# Patient Record
Sex: FEMALE | Race: WHITE | NOT HISPANIC OR LATINO | Employment: FULL TIME | URBAN - METROPOLITAN AREA
[De-identification: names, ages, dates, MRNs, and addresses within clinical notes are randomized per-mention and may not be internally consistent; named-entity substitution may affect disease eponyms.]

---

## 2017-01-18 ENCOUNTER — PATIENT MESSAGE (OUTPATIENT)
Dept: OBSTETRICS AND GYNECOLOGY | Facility: CLINIC | Age: 42
End: 2017-01-18

## 2017-01-25 ENCOUNTER — PATIENT MESSAGE (OUTPATIENT)
Dept: OBSTETRICS AND GYNECOLOGY | Facility: CLINIC | Age: 42
End: 2017-01-25

## 2017-01-27 ENCOUNTER — CLINICAL SUPPORT (OUTPATIENT)
Dept: OBSTETRICS AND GYNECOLOGY | Facility: CLINIC | Age: 42
End: 2017-01-27
Payer: COMMERCIAL

## 2017-01-27 DIAGNOSIS — Z78.0 MENOPAUSE: Primary | ICD-10-CM

## 2017-01-27 PROCEDURE — 96372 THER/PROPH/DIAG INJ SC/IM: CPT | Mod: S$GLB,,, | Performed by: OBSTETRICS & GYNECOLOGY

## 2017-01-27 PROCEDURE — 99999 PR PBB SHADOW E&M-EST. PATIENT-LVL II: CPT | Mod: PBBFAC,,,

## 2017-01-27 NOTE — PROGRESS NOTES
Here for  hormone therapy injection, no complaints at this time , Injection given as ordered, tolerated well, no report of pain prior to or after injection. Return to clinic as scheduled.     Depo Estradiol   10      mg    Clinic Supplied Medication

## 2017-02-06 ENCOUNTER — PATIENT MESSAGE (OUTPATIENT)
Dept: OBSTETRICS AND GYNECOLOGY | Facility: CLINIC | Age: 42
End: 2017-02-06

## 2017-02-24 ENCOUNTER — CLINICAL SUPPORT (OUTPATIENT)
Dept: OBSTETRICS AND GYNECOLOGY | Facility: CLINIC | Age: 42
End: 2017-02-24
Payer: COMMERCIAL

## 2017-02-24 ENCOUNTER — OFFICE VISIT (OUTPATIENT)
Dept: OBSTETRICS AND GYNECOLOGY | Facility: CLINIC | Age: 42
End: 2017-02-24
Attending: OBSTETRICS & GYNECOLOGY
Payer: COMMERCIAL

## 2017-02-24 VITALS
WEIGHT: 167.56 LBS | HEIGHT: 70 IN | BODY MASS INDEX: 23.99 KG/M2 | SYSTOLIC BLOOD PRESSURE: 108 MMHG | DIASTOLIC BLOOD PRESSURE: 72 MMHG

## 2017-02-24 DIAGNOSIS — N64.4 BREAST PAIN: Primary | ICD-10-CM

## 2017-02-24 DIAGNOSIS — Z78.0 MENOPAUSE: Primary | ICD-10-CM

## 2017-02-24 PROCEDURE — 99213 OFFICE O/P EST LOW 20 MIN: CPT | Mod: S$GLB,,, | Performed by: OBSTETRICS & GYNECOLOGY

## 2017-02-24 PROCEDURE — 1160F RVW MEDS BY RX/DR IN RCRD: CPT | Mod: S$GLB,,, | Performed by: OBSTETRICS & GYNECOLOGY

## 2017-02-24 PROCEDURE — 99999 PR PBB SHADOW E&M-EST. PATIENT-LVL II: CPT | Mod: PBBFAC,,, | Performed by: OBSTETRICS & GYNECOLOGY

## 2017-02-24 PROCEDURE — 96372 THER/PROPH/DIAG INJ SC/IM: CPT | Mod: S$GLB,,, | Performed by: OBSTETRICS & GYNECOLOGY

## 2017-02-24 PROCEDURE — 99999 PR PBB SHADOW E&M-EST. PATIENT-LVL II: CPT | Mod: PBBFAC,,,

## 2017-02-24 RX ORDER — PREDNISONE 10 MG/1
TABLET ORAL
Refills: 0 | COMMUNITY
Start: 2017-02-20 | End: 2017-04-03

## 2017-02-24 NOTE — MR AVS SNAPSHOT
[facility] [address]  [address] [address]  Phone: [phone]                  Mirian العلي   2017 9:30 AM   Appointment    Description:  Female : 1975   Provider:  GYN, INJECTION   Department:  [facility] [address]                To Do List           Future Appointments        Provider Department Dept Phone    2017 10:15 AM Genet Stein MD [facility] [address] [phone]      Goals (5 Years of Data)     None      [facility]     [facility] -  Assistance  Registered nurses in the [facility] provide clinical advisement, health education, appointment booking, and other advisory services.  Call for this free service at [phone].             Medications           Message regarding Medications     Verify the changes and/or additions to your medication regime listed below are the same as discussed with your clinician today.  If any of these changes or additions are incorrect, please notify your healthcare provider.             Verify that the below list of medications is an accurate representation of the medications you are currently taking.  If none reported, the list may be blank. If incorrect, please contact your healthcare provider. Carry this list with you in case of emergency.           Current Medications     acetaminophen (TYLENOL) 500 MG tablet Take 500 mg by mouth every 6 (six) hours as needed.      alprazolam (XANAX) 1 MG tablet TAKE 1 TABLET BY MOUTH FOUR TIMES DAILY AS NEEDED    aspirin (ECOTRIN) 81 MG EC tablet Take 81 mg by mouth once daily.    cetirizine (ZYRTEC) 10 MG tablet Take 1 tablet (10 mg total) by mouth once daily.    cyanocobalamin, vitamin B-12, 5,000 mcg Subl Place under the tongue once a week.    estradiol (VIVELLE-DOT) 0.1 mg/24 hr PTSW Apply 2 patches ( yes two) to skin twice weekly as directed.    fluticasone (FLONASE) 50 mcg/actuation nasal spray PLACE 2 SPRAYS  IN EACH NOSTRIL ONCE DAILY    gabapentin (NEURONTIN) 300 MG capsule Take 300 mg by mouth once as needed.     hydrocodone-acetaminophen 10-325mg (NORCO)  mg Tab TK 1 T PO Q 4 TO 6 H PRN P RELIEF.    ibuprofen (ADVIL,MOTRIN) 200 MG tablet Take 200 mg by mouth every 6 (six) hours as needed.             Clinical Reference Information           Your Vitals Were     Last Period                   02/01/2014           Allergies as of 2/24/2017     Niacin Preparations    Sulfa (Sulfonamide Antibiotics)    Iodine And Iodide Containing Products      Immunizations Administered on Date of Encounter - 2/24/2017     None      Language Assistance Services     ATTENTION: Language assistance services are available, free of charge. Please call [phone].      ATENCIÓN: Si juan daley, tiene a banks disposición servicios gratuitos de asistencia lingüística. Llame al [phone].     CHÚ Ý: N?u b?n nói Ti?ng Vi?t, có các d?ch v? h? tr? ngôn ng? mi?n phí dành cho b?n. G?i s? [phone].         [facility] [address] complies with applicable Federal civil rights laws and does not discriminate on the basis of race, color, national origin, age, disability, or sex.

## 2017-02-24 NOTE — PROGRESS NOTES
Here for  hormone therapy injection, no complaints at this time , Injection given as ordered, tolerated well, no report of pain prior to or after injection. Return to clinic as scheduled.     Site -LB    Depo Estradiol  10 mg    Clinic Supplied Medication

## 2017-02-24 NOTE — PROGRESS NOTES
Subjective:       Patient ID: Mirian العلي is a 41 y.o. female.    Chief Complaint:  Follow-up (breast tenderness)      History of Present Illness  HPI  This 41 yr old P2 female is here for her Estradiol injection and she had her first one last month and about week later had sharp shooting pain in her right breast that woke her up .  She had normal mamogram in 2016.  Now no tenderness or shooting pain and only had that night.  We discussed and her fear is breast ca.  We re discussed E2 and risks and benefits and she does feel better on the shots.    GYN & OB History  Patient's last menstrual period was 2014.   Date of Last Pap: 3/1/2014    OB History    Para Term  AB SAB TAB Ectopic Multiple Living   4 2 2  2 2          # Outcome Date GA Lbr Elan/2nd Weight Sex Delivery Anes PTL Lv   4 SAB            3 SAB            2 Term            1 Term               Obstetric Comments   Menarche age 14. LMP age 39 (postsurgical).   First child born age 30.   History of abnormal PAP smear: NO.   History of abnormal mammogram: NO.   History of sexually transmitted disease:  NO             Review of Systems  Review of Systems        Objective:    Physical Exam:        Pulmonary/Chest:                                   Assessment:        1. Breast pain               Plan:      Follow up prn

## 2017-03-02 ENCOUNTER — PATIENT MESSAGE (OUTPATIENT)
Dept: OBSTETRICS AND GYNECOLOGY | Facility: CLINIC | Age: 42
End: 2017-03-02

## 2017-03-24 ENCOUNTER — CLINICAL SUPPORT (OUTPATIENT)
Dept: OBSTETRICS AND GYNECOLOGY | Facility: CLINIC | Age: 42
End: 2017-03-24
Payer: COMMERCIAL

## 2017-03-24 DIAGNOSIS — Z78.0 MENOPAUSE: Primary | ICD-10-CM

## 2017-03-24 PROCEDURE — 96372 THER/PROPH/DIAG INJ SC/IM: CPT | Mod: S$GLB,,, | Performed by: OBSTETRICS & GYNECOLOGY

## 2017-03-24 PROCEDURE — 99999 PR PBB SHADOW E&M-EST. PATIENT-LVL II: CPT | Mod: PBBFAC,,,

## 2017-03-24 NOTE — PROGRESS NOTES
Here for  hormone therapy injection, no complaints at this time , Injection given as ordered, tolerated well, no report of pain prior to or after injection. Return to clinic as scheduled.     Site - RB      Depo Estradiol  10 mg    Clinic Supplied Medication

## 2017-04-03 ENCOUNTER — OFFICE VISIT (OUTPATIENT)
Dept: ALLERGY | Facility: CLINIC | Age: 42
End: 2017-04-03
Payer: COMMERCIAL

## 2017-04-03 VITALS
DIASTOLIC BLOOD PRESSURE: 70 MMHG | BODY MASS INDEX: 22.9 KG/M2 | HEIGHT: 71 IN | WEIGHT: 163.56 LBS | SYSTOLIC BLOOD PRESSURE: 102 MMHG

## 2017-04-03 DIAGNOSIS — R09.89 CHRONIC THROAT CLEARING: ICD-10-CM

## 2017-04-03 DIAGNOSIS — R51.9 FACIAL PAIN: ICD-10-CM

## 2017-04-03 DIAGNOSIS — R51.9 GENERALIZED HEADACHES: ICD-10-CM

## 2017-04-03 DIAGNOSIS — D68.51 FACTOR V LEIDEN CARRIER: ICD-10-CM

## 2017-04-03 DIAGNOSIS — J30.89 NON-SEASONAL ALLERGIC RHINITIS DUE TO OTHER ALLERGIC TRIGGER: Primary | ICD-10-CM

## 2017-04-03 PROCEDURE — 1160F RVW MEDS BY RX/DR IN RCRD: CPT | Mod: S$GLB,,, | Performed by: ALLERGY & IMMUNOLOGY

## 2017-04-03 PROCEDURE — 99999 PR PBB SHADOW E&M-EST. PATIENT-LVL III: CPT | Mod: PBBFAC,,, | Performed by: ALLERGY & IMMUNOLOGY

## 2017-04-03 PROCEDURE — 99204 OFFICE O/P NEW MOD 45 MIN: CPT | Mod: S$GLB,,, | Performed by: ALLERGY & IMMUNOLOGY

## 2017-04-03 RX ORDER — AZELASTINE 1 MG/ML
1-2 SPRAY, METERED NASAL 2 TIMES DAILY PRN
Qty: 30 ML | Refills: 5 | Status: SHIPPED | OUTPATIENT
Start: 2017-04-03 | End: 2018-10-17

## 2017-04-03 NOTE — PROGRESS NOTES
"Mirian Gill is a 41-year-old female who presents to clinic today for evaluation of allergic rhinitis. She was last seen by me on January 17, 2013.    Her ImmunoCAPs at that time were positive for dust mites.  She did house dust mite avoidance and her symptoms improved.    She has been taking Flonase daily since then.    She has been taking either Zyrtec or Benadryl on most days.    She does have itching without a rash as well. She takes Benadryl for this. Her last one was this morning. She prefers Benadryl over Zyrtec for this.    Over the last several months her symptoms have been increasing in severity. In early February she had a viral upper respiratory infection and saw her ENT, Dr. Danny Pepe.    She had increased right ear pain associated with decreased hearing. She had 2 tests done by Dr. Pepe. The one at the beginning of her symptoms showed decreased hearing. The second one after a course of prednisone showed improvement.    She did take a Medrol Dosepak. She did not take any antibiotics.    After this her ear pain did improve.    She has continued to have increased facial discomfort over the maxillary and frontal sinus areas. She has had this before her last visit in 2013. She has not had a sinus CT.    She also has had increased postnasal drip, frequent throat clearing, a sensation that something is in the back throat, mucus in throat, difficulty swallowing, and occasional hoarseness.     She denies any cough, wheezing, or shortness of breath. She was told in the past that she had "a little asthma" after seeing Dr. Dasia Madison, her PCP at the time, and having a PFT done.    She has had reflux in the past but has been mild. She is currently not having.    She has a factor V Leiden deficiency. She has never had a DVT.    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    Her brother is Rudi Gill. She has 2 daughters ages 8-1/2 and 11 that are in the [employer]. She is an  for the [employer].    [facility] PEQ ALLERGY QUESTIONNAIRE LONG 3/31/2017   Do you have symptoms in your head, eyes, ears, nose, or sinuses? Yes   Head or facial pain: Headaches, Sinus pressure   Please describe your head and/or facial pain, if applicable.  pressure across my sinus and forehead   Eyes: Itching, Tearing, Redness, Contacts, Eye discharge, Sensitivity to light   Do you have difficulty wearing contacts, if applicable?  No   Ears: Itching, Fullness, Pressure, Difficulty hearing, Infections   When did you have ear infections, if applicable? Viral infection in early February 2017   Nose: Itching, Post nasal drip, Sniffling, Sneezing, Runny nose, Snoring, Broken nose   When did you break your nose, if applicable? 1993   Throat: Itching, Hoarseness, Frequent clearing   Sinuses: Sinus infections   Do you have symptoms in your lungs?  Yes   Lungs: Cough, Shortness of breath   Is your cough productive of mucus and/or phlegm , if applicable? Yes   Have you ever has a tuberculosis skin test?  Yes   When did you have a tuberculosis skin test, if applicable? years ago   Was your tuberculosis skin test positive or negative, if applicable? Negative   Have you ever had a lung-function test? Yes   When was your lung-function test, if applicable? years ago   Have you had a flu shot this year? Yes   When was your flu shot, if applicable? August 2016   Have you had the pneumonia vaccine?  No   Do you have any known problems with your immune system? No   Do you suspect you may have problems with your immune system? No   Do you have frequent infections? No   Do you have skin symptoms? Yes   Skin: Itching   Have you associated the hives or swelling with any of the following? Not applicable   Have you had any other associated symptoms with the hives or swelling such as: Not applicable   When did these symptoms first occur? Early February 2017   Are they getting worse or  better? Better   How often do these symptoms occur? More often than not   When do these symptoms occur? All the time   Do they occur year round? Yes   If there is any seasonal variation in your symptoms, when are they worse? They have been worse since February, and also improved in some ways like the ear feeling less full.   Is there a particular time of the day or night when the symptoms are worse? No   Is there anything you have identified, which can cause symptoms or make them worse? (such as dust, grass, plant or animal products, mold, heat, cold, strong odors, exercise) No   Is there anything you have identified, which can make symptoms better?  Steam can help open my ears short term   What medications have you tried in the past to help control these symptoms?  Prednisone, Flonase, Zyrtec, Benadryl   Please list all the vitamins or herbal medications you are taking. B12 sublingual   Please list all the other medications you are taking, including over-the-counter medications. Estrogen injection monthly, baby aspirin, naproxen for pain as needed, xanax as needed for anxiety   Have you ever seen an allergist for these symptoms? Yes   When did you see the allergist, if applicable? 2013   Have you ever had skin tests? No   Have you ever had any other type of allergy testing? No   When did you have allergy tests, if applicable?  2013   Have you ever had allergy shots? No   Do you have food allergies? No   Do you have drug allergies? Yes   Please list the drug(s), type of reaction(s), last date of reaction(s), and if you have used the medication since discovering you are allergic.  Sulfa- swelling tongue- 2015, no use since, iodine contrast- hives- 2007? no use since, Niacin- flushing- 2003? no use since   Do you have insect allergies? No   Do you have latex allergies? No   Constitution: Activity change   Cardiovascular: No symptoms   Gastrointestinal: Nausea, vomiting   Genital/ urinary No symptoms   Musculoskeletal:  No symptoms   Endocrine: Headaches, Light-headedness   Hematologic: Adenopathy/ enlarged lymph nodes, Bruises/ bleeds easily   Please note which family members have allergies or asthma and specify which they have. Brother has asthma and allergies, mother had allergies   How long have you lived at your current address? 10 years   Has your residence ever had water or flood damage? No   Does your house have: Central air conditioning, Electrostatic air , HEPA filters   Does your bedroom have: Carpeting, Ceiling fan   What type of pillow do you have, for example feather, foam and fiberfill?  feather and fiberfill    Do you have pets? No   Does anyone in the house smoke? 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 Please specify which symptoms, if applicable.  no   Did you find this questionnaire helpful in addressing your symptoms?  Yes     Physical Examination:  General: Well-developed, well-nourished, no acute distress. Clearing throat throughout interview.  Head: No sinus tenderness.  Eyes: Conjunctivae:  No bulbar or palpebral conjunctival injection.  Ears: EAC's clear.  TM's clear.  No pre-auricular nodes.  Nose: Nasal Mucosa:  Pink.  Septum: No apparent deviation.  Turbinates:  No significant edema.  Polyps/Mass:  None visible.  Teeth/Gums:  No bleeding noted.  Oropharynx: No exudates.  Neck: Supple without thyromegaly. No cervical lymphadenopathy.    Respiratory/Chest: Effort: Good.  Auscultation:  Clear bilaterally.  Cardiovascular:  No murmur, rubs, or gallop heard.   GI:  Non-tender.  No masses.  No organomegaly.  Extremities:  No swelling.  No cyanosis, clubbing, or edema.  Skin: Good turgor.  No urticaria or angioedema.  Neuro/Psych: Oriented x 3.    Assessment:  1. Allergic rhinitis.  2. Facial discomfort, consider chronic sinusitis.  3. Consider LPR.  4. Factor V Leiden deficiency.    Recommendations:  1. Continue house dust mite avoidance.  2. Continue  Flonase daily.  3. OTC antihistamines as needed.  4. Add azelastine 1-2 sprays each nostril twice a day when necessary rhinitis.  5. Consider sinus CT if symptoms are not completely controlled.  6. Consider ENT evaluation of LPR. This was discussed.  7. Return to clinic in 2 weeks.    LPR and website were reviewed.

## 2017-04-19 ENCOUNTER — OFFICE VISIT (OUTPATIENT)
Dept: ALLERGY | Facility: CLINIC | Age: 42
End: 2017-04-19
Payer: COMMERCIAL

## 2017-04-19 VITALS
HEIGHT: 71 IN | OXYGEN SATURATION: 100 % | SYSTOLIC BLOOD PRESSURE: 100 MMHG | WEIGHT: 163.81 LBS | HEART RATE: 70 BPM | DIASTOLIC BLOOD PRESSURE: 60 MMHG | BODY MASS INDEX: 22.93 KG/M2

## 2017-04-19 DIAGNOSIS — R51.9 FACIAL PAIN: ICD-10-CM

## 2017-04-19 DIAGNOSIS — J30.89 NON-SEASONAL ALLERGIC RHINITIS DUE TO OTHER ALLERGIC TRIGGER: Primary | ICD-10-CM

## 2017-04-19 PROCEDURE — 1160F RVW MEDS BY RX/DR IN RCRD: CPT | Mod: S$GLB,,, | Performed by: ALLERGY & IMMUNOLOGY

## 2017-04-19 PROCEDURE — 99214 OFFICE O/P EST MOD 30 MIN: CPT | Mod: S$GLB,,, | Performed by: ALLERGY & IMMUNOLOGY

## 2017-04-19 PROCEDURE — 99999 PR PBB SHADOW E&M-EST. PATIENT-LVL III: CPT | Mod: PBBFAC,,, | Performed by: ALLERGY & IMMUNOLOGY

## 2017-04-19 NOTE — PROGRESS NOTES
Mirian Gill return to clinic today for continued evaluation of allergic rhinitis. She was last seen April 3, 2017.    After her last visit, she started taking azelastine at least once a day. She does think that this has helped. Her ear pain has improved.    She does continue to take Flonase and Zyrtec daily.    She has had increased drowsiness since starting azelastine.    She saw Dr. Danny Pepe in ENT on April 10, 2017. He did a swab of her nose and told her he grew staph. He gave her 3 days of azithromycin. She does not think that this helped.    She continues to have clear rhinorrhea. She thinks that after using Astelin this may increase.    She continues to have pressure over her maxillary and frontal areas. She continues to have a sensation of a lump in the throat, hoarseness, and throat clearing.    Dr. Pepe did do a laryngoscopy and did not see evidence of reflux.    They did discuss doing an MRI or sinus CT. She is to discuss with him after the visit today.    [facility] PEQ ALLERGY QUESTIONNAIRE SHORT 4/18/2017   Are you taking any new medications since your last visit? Yes   Constitution: Activity change, Fatigue   Head or facial pain: Headaches, Sinus pressure   Eyes: Contacts   Ears: Fullness, Pressure, Difficulty hearing   Nose: Post nasal drip, Sniffling   Throat: Frequent clearing   Sinuses: No symptoms   Lungs: No symptoms   Skin: Itching   Cardiovascular: No symptoms   Gastrointestinal: No symptoms   Genital/ urinary No symptoms   Musculoskeletal: No symptoms   Neurologic: Headaches   Endocrine: No symptoms   Hematologic: Bruises/ bleeds easily     Physical Examination:  General: Well-developed, well-nourished, no acute distress. Clearing throat throughout interview.  Head: No sinus tenderness.  Eyes: Conjunctivae:  No bulbar or palpebral conjunctival injection.  Ears: EAC's clear.  TM's clear.  No pre-auricular nodes.  Nose: Nasal Mucosa:  Pink.  Septum: No apparent deviation.  Turbinates:  No  significant edema.  Polyps/Mass:  None visible.  Teeth/Gums:  No bleeding noted.  Oropharynx: No exudates.  Neck: Supple without thyromegaly. No cervical lymphadenopathy.    Respiratory/Chest: Effort: Good.  Auscultation:  Clear bilaterally.  Skin: Good turgor.  No urticaria or angioedema.  Neuro/Psych: Oriented x 3.    Laboratory 1/10/2013:  IgE level: 109.  ImmunoCAP:  Class II: Dust mites.    Assessment:  1. Allergic rhinitis.  2. Facial discomfort, consider chronic sinusitis.  3. Consider LPR.  4. Factor V Leiden deficiency.    Recommendations:  1. Continue house dust mite avoidance.  2. Continue Flonase daily.  3. OTC antihistamines as needed.  4. Continue azelastine 1-2 sprays each nostril twice a day when necessary rhinitis unless drowsiness persists.  5. Consider sinus CT or MRI if symptoms persist. She will discuss with Dr. Pepe.  6. Consider laryngologist evaluation.  7. Return to clinic after above.

## 2017-04-24 ENCOUNTER — CLINICAL SUPPORT (OUTPATIENT)
Dept: OBSTETRICS AND GYNECOLOGY | Facility: CLINIC | Age: 42
End: 2017-04-24
Payer: COMMERCIAL

## 2017-04-24 DIAGNOSIS — R68.82 DECREASED LIBIDO: Primary | ICD-10-CM

## 2017-04-24 DIAGNOSIS — N95.1 MENOPAUSAL SYMPTOMS: ICD-10-CM

## 2017-04-24 PROCEDURE — 99999 PR PBB SHADOW E&M-EST. PATIENT-LVL II: CPT | Mod: PBBFAC,,,

## 2017-04-24 PROCEDURE — 96372 THER/PROPH/DIAG INJ SC/IM: CPT | Mod: S$GLB,,, | Performed by: OBSTETRICS & GYNECOLOGY

## 2017-04-24 NOTE — PROGRESS NOTES
Here for  hormone therapy injection, no complaints at this time , Injection given as ordered, tolerated well, no report of pain prior to or after injection. Return to clinic as scheduled.     Site - LB    Depo Estradiol  10 mg    Clinic Supplied Medication

## 2017-04-25 ENCOUNTER — TELEPHONE (OUTPATIENT)
Dept: ALLERGY | Facility: CLINIC | Age: 42
End: 2017-04-25

## 2017-04-25 ENCOUNTER — PATIENT MESSAGE (OUTPATIENT)
Dept: ALLERGY | Facility: CLINIC | Age: 42
End: 2017-04-25

## 2017-04-25 NOTE — TELEPHONE ENCOUNTER
----- Message from Jolie Garrison sent at 4/25/2017  9:01 AM CDT -----  Contact: Dr.Richard Doty/[phone]   said that he is calling in regards to needing to speak with  about pt. Please sarah and advise          Thank you

## 2017-05-08 ENCOUNTER — LAB VISIT (OUTPATIENT)
Dept: LAB | Facility: OTHER | Age: 42
End: 2017-05-08
Attending: OBSTETRICS & GYNECOLOGY
Payer: COMMERCIAL

## 2017-05-08 DIAGNOSIS — R68.82 DECREASED LIBIDO: ICD-10-CM

## 2017-05-08 DIAGNOSIS — N95.1 MENOPAUSAL SYMPTOMS: ICD-10-CM

## 2017-05-08 LAB — ESTRADIOL SERPL-MCNC: 65 PG/ML

## 2017-05-08 PROCEDURE — 84402 ASSAY OF FREE TESTOSTERONE: CPT

## 2017-05-08 PROCEDURE — 82670 ASSAY OF TOTAL ESTRADIOL: CPT

## 2017-05-10 ENCOUNTER — TELEPHONE (OUTPATIENT)
Dept: OBSTETRICS AND GYNECOLOGY | Facility: CLINIC | Age: 42
End: 2017-05-10

## 2017-05-10 ENCOUNTER — PATIENT MESSAGE (OUTPATIENT)
Dept: OBSTETRICS AND GYNECOLOGY | Facility: CLINIC | Age: 42
End: 2017-05-10

## 2017-05-10 ENCOUNTER — OFFICE VISIT (OUTPATIENT)
Dept: OBSTETRICS AND GYNECOLOGY | Facility: CLINIC | Age: 42
End: 2017-05-10
Attending: OBSTETRICS & GYNECOLOGY
Payer: COMMERCIAL

## 2017-05-10 DIAGNOSIS — Z78.0 MENOPAUSE: Primary | ICD-10-CM

## 2017-05-10 PROCEDURE — 96372 THER/PROPH/DIAG INJ SC/IM: CPT | Mod: S$GLB,,, | Performed by: OBSTETRICS & GYNECOLOGY

## 2017-05-10 PROCEDURE — 99214 OFFICE O/P EST MOD 30 MIN: CPT | Mod: 25,S$GLB,, | Performed by: OBSTETRICS & GYNECOLOGY

## 2017-05-10 RX ORDER — TESTOSTERONE CYPIONATE 200 MG/ML
50 INJECTION, SOLUTION INTRAMUSCULAR
Status: COMPLETED | OUTPATIENT
Start: 2017-05-11 | End: 2017-05-10

## 2017-05-10 RX ADMIN — TESTOSTERONE CYPIONATE 50 MG: 200 INJECTION, SOLUTION INTRAMUSCULAR at 03:05

## 2017-05-10 NOTE — PROGRESS NOTES
Subjective:       Patient ID: Mirian العلي is a 41 y.o. female.    Chief Complaint:  No chief complaint on file.      History of Present Illness  HPI  This 41 yr old P2 female is here for HRT discussion.  She has history of endometriosis and Hyst BSO and we are having a terrible time with her hormones.  She received a shot of estradiol last month and two weeks later her estrogen level was only 65.  She has gotten 4 injections of estradiol.  We had a very long discussion about this and discussed pellets and adding testosterone to the estradiol today.  She is ok trying this and then going to the pellets.  She is taking aspirin daily for her history of heterozygous factor V leiden    GYN & OB History  Patient's last menstrual period was 2014.   Date of Last Pap: 3/1/2014    OB History    Para Term  AB SAB TAB Ectopic Multiple Living   4 2 2  2 2          # Outcome Date GA Lbr Elan/2nd Weight Sex Delivery Anes PTL Lv   4 SAB            3 SAB            2 Term            1 Term               Obstetric Comments   Menarche age 14. LMP age 39 (postsurgical).   First child born age 30.   History of abnormal PAP smear: NO.   History of abnormal mammogram: NO.   History of sexually transmitted disease:  NO             Review of Systems  Review of Systems   Constitutional: Negative for chills and fever.   Respiratory: Negative for shortness of breath.    Cardiovascular: Negative for chest pain.   Gastrointestinal: Negative for abdominal pain, nausea and vomiting.   Endocrine: Positive for heat intolerance.   Genitourinary: Negative for difficulty urinating, dyspareunia, genital sores, menstrual problem, pelvic pain, vaginal bleeding, vaginal discharge and vaginal pain.   Skin: Negative for wound.   Hematological: Negative for adenopathy.   Psychiatric/Behavioral: Positive for decreased concentration.           Objective:    Physical Exam       Assessment:        1. Menopause               Plan:       Give her Im E/T 10/50  and may do pellets in month.  We are hoping that the synergist effect will help

## 2017-05-10 NOTE — TELEPHONE ENCOUNTER
----- Message from Amanda Dominguez sent at 5/10/2017 11:11 AM CDT -----  Contact: Patient  X _1st Request  _  2nd Request  _  3rd Request    Who:RENAE MONK [[id number]]    Why:Patient was returning a call back from the staff     What Number to Call Back:[phone]    When to Expect a call back: (Before the end of the day)   -- if call after 3:00 call back will be tomorrow.

## 2017-05-10 NOTE — TELEPHONE ENCOUNTER
----- Message from Genet Stein MD sent at 5/10/2017 10:59 AM CDT -----  Please call this pt and fit her in this afternoon or Friday or as soon as she can.  Thanks  (not emergency she is just frustrated)

## 2017-05-11 ENCOUNTER — PATIENT MESSAGE (OUTPATIENT)
Dept: OBSTETRICS AND GYNECOLOGY | Facility: CLINIC | Age: 42
End: 2017-05-11

## 2017-05-11 LAB — TESTOST FREE SERPL-MCNC: 0.6 PG/ML

## 2017-05-24 ENCOUNTER — LAB VISIT (OUTPATIENT)
Dept: LAB | Facility: OTHER | Age: 42
End: 2017-05-24
Attending: OBSTETRICS & GYNECOLOGY
Payer: COMMERCIAL

## 2017-05-24 DIAGNOSIS — Z78.0 MENOPAUSE: ICD-10-CM

## 2017-05-24 LAB — ESTRADIOL SERPL-MCNC: 112 PG/ML

## 2017-05-24 PROCEDURE — 82670 ASSAY OF TOTAL ESTRADIOL: CPT

## 2017-05-24 PROCEDURE — 84402 ASSAY OF FREE TESTOSTERONE: CPT

## 2017-05-24 PROCEDURE — 36415 COLL VENOUS BLD VENIPUNCTURE: CPT

## 2017-05-25 ENCOUNTER — PATIENT MESSAGE (OUTPATIENT)
Dept: ALLERGY | Facility: CLINIC | Age: 42
End: 2017-05-25

## 2017-05-25 ENCOUNTER — TELEPHONE (OUTPATIENT)
Dept: ALLERGY | Facility: CLINIC | Age: 42
End: 2017-05-25

## 2017-05-25 ENCOUNTER — PATIENT MESSAGE (OUTPATIENT)
Dept: OBSTETRICS AND GYNECOLOGY | Facility: CLINIC | Age: 42
End: 2017-05-25

## 2017-05-25 DIAGNOSIS — Z78.0 MENOPAUSE: Primary | ICD-10-CM

## 2017-05-25 NOTE — TELEPHONE ENCOUNTER
Hi called patient per Dr. Guthrie' request.  No answer, left voicemail msg to see how she's doing.  Advised that I would also try sending her a message through [facility].

## 2017-05-26 ENCOUNTER — TELEPHONE (OUTPATIENT)
Dept: OBSTETRICS AND GYNECOLOGY | Facility: CLINIC | Age: 42
End: 2017-05-26

## 2017-05-26 LAB — TESTOST FREE SERPL-MCNC: 1.1 PG/ML

## 2017-05-26 RX ORDER — PROGESTERONE 100 MG/1
100 CAPSULE ORAL NIGHTLY
Qty: 30 CAPSULE | Refills: 11 | Status: SHIPPED | OUTPATIENT
Start: 2017-05-26 | End: 2017-12-04 | Stop reason: DRUGHIGH

## 2017-05-26 NOTE — TELEPHONE ENCOUNTER
----- Message from Sabina Soni sent at 5/26/2017  9:33 AM CDT -----  Contact: RENAE MONK [[id number]]  _x  1st Request  _  2nd Request  _  3rd Request        Who: RENAE MONK [[id number]]    Why: Patient would like a call back as to how soon she can get the progesterone. Please advise, thanks.     What Number to Call Back: [phone]    When to Expect a call back: (Before the end of the day)   -- if call after 3:00 call back will be tomorrow.

## 2017-05-29 ENCOUNTER — PATIENT MESSAGE (OUTPATIENT)
Dept: OBSTETRICS AND GYNECOLOGY | Facility: CLINIC | Age: 42
End: 2017-05-29

## 2017-05-30 ENCOUNTER — TELEPHONE (OUTPATIENT)
Dept: OBSTETRICS AND GYNECOLOGY | Facility: CLINIC | Age: 42
End: 2017-05-30

## 2017-05-30 NOTE — TELEPHONE ENCOUNTER
----- Message from Genet Stein MD sent at 5/30/2017  1:00 PM CDT -----  Please call with injection appointment for 3 weeks after her last injection.

## 2017-05-31 ENCOUNTER — CLINICAL SUPPORT (OUTPATIENT)
Dept: OBSTETRICS AND GYNECOLOGY | Facility: CLINIC | Age: 42
End: 2017-05-31
Payer: COMMERCIAL

## 2017-05-31 DIAGNOSIS — R68.82 LIBIDO, DECREASED: Primary | ICD-10-CM

## 2017-05-31 PROCEDURE — 99999 PR PBB SHADOW E&M-EST. PATIENT-LVL II: CPT | Mod: PBBFAC,,,

## 2017-05-31 PROCEDURE — 96372 THER/PROPH/DIAG INJ SC/IM: CPT | Mod: S$GLB,,, | Performed by: OBSTETRICS & GYNECOLOGY

## 2017-05-31 RX ORDER — TESTOSTERONE CYPIONATE 200 MG/ML
50 INJECTION, SOLUTION INTRAMUSCULAR
Status: COMPLETED | OUTPATIENT
Start: 2017-05-31 | End: 2017-06-21

## 2017-05-31 RX ADMIN — TESTOSTERONE CYPIONATE 50 MG: 200 INJECTION, SOLUTION INTRAMUSCULAR at 08:05

## 2017-05-31 NOTE — PROGRESS NOTES
Here for  hormone therapy injection, no complaints at this time , Injection given as ordered, tolerated well, no report of pain prior to or after injection. Return to clinic as scheduled.     Site - RB    Testosterone  50 mg  Depo Estradiol  10 mg    Clinic Supplied Medication

## 2017-06-02 ENCOUNTER — PATIENT MESSAGE (OUTPATIENT)
Dept: OBSTETRICS AND GYNECOLOGY | Facility: CLINIC | Age: 42
End: 2017-06-02

## 2017-06-20 ENCOUNTER — PATIENT MESSAGE (OUTPATIENT)
Dept: OBSTETRICS AND GYNECOLOGY | Facility: CLINIC | Age: 42
End: 2017-06-20

## 2017-06-21 ENCOUNTER — CLINICAL SUPPORT (OUTPATIENT)
Dept: OBSTETRICS AND GYNECOLOGY | Facility: CLINIC | Age: 42
End: 2017-06-21
Payer: COMMERCIAL

## 2017-06-21 DIAGNOSIS — Z78.0 MENOPAUSE: Primary | ICD-10-CM

## 2017-06-21 PROCEDURE — 96372 THER/PROPH/DIAG INJ SC/IM: CPT | Mod: S$GLB,,, | Performed by: OBSTETRICS & GYNECOLOGY

## 2017-06-21 PROCEDURE — 99999 PR PBB SHADOW E&M-EST. PATIENT-LVL II: CPT | Mod: PBBFAC,,,

## 2017-06-21 RX ADMIN — TESTOSTERONE CYPIONATE 50 MG: 200 INJECTION, SOLUTION INTRAMUSCULAR at 08:06

## 2017-07-21 ENCOUNTER — CLINICAL SUPPORT (OUTPATIENT)
Dept: OBSTETRICS AND GYNECOLOGY | Facility: CLINIC | Age: 42
End: 2017-07-21
Payer: COMMERCIAL

## 2017-07-21 DIAGNOSIS — Z79.890 HORMONE REPLACEMENT THERAPY (HRT): Primary | ICD-10-CM

## 2017-07-21 PROCEDURE — 99999 PR PBB SHADOW E&M-EST. PATIENT-LVL II: CPT | Mod: PBBFAC,,,

## 2017-07-21 PROCEDURE — 96372 THER/PROPH/DIAG INJ SC/IM: CPT | Mod: S$GLB,,, | Performed by: OBSTETRICS & GYNECOLOGY

## 2017-07-21 RX ADMIN — TESTOSTERONE CYPIONATE 50 MG: 200 INJECTION, SOLUTION INTRAMUSCULAR at 09:07

## 2017-07-25 RX ORDER — TESTOSTERONE CYPIONATE 200 MG/ML
50 INJECTION, SOLUTION INTRAMUSCULAR ONCE
Status: COMPLETED | OUTPATIENT
Start: 2017-07-25 | End: 2017-07-21

## 2017-08-18 ENCOUNTER — CLINICAL SUPPORT (OUTPATIENT)
Dept: OBSTETRICS AND GYNECOLOGY | Facility: CLINIC | Age: 42
End: 2017-08-18
Payer: COMMERCIAL

## 2017-08-18 DIAGNOSIS — Z79.890 HORMONE REPLACEMENT THERAPY (HRT): Primary | ICD-10-CM

## 2017-08-18 PROCEDURE — 96372 THER/PROPH/DIAG INJ SC/IM: CPT | Mod: S$GLB,,, | Performed by: OBSTETRICS & GYNECOLOGY

## 2017-08-18 PROCEDURE — 99999 PR PBB SHADOW E&M-EST. PATIENT-LVL II: CPT | Mod: PBBFAC,,,

## 2017-08-18 RX ORDER — TESTOSTERONE CYPIONATE 200 MG/ML
50 INJECTION, SOLUTION INTRAMUSCULAR
Status: COMPLETED | OUTPATIENT
Start: 2017-08-18 | End: 2017-09-15

## 2017-08-18 RX ADMIN — TESTOSTERONE CYPIONATE 50 MG: 200 INJECTION, SOLUTION INTRAMUSCULAR at 09:08

## 2017-08-30 ENCOUNTER — PATIENT MESSAGE (OUTPATIENT)
Dept: OBSTETRICS AND GYNECOLOGY | Facility: CLINIC | Age: 42
End: 2017-08-30

## 2017-09-15 ENCOUNTER — CLINICAL SUPPORT (OUTPATIENT)
Dept: OBSTETRICS AND GYNECOLOGY | Facility: CLINIC | Age: 42
End: 2017-09-15
Payer: COMMERCIAL

## 2017-09-15 PROCEDURE — 96372 THER/PROPH/DIAG INJ SC/IM: CPT | Mod: S$GLB,,, | Performed by: OBSTETRICS & GYNECOLOGY

## 2017-09-15 PROCEDURE — 99999 PR PBB SHADOW E&M-EST. PATIENT-LVL II: CPT | Mod: PBBFAC,,,

## 2017-09-15 RX ADMIN — TESTOSTERONE CYPIONATE 26 MG: 200 INJECTION, SOLUTION INTRAMUSCULAR at 09:09

## 2017-09-15 NOTE — PROGRESS NOTES
Here for  hormone therapy injection, no complaints at this time , Injection given as ordered, tolerated well, no report of pain prior to or after injection. Return to clinic as scheduled.     Site -  RB     Testosterone  26  mg  Depo Estradiol  10  mg    Clinic Supplied Medication      Dr Stein suggested to reduce dose. Patient agreed to try 26 mg

## 2017-10-16 ENCOUNTER — CLINICAL SUPPORT (OUTPATIENT)
Dept: OBSTETRICS AND GYNECOLOGY | Facility: CLINIC | Age: 42
End: 2017-10-16
Payer: COMMERCIAL

## 2017-10-16 DIAGNOSIS — Z79.890 HORMONE REPLACEMENT THERAPY (HRT): Primary | ICD-10-CM

## 2017-10-16 PROCEDURE — 96372 THER/PROPH/DIAG INJ SC/IM: CPT | Mod: S$GLB,,, | Performed by: OBSTETRICS & GYNECOLOGY

## 2017-10-16 PROCEDURE — 99999 PR PBB SHADOW E&M-EST. PATIENT-LVL II: CPT | Mod: PBBFAC,,,

## 2017-10-16 RX ORDER — TESTOSTERONE CYPIONATE 200 MG/ML
26 INJECTION, SOLUTION INTRAMUSCULAR
Status: COMPLETED | OUTPATIENT
Start: 2017-10-16 | End: 2017-12-11

## 2017-10-16 RX ADMIN — TESTOSTERONE CYPIONATE 26 MG: 200 INJECTION, SOLUTION INTRAMUSCULAR at 09:10

## 2017-10-16 NOTE — PROGRESS NOTES
Here for  hormone therapy injection, no complaints at this time , Injection given as ordered, tolerated well, no report of pain prior to or after injection. Return to clinic as scheduled.     Site - LB    Testosterone  26 mg  Depo Estradiol  10 mg    Clinic Supplied Medication

## 2017-10-24 ENCOUNTER — PATIENT MESSAGE (OUTPATIENT)
Dept: OBSTETRICS AND GYNECOLOGY | Facility: CLINIC | Age: 42
End: 2017-10-24

## 2017-10-24 DIAGNOSIS — R53.83 FATIGUE, UNSPECIFIED TYPE: Primary | ICD-10-CM

## 2017-10-26 ENCOUNTER — TELEPHONE (OUTPATIENT)
Dept: OBSTETRICS AND GYNECOLOGY | Facility: CLINIC | Age: 42
End: 2017-10-26

## 2017-10-26 NOTE — TELEPHONE ENCOUNTER
----- Message from Genet Stein MD sent at 10/25/2017  4:53 PM CDT -----  Please call this pt and set up lab draw

## 2017-10-27 ENCOUNTER — CLINICAL SUPPORT (OUTPATIENT)
Dept: LAB | Facility: OTHER | Age: 42
End: 2017-10-27
Attending: OBSTETRICS & GYNECOLOGY
Payer: COMMERCIAL

## 2017-10-27 DIAGNOSIS — R53.83 FATIGUE, UNSPECIFIED TYPE: ICD-10-CM

## 2017-10-27 LAB
ALBUMIN SERPL BCP-MCNC: 4.2 G/DL
ALP SERPL-CCNC: 52 U/L
ALT SERPL W/O P-5'-P-CCNC: 10 U/L
ANION GAP SERPL CALC-SCNC: 6 MMOL/L
AST SERPL-CCNC: 13 U/L
BASOPHILS # BLD AUTO: 0.03 K/UL
BASOPHILS NFR BLD: 0.7 %
BILIRUB SERPL-MCNC: 0.7 MG/DL
BUN SERPL-MCNC: 16 MG/DL
CALCIUM SERPL-MCNC: 9.3 MG/DL
CHLORIDE SERPL-SCNC: 105 MMOL/L
CHOLEST SERPL-MCNC: 151 MG/DL
CHOLEST/HDLC SERPL: 2.2 {RATIO}
CO2 SERPL-SCNC: 27 MMOL/L
CREAT SERPL-MCNC: 0.8 MG/DL
DIFFERENTIAL METHOD: NORMAL
EOSINOPHIL # BLD AUTO: 0.1 K/UL
EOSINOPHIL NFR BLD: 2.6 %
ERYTHROCYTE [DISTWIDTH] IN BLOOD BY AUTOMATED COUNT: 13.1 %
EST. GFR  (AFRICAN AMERICAN): >60 ML/MIN/1.73 M^2
EST. GFR  (NON AFRICAN AMERICAN): >60 ML/MIN/1.73 M^2
ESTIMATED AVG GLUCOSE: 91 MG/DL
GLUCOSE SERPL-MCNC: 89 MG/DL
HBA1C MFR BLD HPLC: 4.8 %
HCT VFR BLD AUTO: 39.6 %
HDLC SERPL-MCNC: 69 MG/DL
HDLC SERPL: 45.7 %
HGB BLD-MCNC: 13.2 G/DL
LDLC SERPL CALC-MCNC: 71.6 MG/DL
LYMPHOCYTES # BLD AUTO: 1.2 K/UL
LYMPHOCYTES NFR BLD: 28 %
MCH RBC QN AUTO: 28.9 PG
MCHC RBC AUTO-ENTMCNC: 33.3 G/DL
MCV RBC AUTO: 87 FL
MONOCYTES # BLD AUTO: 0.4 K/UL
MONOCYTES NFR BLD: 10.4 %
NEUTROPHILS # BLD AUTO: 2.5 K/UL
NEUTROPHILS NFR BLD: 58.1 %
NONHDLC SERPL-MCNC: 82 MG/DL
PLATELET # BLD AUTO: 207 K/UL
PMV BLD AUTO: 10.8 FL
POTASSIUM SERPL-SCNC: 4.4 MMOL/L
PROT SERPL-MCNC: 7.4 G/DL
RBC # BLD AUTO: 4.57 M/UL
SODIUM SERPL-SCNC: 138 MMOL/L
T3FREE SERPL-MCNC: 2.5 PG/ML
T4 SERPL-MCNC: 6.4 UG/DL
TRIGL SERPL-MCNC: 52 MG/DL
TSH SERPL DL<=0.005 MIU/L-ACNC: 2.62 UIU/ML
WBC # BLD AUTO: 4.25 K/UL

## 2017-10-27 PROCEDURE — 80053 COMPREHEN METABOLIC PANEL: CPT

## 2017-10-27 PROCEDURE — 84436 ASSAY OF TOTAL THYROXINE: CPT

## 2017-10-27 PROCEDURE — 84481 FREE ASSAY (FT-3): CPT

## 2017-10-27 PROCEDURE — 85025 COMPLETE CBC W/AUTO DIFF WBC: CPT

## 2017-10-27 PROCEDURE — 80061 LIPID PANEL: CPT

## 2017-10-27 PROCEDURE — 82530 CORTISOL FREE: CPT

## 2017-10-27 PROCEDURE — 36415 COLL VENOUS BLD VENIPUNCTURE: CPT

## 2017-10-27 PROCEDURE — 83036 HEMOGLOBIN GLYCOSYLATED A1C: CPT

## 2017-10-27 PROCEDURE — 84443 ASSAY THYROID STIM HORMONE: CPT

## 2017-10-28 ENCOUNTER — PATIENT MESSAGE (OUTPATIENT)
Dept: OBSTETRICS AND GYNECOLOGY | Facility: CLINIC | Age: 42
End: 2017-10-28

## 2017-11-01 ENCOUNTER — PATIENT MESSAGE (OUTPATIENT)
Dept: OBSTETRICS AND GYNECOLOGY | Facility: CLINIC | Age: 42
End: 2017-11-01

## 2017-11-08 ENCOUNTER — TELEPHONE (OUTPATIENT)
Dept: ENDOCRINOLOGY | Facility: CLINIC | Age: 42
End: 2017-11-08

## 2017-11-08 LAB — CORTIS F SERPL-MCNC: 0.42 MCG/DL

## 2017-11-08 NOTE — TELEPHONE ENCOUNTER
----- Message from Breana Andino sent at 11/8/2017  8:36 AM CST -----  Contact: Mirian    tel:   [phone]  Pt.says she is expecting a return call from her msg. That she says she left on the pt. Portal.    Asking if she even needs to see Dr. Biswas in February or not.   Recently had testing done and results were sent to the pt. Portal.    Sent you a msg. On this and is awaiting your response.   As per pt..   Pls call.

## 2017-11-13 ENCOUNTER — CLINICAL SUPPORT (OUTPATIENT)
Dept: OBSTETRICS AND GYNECOLOGY | Facility: CLINIC | Age: 42
End: 2017-11-13
Payer: COMMERCIAL

## 2017-11-13 DIAGNOSIS — Z79.890 HORMONE REPLACEMENT THERAPY (HRT): Primary | ICD-10-CM

## 2017-11-13 PROCEDURE — 96372 THER/PROPH/DIAG INJ SC/IM: CPT | Mod: S$GLB,,, | Performed by: OBSTETRICS & GYNECOLOGY

## 2017-11-13 PROCEDURE — 99999 PR PBB SHADOW E&M-EST. PATIENT-LVL II: CPT | Mod: PBBFAC,,,

## 2017-11-13 RX ADMIN — TESTOSTERONE CYPIONATE 26 MG: 200 INJECTION, SOLUTION INTRAMUSCULAR at 09:11

## 2017-11-13 NOTE — PROGRESS NOTES
Here for  hormone therapy injection, no complaints at this time , Injection given as ordered, tolerated well, no report of pain prior to or after injection. Return to clinic as scheduled.     Site - RB    Testosterone  26  mg  Depo Estradiol  10 mg    Clinic Supplied Medication

## 2017-11-27 ENCOUNTER — LAB VISIT (OUTPATIENT)
Dept: LAB | Facility: OTHER | Age: 42
End: 2017-11-27
Attending: OBSTETRICS & GYNECOLOGY
Payer: COMMERCIAL

## 2017-11-27 ENCOUNTER — PATIENT MESSAGE (OUTPATIENT)
Dept: OBSTETRICS AND GYNECOLOGY | Facility: CLINIC | Age: 42
End: 2017-11-27

## 2017-11-27 DIAGNOSIS — Z79.890 HORMONE REPLACEMENT THERAPY (HRT): ICD-10-CM

## 2017-11-27 LAB — ESTRADIOL SERPL-MCNC: 109 PG/ML

## 2017-11-27 PROCEDURE — 36415 COLL VENOUS BLD VENIPUNCTURE: CPT

## 2017-11-27 PROCEDURE — 84402 ASSAY OF FREE TESTOSTERONE: CPT

## 2017-11-27 PROCEDURE — 82670 ASSAY OF TOTAL ESTRADIOL: CPT

## 2017-11-28 ENCOUNTER — TELEPHONE (OUTPATIENT)
Dept: OBSTETRICS AND GYNECOLOGY | Facility: CLINIC | Age: 42
End: 2017-11-28

## 2017-11-28 DIAGNOSIS — R53.83 FATIGUE, UNSPECIFIED TYPE: Primary | ICD-10-CM

## 2017-11-28 LAB — TESTOST FREE SERPL-MCNC: 1.5 PG/ML

## 2017-11-28 NOTE — TELEPHONE ENCOUNTER
----- Message from Genet Stein MD sent at 11/28/2017 11:53 AM CST -----  Please call in to [facility]  Armourthyroid  30 mg (1/2 grain) daily in am before eat and with water only  Disp 30 with 5 refils    Progesterone 300 mg capsule  Take at night nightly  Disp 30 with 5 refils.  thanks

## 2017-12-04 ENCOUNTER — TELEPHONE (OUTPATIENT)
Dept: OBSTETRICS AND GYNECOLOGY | Facility: CLINIC | Age: 42
End: 2017-12-04

## 2017-12-04 RX ORDER — THYROID 30 MG/1
30 TABLET ORAL
COMMUNITY
Start: 2017-11-28 | End: 2018-04-27 | Stop reason: SDUPTHER

## 2017-12-04 NOTE — TELEPHONE ENCOUNTER
Left message regarding lab work that has to be done 4 weeks from the first dose of the new medication armour thyroid that was called in last week.  Awaiting return call.

## 2017-12-04 NOTE — TELEPHONE ENCOUNTER
Patient reports she began taking the armour thyroid on 11/29 and lab work was scheduled for 12/27 at noon at the [facility] lab.  Reminder letter printed and sent via mail.

## 2017-12-05 ENCOUNTER — PATIENT MESSAGE (OUTPATIENT)
Dept: OBSTETRICS AND GYNECOLOGY | Facility: CLINIC | Age: 42
End: 2017-12-05

## 2017-12-05 DIAGNOSIS — Z12.31 VISIT FOR SCREENING MAMMOGRAM: Primary | ICD-10-CM

## 2017-12-11 ENCOUNTER — CLINICAL SUPPORT (OUTPATIENT)
Dept: OBSTETRICS AND GYNECOLOGY | Facility: CLINIC | Age: 42
End: 2017-12-11
Payer: COMMERCIAL

## 2017-12-11 PROCEDURE — 99999 PR PBB SHADOW E&M-EST. PATIENT-LVL II: CPT | Mod: PBBFAC,,,

## 2017-12-11 PROCEDURE — 96372 THER/PROPH/DIAG INJ SC/IM: CPT | Mod: S$GLB,,, | Performed by: OBSTETRICS & GYNECOLOGY

## 2017-12-11 RX ADMIN — TESTOSTERONE CYPIONATE 26 MG: 200 INJECTION, SOLUTION INTRAMUSCULAR at 08:12

## 2017-12-21 ENCOUNTER — HOSPITAL ENCOUNTER (OUTPATIENT)
Dept: RADIOLOGY | Facility: OTHER | Age: 42
Discharge: HOME | End: 2017-12-21
Attending: OBSTETRICS & GYNECOLOGY
Payer: COMMERCIAL

## 2017-12-21 DIAGNOSIS — Z12.31 VISIT FOR SCREENING MAMMOGRAM: ICD-10-CM

## 2017-12-21 PROCEDURE — 77067 SCR MAMMO BI INCL CAD: CPT | Mod: TC

## 2017-12-21 PROCEDURE — 77067 SCR MAMMO BI INCL CAD: CPT | Mod: 26,,, | Performed by: RADIOLOGY

## 2017-12-21 PROCEDURE — 77063 BREAST TOMOSYNTHESIS BI: CPT | Mod: 26,,, | Performed by: RADIOLOGY

## 2017-12-27 ENCOUNTER — LAB VISIT (OUTPATIENT)
Dept: LAB | Facility: OTHER | Age: 42
End: 2017-12-27
Attending: OBSTETRICS & GYNECOLOGY
Payer: COMMERCIAL

## 2017-12-27 DIAGNOSIS — R53.83 FATIGUE, UNSPECIFIED TYPE: ICD-10-CM

## 2017-12-27 LAB
T3FREE SERPL-MCNC: 2.5 PG/ML
T4 FREE SERPL-MCNC: 0.95 NG/DL
TSH SERPL DL<=0.005 MIU/L-ACNC: 1.36 UIU/ML

## 2017-12-27 PROCEDURE — 84481 FREE ASSAY (FT-3): CPT

## 2017-12-27 PROCEDURE — 84443 ASSAY THYROID STIM HORMONE: CPT

## 2017-12-27 PROCEDURE — 84439 ASSAY OF FREE THYROXINE: CPT

## 2017-12-27 PROCEDURE — 36415 COLL VENOUS BLD VENIPUNCTURE: CPT

## 2017-12-28 ENCOUNTER — PATIENT MESSAGE (OUTPATIENT)
Dept: OBSTETRICS AND GYNECOLOGY | Facility: CLINIC | Age: 42
End: 2017-12-28

## 2018-01-03 ENCOUNTER — PATIENT MESSAGE (OUTPATIENT)
Dept: OBSTETRICS AND GYNECOLOGY | Facility: CLINIC | Age: 43
End: 2018-01-03

## 2018-01-03 DIAGNOSIS — Z91.89 AT HIGH RISK FOR BREAST CANCER: Primary | ICD-10-CM

## 2018-01-08 ENCOUNTER — CLINICAL SUPPORT (OUTPATIENT)
Dept: OBSTETRICS AND GYNECOLOGY | Facility: CLINIC | Age: 43
End: 2018-01-08
Payer: COMMERCIAL

## 2018-01-08 ENCOUNTER — PATIENT MESSAGE (OUTPATIENT)
Dept: OBSTETRICS AND GYNECOLOGY | Facility: CLINIC | Age: 43
End: 2018-01-08

## 2018-01-08 DIAGNOSIS — Z79.890 HORMONE REPLACEMENT THERAPY (HRT): Primary | ICD-10-CM

## 2018-01-08 PROCEDURE — 96372 THER/PROPH/DIAG INJ SC/IM: CPT | Mod: S$GLB,,, | Performed by: OBSTETRICS & GYNECOLOGY

## 2018-01-08 PROCEDURE — 99999 PR PBB SHADOW E&M-EST. PATIENT-LVL II: CPT | Mod: PBBFAC,,,

## 2018-01-08 RX ORDER — TESTOSTERONE CYPIONATE 200 MG/ML
26 INJECTION, SOLUTION INTRAMUSCULAR
Status: COMPLETED | OUTPATIENT
Start: 2018-01-08 | End: 2018-02-05

## 2018-01-08 RX ADMIN — TESTOSTERONE CYPIONATE 26 MG: 200 INJECTION, SOLUTION INTRAMUSCULAR at 08:01

## 2018-01-26 ENCOUNTER — OFFICE VISIT (OUTPATIENT)
Dept: SURGERY | Facility: CLINIC | Age: 43
End: 2018-01-26
Payer: COMMERCIAL

## 2018-01-26 DIAGNOSIS — Z71.83 ENCOUNTER FOR NONPROCREATIVE GENETIC COUNSELING: Primary | ICD-10-CM

## 2018-01-26 DIAGNOSIS — Z80.3 FAMILY HISTORY OF BREAST CANCER: ICD-10-CM

## 2018-01-26 PROCEDURE — 99203 OFFICE O/P NEW LOW 30 MIN: CPT | Mod: S$GLB,,, | Performed by: SURGERY

## 2018-01-26 NOTE — PROGRESS NOTES
"Mrs العلي presents for genetic counseling, referred by Dr Stein. She is a 42 year old female with no personal history of cancer. See pedigree for full family history which will be scanned into Epic media.  She reports three paternal cousins being "positive for BRCA" and her sister was "BRCA negative". She does not have a copy of these results.      We reviewed her medical and family history and discussed the genetics of breast cancer, cancer risks associated with a hereditary predisposition to cancer, and the benefits, risks, and limitations of genetic testing according to current NCCN guidelines.  Discussed sporadic verses family clustering verses hereditary cancer. She reports a known "BRCA" mutation in her paternal cousins and it is uncertain whether or not this was inherited from maternal or paternal side of the family. Her sister's reported negative results do not indicate this patient herself is also negative.       Women who carry mutations in the BRCA genes have a 56%-87% risk to develop breast cancer and up to a 27%-44% risk to develop ovarian cancer during their lifetime. Women who carry a BRCA gene mutation also have a greater chance of developing a second primary breast cancer, up to 40%.  Men who carry a BRCA gene mutation have up to a 6% risk to develop breast cancer and an increased risk for early-onset prostate cancer (diagnosed under age 60).  Mutations in the BRCA2 gene have also been associated with an increased risk other types of cancer in both men and women in some families, most notably pancreatic cancer and melanoma.      Discussed increased surveillance, use of chemoprevention, or prophylactic surgery for pathogenic mutations in BRCA1 and BRCA2.      Discussed the cost of testing, Single Site verses Integrated BRACAnalysis, and potential insurance coverage.          RECOMMENDATIONS:   Obtain copy of her paternal cousin's genetic test results for review. Single Site BRACAnalysis will be " recommended for that specific mutation. If she is unable to obtain these results, Integrated BRACAnalysis would be recommended. She states she will attempt to obtain these results and forward to me for review.  Informed next appointment would be for DNA collection and start of testing.          Time in counseling 45 min, total time 45 min

## 2018-01-30 ENCOUNTER — PATIENT MESSAGE (OUTPATIENT)
Dept: OBSTETRICS AND GYNECOLOGY | Facility: CLINIC | Age: 43
End: 2018-01-30

## 2018-01-30 DIAGNOSIS — Z91.89 AT HIGH RISK FOR BREAST CANCER: ICD-10-CM

## 2018-02-05 ENCOUNTER — CLINICAL SUPPORT (OUTPATIENT)
Dept: OBSTETRICS AND GYNECOLOGY | Facility: CLINIC | Age: 43
End: 2018-02-05
Payer: COMMERCIAL

## 2018-02-05 PROCEDURE — 99999 PR PBB SHADOW E&M-EST. PATIENT-LVL II: CPT | Mod: PBBFAC,,,

## 2018-02-05 PROCEDURE — 96372 THER/PROPH/DIAG INJ SC/IM: CPT | Mod: S$GLB,,, | Performed by: OBSTETRICS & GYNECOLOGY

## 2018-02-05 RX ADMIN — TESTOSTERONE CYPIONATE 26 MG: 200 INJECTION, SOLUTION INTRAMUSCULAR at 08:02

## 2018-02-22 ENCOUNTER — OFFICE VISIT (OUTPATIENT)
Dept: SURGERY | Facility: CLINIC | Age: 43
End: 2018-02-22
Payer: COMMERCIAL

## 2018-02-22 DIAGNOSIS — Z71.83 ENCOUNTER FOR NONPROCREATIVE GENETIC COUNSELING: Primary | ICD-10-CM

## 2018-02-22 DIAGNOSIS — Z80.3 FAMILY HISTORY OF BREAST CANCER: ICD-10-CM

## 2018-02-22 PROCEDURE — 99212 OFFICE O/P EST SF 10 MIN: CPT | Mod: S$GLB,,, | Performed by: SURGERY

## 2018-02-22 NOTE — PROGRESS NOTES
Patient returns today for genetic testing, she was able to obtain copy of pedigree from her paternal cousin indicating presence of BRCA2 mutation [id number]. Her sister with inflammatory breast cancer tested negative with [employer] , Integrated BRACAnalysis with [facility]. Her father is . Recommended single site test for BRCA2 mutation identified in her paternal cousins. Discussed types of results, (positive verses negative), implications of results, and cost of testing. Buccal sample collected and sent to [facility]. Will phone patient once results are available     Time in counseling 20 min, total time 20 min

## 2018-03-05 ENCOUNTER — CLINICAL SUPPORT (OUTPATIENT)
Dept: OBSTETRICS AND GYNECOLOGY | Facility: CLINIC | Age: 43
End: 2018-03-05
Payer: COMMERCIAL

## 2018-03-05 DIAGNOSIS — Z79.890 HORMONE REPLACEMENT THERAPY (HRT): Primary | ICD-10-CM

## 2018-03-05 PROCEDURE — 99999 PR PBB SHADOW E&M-EST. PATIENT-LVL II: CPT | Mod: PBBFAC,,,

## 2018-03-05 PROCEDURE — 96372 THER/PROPH/DIAG INJ SC/IM: CPT | Mod: S$GLB,,, | Performed by: OBSTETRICS & GYNECOLOGY

## 2018-03-05 RX ORDER — TESTOSTERONE CYPIONATE 200 MG/ML
26 INJECTION, SOLUTION INTRAMUSCULAR
Status: COMPLETED | OUTPATIENT
Start: 2018-03-05 | End: 2018-04-03

## 2018-03-05 RX ADMIN — TESTOSTERONE CYPIONATE 26 MG: 200 INJECTION, SOLUTION INTRAMUSCULAR at 08:03

## 2018-03-15 ENCOUNTER — DOCUMENTATION ONLY (OUTPATIENT)
Dept: SURGERY | Facility: CLINIC | Age: 43
End: 2018-03-15

## 2018-03-15 ENCOUNTER — PATIENT MESSAGE (OUTPATIENT)
Dept: OBSTETRICS AND GYNECOLOGY | Facility: CLINIC | Age: 43
End: 2018-03-15

## 2018-03-15 NOTE — PROGRESS NOTES
Results received from [facility], negative single site BRCA2 [id number]. This mutation was reported in several paternal family members. Also important to note is that her sister with breast cancer diagnosed at 45 had negative BRACAnalysis with [facility]. This patient may consider increased breast cancer screenings with breast MRI based on family clustering on her maternal side (mother and sister)

## 2018-04-03 ENCOUNTER — CLINICAL SUPPORT (OUTPATIENT)
Dept: OBSTETRICS AND GYNECOLOGY | Facility: CLINIC | Age: 43
End: 2018-04-03
Payer: COMMERCIAL

## 2018-04-03 PROCEDURE — 99999 PR PBB SHADOW E&M-EST. PATIENT-LVL II: CPT | Mod: PBBFAC,,,

## 2018-04-03 PROCEDURE — 96372 THER/PROPH/DIAG INJ SC/IM: CPT | Mod: S$GLB,,, | Performed by: OBSTETRICS & GYNECOLOGY

## 2018-04-03 RX ADMIN — TESTOSTERONE CYPIONATE 26 MG: 200 INJECTION, SOLUTION INTRAMUSCULAR at 08:04

## 2018-04-18 ENCOUNTER — PATIENT MESSAGE (OUTPATIENT)
Dept: OBSTETRICS AND GYNECOLOGY | Facility: CLINIC | Age: 43
End: 2018-04-18

## 2018-04-18 DIAGNOSIS — Z79.890 PREMATURE MENOPAUSE ON HRT: Primary | ICD-10-CM

## 2018-04-18 DIAGNOSIS — E28.319 PREMATURE MENOPAUSE ON HRT: Primary | ICD-10-CM

## 2018-04-19 ENCOUNTER — TELEPHONE (OUTPATIENT)
Dept: OBSTETRICS AND GYNECOLOGY | Facility: CLINIC | Age: 43
End: 2018-04-19

## 2018-04-19 NOTE — TELEPHONE ENCOUNTER
----- Message from Genet Stein MD sent at 4/18/2018  4:27 PM CDT -----  Please call this pt for labs.  Orders in .  Should be two weeks after her last injection.  thanks

## 2018-04-30 ENCOUNTER — CLINICAL SUPPORT (OUTPATIENT)
Dept: OBSTETRICS AND GYNECOLOGY | Facility: CLINIC | Age: 43
End: 2018-04-30
Payer: COMMERCIAL

## 2018-04-30 DIAGNOSIS — Z79.890 HORMONE REPLACEMENT THERAPY (HRT): Primary | ICD-10-CM

## 2018-04-30 PROCEDURE — 99999 PR PBB SHADOW E&M-EST. PATIENT-LVL II: CPT | Mod: PBBFAC,,,

## 2018-04-30 PROCEDURE — 96372 THER/PROPH/DIAG INJ SC/IM: CPT | Mod: S$GLB,,, | Performed by: OBSTETRICS & GYNECOLOGY

## 2018-04-30 RX ORDER — TESTOSTERONE CYPIONATE 200 MG/ML
26 INJECTION, SOLUTION INTRAMUSCULAR
Status: SHIPPED | OUTPATIENT
Start: 2018-04-30 | End: 2018-08-20

## 2018-04-30 RX ADMIN — TESTOSTERONE CYPIONATE 26 MG: 200 INJECTION, SOLUTION INTRAMUSCULAR at 08:04

## 2018-05-01 RX ORDER — THYROID, PORCINE 30 MG/1
TABLET ORAL
Qty: 30 TABLET | Refills: 5 | Status: SHIPPED | OUTPATIENT
Start: 2018-05-01 | End: 2018-12-12

## 2018-05-02 ENCOUNTER — PATIENT MESSAGE (OUTPATIENT)
Dept: OBSTETRICS AND GYNECOLOGY | Facility: CLINIC | Age: 43
End: 2018-05-02

## 2018-05-14 ENCOUNTER — LAB VISIT (OUTPATIENT)
Dept: LAB | Facility: OTHER | Age: 43
End: 2018-05-14
Attending: OBSTETRICS & GYNECOLOGY
Payer: COMMERCIAL

## 2018-05-14 DIAGNOSIS — E28.319 PREMATURE MENOPAUSE ON HRT: ICD-10-CM

## 2018-05-14 DIAGNOSIS — Z79.890 PREMATURE MENOPAUSE ON HRT: ICD-10-CM

## 2018-05-14 LAB
DHEA-S SERPL-MCNC: 170.2 UG/DL
ESTRADIOL SERPL-MCNC: 122 PG/ML
T3FREE SERPL-MCNC: 2.4 PG/ML
T4 SERPL-MCNC: 5.2 UG/DL
TSH SERPL DL<=0.005 MIU/L-ACNC: 1.11 UIU/ML

## 2018-05-14 PROCEDURE — 82670 ASSAY OF TOTAL ESTRADIOL: CPT

## 2018-05-14 PROCEDURE — 84402 ASSAY OF FREE TESTOSTERONE: CPT

## 2018-05-14 PROCEDURE — 84481 FREE ASSAY (FT-3): CPT

## 2018-05-14 PROCEDURE — 82627 DEHYDROEPIANDROSTERONE: CPT

## 2018-05-14 PROCEDURE — 84436 ASSAY OF TOTAL THYROXINE: CPT

## 2018-05-14 PROCEDURE — 84443 ASSAY THYROID STIM HORMONE: CPT

## 2018-05-16 ENCOUNTER — PATIENT MESSAGE (OUTPATIENT)
Dept: OBSTETRICS AND GYNECOLOGY | Facility: CLINIC | Age: 43
End: 2018-05-16

## 2018-05-17 LAB — TESTOST FREE SERPL-MCNC: 0.9 PG/ML

## 2018-05-22 ENCOUNTER — OFFICE VISIT (OUTPATIENT)
Dept: OBSTETRICS AND GYNECOLOGY | Facility: CLINIC | Age: 43
End: 2018-05-22
Attending: OBSTETRICS & GYNECOLOGY
Payer: COMMERCIAL

## 2018-05-22 VITALS
SYSTOLIC BLOOD PRESSURE: 118 MMHG | BODY MASS INDEX: 23.06 KG/M2 | DIASTOLIC BLOOD PRESSURE: 78 MMHG | WEIGHT: 161.06 LBS | HEIGHT: 70 IN

## 2018-05-22 DIAGNOSIS — Z78.0 MENOPAUSE: Primary | ICD-10-CM

## 2018-05-22 DIAGNOSIS — F41.9 ANXIETY: ICD-10-CM

## 2018-05-22 PROCEDURE — 99214 OFFICE O/P EST MOD 30 MIN: CPT | Mod: 25,S$GLB,, | Performed by: OBSTETRICS & GYNECOLOGY

## 2018-05-22 PROCEDURE — 96372 THER/PROPH/DIAG INJ SC/IM: CPT | Mod: S$GLB,,, | Performed by: OBSTETRICS & GYNECOLOGY

## 2018-05-22 PROCEDURE — 3008F BODY MASS INDEX DOCD: CPT | Mod: CPTII,S$GLB,, | Performed by: OBSTETRICS & GYNECOLOGY

## 2018-05-22 RX ORDER — METHOCARBAMOL 500 MG/1
TABLET, FILM COATED ORAL
Refills: 0 | COMMUNITY
Start: 2018-05-14 | End: 2019-08-08

## 2018-05-22 RX ORDER — DICLOFENAC SODIUM 30 MG/G
GEL TOPICAL
COMMUNITY
Start: 2018-05-17 | End: 2019-08-08

## 2018-05-22 RX ORDER — PREGABALIN 50 MG/1
CAPSULE ORAL
Refills: 0 | COMMUNITY
Start: 2018-05-15 | End: 2018-10-17

## 2018-05-22 RX ORDER — TESTOSTERONE CYPIONATE 200 MG/ML
50 INJECTION, SOLUTION INTRAMUSCULAR
Status: COMPLETED | OUTPATIENT
Start: 2018-05-22 | End: 2018-10-08

## 2018-05-22 RX ORDER — VENLAFAXINE 75 MG/1
75 TABLET ORAL 2 TIMES DAILY
Qty: 60 TABLET | Refills: 2 | Status: SHIPPED | OUTPATIENT
Start: 2018-05-22 | End: 2018-08-12 | Stop reason: SDUPTHER

## 2018-05-22 RX ADMIN — TESTOSTERONE CYPIONATE 50 MG: 200 INJECTION, SOLUTION INTRAMUSCULAR at 04:05

## 2018-05-22 NOTE — PROGRESS NOTES
Subjective:       Patient ID: Mirian العلي is a 42 y.o. female.    Chief Complaint:  Follow-up      History of Present Illness  HPI  This 42 yr old P2 female is here for discussion of HRT.  She had Hyst /BSO for endometriosis several yrs ago.  She was using vivelle, then injections and just feeling very badly so recently I asked her to just stop all hrt and we can now start over.  She is now due for injection but here to discuss.  On her current IM injections of 26mg of testosterone and 10 mg of estradiol, she has severe fatigue and can sleep all night (8 hours) and still take 3 or 4 hour nap.  Her estradiol is over 100 but testosterone is only 0.8 on this regimen.  Will increase her testosterone to 50 mg monthly and start on effexor (wellbutrin not covered).  Also we discussed seeing her therapist regularly and asked her to see Dr Sneha Sow or Lisy Langford who practice [facility].  She agrees to all of this.  She has consulted with endocrine and all ok there.  She is seeing her chiropractor and beginning acupuncture soon.    GYN & OB History  Patient's last menstrual period was 2014.   Date of Last Pap: 3/1/2014    OB History    Para Term  AB Living   4 2 2   2     SAB TAB Ectopic Multiple Live Births   2              # Outcome Date GA Lbr Elan/2nd Weight Sex Delivery Anes PTL Lv   4 SAB            3 SAB            2 Term            1 Term               Obstetric Comments   Menarche age 14. LMP age 39 (postsurgical).   First child born age 30.   History of abnormal PAP smear: NO.   History of abnormal mammogram: NO.   History of sexually transmitted disease:  NO             Review of Systems  Review of Systems   Constitutional: Positive for fatigue. Negative for chills and fever.   Respiratory: Negative for shortness of breath.    Cardiovascular: Negative for chest pain.   Gastrointestinal: Negative for abdominal pain, nausea and vomiting.   Genitourinary: Negative for difficulty  urinating, dyspareunia, genital sores, menstrual problem, pelvic pain, vaginal bleeding, vaginal discharge and vaginal pain.   Musculoskeletal: Positive for myalgias.   Skin: Negative for wound.   Hematological: Negative for adenopathy.   Psychiatric/Behavioral: Positive for decreased concentration. The patient is nervous/anxious.            Objective:    Physical Exam       Assessment:        1. Menopause    2. Anxiety               Plan:      Will increase her testosterone to 50 IM monthly and continue estradiol.    Discussed pellets again  Refer to [facility] med  Continue therapist  We spent over 25 min and all in counseling

## 2018-05-22 NOTE — PROGRESS NOTES
Here for  hormone therapy injection, no complaints at this time , Injection given as ordered, tolerated well, no report of pain prior to or after injection. Return to clinic as scheduled.     Site -LB    Testosterone      50mg  Depo Estradiol       10  mg    Clinic Supplied Medication

## 2018-06-25 ENCOUNTER — PATIENT MESSAGE (OUTPATIENT)
Dept: OBSTETRICS AND GYNECOLOGY | Facility: CLINIC | Age: 43
End: 2018-06-25

## 2018-06-25 ENCOUNTER — CLINICAL SUPPORT (OUTPATIENT)
Dept: OBSTETRICS AND GYNECOLOGY | Facility: CLINIC | Age: 43
End: 2018-06-25
Payer: COMMERCIAL

## 2018-06-25 PROCEDURE — 99999 PR PBB SHADOW E&M-EST. PATIENT-LVL III: CPT | Mod: PBBFAC,,,

## 2018-06-25 PROCEDURE — 96372 THER/PROPH/DIAG INJ SC/IM: CPT | Mod: S$GLB,,, | Performed by: OBSTETRICS & GYNECOLOGY

## 2018-06-25 RX ADMIN — TESTOSTERONE CYPIONATE 50 MG: 200 INJECTION, SOLUTION INTRAMUSCULAR at 08:06

## 2018-06-29 ENCOUNTER — HOSPITAL ENCOUNTER (OUTPATIENT)
Dept: RADIOLOGY | Facility: HOSPITAL | Age: 43
Discharge: HOME | End: 2018-06-29
Attending: OBSTETRICS & GYNECOLOGY
Payer: COMMERCIAL

## 2018-06-29 DIAGNOSIS — Z91.89 AT HIGH RISK FOR BREAST CANCER: ICD-10-CM

## 2018-06-29 PROCEDURE — 0159T MRI BREAST BILATERAL: CPT | Mod: TC

## 2018-06-29 PROCEDURE — 77059 MRI BREAST BILATERAL: CPT | Mod: 26,,, | Performed by: RADIOLOGY

## 2018-06-29 PROCEDURE — 25500020 PHARM REV CODE 255: Performed by: OBSTETRICS & GYNECOLOGY

## 2018-06-29 PROCEDURE — 0159T MRI BREAST BILATERAL: CPT | Mod: 26,,, | Performed by: RADIOLOGY

## 2018-06-29 PROCEDURE — A9577 INJ MULTIHANCE: HCPCS | Performed by: OBSTETRICS & GYNECOLOGY

## 2018-06-29 RX ADMIN — GADOBENATE DIMEGLUMINE 16 ML: 529 INJECTION, SOLUTION INTRAVENOUS at 03:06

## 2018-07-12 ENCOUNTER — PATIENT MESSAGE (OUTPATIENT)
Dept: OBSTETRICS AND GYNECOLOGY | Facility: CLINIC | Age: 43
End: 2018-07-12

## 2018-07-16 ENCOUNTER — TELEPHONE (OUTPATIENT)
Dept: OBSTETRICS AND GYNECOLOGY | Facility: CLINIC | Age: 43
End: 2018-07-16

## 2018-07-16 NOTE — TELEPHONE ENCOUNTER
----- Message from Michaela Nair sent at 7/16/2018  9:55 AM CDT -----  Contact: Carmen/ Dr Napoleon Rey office [phone]  Carmen is needing a call back, regarding this pt, she can be reached at [phone].

## 2018-07-16 NOTE — TELEPHONE ENCOUNTER
Left detailed message for Carmen that Dr. Stein will return to the office on tomorrow. I will look for the release form for her to sign.

## 2018-07-20 ENCOUNTER — CLINICAL SUPPORT (OUTPATIENT)
Dept: OBSTETRICS AND GYNECOLOGY | Facility: CLINIC | Age: 43
End: 2018-07-20
Payer: COMMERCIAL

## 2018-07-20 PROCEDURE — 99999 PR PBB SHADOW E&M-EST. PATIENT-LVL III: CPT | Mod: PBBFAC,,,

## 2018-07-20 PROCEDURE — 96372 THER/PROPH/DIAG INJ SC/IM: CPT | Mod: S$GLB,,, | Performed by: OBSTETRICS & GYNECOLOGY

## 2018-07-20 RX ADMIN — TESTOSTERONE CYPIONATE 50 MG: 200 INJECTION, SOLUTION INTRAMUSCULAR at 09:07

## 2018-07-20 NOTE — PROGRESS NOTES
Here for  hormone therapy injection, no complaints at this time , Injection given as ordered, tolerated well, no report of pain prior to or after injection. Return to clinic as scheduled.     Site - LB    Testosterone   50 mg  Depo Estradiol  10  mg    Clinic Supplied Medication

## 2018-08-12 DIAGNOSIS — F41.9 ANXIETY: ICD-10-CM

## 2018-08-14 ENCOUNTER — CLINICAL SUPPORT (OUTPATIENT)
Dept: OBSTETRICS AND GYNECOLOGY | Facility: CLINIC | Age: 43
End: 2018-08-14
Payer: COMMERCIAL

## 2018-08-14 PROCEDURE — 99999 PR PBB SHADOW E&M-EST. PATIENT-LVL III: CPT | Mod: PBBFAC,,,

## 2018-08-14 PROCEDURE — 96372 THER/PROPH/DIAG INJ SC/IM: CPT | Mod: S$GLB,,, | Performed by: OBSTETRICS & GYNECOLOGY

## 2018-08-14 RX ORDER — VENLAFAXINE 75 MG/1
TABLET ORAL
Qty: 60 TABLET | Refills: 0 | Status: SHIPPED | OUTPATIENT
Start: 2018-08-14 | End: 2018-09-07 | Stop reason: SDUPTHER

## 2018-08-14 RX ADMIN — TESTOSTERONE CYPIONATE 50 MG: 200 INJECTION, SOLUTION INTRAMUSCULAR at 08:08

## 2018-09-07 DIAGNOSIS — F41.9 ANXIETY: ICD-10-CM

## 2018-09-10 RX ORDER — VENLAFAXINE 75 MG/1
TABLET ORAL
Qty: 60 TABLET | Refills: 0 | Status: SHIPPED | OUTPATIENT
Start: 2018-09-10 | End: 2018-10-05 | Stop reason: SDUPTHER

## 2018-09-11 ENCOUNTER — CLINICAL SUPPORT (OUTPATIENT)
Dept: OBSTETRICS AND GYNECOLOGY | Facility: CLINIC | Age: 43
End: 2018-09-11
Payer: COMMERCIAL

## 2018-09-11 PROCEDURE — 99999 PR PBB SHADOW E&M-EST. PATIENT-LVL III: CPT | Mod: PBBFAC,,,

## 2018-09-11 PROCEDURE — 96372 THER/PROPH/DIAG INJ SC/IM: CPT | Mod: S$GLB,,, | Performed by: OBSTETRICS & GYNECOLOGY

## 2018-09-11 RX ADMIN — TESTOSTERONE CYPIONATE 50 MG: 200 INJECTION, SOLUTION INTRAMUSCULAR at 09:09

## 2018-10-05 DIAGNOSIS — F41.9 ANXIETY: ICD-10-CM

## 2018-10-08 ENCOUNTER — CLINICAL SUPPORT (OUTPATIENT)
Dept: OBSTETRICS AND GYNECOLOGY | Facility: CLINIC | Age: 43
End: 2018-10-08
Payer: COMMERCIAL

## 2018-10-08 PROCEDURE — 99999 PR PBB SHADOW E&M-EST. PATIENT-LVL III: CPT | Mod: PBBFAC,,,

## 2018-10-08 PROCEDURE — 96372 THER/PROPH/DIAG INJ SC/IM: CPT | Mod: S$GLB,,, | Performed by: OBSTETRICS & GYNECOLOGY

## 2018-10-08 RX ADMIN — TESTOSTERONE CYPIONATE 50 MG: 200 INJECTION, SOLUTION INTRAMUSCULAR at 02:10

## 2018-10-09 RX ORDER — VENLAFAXINE 75 MG/1
TABLET ORAL
Qty: 60 TABLET | Refills: 0 | Status: SHIPPED | OUTPATIENT
Start: 2018-10-09 | End: 2018-11-07 | Stop reason: SDUPTHER

## 2018-10-17 ENCOUNTER — OFFICE VISIT (OUTPATIENT)
Dept: PRIMARY CARE CLINIC | Facility: CLINIC | Age: 43
End: 2018-10-17
Attending: FAMILY MEDICINE
Payer: COMMERCIAL

## 2018-10-17 VITALS
BODY MASS INDEX: 23.12 KG/M2 | HEIGHT: 71 IN | DIASTOLIC BLOOD PRESSURE: 78 MMHG | HEART RATE: 92 BPM | SYSTOLIC BLOOD PRESSURE: 108 MMHG | WEIGHT: 165.13 LBS

## 2018-10-17 DIAGNOSIS — Z00.00 ANNUAL PHYSICAL EXAM: Primary | ICD-10-CM

## 2018-10-17 DIAGNOSIS — J01.10 ACUTE NON-RECURRENT FRONTAL SINUSITIS: ICD-10-CM

## 2018-10-17 PROCEDURE — 96372 THER/PROPH/DIAG INJ SC/IM: CPT | Mod: S$GLB,,, | Performed by: FAMILY MEDICINE

## 2018-10-17 PROCEDURE — 99386 PREV VISIT NEW AGE 40-64: CPT | Mod: 25,S$GLB,, | Performed by: FAMILY MEDICINE

## 2018-10-17 PROCEDURE — 99999 PR PBB SHADOW E&M-EST. PATIENT-LVL III: CPT | Mod: PBBFAC,,, | Performed by: FAMILY MEDICINE

## 2018-10-17 RX ORDER — AZITHROMYCIN 250 MG/1
TABLET, FILM COATED ORAL
Qty: 6 TABLET | Refills: 0 | Status: SHIPPED | OUTPATIENT
Start: 2018-10-17 | End: 2018-12-12

## 2018-10-17 RX ORDER — TRIAMCINOLONE ACETONIDE 40 MG/ML
40 INJECTION, SUSPENSION INTRA-ARTICULAR; INTRAMUSCULAR
Status: COMPLETED | OUTPATIENT
Start: 2018-10-17 | End: 2018-10-17

## 2018-10-17 RX ADMIN — TRIAMCINOLONE ACETONIDE 40 MG: 40 INJECTION, SUSPENSION INTRA-ARTICULAR; INTRAMUSCULAR at 08:10

## 2018-10-17 NOTE — PROGRESS NOTES
"Per order, patient to receive 1mL of Kenalog (triamcinolone acetonide) 40mg/mL. The area of injection was palpated using the medial fold and the iliac crest as anatomical landmarks. Patient was advised to relax the muscle. The area was cleaned with alcohol and allowed to dry. Using a 25g 1.5" needle, 1mL of Kenalog (triamcinolone acetonide) 40mg/mL was placed intramuscularly into the Left upper outer gluteal quadrant. Patient experienced no complications and was discharged in stable condition. Kenalog Lot: [id number] Exp: 9/19  "

## 2018-10-17 NOTE — PROGRESS NOTES
Subjective:       Patient ID: Mirian العلي is a 42 y.o. female.    Chief Complaint: Establish Care; Sinus Problem (9/26 ); Nasal Congestion (rt side head); and Immunizations    Pt seen today to [facility] care. Pt states that she feels under the weather with cough, cold and congestion. Pt has tried sudafed, and other decongestants. Used her flonase. No relief. Denies fever, chills, n/v/CP/sob      Review of Systems   Constitutional: Positive for activity change. Negative for unexpected weight change.   HENT: Positive for rhinorrhea and trouble swallowing. Negative for hearing loss.    Eyes: Positive for discharge and visual disturbance.   Respiratory: Negative for chest tightness and wheezing.    Cardiovascular: Positive for palpitations. Negative for chest pain.   Gastrointestinal: Negative for blood in stool, constipation, diarrhea and vomiting.   Endocrine: Negative for polydipsia and polyuria.   Genitourinary: Negative for difficulty urinating, dysuria, hematuria and menstrual problem.   Musculoskeletal: Positive for arthralgias and neck pain. Negative for joint swelling.   Neurological: Positive for headaches. Negative for weakness.   Psychiatric/Behavioral: Negative for confusion and dysphoric mood.       Objective:      Physical Exam   Constitutional: She is oriented to person, place, and time. She appears well-developed and well-nourished.   HENT:   Head: Normocephalic and atraumatic.   Right Ear: There is swelling. Tympanic membrane is bulging.   Left Ear: There is swelling. Tympanic membrane is bulging. A middle ear effusion is present.   Nose: Mucosal edema and rhinorrhea present. Right sinus exhibits frontal sinus tenderness. Left sinus exhibits frontal sinus tenderness.   Mouth/Throat: Uvula is midline and mucous membranes are normal. Posterior oropharyngeal edema and posterior oropharyngeal erythema present. No oropharyngeal exudate or tonsillar abscesses.   Eyes: Conjunctivae and EOM are normal.  Pupils are equal, round, and reactive to light. Right eye exhibits no discharge. Left eye exhibits no discharge. No scleral icterus.   Neck: Normal range of motion. Neck supple. No thyromegaly present.   Cardiovascular: Normal rate, regular rhythm, normal heart sounds and intact distal pulses.   Pulmonary/Chest: Effort normal and breath sounds normal. No respiratory distress.   Lymphadenopathy:     She has no cervical adenopathy.   Neurological: She is alert and oriented to person, place, and time.   Skin: Skin is warm and dry. No rash noted.   Psychiatric: She has a normal mood and affect. Her behavior is normal. Judgment and thought content normal.       Assessment:       1. Annual physical exam    2. Acute non-recurrent frontal sinusitis        Plan:       Annual physical exam  -     CBC auto differential; Future; Expected date: 10/17/2018  -     Comprehensive metabolic panel; Future; Expected date: 10/17/2018  -     TSH; Future; Expected date: 10/17/2018  -     Lipid panel; Future; Expected date: 10/17/2018    Acute non-recurrent frontal sinusitis  -     triamcinolone acetonide injection 40 mg; Inject 1 mL (40 mg total) into the muscle one time.    Other orders  -     azithromycin (ZITHROMAX Z-AVILA) 250 MG tablet; Take as directed  Dispense: 6 tablet; Refill: 0    will treat sinus infection. SE;s of med d/w pt  Annual PE wnl  Labs pending  RTC prn symptoms or annually

## 2018-10-18 ENCOUNTER — LAB VISIT (OUTPATIENT)
Dept: LAB | Facility: HOSPITAL | Age: 43
End: 2018-10-18
Attending: FAMILY MEDICINE
Payer: COMMERCIAL

## 2018-10-18 DIAGNOSIS — Z00.00 ANNUAL PHYSICAL EXAM: ICD-10-CM

## 2018-10-18 LAB
ALBUMIN SERPL BCP-MCNC: 4.1 G/DL
ALP SERPL-CCNC: 62 U/L
ALT SERPL W/O P-5'-P-CCNC: 11 U/L
ANION GAP SERPL CALC-SCNC: 5 MMOL/L
AST SERPL-CCNC: 16 U/L
BASOPHILS # BLD AUTO: 0.04 K/UL
BASOPHILS NFR BLD: 0.6 %
BILIRUB SERPL-MCNC: 0.4 MG/DL
BUN SERPL-MCNC: 12 MG/DL
CALCIUM SERPL-MCNC: 9.4 MG/DL
CHLORIDE SERPL-SCNC: 104 MMOL/L
CHOLEST SERPL-MCNC: 154 MG/DL
CHOLEST/HDLC SERPL: 2.4 {RATIO}
CO2 SERPL-SCNC: 28 MMOL/L
CREAT SERPL-MCNC: 0.8 MG/DL
DIFFERENTIAL METHOD: NORMAL
EOSINOPHIL # BLD AUTO: 0 K/UL
EOSINOPHIL NFR BLD: 0 %
ERYTHROCYTE [DISTWIDTH] IN BLOOD BY AUTOMATED COUNT: 12.4 %
EST. GFR  (AFRICAN AMERICAN): >60 ML/MIN/1.73 M^2
EST. GFR  (NON AFRICAN AMERICAN): >60 ML/MIN/1.73 M^2
GLUCOSE SERPL-MCNC: 92 MG/DL
HCT VFR BLD AUTO: 39.7 %
HDLC SERPL-MCNC: 64 MG/DL
HDLC SERPL: 41.6 %
HGB BLD-MCNC: 12.9 G/DL
IMM GRANULOCYTES # BLD AUTO: 0.01 K/UL
IMM GRANULOCYTES NFR BLD AUTO: 0.2 %
LDLC SERPL CALC-MCNC: 75.2 MG/DL
LYMPHOCYTES # BLD AUTO: 1.5 K/UL
LYMPHOCYTES NFR BLD: 23.4 %
MCH RBC QN AUTO: 30.1 PG
MCHC RBC AUTO-ENTMCNC: 32.5 G/DL
MCV RBC AUTO: 93 FL
MONOCYTES # BLD AUTO: 0.5 K/UL
MONOCYTES NFR BLD: 8.5 %
NEUTROPHILS # BLD AUTO: 4.2 K/UL
NEUTROPHILS NFR BLD: 67.3 %
NONHDLC SERPL-MCNC: 90 MG/DL
NRBC BLD-RTO: 0 /100 WBC
PLATELET # BLD AUTO: 284 K/UL
PMV BLD AUTO: 9.9 FL
POTASSIUM SERPL-SCNC: 4.2 MMOL/L
PROT SERPL-MCNC: 7 G/DL
RBC # BLD AUTO: 4.29 M/UL
SODIUM SERPL-SCNC: 137 MMOL/L
TRIGL SERPL-MCNC: 74 MG/DL
TSH SERPL DL<=0.005 MIU/L-ACNC: 1.47 UIU/ML
WBC # BLD AUTO: 6.24 K/UL

## 2018-10-18 PROCEDURE — 85025 COMPLETE CBC W/AUTO DIFF WBC: CPT

## 2018-10-18 PROCEDURE — 84443 ASSAY THYROID STIM HORMONE: CPT

## 2018-10-18 PROCEDURE — 80061 LIPID PANEL: CPT

## 2018-10-18 PROCEDURE — 80053 COMPREHEN METABOLIC PANEL: CPT

## 2018-10-18 PROCEDURE — 36415 COLL VENOUS BLD VENIPUNCTURE: CPT | Mod: PN

## 2018-10-19 ENCOUNTER — PATIENT MESSAGE (OUTPATIENT)
Dept: PRIMARY CARE CLINIC | Facility: CLINIC | Age: 43
End: 2018-10-19

## 2018-10-25 ENCOUNTER — PATIENT MESSAGE (OUTPATIENT)
Dept: PRIMARY CARE CLINIC | Facility: CLINIC | Age: 43
End: 2018-10-25

## 2018-10-25 NOTE — TELEPHONE ENCOUNTER
Pt c/o bilateral ear fullness and yun, denies any fever. Pt states that she finished the antibiotics that was given. Pt was informed that Dr. Brooks is not in the office on Thursday or Friday and that this message will be sent to the covering. Pt verbalized understanding.

## 2018-11-05 ENCOUNTER — CLINICAL SUPPORT (OUTPATIENT)
Dept: OBSTETRICS AND GYNECOLOGY | Facility: CLINIC | Age: 43
End: 2018-11-05
Payer: COMMERCIAL

## 2018-11-05 DIAGNOSIS — Z79.890 HORMONE REPLACEMENT THERAPY (HRT): Primary | ICD-10-CM

## 2018-11-05 PROCEDURE — 96372 THER/PROPH/DIAG INJ SC/IM: CPT | Mod: S$GLB,,, | Performed by: OBSTETRICS & GYNECOLOGY

## 2018-11-05 PROCEDURE — 99999 PR PBB SHADOW E&M-EST. PATIENT-LVL II: CPT | Mod: PBBFAC,,,

## 2018-11-05 RX ORDER — TESTOSTERONE CYPIONATE 200 MG/ML
50 INJECTION, SOLUTION INTRAMUSCULAR
Status: COMPLETED | OUTPATIENT
Start: 2018-11-05 | End: 2019-01-28

## 2018-11-05 RX ADMIN — TESTOSTERONE CYPIONATE 50 MG: 200 INJECTION, SOLUTION INTRAMUSCULAR at 08:11

## 2018-11-07 DIAGNOSIS — F41.9 ANXIETY: ICD-10-CM

## 2018-11-07 RX ORDER — VENLAFAXINE 75 MG/1
TABLET ORAL
Qty: 60 TABLET | Refills: 0 | Status: SHIPPED | OUTPATIENT
Start: 2018-11-07 | End: 2018-12-12

## 2018-12-03 ENCOUNTER — CLINICAL SUPPORT (OUTPATIENT)
Dept: OBSTETRICS AND GYNECOLOGY | Facility: CLINIC | Age: 43
End: 2018-12-03
Payer: COMMERCIAL

## 2018-12-03 PROCEDURE — 96372 THER/PROPH/DIAG INJ SC/IM: CPT | Mod: S$GLB,,, | Performed by: OBSTETRICS & GYNECOLOGY

## 2018-12-03 PROCEDURE — 99999 PR PBB SHADOW E&M-EST. PATIENT-LVL I: CPT | Mod: PBBFAC,,,

## 2018-12-03 RX ADMIN — TESTOSTERONE CYPIONATE 50 MG: 200 INJECTION, SOLUTION INTRAMUSCULAR at 03:12

## 2018-12-12 ENCOUNTER — OFFICE VISIT (OUTPATIENT)
Dept: PRIMARY CARE CLINIC | Facility: CLINIC | Age: 43
End: 2018-12-12
Attending: FAMILY MEDICINE
Payer: COMMERCIAL

## 2018-12-12 VITALS — WEIGHT: 172.31 LBS | HEIGHT: 71 IN | BODY MASS INDEX: 24.12 KG/M2

## 2018-12-12 DIAGNOSIS — F41.9 ANXIETY: Primary | ICD-10-CM

## 2018-12-12 DIAGNOSIS — R20.0 BILATERAL LEG NUMBNESS: ICD-10-CM

## 2018-12-12 DIAGNOSIS — R07.1 CHEST PAIN ON BREATHING: ICD-10-CM

## 2018-12-12 PROCEDURE — 3008F BODY MASS INDEX DOCD: CPT | Mod: CPTII,S$GLB,, | Performed by: FAMILY MEDICINE

## 2018-12-12 PROCEDURE — 99999 PR PBB SHADOW E&M-EST. PATIENT-LVL III: CPT | Mod: PBBFAC,,, | Performed by: FAMILY MEDICINE

## 2018-12-12 PROCEDURE — 99214 OFFICE O/P EST MOD 30 MIN: CPT | Mod: S$GLB,,, | Performed by: FAMILY MEDICINE

## 2018-12-12 RX ORDER — TIZANIDINE HYDROCHLORIDE 4 MG/1
4 CAPSULE, GELATIN COATED ORAL NIGHTLY
Refills: 0 | COMMUNITY
Start: 2018-11-26 | End: 2019-08-08

## 2018-12-12 RX ORDER — ALPRAZOLAM 1 MG/1
1 TABLET ORAL 3 TIMES DAILY PRN
Qty: 90 TABLET | Refills: 0 | Status: SHIPPED | OUTPATIENT
Start: 2018-12-12 | End: 2019-05-28 | Stop reason: SDUPTHER

## 2018-12-12 RX ORDER — VENLAFAXINE HYDROCHLORIDE 75 MG/1
75 CAPSULE, EXTENDED RELEASE ORAL DAILY
Qty: 90 CAPSULE | Refills: 0 | Status: SHIPPED | OUTPATIENT
Start: 2018-12-12 | End: 2019-01-02

## 2018-12-18 ENCOUNTER — PATIENT MESSAGE (OUTPATIENT)
Dept: PRIMARY CARE CLINIC | Facility: CLINIC | Age: 43
End: 2018-12-18

## 2018-12-19 NOTE — PROGRESS NOTES
Subjective:       Patient ID: Mirian العلي is a 43 y.o. female.    Chief Complaint: Spasms (legs & feet cramping getting x mos); Night Sweats; and Anxiety (had ween off wants to get back on)    Pt presents today with concerns that she has been extra fatigued, has been having night sweats and feels numbness in her feet and legs. Pt did have hysterectomy in 2015 and has been over the menopause like symptoms but does wonder if this hormone related.  Pt also with some eye twitching and muscle spasms.     Upon further discussion it appears that pt is under immense duress with 's business, and being xmas time and family over  Pt does admit that it could be anxiety but is on Effexor. When talking more, it seems like the Effexor was started around the time line when her symptoms started.               Review of Systems   Constitutional: Positive for activity change and diaphoresis. Negative for fatigue, fever and unexpected weight change.   HENT: Positive for rhinorrhea. Negative for hearing loss and trouble swallowing.    Eyes: Negative for discharge and visual disturbance.   Respiratory: Negative for chest tightness and wheezing.    Cardiovascular: Positive for palpitations. Negative for chest pain.   Gastrointestinal: Positive for constipation. Negative for blood in stool, diarrhea and vomiting.   Endocrine: Negative for polydipsia and polyuria.   Genitourinary: Negative for difficulty urinating, dysuria, hematuria and menstrual problem.   Musculoskeletal: Negative for arthralgias, joint swelling and neck pain.   Neurological: Positive for headaches. Negative for weakness.   Psychiatric/Behavioral: Negative for confusion and dysphoric mood.   All other systems reviewed and are negative.      Objective:      Physical Exam   Constitutional: She is oriented to person, place, and time. She appears well-developed and well-nourished.   HENT:   Head: Normocephalic and atraumatic.   Eyes: Conjunctivae and EOM  are normal. Pupils are equal, round, and reactive to light.   Neck: Normal range of motion. Neck supple. No thyromegaly present.   Cardiovascular: Normal rate, regular rhythm, normal heart sounds and intact distal pulses.   No murmur heard.  Pulmonary/Chest: Effort normal and breath sounds normal. No respiratory distress. She has no wheezes. She has no rales. She exhibits no tenderness.   Musculoskeletal: Normal range of motion. She exhibits no edema.   Lymphadenopathy:     She has no cervical adenopathy.   Neurological: She is alert and oriented to person, place, and time. She displays normal reflexes. No cranial nerve deficit. She exhibits normal muscle tone. Coordination normal.   Skin: Skin is warm. No erythema.   Psychiatric: She has a normal mood and affect. Her behavior is normal. Judgment and thought content normal.       Assessment:       1. Chest pain on breathing    2. Anxiety        Plan:       Anxiety: suspect that this could be playing a role in her symptoms but pt advised to see therapist. Denies SI/HI   Chest pain:.symptoms seem as if assoc with effexor. Will cut back on dose and freq to XR. Pt amenable. If this med is cause my wish to try wellbutrin or another SSRI.  Pt amenable and will keep me posted via [employer] [facility] of her plan

## 2018-12-26 ENCOUNTER — HOSPITAL ENCOUNTER (EMERGENCY)
Facility: OTHER | Age: 43
Discharge: HOME | End: 2018-12-26
Attending: EMERGENCY MEDICINE
Payer: COMMERCIAL

## 2018-12-26 VITALS
HEART RATE: 74 BPM | SYSTOLIC BLOOD PRESSURE: 117 MMHG | DIASTOLIC BLOOD PRESSURE: 72 MMHG | BODY MASS INDEX: 23.1 KG/M2 | HEIGHT: 71 IN | WEIGHT: 165 LBS | OXYGEN SATURATION: 99 % | RESPIRATION RATE: 18 BRPM | TEMPERATURE: 98 F

## 2018-12-26 DIAGNOSIS — M79.89 SWELLING OF RIGHT HAND: Primary | ICD-10-CM

## 2018-12-26 PROCEDURE — 99284 EMERGENCY DEPT VISIT MOD MDM: CPT | Mod: 25

## 2018-12-26 RX ORDER — IBUPROFEN 600 MG/1
600 TABLET ORAL EVERY 6 HOURS PRN
Qty: 20 TABLET | Refills: 0 | Status: SHIPPED | OUTPATIENT
Start: 2018-12-26 | End: 2020-10-08

## 2018-12-26 NOTE — ED PROVIDER NOTES
Encounter Date: 12/26/2018       History     Chief Complaint   Patient presents with    Hand Pain     Pt reports she was relaxing at home when she noticed redness and warmth to the right hand. She is c/o pain, denies trauma.      Patient is 43 year old female history of factor V Leiden on daily ASA who presents with complaints of right hand discomfort and edema with some redness that was noticed suddenly about and hour PTA. She reports that she was in her usual state of health at home when she noticed a strange sensation with slight pain that radiated up to her anterior forearm. She reports that her hand appeared slightly swollen, red and warmer than her left. She is right hand dominant and has no report of recent trauma or injury. She has a history of trapezius spasms and took her usual dose of tizanidine last night but has not had any persistent or worsening spasms today. She denies associated neck pain. Denies recent change in activity level but does report two days ago playing two games of air hockey with her kids. She denies associated chest pain, SOB, nausea, vomiting, dizziness, AMS. She is currently accompanied by her  who is at bedside. Of note, despite history of factor V Leiden patient has no history of DVT or PE. Patient is right hand dominant.           Review of patient's allergies indicates:   Allergen Reactions    Niacin preparations      Patient states she has flushing    Sulfa (sulfonamide antibiotics)      Tongue swelling    Iodine and iodide containing products Rash     Past Medical History:   Diagnosis Date    AR (allergic rhinitis)     Asthma     Eczema     Endometriosis, mild 2002    Factor V Leiden mutation     Ovarian cyst, bilateral      Past Surgical History:   Procedure Laterality Date    BUNIONECTOMY Left 02/06/2018    CYSTOSCOPY N/A 1/20/2015    [facility]    HYSTERECTOMY      HYSTERECTOMY-TOTAL LAPAROSCOPIC (TLH) with right  salpingo-oopherectomy Right 1/20/2015    [facility]    LAPAROSCOPY W/ MINI-LAPAROTOMY  2003    OOPHORECTOMY Right 3/2012    TOTAL VAGINAL HYSTERECTOMY  1/2015    w/Right USO    WISDOM TOOTH EXTRACTION Bilateral      Family History   Problem Relation Age of Onset    Cancer Mother     Thyroid disease Mother     Breast cancer Mother         late 60's    Cancer Father     Diabetes Brother     Factor V Leiden deficiency Brother     Allergies Brother     Asthma Brother     Breast cancer Sister 46    Diabetes Maternal Aunt     Diabetes Paternal Uncle     Cancer Cousin      Social History     Tobacco Use    Smoking status: Never Smoker    Smokeless tobacco: Never Used   Substance Use Topics    Alcohol use: Yes     Alcohol/week: 3.0 - 3.6 oz     Types: 5 - 6 Glasses of wine per week     Comment: week    Drug use: No     Review of Systems   Constitutional: Negative for fever.   HENT: Negative for sore throat.    Respiratory: Negative for shortness of breath.    Cardiovascular: Negative for chest pain.   Gastrointestinal: Negative for nausea.   Genitourinary: Negative for dysuria.   Musculoskeletal: Negative for back pain.        Right hand swelling, warmth and redness   Skin: Negative for rash.   Neurological: Negative for weakness.   Hematological: Does not bruise/bleed easily.       Physical Exam     Initial Vitals [12/26/18 1753]   BP Pulse Resp Temp SpO2   138/89 80 18 98.1 °F (36.7 °C) 99 %      MAP       --         Physical Exam    Nursing note and vitals reviewed.  Constitutional: She appears well-developed and well-nourished. She is not diaphoretic. No distress.   Healthy appearing female in NAD or apparent pain. She makes good eye contact, speaks in clear full sentences and ambulates with ease.    HENT:   Head: Normocephalic and atraumatic.   Eyes: Conjunctivae and EOM are normal. Pupils are equal, round, and reactive to light. Right eye exhibits no discharge.  Left eye exhibits no discharge. No scleral icterus.   Neck: Normal range of motion.   Cardiovascular: Normal rate, regular rhythm, normal heart sounds and intact distal pulses. Exam reveals no gallop and no friction rub.    No murmur heard.  Pulmonary/Chest: Breath sounds normal. She has no wheezes. She has no rhonchi. She has no rales.   Abdominal: Soft. Bowel sounds are normal. There is no tenderness. There is no rebound and no guarding.   Musculoskeletal: Normal range of motion. She exhibits no edema or tenderness.   Right upper extremity has normal raidal pulse, ROM and sesnation to light touch. There is normal SAVANNAH. There is slight edema noted to the second and third fingers and the dorsum of the hand. There is some erythema noted ocer the MCP and there is some slight warmth    Lymphadenopathy:     She has no cervical adenopathy.   Neurological: She is alert and oriented to person, place, and time. She has normal strength. No cranial nerve deficit or sensory deficit. GCS score is 15. GCS eye subscore is 4. GCS verbal subscore is 5. GCS motor subscore is 6.   Skin: Skin is warm. Capillary refill takes less than 2 seconds. No rash and no abscess noted. No erythema.   Psychiatric: She has a normal mood and affect. Her behavior is normal. Thought content normal.         ED Course   Procedures  Labs Reviewed - No data to display        Imaging Results          US Upper Extremity Veins Right (Final result)  Result time 12/26/18 19:32:57    Final result by Hieu John III, MD (12/26/18 19:32:57)                 Impression:      See above    No right neck or upper extremity DVT seen.      Electronically signed by: Hieu John MD  Date:    12/26/2018  Time:    19:32             Narrative:    EXAMINATION:  US UPPER EXTREMITY VEINS RIGHT    CLINICAL HISTORY:  right hand edema;    FINDINGS:  Right neck and arm veins are patent and demonstrate normal flow compressibility and waveform.                                    Medical Decision Making:   ED Management:  Urgent evaluation a 43-year-old female who presents with complaints of right hand swelling redness and strange sensation.  She is afebrile, nontoxic appearing, hemodynamically stable. Physical exam outlined above and reveals no bony landmark abnormality or deformities. Normal distal pulse, sensation to light touch strength.  Normal elbow and shoulder range of motion.  No trapezius or cervical midline bony tenderness crepitus or step-offs.  Venous ultrasound of right upper extremity is pending.  Circumference of the upper and lower portion of the right arm is slightly larger in comparison to the left.  Will continue to follow.     Update: venous US reveals no evidence of DVT. Certainly superficial thrombophlebitis could account for patient's symptoms. Will treat with NSAIDs and encourage follow-up with PCP in the outpatient setting. Upon re-eval patient reports that while waiting she noticed similar symptoms develop on her left hand and admits that after about 10 mins all symptoms resolved. During my re-eval there is no clincal evidence of any pathology.  I have considered but do not suspect fracture, infection, acute neurovascular compromise.  There is no systemic sequelae.  No further diagnostics felt warranted at this time.  Patient educated on diagnostics and follow-up plan and verbalized understanding.  She is felt stable for discharge. Case discussed with attending physician who agrees with plan.    Left upper arm circumference 10.5 inches, right upper arm circumference 10.75 inches. Left lower arm circumference 8.75 inches, right lower arm circumference 9 inches.   Other:   I have discussed this case with another health care provider.       <> Summary of the Discussion: Sang                       Clinical Impression:   The encounter diagnosis was Swelling of right hand.                             Moni Sellers PA-C  12/26/18 2050

## 2018-12-27 NOTE — ED NOTES
Circumference of right lower arm measures 9 inches, left lower arm measures 8 and 3/4 inches. Right upper arm measures 10 and 3/4 inches, left upper arm measures 10 and 1/2 inches.

## 2018-12-28 DIAGNOSIS — Z12.31 ENCOUNTER FOR SCREENING MAMMOGRAM FOR MALIGNANT NEOPLASM OF BREAST: Primary | ICD-10-CM

## 2018-12-31 ENCOUNTER — PATIENT MESSAGE (OUTPATIENT)
Dept: OBSTETRICS AND GYNECOLOGY | Facility: CLINIC | Age: 43
End: 2018-12-31

## 2018-12-31 ENCOUNTER — CLINICAL SUPPORT (OUTPATIENT)
Dept: OBSTETRICS AND GYNECOLOGY | Facility: CLINIC | Age: 43
End: 2018-12-31
Payer: COMMERCIAL

## 2018-12-31 ENCOUNTER — PATIENT MESSAGE (OUTPATIENT)
Dept: PRIMARY CARE CLINIC | Facility: CLINIC | Age: 43
End: 2018-12-31

## 2018-12-31 PROCEDURE — 99999 PR PBB SHADOW E&M-EST. PATIENT-LVL II: CPT | Mod: PBBFAC,,,

## 2018-12-31 PROCEDURE — 96372 THER/PROPH/DIAG INJ SC/IM: CPT | Mod: S$GLB,,, | Performed by: OBSTETRICS & GYNECOLOGY

## 2018-12-31 RX ADMIN — TESTOSTERONE CYPIONATE 50 MG: 200 INJECTION, SOLUTION INTRAMUSCULAR at 10:12

## 2019-01-02 RX ORDER — BUPROPION HYDROCHLORIDE 150 MG/1
150 TABLET ORAL DAILY
Qty: 30 TABLET | Refills: 1 | Status: SHIPPED | OUTPATIENT
Start: 2019-01-02 | End: 2019-02-24 | Stop reason: SDUPTHER

## 2019-01-03 ENCOUNTER — HOSPITAL ENCOUNTER (OUTPATIENT)
Dept: RADIOLOGY | Facility: OTHER | Age: 44
Discharge: HOME | End: 2019-01-03
Attending: FAMILY MEDICINE
Payer: COMMERCIAL

## 2019-01-03 VITALS — WEIGHT: 165 LBS | HEIGHT: 71 IN | BODY MASS INDEX: 23.1 KG/M2

## 2019-01-03 DIAGNOSIS — Z12.31 ENCOUNTER FOR SCREENING MAMMOGRAM FOR MALIGNANT NEOPLASM OF BREAST: ICD-10-CM

## 2019-01-03 PROCEDURE — 77063 BREAST TOMOSYNTHESIS BI: CPT | Mod: 26,,, | Performed by: RADIOLOGY

## 2019-01-03 PROCEDURE — 77067 MAMMO DIGITAL SCREENING BILAT WITH TOMOSYNTHESIS_CAD: ICD-10-PCS | Mod: 26,,, | Performed by: RADIOLOGY

## 2019-01-03 PROCEDURE — 77067 SCR MAMMO BI INCL CAD: CPT | Mod: TC

## 2019-01-03 PROCEDURE — 77067 SCR MAMMO BI INCL CAD: CPT | Mod: 26,,, | Performed by: RADIOLOGY

## 2019-01-03 PROCEDURE — 77063 MAMMO DIGITAL SCREENING BILAT WITH TOMOSYNTHESIS_CAD: ICD-10-PCS | Mod: 26,,, | Performed by: RADIOLOGY

## 2019-01-23 ENCOUNTER — PATIENT MESSAGE (OUTPATIENT)
Dept: PRIMARY CARE CLINIC | Facility: CLINIC | Age: 44
End: 2019-01-23

## 2019-01-23 DIAGNOSIS — M79.89 ARM SWELLING: Primary | ICD-10-CM

## 2019-01-28 ENCOUNTER — CLINICAL SUPPORT (OUTPATIENT)
Dept: OBSTETRICS AND GYNECOLOGY | Facility: CLINIC | Age: 44
End: 2019-01-28
Payer: COMMERCIAL

## 2019-01-28 PROCEDURE — 99999 PR PBB SHADOW E&M-EST. PATIENT-LVL III: ICD-10-PCS | Mod: PBBFAC,,,

## 2019-01-28 PROCEDURE — 99999 PR PBB SHADOW E&M-EST. PATIENT-LVL III: CPT | Mod: PBBFAC,,,

## 2019-01-28 PROCEDURE — 96372 PR INJECTION,THERAP/PROPH/DIAG2ST, IM OR SUBCUT: ICD-10-PCS | Mod: S$GLB,,, | Performed by: OBSTETRICS & GYNECOLOGY

## 2019-01-28 PROCEDURE — 96372 THER/PROPH/DIAG INJ SC/IM: CPT | Mod: S$GLB,,, | Performed by: OBSTETRICS & GYNECOLOGY

## 2019-01-28 RX ADMIN — TESTOSTERONE CYPIONATE 50 MG: 200 INJECTION, SOLUTION INTRAMUSCULAR at 08:01

## 2019-02-15 ENCOUNTER — PATIENT MESSAGE (OUTPATIENT)
Dept: PRIMARY CARE CLINIC | Facility: CLINIC | Age: 44
End: 2019-02-15

## 2019-02-15 DIAGNOSIS — R76.8 POSITIVE ANA (ANTINUCLEAR ANTIBODY): Primary | ICD-10-CM

## 2019-02-25 ENCOUNTER — CLINICAL SUPPORT (OUTPATIENT)
Dept: OBSTETRICS AND GYNECOLOGY | Facility: CLINIC | Age: 44
End: 2019-02-25
Payer: COMMERCIAL

## 2019-02-25 DIAGNOSIS — Z79.890 HORMONE REPLACEMENT THERAPY (HRT): Primary | ICD-10-CM

## 2019-02-25 PROCEDURE — 96372 THER/PROPH/DIAG INJ SC/IM: CPT | Mod: S$GLB,,, | Performed by: OBSTETRICS & GYNECOLOGY

## 2019-02-25 PROCEDURE — 99999 PR PBB SHADOW E&M-EST. PATIENT-LVL III: CPT | Mod: PBBFAC,,,

## 2019-02-25 PROCEDURE — 96372 PR INJECTION,THERAP/PROPH/DIAG2ST, IM OR SUBCUT: ICD-10-PCS | Mod: S$GLB,,, | Performed by: OBSTETRICS & GYNECOLOGY

## 2019-02-25 PROCEDURE — 99999 PR PBB SHADOW E&M-EST. PATIENT-LVL III: ICD-10-PCS | Mod: PBBFAC,,,

## 2019-02-25 RX ORDER — TESTOSTERONE CYPIONATE 200 MG/ML
50 INJECTION, SOLUTION INTRAMUSCULAR
Status: COMPLETED | OUTPATIENT
Start: 2019-02-25 | End: 2019-07-16

## 2019-02-25 RX ORDER — BUPROPION HYDROCHLORIDE 150 MG/1
TABLET ORAL
Qty: 30 TABLET | Refills: 0 | Status: SHIPPED | OUTPATIENT
Start: 2019-02-25 | End: 2019-03-23 | Stop reason: SDUPTHER

## 2019-02-25 RX ADMIN — TESTOSTERONE CYPIONATE 50 MG: 200 INJECTION, SOLUTION INTRAMUSCULAR at 08:02

## 2019-03-12 RX ORDER — VENLAFAXINE HYDROCHLORIDE 75 MG/1
CAPSULE, EXTENDED RELEASE ORAL
Qty: 90 CAPSULE | Refills: 0 | OUTPATIENT
Start: 2019-03-12

## 2019-03-25 ENCOUNTER — CLINICAL SUPPORT (OUTPATIENT)
Dept: OBSTETRICS AND GYNECOLOGY | Facility: CLINIC | Age: 44
End: 2019-03-25
Payer: COMMERCIAL

## 2019-03-25 PROCEDURE — 99999 PR PBB SHADOW E&M-EST. PATIENT-LVL III: CPT | Mod: PBBFAC,,,

## 2019-03-25 PROCEDURE — 96372 THER/PROPH/DIAG INJ SC/IM: CPT | Mod: S$GLB,,, | Performed by: OBSTETRICS & GYNECOLOGY

## 2019-03-25 PROCEDURE — 99999 PR PBB SHADOW E&M-EST. PATIENT-LVL III: ICD-10-PCS | Mod: PBBFAC,,,

## 2019-03-25 PROCEDURE — 96372 PR INJECTION,THERAP/PROPH/DIAG2ST, IM OR SUBCUT: ICD-10-PCS | Mod: S$GLB,,, | Performed by: OBSTETRICS & GYNECOLOGY

## 2019-03-25 RX ADMIN — TESTOSTERONE CYPIONATE 50 MG: 200 INJECTION, SOLUTION INTRAMUSCULAR at 08:03

## 2019-03-26 RX ORDER — BUPROPION HYDROCHLORIDE 150 MG/1
TABLET ORAL
Qty: 30 TABLET | Refills: 0 | Status: SHIPPED | OUTPATIENT
Start: 2019-03-26 | End: 2019-04-25 | Stop reason: SDUPTHER

## 2019-04-23 ENCOUNTER — CLINICAL SUPPORT (OUTPATIENT)
Dept: OBSTETRICS AND GYNECOLOGY | Facility: CLINIC | Age: 44
End: 2019-04-23
Payer: COMMERCIAL

## 2019-04-23 PROCEDURE — 96372 PR INJECTION,THERAP/PROPH/DIAG2ST, IM OR SUBCUT: ICD-10-PCS | Mod: S$GLB,,, | Performed by: OBSTETRICS & GYNECOLOGY

## 2019-04-23 PROCEDURE — 99999 PR PBB SHADOW E&M-EST. PATIENT-LVL II: ICD-10-PCS | Mod: PBBFAC,,,

## 2019-04-23 PROCEDURE — 96372 THER/PROPH/DIAG INJ SC/IM: CPT | Mod: S$GLB,,, | Performed by: OBSTETRICS & GYNECOLOGY

## 2019-04-23 PROCEDURE — 99999 PR PBB SHADOW E&M-EST. PATIENT-LVL II: CPT | Mod: PBBFAC,,,

## 2019-04-23 RX ADMIN — TESTOSTERONE CYPIONATE 50 MG: 200 INJECTION, SOLUTION INTRAMUSCULAR at 09:04

## 2019-04-25 RX ORDER — BUPROPION HYDROCHLORIDE 150 MG/1
TABLET ORAL
Qty: 30 TABLET | Refills: 0 | Status: SHIPPED | OUTPATIENT
Start: 2019-04-25 | End: 2019-05-29 | Stop reason: SDUPTHER

## 2019-05-21 ENCOUNTER — CLINICAL SUPPORT (OUTPATIENT)
Dept: OBSTETRICS AND GYNECOLOGY | Facility: CLINIC | Age: 44
End: 2019-05-21
Payer: COMMERCIAL

## 2019-05-21 PROCEDURE — 96372 THER/PROPH/DIAG INJ SC/IM: CPT | Mod: S$GLB,,, | Performed by: OBSTETRICS & GYNECOLOGY

## 2019-05-21 PROCEDURE — 99999 PR PBB SHADOW E&M-EST. PATIENT-LVL I: CPT | Mod: PBBFAC,,,

## 2019-05-21 PROCEDURE — 99999 PR PBB SHADOW E&M-EST. PATIENT-LVL I: ICD-10-PCS | Mod: PBBFAC,,,

## 2019-05-21 PROCEDURE — 96372 PR INJECTION,THERAP/PROPH/DIAG2ST, IM OR SUBCUT: ICD-10-PCS | Mod: S$GLB,,, | Performed by: OBSTETRICS & GYNECOLOGY

## 2019-05-21 RX ADMIN — TESTOSTERONE CYPIONATE 50 MG: 200 INJECTION, SOLUTION INTRAMUSCULAR at 08:05

## 2019-05-21 NOTE — PROGRESS NOTES
Here for hormone therapy injection, no complaints at this time, Injection given as ordered, tolerated well, no report of pain prior to or after injection. Return to clinic as scheduled.     Site - LB    Testosterone 50 mg  Depo Estradiol 10 mg    Clinic Supplied Medication

## 2019-05-28 ENCOUNTER — TELEPHONE (OUTPATIENT)
Dept: PRIMARY CARE CLINIC | Facility: CLINIC | Age: 44
End: 2019-05-28

## 2019-05-28 DIAGNOSIS — F41.9 ANXIETY: ICD-10-CM

## 2019-05-28 DIAGNOSIS — R07.1 CHEST PAIN ON BREATHING: ICD-10-CM

## 2019-05-28 RX ORDER — ALPRAZOLAM 1 MG/1
1 TABLET ORAL 3 TIMES DAILY PRN
Qty: 21 TABLET | Refills: 0 | Status: SHIPPED | OUTPATIENT
Start: 2019-05-28 | End: 2019-05-29 | Stop reason: SDUPTHER

## 2019-05-29 ENCOUNTER — OFFICE VISIT (OUTPATIENT)
Dept: PRIMARY CARE CLINIC | Facility: CLINIC | Age: 44
End: 2019-05-29
Attending: FAMILY MEDICINE
Payer: COMMERCIAL

## 2019-05-29 VITALS
DIASTOLIC BLOOD PRESSURE: 68 MMHG | BODY MASS INDEX: 24.17 KG/M2 | HEART RATE: 81 BPM | HEIGHT: 71 IN | OXYGEN SATURATION: 100 % | WEIGHT: 172.63 LBS | SYSTOLIC BLOOD PRESSURE: 96 MMHG

## 2019-05-29 DIAGNOSIS — R07.1 CHEST PAIN ON BREATHING: ICD-10-CM

## 2019-05-29 DIAGNOSIS — F41.9 ANXIETY: Primary | ICD-10-CM

## 2019-05-29 DIAGNOSIS — D68.51 FACTOR V LEIDEN CARRIER: ICD-10-CM

## 2019-05-29 PROCEDURE — 99999 PR PBB SHADOW E&M-EST. PATIENT-LVL III: CPT | Mod: PBBFAC,,, | Performed by: FAMILY MEDICINE

## 2019-05-29 PROCEDURE — 3008F PR BODY MASS INDEX (BMI) DOCUMENTED: ICD-10-PCS | Mod: CPTII,S$GLB,, | Performed by: FAMILY MEDICINE

## 2019-05-29 PROCEDURE — 3008F BODY MASS INDEX DOCD: CPT | Mod: CPTII,S$GLB,, | Performed by: FAMILY MEDICINE

## 2019-05-29 PROCEDURE — 99214 PR OFFICE/OUTPT VISIT, EST, LEVL IV, 30-39 MIN: ICD-10-PCS | Mod: S$GLB,,, | Performed by: FAMILY MEDICINE

## 2019-05-29 PROCEDURE — 99999 PR PBB SHADOW E&M-EST. PATIENT-LVL III: ICD-10-PCS | Mod: PBBFAC,,, | Performed by: FAMILY MEDICINE

## 2019-05-29 PROCEDURE — 99214 OFFICE O/P EST MOD 30 MIN: CPT | Mod: S$GLB,,, | Performed by: FAMILY MEDICINE

## 2019-05-29 RX ORDER — ALPRAZOLAM 1 MG/1
1 TABLET ORAL 3 TIMES DAILY PRN
Qty: 90 TABLET | Refills: 0 | Status: SHIPPED | OUTPATIENT
Start: 2019-05-29 | End: 2019-09-09 | Stop reason: SDUPTHER

## 2019-05-29 RX ORDER — ERGOCALCIFEROL 1.25 MG/1
50000 CAPSULE ORAL
COMMUNITY
End: 2019-11-19

## 2019-05-29 RX ORDER — BUPROPION HYDROCHLORIDE 150 MG/1
150 TABLET ORAL DAILY
Qty: 90 TABLET | Refills: 2 | Status: SHIPPED | OUTPATIENT
Start: 2019-05-29 | End: 2020-01-29

## 2019-05-29 NOTE — PROGRESS NOTES
Subjective:       Patient ID: Mirian العلي is a 43 y.o. female.    Chief Complaint: Medication Refill    Pt presents today for RF of her anxiety med, which she does take sparingly. Per , last 90 days lasted her nearly 6 months. Per pt, she also has had positive SHRAVAN at 1:320 titer and is being worked up by rheum. Garcia shave thyroglobulin ab positive although TSH is normal.   Pt c/o achiness along UE and neck right side. Was doing steroid injections in the past. States that xanax does help morgan when it worsens due to anxiety. Pt also with neuro pain right foot and notes that it helps with this as well.  Has tried gabapentin in past. SE's were too much for pt as is SE's from muscle relaxants as well    Review of Systems   Constitutional: Positive for activity change. Negative for appetite change, chills, diaphoresis, fatigue, fever and unexpected weight change.   HENT: Negative for congestion, ear pain, sinus pressure, sore throat and trouble swallowing.    Eyes: Negative for photophobia, pain and visual disturbance.   Respiratory: Negative for apnea, cough, chest tightness, shortness of breath and wheezing.    Cardiovascular: Negative for chest pain, palpitations and leg swelling.   Gastrointestinal: Negative for abdominal distention, abdominal pain, constipation, diarrhea, nausea and vomiting.   Genitourinary: Negative.    Musculoskeletal: Positive for arthralgias, back pain, joint swelling, myalgias, neck pain and neck stiffness. Negative for gait problem.   Skin: Negative.    Neurological: Positive for numbness. Negative for dizziness, tremors, weakness and headaches.   Psychiatric/Behavioral: Negative for behavioral problems, decreased concentration and sleep disturbance. The patient is not nervous/anxious.    All other systems reviewed and are negative.      Objective:      Physical Exam   Constitutional: She is oriented to person, place, and time. She appears well-developed and well-nourished.   HENT:    Head: Normocephalic and atraumatic.   Right Ear: External ear normal.   Left Ear: External ear normal.   Nose: Nose normal.   Mouth/Throat: Oropharynx is clear and moist. No oropharyngeal exudate.   Eyes: Pupils are equal, round, and reactive to light. EOM are normal.   Neck: Normal range of motion. Neck supple. No thyromegaly present.   Cardiovascular: Normal rate, regular rhythm, normal heart sounds and intact distal pulses.   No murmur heard.  Pulmonary/Chest: Effort normal and breath sounds normal. No respiratory distress.   Abdominal: Soft. Bowel sounds are normal. She exhibits no distension and no mass. There is no tenderness. There is no rebound and no guarding.   Musculoskeletal: Normal range of motion. She exhibits edema (RUE shoulder and neck) and tenderness.   Lymphadenopathy:     She has no cervical adenopathy.   Neurological: She is alert and oriented to person, place, and time. She has normal reflexes. She displays normal reflexes. No cranial nerve deficit or sensory deficit. She exhibits normal muscle tone. Coordination normal.   Skin: Skin is warm and dry. No erythema.   Psychiatric: She has a normal mood and affect. Her behavior is normal. Judgment and thought content normal.       Assessment:       1. Factor V Leiden carrier    2. Chest pain on breathing    3. Anxiety        Plan:       Anxiety and neuro pain in feet and neck  Pt is carrier for factor 5 leiden  Factor V Leiden carrier    Chest pain on breathing  -     ALPRAZolam (XANAX) 1 MG tablet; Take 1 tablet (1 mg total) by mouth 3 (three) times daily as needed.  Dispense: 90 tablet; Refill: 0    Anxiety  -     ALPRAZolam (XANAX) 1 MG tablet; Take 1 tablet (1 mg total) by mouth 3 (three) times daily as needed.  Dispense: 90 tablet; Refill: 0    Other orders  -     buPROPion (WELLBUTRIN XL) 150 MG TB24 tablet; Take 1 tablet (150 mg total) by mouth once daily.  Dispense: 90 tablet; Refill: 2      Stable on wellbutrin. Continue with present  management. Awaiting work up from rheum  RTC 6 mos   General

## 2019-05-30 ENCOUNTER — PATIENT MESSAGE (OUTPATIENT)
Dept: PRIMARY CARE CLINIC | Facility: CLINIC | Age: 44
End: 2019-05-30

## 2019-06-04 ENCOUNTER — TELEPHONE (OUTPATIENT)
Dept: PRIMARY CARE CLINIC | Facility: CLINIC | Age: 44
End: 2019-06-04

## 2019-06-04 DIAGNOSIS — R89.9 ABNORMAL LABORATORY TEST: Primary | ICD-10-CM

## 2019-06-04 RX ORDER — DULOXETIN HYDROCHLORIDE 30 MG/1
30 CAPSULE, DELAYED RELEASE ORAL DAILY
Qty: 30 CAPSULE | Refills: 1 | Status: SHIPPED | OUTPATIENT
Start: 2019-06-04 | End: 2019-08-07 | Stop reason: SDUPTHER

## 2019-06-05 NOTE — PROGRESS NOTES
Subjective:      Patient ID: Mirian العلي is a 43 y.o. female.    Chief Complaint:  Other Misc (new patient )    History of Present Illness  Mirian العلي presents today for Evaluation & management of an elevated thyroglobulin antibody test. This is her first visit with me.      Ref. Range 12/27/2017 11:52 5/14/2018 14:55 10/18/2018 08:20   TSH Latest Ref Range: 0.400 - 4.000 uIU/mL 1.365 1.111 1.465   T3, Free Latest Ref Range: 2.3 - 4.2 pg/mL 2.5 2.4    T4 Total Latest Ref Range: 4.5 - 11.5 ug/dL  5.2    Free T4 Latest Ref Range: 0.71 - 1.51 ng/dL 0.95       After her C section she took Wellman for 9 months, she stopped due to no change in symptoms of fatigue.     current symptoms:   + weight gain   Denies weight loss   + Fatigue   + Constipation   - frequent BM   + Hair loss  + dry skin   + Brittle nails  + Mental fog   No heat or cold intolerance.   No tremor   Denies anxiety  Denies cp, palpitations, or sob     Monthly injection of estrogen & testosterone.  Hysterectomy 1/2015- due to ovarian cysts     With regards to the vitamin d deficiency:  Ergocalciferol 50 k weekly    No recent fractures     Review of Systems   Constitutional: Positive for fatigue and unexpected weight change (gain).   Eyes: Negative for visual disturbance.   Respiratory: Negative for cough and shortness of breath.    Cardiovascular: Negative for chest pain.   Gastrointestinal: Positive for constipation. Negative for abdominal pain.   Endocrine: Negative for cold intolerance, heat intolerance, polydipsia, polyphagia and polyuria.   Musculoskeletal: Negative for arthralgias.   Skin: Negative for wound.   Neurological: Negative for headaches.   Hematological: Does not bruise/bleed easily.   Psychiatric/Behavioral: Negative for sleep disturbance.     Objective:   Physical Exam   Constitutional: She appears well-developed.   HENT:   Right Ear: External ear normal.   Left Ear: External ear normal.   Nose: Nose normal.    Hearing Normal     Neck: No tracheal deviation present. No thyromegaly present.   No nodules.   Cardiovascular: Normal rate.   No murmur heard.  No edema present   Pulmonary/Chest: Effort normal and breath sounds normal.   Abdominal: Soft. She exhibits no mass. No hernia.   Neurological: She is alert. No cranial nerve deficit or sensory deficit.   Skin: No rash noted.   Psychiatric: She has a normal mood and affect. Judgment normal.   Vitals reviewed.    Body mass index is 23.94 kg/m².    Lab Review:   VitD 21 (L)  Thyroglobulin antibody 2 (H)    Lab Results   Component Value Date    HGBA1C 4.8 10/27/2017     Lab Results   Component Value Date    CHOL 154 10/18/2018    HDL 64 10/18/2018    LDLCALC 75.2 10/18/2018    TRIG 74 10/18/2018    CHOLHDL 41.6 10/18/2018     Lab Results   Component Value Date     10/18/2018    K 4.2 10/18/2018     10/18/2018    CO2 28 10/18/2018    GLU 92 10/18/2018    BUN 12 10/18/2018    CREATININE 0.8 10/18/2018    CALCIUM 9.4 10/18/2018    PROT 7.0 10/18/2018    ALBUMIN 4.1 10/18/2018    BILITOT 0.4 10/18/2018    ALKPHOS 62 10/18/2018    AST 16 10/18/2018    ALT 11 10/18/2018    ANIONGAP 5 (L) 10/18/2018    ESTGFRAFRICA >60.0 10/18/2018    EGFRNONAA >60.0 10/18/2018    TSH 1.465 10/18/2018     Assessment and Plan     1. Vitamin D deficiency  Vitamin D   2. Abnormal laboratory test  TSH    Thyroid stimulating immunoglobulin    Thyrotropin receptor antibody    Thyroid peroxidase antibody    Hemoglobin A1c    Comprehensive metabolic panel    Vitamin B12     Vit D Def  Continue Vit D supplement daily  Labs today    -- Abnormal thyroglobulin antibody lab. Explained significance of this lab.   -- Above labs today and discussed indication for treatment of hypothyroidism.  -- Avoid exogenous hyperthyroidism as this can accelerate bone loss and increase risk of CV complications.  -- Reviewed that symptoms of hypothyroidism may not correlate with tsh, and a normal TSH is the goal of  therapy.....  symptoms are not a justification for over treatment     -- Ok to take selenium 100 mcg bid to try and decrease thyroid antibodies if possible.     Will notify patient of lab results via the patient portal.    All questions were answered.

## 2019-06-06 ENCOUNTER — PATIENT MESSAGE (OUTPATIENT)
Dept: PAIN MEDICINE | Facility: CLINIC | Age: 44
End: 2019-06-06

## 2019-06-06 ENCOUNTER — OFFICE VISIT (OUTPATIENT)
Dept: ENDOCRINOLOGY | Facility: CLINIC | Age: 44
End: 2019-06-06
Payer: COMMERCIAL

## 2019-06-06 ENCOUNTER — OFFICE VISIT (OUTPATIENT)
Dept: PAIN MEDICINE | Facility: CLINIC | Age: 44
End: 2019-06-06
Attending: ANESTHESIOLOGY
Payer: COMMERCIAL

## 2019-06-06 ENCOUNTER — PATIENT MESSAGE (OUTPATIENT)
Dept: ENDOCRINOLOGY | Facility: CLINIC | Age: 44
End: 2019-06-06

## 2019-06-06 VITALS
WEIGHT: 171.63 LBS | HEIGHT: 71 IN | BODY MASS INDEX: 24.03 KG/M2 | SYSTOLIC BLOOD PRESSURE: 114 MMHG | DIASTOLIC BLOOD PRESSURE: 82 MMHG | HEART RATE: 66 BPM

## 2019-06-06 VITALS
DIASTOLIC BLOOD PRESSURE: 71 MMHG | BODY MASS INDEX: 24.08 KG/M2 | SYSTOLIC BLOOD PRESSURE: 102 MMHG | RESPIRATION RATE: 18 BRPM | TEMPERATURE: 98 F | HEART RATE: 82 BPM | WEIGHT: 172 LBS | HEIGHT: 71 IN

## 2019-06-06 DIAGNOSIS — M54.12 CERVICAL RADICULOPATHY: Primary | ICD-10-CM

## 2019-06-06 DIAGNOSIS — M25.519 SHOULDER PAIN, UNSPECIFIED CHRONICITY, UNSPECIFIED LATERALITY: ICD-10-CM

## 2019-06-06 DIAGNOSIS — G56.01 CARPAL TUNNEL SYNDROME OF RIGHT WRIST: ICD-10-CM

## 2019-06-06 DIAGNOSIS — R89.9 ABNORMAL LABORATORY TEST: ICD-10-CM

## 2019-06-06 DIAGNOSIS — E55.9 VITAMIN D DEFICIENCY: Primary | ICD-10-CM

## 2019-06-06 DIAGNOSIS — M50.30 DDD (DEGENERATIVE DISC DISEASE), CERVICAL: ICD-10-CM

## 2019-06-06 PROCEDURE — 99999 PR PBB SHADOW E&M-EST. PATIENT-LVL III: CPT | Mod: PBBFAC,,, | Performed by: NURSE PRACTITIONER

## 2019-06-06 PROCEDURE — 3008F BODY MASS INDEX DOCD: CPT | Mod: CPTII,S$GLB,, | Performed by: NURSE PRACTITIONER

## 2019-06-06 PROCEDURE — 99999 PR PBB SHADOW E&M-EST. PATIENT-LVL IV: ICD-10-PCS | Mod: PBBFAC,,, | Performed by: ANESTHESIOLOGY

## 2019-06-06 PROCEDURE — 99203 PR OFFICE/OUTPT VISIT, NEW, LEVL III, 30-44 MIN: ICD-10-PCS | Mod: S$GLB,,, | Performed by: NURSE PRACTITIONER

## 2019-06-06 PROCEDURE — 99204 OFFICE O/P NEW MOD 45 MIN: CPT | Mod: S$GLB,,, | Performed by: ANESTHESIOLOGY

## 2019-06-06 PROCEDURE — 99999 PR PBB SHADOW E&M-EST. PATIENT-LVL III: ICD-10-PCS | Mod: PBBFAC,,, | Performed by: NURSE PRACTITIONER

## 2019-06-06 PROCEDURE — 99203 OFFICE O/P NEW LOW 30 MIN: CPT | Mod: S$GLB,,, | Performed by: NURSE PRACTITIONER

## 2019-06-06 PROCEDURE — 3008F PR BODY MASS INDEX (BMI) DOCUMENTED: ICD-10-PCS | Mod: CPTII,S$GLB,, | Performed by: NURSE PRACTITIONER

## 2019-06-06 PROCEDURE — 99999 PR PBB SHADOW E&M-EST. PATIENT-LVL IV: CPT | Mod: PBBFAC,,, | Performed by: ANESTHESIOLOGY

## 2019-06-06 PROCEDURE — 99204 PR OFFICE/OUTPT VISIT, NEW, LEVL IV, 45-59 MIN: ICD-10-PCS | Mod: S$GLB,,, | Performed by: ANESTHESIOLOGY

## 2019-06-06 RX ORDER — CYANOCOBALAMIN (VITAMIN B-12) 500 MCG
TABLET ORAL
COMMUNITY
End: 2020-06-08

## 2019-06-06 RX ORDER — METHYLPREDNISOLONE 4 MG/1
TABLET ORAL
Qty: 21 TABLET | Refills: 0 | Status: SHIPPED | OUTPATIENT
Start: 2019-06-06 | End: 2019-08-08

## 2019-06-06 NOTE — PROGRESS NOTES
Chronic Pain - New Consult    Referring Physician: Mirian Gonzales    Chief Complaint:   Chief Complaint   Patient presents with    Shoulder Pain     upper right         SUBJECTIVE:    Mirian العلي presents to the clinic for the evaluation of shoulder pain. The pain started 6 years ago following non related injury (lifting suitcase in plane) and symptoms have been unchanged.The pain is located in the right upper trap area and radiates down the arm to the right hand and fingers, as well as occasionally into the right neck and jaw.  The pain is described as aching, numbing, sharp, shooting, throbbing, tight band, tingling and intermittent and is rated as 3/10. She experiences whole hand numbness, but reports symptoms are inconsistent. She denies weakness or dropping things. The pain is rated with a score of  0/10 on the BEST day and a score of 8/10 on the WORST day.  Symptoms interfere with daily activity, sleeping and work. The pain is exacerbated by Sitting.  The pain is mitigated by heat, ice, laying down, massage and medications. She reports spending 0 hours per day reclining. The patient reports 10 hours of uninterrupted sleep per night.  She has had an EMG of the right arm with Dr Martin, is unsure of results. She has a history of weakly +SHRAVAN and has seen an outside rheumatologist with negative testing otherwise, not thought to have a connective tissue disorder. Also + thyroglobulin antibody and seeing an endocrinologist this afternoon. She has previously seen Dr Tiwari who did not recommend neck surgery.   Received multiple ANTHONY fro outside providers with appropriate relief, states last pain physician did not want to repeat it to avoid side effects         Patient reports loss of sensations.    Physical Therapy/Home Exercise: yes      Pain Disability Index Review:  No flowsheet data found.    Pain Medications:    - Opioids: none  - Adjuvant Medications: Advil,Motrin ( Ibuprofen) and Tizanidine  -  "Anti-Coagulants: Aspirin  - Others: see med list      report:  Reviewed and consistent with medication use as prescribed.    Pain Procedures:  3 cervical ESIs with Dr Rikki Reyes  2 ESIs with Napoleon Rey (notes reviewed)     -Paramedian R C7/T1 ILESI 18-"most relief she has had so far" per Dr Dye notes     -Paramedian R C7/T1 ILESI 18-patient reports helpful for approximately 3-4 months    Imagin18 MRI C-spine:   -C2-3: prominent right disc ostephyte complex in the right paracentral and right foraminal region along the uncovertebral joint indenting the ventral thecal sace and contributing to mild right neural foraminal stenosis with contact of the right exiting nerve root  -C3-4: moderate NF stenosis from mild hypertrophy of uncovertebral and facet joints  -C4-5: broad based disc bulge without NF or central stenosis  -C5-6:disc osteophyte complex causing mild central canal narrowing, no neuroforaminal stenosis  -C6-C7-T1: normal    16 MRI R shoulder: mild changes of tendinosis involving supraspinatus, type II acromion with evidence of impingement, Mild osteoarthritis right glenohumeral joint    EMG 16: NCS with mild right carpal tunnel syndrome with normal EMG examination    Past Medical History:   Diagnosis Date    AR (allergic rhinitis)     Asthma     Eczema     Endometriosis, mild 2002    Factor V Leiden mutation     Ovarian cyst, bilateral      Past Surgical History:   Procedure Laterality Date    BUNIONECTOMY Left 2018    CYSTOSCOPY N/A 2015    [facility]    HYSTERECTOMY      total    HYSTERECTOMY-TOTAL LAPAROSCOPIC (TLH) with right salpingo-oopherectomy Right 2015    [facility]    LAPAROSCOPY W/ MINI-LAPAROTOMY  2003    OOPHORECTOMY Right 3/2012    OOPHORECTOMY Left 2015    TOTAL VAGINAL HYSTERECTOMY  2015    w/Right USO    WISDOM TOOTH EXTRACTION " Bilateral      Social History     Socioeconomic History    Marital status:      Spouse name: Not on file    Number of children: 2    Years of education: Not on file    Highest education level: Not on file   Occupational History    Not on file   Social Needs    Financial resource strain: Not on file    Food insecurity:     Worry: Not on file     Inability: Not on file    Transportation needs:     Medical: Not on file     Non-medical: Not on file   Tobacco Use    Smoking status: Never Smoker    Smokeless tobacco: Never Used   Substance and Sexual Activity    Alcohol use: Yes     Alcohol/week: 3.0 - 3.6 oz     Types: 5 - 6 Glasses of wine per week     Frequency: 2-3 times a week     Comment: week    Drug use: No    Sexual activity: Yes     Partners: Male     Birth control/protection: Other-see comments, Post-menopausal   Lifestyle    Physical activity:     Days per week: Not on file     Minutes per session: Not on file    Stress: Not on file   Relationships    Social connections:     Talks on phone: Not on file     Gets together: Not on file     Attends Yarsani service: Not on file     Active member of club or organization: Not on file     Attends meetings of clubs or organizations: Not on file     Relationship status: Not on file   Other Topics Concern    Not on file   Social History Narrative    Not on file     Family History   Problem Relation Age of Onset    Cancer Mother     Thyroid disease Mother     Breast cancer Mother         late 60's    Cancer Father     Diabetes Brother     Factor V Leiden deficiency Brother     Allergies Brother     Asthma Brother     Breast cancer Sister 46        inflammatory    Diabetes Maternal Aunt     Diabetes Paternal Uncle     Cancer Cousin     Breast cancer Paternal Aunt        Review of patient's allergies indicates:   Allergen Reactions    Niacin preparations      Patient states she has flushing    Sulfa (sulfonamide antibiotics)       Tongue swelling    Iodine and iodide containing products Rash       Current Outpatient Medications   Medication Sig    ALPRAZolam (XANAX) 1 MG tablet Take 1 tablet (1 mg total) by mouth 3 (three) times daily as needed.    aspirin (ECOTRIN) 81 MG EC tablet Take 81 mg by mouth once daily.    buPROPion (WELLBUTRIN XL) 150 MG TB24 tablet Take 1 tablet (150 mg total) by mouth once daily.    cetirizine (ZYRTEC) 10 MG tablet Take 1 tablet (10 mg total) by mouth once daily. (Patient taking differently: Take 10 mg by mouth once as needed. )    cyanocobalamin, vitamin B-12, 5,000 mcg Subl Place under the tongue once a week.    diclofenac sodium (SOLARAZE) 3 % gel     DULoxetine (CYMBALTA) 30 MG capsule Take 1 capsule (30 mg total) by mouth once daily.    ergocalciferol (VITAMIN D2) 50,000 unit Cap Take 50,000 Units by mouth every 7 days.    fluticasone (FLONASE) 50 mcg/actuation nasal spray PLACE 2 SPRAYS IN EACH NOSTRIL ONCE DAILY    FLUZONE QUAD [id number], PF, 60 mcg (15 mcg x 4)/0.5 mL Syrg ADM 0.5ML IM UTD    ibuprofen (ADVIL,MOTRIN) 600 MG tablet Take 1 tablet (600 mg total) by mouth every 6 (six) hours as needed.    PROGESTERONE, MICRONIZED (PROMETRIUM ORAL) Take 300 mg by mouth nightly.    tiZANidine 4 mg Cap Take 4 tablets by mouth every evening.    melatonin 1 mg Tab Take by mouth.    methocarbamol (ROBAXIN) 500 MG Tab TK 1 T PO BID PRF MSP    methylPREDNISolone (MEDROL DOSEPACK) 4 mg tablet use as directed     Current Facility-Administered Medications   Medication    estradiol cypionate 5 mg/mL injection 10 mg    estradiol valerate injection 40 mg    testosterone cypionate injection 50 mg       REVIEW OF SYSTEMS:    GENERAL:  No weight loss, malaise or fevers.  HEENT:  +ve for frequent or significant headaches.  NECK:  Negative for lumps, goiter, pain and significant neck swelling.  RESPIRATORY:  Negative for cough, wheezing or shortness of breath.  CARDIOVASCULAR:  Negative for chest pain, leg  "swelling or palpitations.  GI:  Negative for abdominal discomfort, blood in stools or black stools or change in bowel habits.  MUSCULOSKELETAL:  See HPI.  SKIN:  Negative for lesions, rash, and itching.  PSYCH:  +ve for sleep disturbance, mood disorder and recent psychosocial stressors.  HEMATOLOGY/LYMPHOLOGY:  Negative for prolonged bleeding, bruising easily or swollen nodes.  NEURO:   No history of syncope, paralysis, seizures or tremors.  All other reviewed and negative other than HPI.    OBJECTIVE:    /71   Pulse 82   Temp 98.1 °F (36.7 °C)   Resp 18   Ht 5' 11" (1.803 m)   Wt 78 kg (172 lb)   LMP 02/01/2014   BMI 23.99 kg/m²     PHYSICAL EXAMINATION:    General appearance: Well appearing, in no acute distress, alert and oriented x3.  Psych:  Mood and affect appropriate.  Skin: Skin color, texture, turgor normal, no rashes or lesions, in both upper and lower body.  Head/face:  Normocephalic, atraumatic. No palpable lymph nodes.  Neck: No pain to palpation over the cervical paraspinous muscles. Spurling Negative. No pain with neck flexion, extension, or lateral flexion.   Cor: RRR  Pulm: CTA  GI:  Soft and non-tender.  Neck: excellent painless cervical range of motion. Mild tenderness over neck and shoulder muscles, no tender or trigger points. Cervical facet loading negative. Spurling's maneuver produces pain radiating to the right trap and shoulder region.  Extremities: Peripheral joint ROM is full and pain free without obvious instability or laxity in all four extremities. No deformities, edema, or skin discoloration. Good capillary refill.  Musculoskeletal: Hip, sacroiliac and knee provocative maneuvers are negative. Bilateral upper and lower extremity strength is normal and symmetric.  No atrophy or tone abnormalities are noted. Right shoulder with excellent range of motion. No pain with overhead motion. No tenderness about the shoulder. Negative Neer's/Hawkin's. Negative testing of rotator cuff " muscles.   Neuro: Bilateral upper and lower extremity coordination and muscle stretch reflexes are physiologic and symmetric.  Plantar response are downgoing. No loss of sensation is noted. Absent Polanco's. Negative Tinel's and Durkan's compression test bilatearl carpal tunnel. + Phalen's for paresthesias right index.   Gait: normal.    ASSESSMENT: 43 y.o. year old female with neck and and R shoulder pain, consistent with         1. Cervical radiculopathy  EMG W/ ULTRASOUND AND NERVE CONDUCTION TEST 2 Extremities    MRI Shoulder W WO Contrast Right   2. Shoulder pain, unspecified chronicity, unspecified laterality  EMG W/ ULTRASOUND AND NERVE CONDUCTION TEST 2 Extremities    MRI Shoulder W WO Contrast Right   3. DDD (degenerative disc disease), cervical  EMG W/ ULTRASOUND AND NERVE CONDUCTION TEST 2 Extremities    MRI Shoulder W WO Contrast Right   4. Carpal tunnel syndrome of right wrist       PLAN:     - I have stressed the importance of physical activity and a home exercise plan to help with pain and improve health.  - Obtain old records from outside physicians and imaging-Candido Murray, and Amy. Also patient instructed to obtain old EMG results if available. Dr Dye notes and EMG results were reviewed on the same date of encounter and results added above.   - Order MRI of the R shoulder to help evaluate possible source of pain. Previous MRI with supraspinatus tendonitis.  - Obtain repeat EMG of the right arm. Previous EMG with mild R carpal tunnel syndrome.   - Trial medrol dosepack today  - Patient can continue with medications for now since they are providing benefits, using them appropriately, and without side effects.  - In the future I will consider repeat cervical epidural steroid injection.  - If suboptimal improvement with intervention will consider referral to Dr Stephenson for surgical evaluation.   - RTC 2 weeks with NP on same day as Dr Stroud clinic.    The above plan and management  options were discussed at length with patient. Patient is in agreement with the above and verbalized understanding. It will be communicated with the referring physician via electronic record, fax, or mail.    DANA Shields MD  \[facility] [place] [facility]\"" PM&R HO-4   I have personally reviewed the history and exam of this patient and agree with the resident/fellow/NPs note as stated above.    Joel Stroud MD  6/6/19

## 2019-06-06 NOTE — LETTER
June 6, 2019      Nora Brooks MD  [address]   [address]  [facility] [address]           [facility] - Endocrinology 6th [place]  [address]  [place] [address]  Phone: [phone]          Patient: Mirian العلي   MR Number: [id number]   YOB: 1975   Date of Visit: 6/6/2019       Dear Dr. Nora Brooks:    Thank you for referring Mirian العلي to me for evaluation. Attached you will find relevant portions of my assessment and plan of care.    If you have questions, please do not hesitate to call me. I look forward to following Mirian العلي along with you.    Sincerely,    Jennifer Cash, JESSIE    Enclosure  CC:  No Recipients    If you would like to receive this communication electronically, please contact [email or web address] or [phone] to request more information on [employer] Link access.    For providers and/or their staff who would like to refer a patient to [facility], please contact us through our one-stop-shop provider referral line, [facility], at [phone].    If you feel you have received this communication in error or would no longer like to receive these types of communications, please e-mail [email or web address]

## 2019-06-07 ENCOUNTER — PATIENT MESSAGE (OUTPATIENT)
Dept: ENDOCRINOLOGY | Facility: CLINIC | Age: 44
End: 2019-06-07

## 2019-06-10 ENCOUNTER — PATIENT MESSAGE (OUTPATIENT)
Dept: ENDOCRINOLOGY | Facility: CLINIC | Age: 44
End: 2019-06-10

## 2019-06-11 ENCOUNTER — TELEPHONE (OUTPATIENT)
Dept: PAIN MEDICINE | Facility: CLINIC | Age: 44
End: 2019-06-11

## 2019-06-11 NOTE — TELEPHONE ENCOUNTER
Staff returned the call and was sent to an auto [employer] system. Unable to leave voice message.

## 2019-06-12 ENCOUNTER — TELEPHONE (OUTPATIENT)
Dept: PAIN MEDICINE | Facility: CLINIC | Age: 44
End: 2019-06-12

## 2019-06-12 DIAGNOSIS — M25.519 SHOULDER PAIN, UNSPECIFIED CHRONICITY, UNSPECIFIED LATERALITY: Primary | ICD-10-CM

## 2019-06-12 DIAGNOSIS — M67.919 DISORDER OF ROTATOR CUFF, UNSPECIFIED LATERALITY: ICD-10-CM

## 2019-06-12 NOTE — TELEPHONE ENCOUNTER
----- Message from Enrico Mensah sent at 6/12/2019  2:35 PM CDT -----  Contact: RENAE MONK [[id number]]  Name of Who is Calling:RENAE MONK [[id number]]        What is the request in detail: Pt requesting to have her MRI orders faxed to [facility] [phone]. Thanks=        Can the clinic reply by [facility]: n    What [facility]: [phone]

## 2019-06-12 NOTE — TELEPHONE ENCOUNTER
Staff called and spoke with pt to inform her that once Dr. Stroud puts in an order for an outside facilitly, we will then fax order and necessary paper work to the preferred facility and follow up upon once it has been completed.     Pt gave verbal understanding

## 2019-06-12 NOTE — TELEPHONE ENCOUNTER
Ruiz, this is Jose Alejandro I've received your request I'm returning your call from the pain management clinic at [facility]. I just wanted to let you know that I'm working on getting an answer for you by you back and verify if you're requesting outside MRI orders. Pt is scheduled for MRI on 6/13/19 at [facility].

## 2019-06-12 NOTE — TELEPHONE ENCOUNTER
----- Message from Zac Dominique sent at 6/12/2019  4:12 PM CDT -----  Contact: RENAE MONK [[id number]]  Type:  Patient Returning Call    Who Called: RENAE MONK [[id number]]    Who Left Message for Patient: Jose Alejandro    Does the patient know what this is regarding?: yes    Would the patient rather a call back or a response via [facility]? call    Best Call Back Number: [phone]    Additional Information: Patient states her insurance cost less to go the outside facility and canceled MRI for tomorrow. Please send orders to [facility] [phone]

## 2019-06-17 ENCOUNTER — TELEPHONE (OUTPATIENT)
Dept: PAIN MEDICINE | Facility: CLINIC | Age: 44
End: 2019-06-17

## 2019-06-17 NOTE — TELEPHONE ENCOUNTER
Called pt to inform her that MRI order has been sent to [facility] as requested.    Pt did not answer. Left detailed message informing pt of above information and advised her to contact the office at [phone] should she have any concerns.

## 2019-06-17 NOTE — TELEPHONE ENCOUNTER
----- Message from Josselynhannah Phillips sent at 6/17/2019  1:16 PM CDT -----  Contact: Lesvia Gonzales   Name of Who is Calling: Lesvia Gonzales     What is the request in detail:Lesvia edward/Ildefonso is requesting a call back regarding the pt MRI cant be done she needs a PA...... Please contact to further discuss and advise      Can the clinic reply by [facility]: No     What [facility]:  [phone] call back [phone]

## 2019-06-17 NOTE — TELEPHONE ENCOUNTER
Spoke with [facility] to relay approval number for MRI obtained from [facility]# [id number] valid from 06/11/2019 - 07/10/2019

## 2019-06-18 ENCOUNTER — CLINICAL SUPPORT (OUTPATIENT)
Dept: OBSTETRICS AND GYNECOLOGY | Facility: CLINIC | Age: 44
End: 2019-06-18
Payer: COMMERCIAL

## 2019-06-18 PROCEDURE — 99999 PR PBB SHADOW E&M-EST. PATIENT-LVL I: CPT | Mod: PBBFAC,,,

## 2019-06-18 PROCEDURE — 99999 PR PBB SHADOW E&M-EST. PATIENT-LVL I: ICD-10-PCS | Mod: PBBFAC,,,

## 2019-06-18 PROCEDURE — 96372 THER/PROPH/DIAG INJ SC/IM: CPT | Mod: S$GLB,,, | Performed by: OBSTETRICS & GYNECOLOGY

## 2019-06-18 PROCEDURE — 96372 PR INJECTION,THERAP/PROPH/DIAG2ST, IM OR SUBCUT: ICD-10-PCS | Mod: S$GLB,,, | Performed by: OBSTETRICS & GYNECOLOGY

## 2019-06-18 RX ADMIN — TESTOSTERONE CYPIONATE 50 MG: 200 INJECTION, SOLUTION INTRAMUSCULAR at 08:06

## 2019-06-19 ENCOUNTER — TELEPHONE (OUTPATIENT)
Dept: PAIN MEDICINE | Facility: CLINIC | Age: 44
End: 2019-06-19

## 2019-06-19 NOTE — TELEPHONE ENCOUNTER
Staff contact pt to confirm appointment for 6/27/19 @ 8:00 with Dr. Stroud.    Pt did not answer, left voicemail advising pt to contact the office to confirm.

## 2019-06-24 ENCOUNTER — TELEPHONE (OUTPATIENT)
Dept: PAIN MEDICINE | Facility: CLINIC | Age: 44
End: 2019-06-24

## 2019-06-24 NOTE — TELEPHONE ENCOUNTER
Staff return call and spoke with pt to inform her that per Dr. Stroud, the procedure can be done without the MRI results being review but he would rather see the results before.     Staff informed pt that if the results are available to her at the appointment or before. She may show him at that time.    Pt gave verbal understanding

## 2019-06-24 NOTE — TELEPHONE ENCOUNTER
----- Message from Sabina Reich sent at 6/24/2019 10:43 AM CDT -----  Contact: RENAE MONK [[id number]]  Name of Who is Calling: RENAE MONK [[id number]]      What is the request in detail: patient is returning paris call    Can the clinic reply by [facility]: no    What [facility] Back [facility]: [phone]

## 2019-06-24 NOTE — TELEPHONE ENCOUNTER
----- Message from Sabina Reich sent at 6/24/2019  8:35 AM CDT -----  Contact: RENAE MONK [[id number]]  Type:  Patient Returning Call    Who Called: RENAE MONK [[id number]]    Who Left Message for Patient: Heather     Does the patient know what this is regarding?:yes    Would the patient rather a call back or a response via [facility]? Call back     Best Call Back Number: [phone]    Additional Information:

## 2019-06-24 NOTE — TELEPHONE ENCOUNTER
Staff return pt call    Pt stated she is having MRI done at [facility] tomorrow and she would like to know if the procedure will still take place if the results are not in before.    Staff informed pt that regards will be represented to Dr. Stroud and staff will follow up upon his response.     Staff informed pt that we will keep appointment scheduled for now    Pt gave verbal understanding.

## 2019-06-27 ENCOUNTER — PROCEDURE VISIT (OUTPATIENT)
Dept: PAIN MEDICINE | Facility: CLINIC | Age: 44
End: 2019-06-27
Attending: ANESTHESIOLOGY
Payer: COMMERCIAL

## 2019-06-27 VITALS
HEART RATE: 94 BPM | SYSTOLIC BLOOD PRESSURE: 111 MMHG | BODY MASS INDEX: 23.64 KG/M2 | WEIGHT: 168.88 LBS | DIASTOLIC BLOOD PRESSURE: 80 MMHG | HEIGHT: 71 IN | TEMPERATURE: 98 F | RESPIRATION RATE: 18 BRPM

## 2019-06-27 DIAGNOSIS — M25.519 SHOULDER PAIN, UNSPECIFIED CHRONICITY, UNSPECIFIED LATERALITY: ICD-10-CM

## 2019-06-27 DIAGNOSIS — M75.50 SUBACROMIAL BURSITIS: Primary | ICD-10-CM

## 2019-06-27 PROCEDURE — 20611 DRAIN/INJ JOINT/BURSA W/US: CPT | Mod: S$GLB,,, | Performed by: ANESTHESIOLOGY

## 2019-06-27 PROCEDURE — 20611 PR DRAIN/ASP/INJECT MAJOR JOINT/BURSA W/US GUIDANCE: ICD-10-PCS | Mod: S$GLB,,, | Performed by: ANESTHESIOLOGY

## 2019-06-27 NOTE — PROCEDURES
Patient Name: Mirian العلي  MRN: [id number]    INFORMED CONSENT: The procedure, risks, benefits and options were discussed with patient. There are no contraindications to the procedure. The patient expressed understanding and agreed to proceed. The personnel performing the procedure was discussed. I verify that I personally obtained Mirian's consent prior to the start of the procedure and the signed consent can be found on the patient's chart.    Procedure Date: 06/27/2019    Anesthesia: Topical    Pre Procedure diagnosis:   1. Subacromial bursitis    2. Shoulder pain, unspecified chronicity, unspecified laterality        Post-Procedure diagnosis: same    Assistants:Ariane Bower MD PGY-5  [facility]     Sedation: None    PROCEDURE: Right SUBACROMIAL BURSA INJECTION under U/S guidence  With a patient in a sitting position, the desired area was prepped and draped in the usual sterile fashion using ChloraPrep. Using a 27-gauge 1.25in needle introduced into the subacromial bursa under ultrasound guidance . Negative aspiration was confirmed.. A combination of 4 cc of bupivacaine 0.5% and 40 mg Depo-Medrol was easily injected. The needle was removed and bleeding was nill. A sterile dressing was applied. Patient was taken back to the recovery room for further observation.     Dr. Joel Stroud MD was present during the entire procedure.    4 weeks follow up    Blood Loss: Nill  Specimen: None    Right Shoulder MRI :supr and infraspinatus tendinitis without evidence of rotator cuff tears. Mechanical impingmeent from hypertrophic AC joint mild subacominal and subdeltoid bursitis, Thickenining of the IGL with the small axillary ouch correlate for adhesive acpusilitis.     Ariane Bower MD

## 2019-07-15 ENCOUNTER — HOSPITAL ENCOUNTER (OUTPATIENT)
Dept: RADIOLOGY | Facility: HOSPITAL | Age: 44
Discharge: HOME | End: 2019-07-15
Attending: ORTHOPAEDIC SURGERY
Payer: COMMERCIAL

## 2019-07-15 ENCOUNTER — OFFICE VISIT (OUTPATIENT)
Dept: SPORTS MEDICINE | Facility: CLINIC | Age: 44
End: 2019-07-15
Payer: COMMERCIAL

## 2019-07-15 VITALS
BODY MASS INDEX: 23.52 KG/M2 | DIASTOLIC BLOOD PRESSURE: 81 MMHG | WEIGHT: 168 LBS | SYSTOLIC BLOOD PRESSURE: 118 MMHG | HEIGHT: 71 IN | HEART RATE: 100 BPM

## 2019-07-15 DIAGNOSIS — M25.511 RIGHT SHOULDER PAIN, UNSPECIFIED CHRONICITY: Primary | ICD-10-CM

## 2019-07-15 DIAGNOSIS — M25.511 RIGHT SHOULDER PAIN, UNSPECIFIED CHRONICITY: ICD-10-CM

## 2019-07-15 PROCEDURE — 3008F PR BODY MASS INDEX (BMI) DOCUMENTED: ICD-10-PCS | Mod: CPTII,S$GLB,, | Performed by: ORTHOPAEDIC SURGERY

## 2019-07-15 PROCEDURE — 3008F BODY MASS INDEX DOCD: CPT | Mod: CPTII,S$GLB,, | Performed by: ORTHOPAEDIC SURGERY

## 2019-07-15 PROCEDURE — 73030 X-RAY EXAM OF SHOULDER: CPT | Mod: 26,RT,, | Performed by: RADIOLOGY

## 2019-07-15 PROCEDURE — 99203 OFFICE O/P NEW LOW 30 MIN: CPT | Mod: S$GLB,,, | Performed by: ORTHOPAEDIC SURGERY

## 2019-07-15 PROCEDURE — 73030 XR SHOULDER COMPLETE 2 OR MORE VIEWS RIGHT: ICD-10-PCS | Mod: 26,RT,, | Performed by: RADIOLOGY

## 2019-07-15 PROCEDURE — 99999 PR PBB SHADOW E&M-EST. PATIENT-LVL V: ICD-10-PCS | Mod: PBBFAC,,, | Performed by: ORTHOPAEDIC SURGERY

## 2019-07-15 PROCEDURE — 99203 PR OFFICE/OUTPT VISIT, NEW, LEVL III, 30-44 MIN: ICD-10-PCS | Mod: S$GLB,,, | Performed by: ORTHOPAEDIC SURGERY

## 2019-07-15 PROCEDURE — 99999 PR PBB SHADOW E&M-EST. PATIENT-LVL V: CPT | Mod: PBBFAC,,, | Performed by: ORTHOPAEDIC SURGERY

## 2019-07-15 PROCEDURE — 73030 X-RAY EXAM OF SHOULDER: CPT | Mod: TC,FY,PO,RT

## 2019-07-15 NOTE — PROGRESS NOTES
CC: right shoulder pain> She was referred to me by Dr. Purdy      43 y.o. Female RHD. She is an  at a non-profit. Reports that the pain is severe and not responding to any conservative care.    In 2013 she was lifting a suitcase overhead and felt a sharp pain in her neck   She came home and had an MRI that looked normal and she had steroid injections and therapy with mild relief   She got a second opinion from another Orthopaedist and gave her more injections and therapy with mild relief   She switched her PCP who then referred her to Dr. Hewitt.  Dr. Hewitt ordered her an MRI on June 25. He followed this with a bursa injection   Her pain went away for one day and then her pain came right back    She states that her pain is almost constant but does go away at times   She states that nothing specifically makes the pain worse it just happens at times   She states that she has numbness that radiates down her arm   She states that most of her pain is in her upper trap region   She states that she has weakness with prolonged activites     She reports that the pain is worse with overhead activity. It also bothers her at night.    Is affecting ADLs.     Review of Systems   Constitution: Negative. Negative for chills, fever and night sweats.   HENT: Negative for congestion and headaches.    Eyes: Negative for blurred vision, left vision loss and right vision loss.   Cardiovascular: Negative for chest pain and syncope.   Respiratory: Negative for cough and shortness of breath.    Endocrine: Negative for polydipsia, polyphagia and polyuria.   Hematologic/Lymphatic: Negative for bleeding problem. Does not bruise/bleed easily.   Skin: Negative for dry skin, itching and rash.   Musculoskeletal: Negative for falls and muscle weakness.   Gastrointestinal: Negative for abdominal pain and bowel incontinence.   Genitourinary: Negative for bladder incontinence and nocturia.   Neurological: Negative for disturbances in  coordination, loss of balance and seizures.   Psychiatric/Behavioral: Negative for depression. The patient does not have insomnia.    Allergic/Immunologic: Negative for hives and persistent infections.     PAST MEDICAL HISTORY:   Past Medical History:   Diagnosis Date    AR (allergic rhinitis)     Asthma     Eczema     Endometriosis, mild 2002    Factor V Leiden mutation     Ovarian cyst, bilateral      PAST SURGICAL HISTORY:   Past Surgical History:   Procedure Laterality Date    BUNIONECTOMY Left 02/06/2018    CYSTOSCOPY N/A 1/20/2015    [facility]    HYSTERECTOMY      total    HYSTERECTOMY-TOTAL LAPAROSCOPIC (TLH) with right salpingo-oopherectomy Right 1/20/2015    Per[facility]    LAPAROSCOPY W/ MINI-LAPAROTOMY  2003    OOPHORECTOMY Right 3/2012    OOPHORECTOMY Left 01/2015    TOTAL VAGINAL HYSTERECTOMY  1/2015    w/Right USO    WISDOM TOOTH EXTRACTION Bilateral      FAMILY HISTORY:   Family History   Problem Relation Age of Onset    Cancer Mother     Thyroid disease Mother     Breast cancer Mother         late 60's    Cancer Father     Diabetes Brother     Factor V Leiden deficiency Brother     Allergies Brother     Asthma Brother     Breast cancer Sister 46        inflammatory    Diabetes Maternal Aunt     Diabetes Paternal Uncle     Cancer Cousin     Breast cancer Paternal Aunt      SOCIAL HISTORY:   Social History     Socioeconomic History    Marital status:      Spouse name: Not on file    Number of children: 2    Years of education: Not on file    Highest education level: Not on file   Occupational History    Not on file   Social Needs    Financial resource strain: Not on file    Food insecurity:     Worry: Not on file     Inability: Not on file    Transportation needs:     Medical: Not on file     Non-medical: Not on file   Tobacco Use    Smoking status: Never Smoker    Smokeless tobacco:  Never Used   Substance and Sexual Activity    Alcohol use: Yes     Alcohol/week: 3.0 - 3.6 oz     Types: 5 - 6 Glasses of wine per week     Frequency: 2-3 times a week     Comment: week    Drug use: No    Sexual activity: Yes     Partners: Male     Birth control/protection: Other-see comments, Post-menopausal   Lifestyle    Physical activity:     Days per week: Not on file     Minutes per session: Not on file    Stress: Not on file   Relationships    Social connections:     Talks on phone: Not on file     Gets together: Not on file     Attends Roman [employer] service: Not on file     Active member of club or organization: Not on file     Attends meetings of clubs or organizations: Not on file     Relationship status: Not on file   Other Topics Concern    Not on file   Social History Narrative    Not on file       MEDICATIONS:   Current Outpatient Medications:     ALPRAZolam (XANAX) 1 MG tablet, Take 1 tablet (1 mg total) by mouth 3 (three) times daily as needed., Disp: 90 tablet, Rfl: 0    aspirin (ECOTRIN) 81 MG EC tablet, Take 81 mg by mouth once daily., Disp: , Rfl:     buPROPion (WELLBUTRIN XL) 150 MG TB24 tablet, Take 1 tablet (150 mg total) by mouth once daily., Disp: 90 tablet, Rfl: 2    cetirizine (ZYRTEC) 10 MG tablet, Take 1 tablet (10 mg total) by mouth once daily. (Patient taking differently: Take 10 mg by mouth once as needed. ), Disp: 30 tablet, Rfl: 2    cyanocobalamin, vitamin B-12, 5,000 mcg Subl, Place under the tongue once a week., Disp: , Rfl:     diclofenac sodium (SOLARAZE) 3 % gel, , Disp: , Rfl:     DULoxetine (CYMBALTA) 30 MG capsule, Take 1 capsule (30 mg total) by mouth once daily., Disp: 30 capsule, Rfl: 1    ergocalciferol (VITAMIN D2) 50,000 unit Cap, Take 50,000 Units by mouth every 7 days., Disp: , Rfl:     fluticasone (FLONASE) 50 mcg/actuation nasal spray, PLACE 2 SPRAYS IN EACH NOSTRIL ONCE DAILY, Disp: 1 Bottle, Rfl: 1    FLUZONE QUAD 1135-3293, PF, 60 mcg (15 mcg x  "4)/0.5 mL Syrg, ADM 0.5ML IM UTD, Disp: , Rfl: 0    ibuprofen (ADVIL,MOTRIN) 600 MG tablet, Take 1 tablet (600 mg total) by mouth every 6 (six) hours as needed., Disp: 20 tablet, Rfl: 0    melatonin 1 mg Tab, Take by mouth., Disp: , Rfl:     methocarbamol (ROBAXIN) 500 MG Tab, TK 1 T PO BID PRF MSP, Disp: , Rfl: 0    methylPREDNISolone (MEDROL DOSEPACK) 4 mg tablet, use as directed, Disp: 21 tablet, Rfl: 0    PROGESTERONE, MICRONIZED (PROMETRIUM ORAL), Take 300 mg by mouth nightly., Disp: , Rfl:     tiZANidine 4 mg Cap, Take 4 tablets by mouth every evening., Disp: , Rfl: 0    Current Facility-Administered Medications:     estradiol cypionate 5 mg/mL injection 10 mg, 10 mg, Intramuscular, Q28 Days, Genet Stein MD, 10 mg at 06/18/19 0835    estradiol valerate injection 40 mg, 40 mg, Intramuscular, Q30 Days, Genet Stein MD    testosterone cypionate injection 50 mg, 50 mg, Intramuscular, Q28 Days, Genet Stein MD, 50 mg at 06/18/19 0835  ALLERGIES:   Review of patient's allergies indicates:   Allergen Reactions    Niacin preparations      Patient states she has flushing    Sulfa (sulfonamide antibiotics)      Tongue swelling    Iodine and iodide containing products Rash       VITAL SIGNS: /81   Pulse 100   Ht 5' 11" (1.803 m)   Wt 76.2 kg (168 lb)   LMP 02/01/2014   BMI 23.43 kg/m²      PHYSICAL EXAMINATION:  General:  The patient is alert and oriented x 3.  Mood is pleasant.  Observation of ears, eyes and nose reveal no gross abnormalities.  No labored breathing observed.  Gait is coordinated. Patient can toe walk and heel walk without difficulty.      right Shoulder / Upper Extremity Exam    OBSERVATION:     Swelling  none  Deformity  none   Discoloration  none   Scapular winging none   Scars   none  Atrophy  none    TENDERNESS / CREPITUS (T/C):          T/C      T/C   Clavicle   -/-  SUPRAspinatus    -/-     AC Jt.    -/-  INFRAspinatus  -/-    SC Jt. "    -/-  Deltoid    -/-      G. Tuberosity  -/-  LH BICEP groove  +/-   Acromion:  -/-  Midline Neck   -/-     Scapular Spine -/-  Trapezium   -/-   SMA Scapula  -/-  GH jt. line - post  -/-     Scapulothoracic  -/-         ROM: (* = with pain)  Right shoulder   Left shoulder        AROM (PROM)   AROM (PROM)   FE    170° (175°)     170° (175°)     ER at 0°    60°  (65°)    60°  (65°)   ER at 90° ABD  90°  (90°)    90°  (90°)   IR at 90°  ABD   NA  (40°)     NA  (40°)      IR (spine level)   T10     T10    STRENGTH: (* = with pain) RIGHT SHOULDER  LEFT SHOULDER   SCAPTION at 0  5/5    5/5   SCAPTION at 30  5/5    5/5    IR    5/5    5/5   ER    5/5    5/5   BICEPS   5/5    5/5   Deltoid    5/5    5/5     SIGNS:  Painful side       NEER   -    OARLETHS  +    FAITH   -    SPEEDS  neg     DROP ARM   neg   BELLY PRESS neg   Superior escape none    LIFT-OFF  neg   X-Body ADD    neg    MOVING VALGUS neg        STABILITY TESTING    RIGHT SHOULDER   LEFT SHOULDER     Translation     Anterior  up face     up face    Posterior  up face    up face    Sulcus   < 10mm    < 10 mm     Signs   Apprehension   neg      neg       Relocation   no change     no change      Jerk test  neg     neg    EXTREMITY NEURO-VASCULAR EXAM    Sensation grossly intact to light touch all dermatomal regions.    DTR 2+ Biceps, Triceps, BR and Negative Elizs sign   Grossly intact motor function at Elbow, Wrist and Hand   Distal pulses radial and ulnar 2+, brisk cap refill, symmetric.      NECK:  Painless FROM and spinous processes non-tender. Negative Spurlings sign.      OTHER FINDINGS:  Mild Scapular dyskinesia  + Marino    XRAYS:  Shoulder trauma series right,  were obtained and reviewed  No convincing fracture or dislocation is noted. The osseous structures appear well mineralized and well aligned    MRI:  + SLAP  + mild cuff tendonitis        ASSESSMENT:   right:  1. Possible TOS   2. Mild Scapular Dyskinesia     PLAN:      EMG Right  Shoulder   Referral to Dr. Garza     Begin Physical Therapy     All questions were answered, pt will contact us for questions or concerns in the interim.

## 2019-07-16 ENCOUNTER — CLINICAL SUPPORT (OUTPATIENT)
Dept: OBSTETRICS AND GYNECOLOGY | Facility: CLINIC | Age: 44
End: 2019-07-16
Payer: COMMERCIAL

## 2019-07-16 ENCOUNTER — PATIENT MESSAGE (OUTPATIENT)
Dept: OBSTETRICS AND GYNECOLOGY | Facility: CLINIC | Age: 44
End: 2019-07-16

## 2019-07-16 ENCOUNTER — TELEPHONE (OUTPATIENT)
Dept: OBSTETRICS AND GYNECOLOGY | Facility: CLINIC | Age: 44
End: 2019-07-16

## 2019-07-16 DIAGNOSIS — Z79.890 HORMONE REPLACEMENT THERAPY (HRT): Primary | ICD-10-CM

## 2019-07-16 DIAGNOSIS — Z78.0 MENOPAUSE: Primary | ICD-10-CM

## 2019-07-16 PROCEDURE — 96372 THER/PROPH/DIAG INJ SC/IM: CPT | Mod: S$GLB,,, | Performed by: OBSTETRICS & GYNECOLOGY

## 2019-07-16 PROCEDURE — 99999 PR PBB SHADOW E&M-EST. PATIENT-LVL III: CPT | Mod: PBBFAC,,,

## 2019-07-16 PROCEDURE — 96372 PR INJECTION,THERAP/PROPH/DIAG2ST, IM OR SUBCUT: ICD-10-PCS | Mod: S$GLB,,, | Performed by: OBSTETRICS & GYNECOLOGY

## 2019-07-16 PROCEDURE — 99999 PR PBB SHADOW E&M-EST. PATIENT-LVL III: ICD-10-PCS | Mod: PBBFAC,,,

## 2019-07-16 RX ADMIN — TESTOSTERONE CYPIONATE 50 MG: 200 INJECTION, SOLUTION INTRAMUSCULAR at 08:07

## 2019-07-18 ENCOUNTER — TELEPHONE (OUTPATIENT)
Dept: NEUROLOGY | Facility: CLINIC | Age: 44
End: 2019-07-18

## 2019-07-18 NOTE — TELEPHONE ENCOUNTER
Left message on patients vm that her EMG has been scheduled for October 7th at 11 am.  I added patient to the waiting list and will mail an appointment letter.

## 2019-07-19 ENCOUNTER — CLINICAL SUPPORT (OUTPATIENT)
Dept: REHABILITATION | Facility: HOSPITAL | Age: 44
End: 2019-07-19
Attending: ORTHOPAEDIC SURGERY
Payer: COMMERCIAL

## 2019-07-19 DIAGNOSIS — M25.511 RIGHT SHOULDER PAIN, UNSPECIFIED CHRONICITY: ICD-10-CM

## 2019-07-19 PROCEDURE — 97161 PT EVAL LOW COMPLEX 20 MIN: CPT

## 2019-07-19 PROCEDURE — 97110 THERAPEUTIC EXERCISES: CPT

## 2019-07-19 NOTE — PLAN OF CARE
[facility]  Physical Therapy Initial Evaluation    Name: Mirian العلي  Clinic Number: [id number]    Therapy Diagnosis:   Encounter Diagnosis   Name Primary?    Right shoulder pain, unspecified chronicity      Physician: Christy Abdi MD    Physician Orders: PT Eval and Treat  Medical Diagnosis: M25.511 (ICD-10-CM) - Right shoulder pain, unspecified chronicity  Evaluation Date: 7/19/2019  Authorization Period Expiration: 12/31/2019  Plan of Care Certification Period: 12/6/2019  Visit # / Visits authorized: 1/20  Date of Surgery: NA  Precautions: Standard    Time In: 705a  Time Out: 805a  Total Billable Time: 60 minutes    Subjective     Date of onset: Insidious, since 2013  History of current condition - Mirian reports: that she has neck and shoulder pain with feelings of tingling and heaviness in her arm and hand intermittently.  She states putting a suitcase overhead in 2013 and felt immediate severe pain in her neck.  Since then, pain has moved from neck to shoulder and down hand at different times.  She states that it follows no specific pattern and is not reproduced with positions.  She states that her arm feels heavy, she has intermittent headaches daily.  She states that the last 2-3 weeks have been the worst.  She wakes at night from pain.     Past Medical History:   Diagnosis Date    AR (allergic rhinitis)     Asthma     Eczema     Endometriosis, mild 2002    Factor V Leiden mutation     Ovarian cyst, bilateral      Mirian العلي  has a past surgical history that includes Laparoscopy w/ mini-laparotomy (2003); Total vaginal hysterectomy (1/2015); Bunionectomy (Left, 02/06/2018); [place] tooth extraction (Bilateral); Oophorectomy (Right, 3/2012); Oophorectomy (Left, 01/2015); and Hysterectomy.    Mirian has a current medication list which includes the following prescription(s): alprazolam, aspirin, bupropion, cetirizine, cyanocobalamin (vitamin b-12),  diclofenac sodium, duloxetine, ergocalciferol, fluticasone propionate, fluzone quad 5947-8199 (pf), ibuprofen, melatonin, methocarbamol, methylprednisolone, progesterone, micronized, and tizanidine, and the following Facility-Administered Medications: estradiol cypionate and estradiol valerate.    Review of patient's allergies indicates:   Allergen Reactions    Niacin preparations      Patient states she has flushing    Sulfa (sulfonamide antibiotics)      Tongue swelling    Iodine and iodide containing products Rash        Prior Therapy: Yes  Social History: Lives at home with family  Occupation: Works at non profit  Prior Level of Function: No limitations of UE or neck  Current Level of Function: Limited in UE activity, sitting, prolonged carrying, lifting    Pain:  Current 3/10, worst 10/10, best 0/10   Location: right hands , neck  and shoulder   Description: Aching, Tingling, Deep, Numb and Sharp    Pts goals: To resume full activity without pain or symptoms    Objective     Scapular alignment:  Right scapula abducted    Range of Motion:   Shoulder Right (AROM/PROM) Left (AROM/PROM)   Flexion 178 degrees 180 degrees   Abduction 180 degrees 180 degrees   ER at 90 102 degrees 100 degrees   IR at 90 91 degrees 90 degrees     Strength:  Shoulder Right Left   Flexion 4+*/5 5/5   Abduction 4+*/5 5/5   ER 4/5 4+/5   IR 5/5 5/5       Special Tests:  AC Joint Right Left   AC Joint Compression Test - -   Empty Can Test - -   Drop Arm test - -   Subscaputlaris Lift Off - -   Clunk test - -   Hawkin's Kenndy - -   Neer's Test - -   Speed's test - -   Anterior Apprehension test - -     Palpation: Limited sideglide throughout upper cervical spine, increased tone and hypertrophy of right upper trapezius, distal symptoms reproduced with ulnar nerve gliding    TREATMENT     Treatment Time In: 730a  Treatment Time Out: 805  Total Treatment time separate from Evaluation time:35    Mirian received therapeutic exercises to  develop strength, endurance and posture for 35 minutes including:  Education in diagnosis and plan of care  Prone cervical retraction - 10x  Prone scap retraction - 10x  Serratus punch - 10x  Lay on 1/2 fr - 2'    Mirian received the following manual therapy techniques for 0 minutes including:    Home Exercises and Patient Education Provided    Education provided re: therapeutic diagnosis and plan of care    Written Home Exercises Provided: Yes  Exercises were reviewed and Mirian was able to demonstrate them prior to the end of the session.   Pt received a written copy of exercises to perform at home. Mirian demonstrated good  understanding of the education provided.     See EMR under patient instructions for exercises given.     Assessment     Mirian is a 43 y.o. female referred to outpatient Physical Therapy with a medical diagnosis of right shoulder pain that began in 2013. Pt presents with objective deficits in right upper trapezius function, right scapular position, upper cervical hypermobility, and ulnar nerve mobility that limits functional ability to sleep, sit, lift/carry comfortably.    Pt prognosis is Excellent.   Pt will benefit from skilled outpatient Physical Therapy to address the deficits stated above and in the chart below, provide pt/family education, and to maximize pt's level of independence.     Plan of care discussed with patient: Yes  Pt's spiritual, cultural and educational needs considered and patient is agreeable to the plan of care and goals as stated below:     Anticipated Barriers for therapy: None    Medical Necessity is demonstrated by the following  History  Co-morbidities and personal factors that may impact the plan of care Co-morbidities:   See chart    Personal Factors:   no deficits     low   Examination  Body Structures and Functions, activity limitations and participation restrictions that may impact the plan of care Body Regions:   neck  upper extremities    Body  Systems:    strength    Participation Restrictions:   None    Activity limitations:   Learning and applying knowledge  no deficits    General Tasks and Commands  no deficits    Communication  no deficits    Mobility  lifting and carrying objects    Self care  no deficits    Domestic Life  no deficits    Interactions/Relationships  no deficits    Life Areas  no deficits    Community and Social Life  no deficits         low   Clinical Presentation stable and uncomplicated low   Decision Making/ Complexity Score: low       Goals:  Short Term Goals:  4 weeks  1.Patient will demonstrate ability to sleep without waking.   2.Patient will demonstrate 4+/5 strength of affected shoulder flexion and abduction without pain to increase ease with lifting/carrying.    Long Term Goals: 16 weeks  1.Increase strength to >/= 4+/5 in all planes of cervical and shoulder ROM to increase tolerance for ADL and work activities.  3. Pt goal: To resume playing tennis    Plan   Certification Period/Plan of care expiration: 7/19/2019 to 11/8/2019.    Outpatient Physical Therapy 2 times weekly for 16 weeks to include the following interventions: manual therapy as needed, therapeutic exercises to address functional deficits, modalities prn, wound care prn, dry needling prn, treatment by a skilled PTA at times.    Dario Jain, PT

## 2019-07-25 ENCOUNTER — TELEPHONE (OUTPATIENT)
Dept: PAIN MEDICINE | Facility: CLINIC | Age: 44
End: 2019-07-25

## 2019-07-25 NOTE — TELEPHONE ENCOUNTER
Staff left a detail message for patient.    Staff was informing patient about her appointment she have on 07.29.19 with JACK. Due to JACK not being available that day, staff was trying to get pt reschedule to a newer date and time.      **Staff canceled original appt with JACK 07.29.19(Mon)**      PLEASE RESCHEDULE PATIENT

## 2019-07-26 ENCOUNTER — PATIENT MESSAGE (OUTPATIENT)
Dept: SPORTS MEDICINE | Facility: CLINIC | Age: 44
End: 2019-07-26

## 2019-07-26 ENCOUNTER — CLINICAL SUPPORT (OUTPATIENT)
Dept: REHABILITATION | Facility: HOSPITAL | Age: 44
End: 2019-07-26
Attending: ORTHOPAEDIC SURGERY
Payer: COMMERCIAL

## 2019-07-26 DIAGNOSIS — M25.511 RIGHT SHOULDER PAIN, UNSPECIFIED CHRONICITY: ICD-10-CM

## 2019-07-26 PROCEDURE — 97110 THERAPEUTIC EXERCISES: CPT

## 2019-07-29 ENCOUNTER — TELEPHONE (OUTPATIENT)
Dept: CARDIOTHORACIC SURGERY | Facility: CLINIC | Age: 44
End: 2019-07-29

## 2019-07-29 ENCOUNTER — PATIENT MESSAGE (OUTPATIENT)
Dept: SPORTS MEDICINE | Facility: CLINIC | Age: 44
End: 2019-07-29

## 2019-07-29 NOTE — PROGRESS NOTES
Physical Therapy Daily Treatment Note     Name: Mirian Perezsey  Clinic Number: [id number]    Therapy Diagnosis:   Encounter Diagnosis   Name Primary?    Right shoulder pain, unspecified chronicity      Physician: Christy Abdi MD    Visit Date: 7/26/2019  Physician Orders: PT Eval and Treat  Medical Diagnosis: M25.511 (ICD-10-CM) - Right shoulder pain, unspecified chronicity  Evaluation Date: 7/19/2019  Authorization Period Expiration: 12/31/2019  Plan of Care Certification Period: 12/6/2019  Visit # / Visits authorized: 2/20  Date of Surgery: NA  Precautions: Standard    Time In: 1000a  Time Out: 1100a  Total Billable Time: 50 minutes    Subjective      Pt reports: she was compliant with home exercise program given last session.   Response to previous treatment: Patient states that she is having increased pain in her affected arm and neck, feeling very cold in affected hand    Pain: 3/10  Location: right arms, hands  and shoulder      Objective     Mirian received therapeutic exercises to develop strength, posture and core stabilization for 50 minutes including:  Prone on fr - 4'  Shoulder ext prone - 2 x 8  Prone thoracic extension mobilization of thoracic spine - Gr II-III  Sitting posture education   Sitting with arms supported chin retraction - 2 x 8  Prone pendulums - 2 x 30    Dry needling to patient prone for upper trapezius left and levator scapula, timed in and out, consent obtained, no adverse effects, performed by Marcelo Castillo PT, DPT  Mirian received the following manual therapy techniques for 0 minutes, including:      Home Exercises Provided and Patient Education Provided     Education provided:   Continue current HEP    Written Home Exercises Provided: Continue with current HEP  Exercises were reviewed and Mirian was able to demonstrate them prior to the end of the session.      Pt received a written copy of exercises to perform at home.   See EMR under patient  instructions for exercises given.     Mirian demonstrated good  understanding of the education provided.     Assessment     Patient demonstrates cold right hand that was improved in prone position, needed education of shoulder posture and pec minor stretching  Mirian is progressing well towards her goals.   Pt prognosis is Excellent.     Pt will continue to benefit from skilled outpatient physical therapy to address the deficits listed in the problem list box on initial evaluation, provide pt/family education and to maximize pt's level of independence in the home and community environment.     Pt's spiritual, cultural and educational needs considered and pt agreeable to plan of care and goals.    Anticipated barriers to physical therapy: None    Goals:   Short Term Goals:  4 weeks  1.Patient will demonstrate ability to sleep without waking.   2.Patient will demonstrate 4+/5 strength of affected shoulder flexion and abduction without pain to increase ease with lifting/carrying.     Long Term Goals: 16 weeks  1.Increase strength to >/= 4+/5 in all planes of cervical and shoulder ROM to increase tolerance for ADL and work activities.  3. Pt goal: To resume playing tennis    Plan     Progress education and postural modification for decreased symptoms    Dario Jain, PT

## 2019-07-30 ENCOUNTER — CLINICAL SUPPORT (OUTPATIENT)
Dept: REHABILITATION | Facility: HOSPITAL | Age: 44
End: 2019-07-30
Attending: ORTHOPAEDIC SURGERY
Payer: COMMERCIAL

## 2019-07-30 DIAGNOSIS — M25.511 RIGHT SHOULDER PAIN, UNSPECIFIED CHRONICITY: ICD-10-CM

## 2019-07-30 PROCEDURE — 97110 THERAPEUTIC EXERCISES: CPT

## 2019-07-30 PROCEDURE — 97140 MANUAL THERAPY 1/> REGIONS: CPT

## 2019-07-31 ENCOUNTER — TELEPHONE (OUTPATIENT)
Dept: PAIN MEDICINE | Facility: CLINIC | Age: 44
End: 2019-07-31

## 2019-07-31 NOTE — PROGRESS NOTES
"                            Physical Therapy Daily Treatment Note     Name: Mirian Perezsey  Clinic Number: [id number]    Therapy Diagnosis:   Encounter Diagnosis   Name Primary?    Right shoulder pain, unspecified chronicity      Physician: Christy Abdi MD    Visit Date: 2019  Physician Orders: PT Eval and Treat  Medical Diagnosis: M25.511 (ICD-10-CM) - Right shoulder pain, unspecified chronicity  Evaluation Date: 2019  Authorization Period Expiration: 2019  Plan of Care Certification Period: 2019  Visit # / Visits authorized: 3/20  Date of Surgery: NA  Precautions: Standard    Time In: 1000a  Time Out: 1100a  Total Billable Time: 50 minutes    Subjective      Pt reports: she was compliant with home exercise program given last session.   Response to previous treatment: Patient states that she has had 2 bad days of neck and shoulder pain, increased headaches    Pain: 3/10  Location: right arms, hands  and shoulder      Objective     Mirian received therapeutic exercises to develop strength, posture and core stabilization for 30 minutes including:  Supine on fr - 4'  Shoulder ext prone - 2 x 8  Prone thoracic extension mobilization of thoracic spine - Gr II-III  Pec minor stretch - 3 x 20"    Dry needling to patient prone for upper trapezius left and levator scapula, timed in and out, consent obtained, no adverse effects, performed by Marcelo Castillo PT, DPT  Mirian received the following manual therapy techniques for 10 minutes, includinst rib depression mobilization GR IV  Pec minor release right    Home Exercises Provided and Patient Education Provided     Education provided:   Continue current HEP    Written Home Exercises Provided: Continue with current HEP  Exercises were reviewed and Mirian was able to demonstrate them prior to the end of the session.      Pt received a written copy of exercises to perform at home.   See EMR under patient instructions for exercises given. "     Mirian demonstrated good  understanding of the education provided.     Assessment     Patient demonstrates decreased symptoms in affected hand with first rib mobilization, decreased neck pain with left cervical rotation  Mirian is progressing well towards her goals.   Pt prognosis is Excellent.     Pt will continue to benefit from skilled outpatient physical therapy to address the deficits listed in the problem list box on initial evaluation, provide pt/family education and to maximize pt's level of independence in the home and community environment.     Pt's spiritual, cultural and educational needs considered and pt agreeable to plan of care and goals.    Anticipated barriers to physical therapy: None    Goals:   Short Term Goals:  4 weeks  1.Patient will demonstrate ability to sleep without waking.   2.Patient will demonstrate 4+/5 strength of affected shoulder flexion and abduction without pain to increase ease with lifting/carrying.     Long Term Goals: 16 weeks  1.Increase strength to >/= 4+/5 in all planes of cervical and shoulder ROM to increase tolerance for ADL and work activities.  3. Pt goal: To resume playing tennis    Plan     Progress education and postural modification for decreased symptoms    Dario Jain, PT

## 2019-07-31 NOTE — TELEPHONE ENCOUNTER
----- Message from Sabina Reich sent at 7/31/2019  8:34 AM CDT -----  Contact: RENAE MONK [[id number]]  Name of Who is Calling: RENAE MONK [[id number]]      What is the request in detail: patient would like to establish care with Dr. Mcclendon states orthopedic doctor advised her to switch doctors. Please call to schedule appt     Can the clinic reply by [facility]: no    What Number to Call Back if not in [facility]: [phone]

## 2019-07-31 NOTE — TELEPHONE ENCOUNTER
Spoke with patient and explained process of switching providers, appointment made with nurse practioner

## 2019-08-01 ENCOUNTER — CLINICAL SUPPORT (OUTPATIENT)
Dept: REHABILITATION | Facility: HOSPITAL | Age: 44
End: 2019-08-01
Attending: ORTHOPAEDIC SURGERY
Payer: COMMERCIAL

## 2019-08-01 DIAGNOSIS — M25.511 RIGHT SHOULDER PAIN, UNSPECIFIED CHRONICITY: ICD-10-CM

## 2019-08-01 PROCEDURE — 97110 THERAPEUTIC EXERCISES: CPT

## 2019-08-01 PROCEDURE — 97140 MANUAL THERAPY 1/> REGIONS: CPT

## 2019-08-01 NOTE — PROGRESS NOTES
"                            Physical Therapy Daily Treatment Note     Name: Mirian Gill العلي  Clinic Number: [id number]    Therapy Diagnosis:   Encounter Diagnosis   Name Primary?    Right shoulder pain, unspecified chronicity      Physician: Christy Abdi MD    Visit Date: 2019  Physician Orders: PT Eval and Treat  Medical Diagnosis: M25.511 (ICD-10-CM) - Right shoulder pain, unspecified chronicity  Evaluation Date: 2019  Authorization Period Expiration: 2019  Plan of Care Certification Period: 2019  Visit # / Visits authorized:   Date of Surgery: NA  Precautions: Standard    Time In: 200p  Time Out: 300p  Total Billable Time: 50 minutes    Subjective      Pt reports: she was compliant with home exercise program given last session.   Response to previous treatment: Patient states that she woke up with almost no pain this morning, first time she could remember    Pain: 3/10  Location: right arms, hands  and shoulder      Objective     Mirian received therapeutic exercises to develop strength, posture and core stabilization for 30 minutes including:  Supine on fr - 4'  Shoulder ext prone - 2 x 8  Prone thoracic extension mobilization of thoracic spine - Gr II-III  Pec minor stretch - 3 x 20"    Dry needling to patient prone for upper trapezius left and levator scapula, timed in and out, consent obtained, no adverse effects, performed by Yesenia Westbrook PT, DPT  Mirian received the following manual therapy techniques for 10 minutes, includinst rib depression mobilization GR IV  Pec minor release right    Home Exercises Provided and Patient Education Provided     Education provided:   Continue current HEP    Written Home Exercises Provided: Continue with current HEP  Exercises were reviewed and Mirian was able to demonstrate them prior to the end of the session.      Pt received a written copy of exercises to perform at home.   See EMR under patient instructions for exercises given. "     Mirian demonstrated good  understanding of the education provided.     Assessment     Patient demonstrates decreased duration of symptoms with cervical scalene mobilization and 1st rib mobilization  Mirian is progressing well towards her goals.   Pt prognosis is Excellent.     Pt will continue to benefit from skilled outpatient physical therapy to address the deficits listed in the problem list box on initial evaluation, provide pt/family education and to maximize pt's level of independence in the home and community environment.     Pt's spiritual, cultural and educational needs considered and pt agreeable to plan of care and goals.    Anticipated barriers to physical therapy: None    Goals:   Short Term Goals:  4 weeks  1.Patient will demonstrate ability to sleep without waking.   2.Patient will demonstrate 4+/5 strength of affected shoulder flexion and abduction without pain to increase ease with lifting/carrying.     Long Term Goals: 16 weeks  1.Increase strength to >/= 4+/5 in all planes of cervical and shoulder ROM to increase tolerance for ADL and work activities.  3. Pt goal: To resume playing tennis    Plan     Progress education and postural modification for decreased symptoms    Dario Jain, PT

## 2019-08-05 ENCOUNTER — LAB VISIT (OUTPATIENT)
Dept: LAB | Facility: OTHER | Age: 44
End: 2019-08-05
Attending: OBSTETRICS & GYNECOLOGY
Payer: COMMERCIAL

## 2019-08-05 DIAGNOSIS — Z79.890 HORMONE REPLACEMENT THERAPY (HRT): ICD-10-CM

## 2019-08-05 LAB — ESTRADIOL SERPL-MCNC: 55 PG/ML

## 2019-08-05 PROCEDURE — 82670 ASSAY OF TOTAL ESTRADIOL: CPT

## 2019-08-05 PROCEDURE — 36415 COLL VENOUS BLD VENIPUNCTURE: CPT

## 2019-08-05 PROCEDURE — 84402 ASSAY OF FREE TESTOSTERONE: CPT

## 2019-08-06 ENCOUNTER — CLINICAL SUPPORT (OUTPATIENT)
Dept: REHABILITATION | Facility: HOSPITAL | Age: 44
End: 2019-08-06
Attending: ORTHOPAEDIC SURGERY
Payer: COMMERCIAL

## 2019-08-06 ENCOUNTER — PATIENT OUTREACH (OUTPATIENT)
Dept: ADMINISTRATIVE | Facility: OTHER | Age: 44
End: 2019-08-06

## 2019-08-06 DIAGNOSIS — M25.511 RIGHT SHOULDER PAIN, UNSPECIFIED CHRONICITY: ICD-10-CM

## 2019-08-06 PROCEDURE — 97140 MANUAL THERAPY 1/> REGIONS: CPT

## 2019-08-06 PROCEDURE — 97110 THERAPEUTIC EXERCISES: CPT

## 2019-08-07 DIAGNOSIS — F41.9 ANXIETY: Primary | ICD-10-CM

## 2019-08-07 DIAGNOSIS — M25.511 RIGHT SHOULDER PAIN, UNSPECIFIED CHRONICITY: ICD-10-CM

## 2019-08-07 RX ORDER — DULOXETIN HYDROCHLORIDE 30 MG/1
30 CAPSULE, DELAYED RELEASE ORAL DAILY
Qty: 30 CAPSULE | Refills: 1 | Status: SHIPPED | OUTPATIENT
Start: 2019-08-07 | End: 2019-09-27

## 2019-08-07 NOTE — TELEPHONE ENCOUNTER
LOV: 5/29/19 RTC 6 mos  See 's note below:  Dear Mirian,   If you would like, you can just add the cymbalta to the wellbutrin. And, after a few months (8-12 weeks) we could try to taper off the wellbutrin , or you can keep both. Wellbutrin is a good med that works well with others, so I have quite a few patients that take it with various other antidepressants/antianxiety meds.   I would start at cymbalta 30 mg daily. I will send the script to your pharmacy. Good for pain and mood. Main side effects are similar to wellbutrin.    Endocrine: the wait is long across the system. I would recommend Dr Biswas, Dr Garcia and Dr Marinelli. You should be able to make your appt online and if you need help please message us back.     Thank you for keeping me posted.     Best,.   PV      Last read by Mirian العلي at 2:48 PM on 6/4/2019.

## 2019-08-07 NOTE — PROGRESS NOTES
"                            Physical Therapy Daily Treatment Note     Name: Mirian Perezsey  Clinic Number: [id number]    Therapy Diagnosis:   Encounter Diagnosis   Name Primary?    Right shoulder pain, unspecified chronicity      Physician: Christy Abdi MD    Visit Date: 2019  Physician Orders: PT Eval and Treat  Medical Diagnosis: M25.511 (ICD-10-CM) - Right shoulder pain, unspecified chronicity  Evaluation Date: 2019  Authorization Period Expiration: 2019  Plan of Care Certification Period: 2019  Visit # / Visits authorized:   Date of Surgery: NA  Precautions: Standard    Time In: 700a  Time Out: 810a  Total Billable Time: 50 minutes    Subjective      Pt reports: she was compliant with home exercise program given last session.   Response to previous treatment: Patient states that her right hand is much better and she is more active without symptoms    Pain: 3/10  Location: right arms, hands  and shoulder      Objective     Mirian received therapeutic exercises to develop strength, posture and core stabilization for 30 minutes including:  Supine on fr - 4'  Shoulder ext prone - 2 x 8  Prone thoracic extension mobilization of thoracic spine - Gr II-III  Pec minor stretch - 3 x 20"    Dry needling to patient prone for upper trapezius left and levator scapula, timed in and out, consent obtained, no adverse effects, performed by Marcelo Castillo PT, DPT  Mirian received the following manual therapy techniques for 10 minutes, includinst rib depression mobilization GR IV  Pec minor release right    Home Exercises Provided and Patient Education Provided     Education provided:   Continue current HEP    Written Home Exercises Provided: Continue with current HEP  Exercises were reviewed and Mirian was able to demonstrate them prior to the end of the session.      Pt received a written copy of exercises to perform at home.   See EMR under patient instructions for exercises given. "     Mirian demonstrated good  understanding of the education provided.     Assessment     Patient demonstrates decreased distal symptoms of temperature and sensation changes with more mobility of affected upper trapezius muscle belly  Mirian is progressing well towards her goals.   Pt prognosis is Excellent.     Pt will continue to benefit from skilled outpatient physical therapy to address the deficits listed in the problem list box on initial evaluation, provide pt/family education and to maximize pt's level of independence in the home and community environment.     Pt's spiritual, cultural and educational needs considered and pt agreeable to plan of care and goals.    Anticipated barriers to physical therapy: None    Goals:   Short Term Goals:  4 weeks  1.Patient will demonstrate ability to sleep without waking.   2.Patient will demonstrate 4+/5 strength of affected shoulder flexion and abduction without pain to increase ease with lifting/carrying.     Long Term Goals: 16 weeks  1.Increase strength to >/= 4+/5 in all planes of cervical and shoulder ROM to increase tolerance for ADL and work activities.  3. Pt goal: To resume playing tennis    Plan     Progress education and postural modification for decreased symptoms    Dario Jain, PT

## 2019-08-08 ENCOUNTER — OFFICE VISIT (OUTPATIENT)
Dept: PAIN MEDICINE | Facility: CLINIC | Age: 44
End: 2019-08-08
Payer: COMMERCIAL

## 2019-08-08 VITALS
HEART RATE: 105 BPM | RESPIRATION RATE: 18 BRPM | WEIGHT: 168 LBS | BODY MASS INDEX: 23.52 KG/M2 | TEMPERATURE: 99 F | HEIGHT: 71 IN | SYSTOLIC BLOOD PRESSURE: 122 MMHG | DIASTOLIC BLOOD PRESSURE: 86 MMHG

## 2019-08-08 DIAGNOSIS — M75.50 SUBACROMIAL BURSITIS: ICD-10-CM

## 2019-08-08 DIAGNOSIS — M67.911 DISORDER OF RIGHT ROTATOR CUFF: ICD-10-CM

## 2019-08-08 DIAGNOSIS — M79.18 MYOFASCIAL PAIN: ICD-10-CM

## 2019-08-08 DIAGNOSIS — G54.0 THORACIC OUTLET SYNDROME: Primary | ICD-10-CM

## 2019-08-08 LAB — TESTOST FREE SERPL-MCNC: 1.2 PG/ML

## 2019-08-08 PROCEDURE — 99213 OFFICE O/P EST LOW 20 MIN: CPT | Mod: S$GLB,,, | Performed by: NURSE PRACTITIONER

## 2019-08-08 PROCEDURE — 3008F BODY MASS INDEX DOCD: CPT | Mod: CPTII,S$GLB,, | Performed by: NURSE PRACTITIONER

## 2019-08-08 PROCEDURE — 99999 PR PBB SHADOW E&M-EST. PATIENT-LVL III: CPT | Mod: PBBFAC,,, | Performed by: NURSE PRACTITIONER

## 2019-08-08 PROCEDURE — 99213 PR OFFICE/OUTPT VISIT, EST, LEVL III, 20-29 MIN: ICD-10-PCS | Mod: S$GLB,,, | Performed by: NURSE PRACTITIONER

## 2019-08-08 PROCEDURE — 99999 PR PBB SHADOW E&M-EST. PATIENT-LVL III: ICD-10-PCS | Mod: PBBFAC,,, | Performed by: NURSE PRACTITIONER

## 2019-08-08 PROCEDURE — 3008F PR BODY MASS INDEX (BMI) DOCUMENTED: ICD-10-PCS | Mod: CPTII,S$GLB,, | Performed by: NURSE PRACTITIONER

## 2019-08-08 RX ORDER — TIZANIDINE 4 MG/1
4 TABLET ORAL 2 TIMES DAILY PRN
Qty: 60 TABLET | Refills: 2 | Status: SHIPPED | OUTPATIENT
Start: 2019-08-08 | End: 2019-11-05 | Stop reason: SDUPTHER

## 2019-08-08 NOTE — PROGRESS NOTES
"Chronic Pain - Established Visit    Referring Physician: No ref. provider found    Chief Complaint:   Chief Complaint   Patient presents with    Arm Pain     Right Side        SUBJECTIVE:    Mirian العلي returns to clinic today for follow up. She would like to switch providers to Dr. Mcclendon. She continues to report right shoulder pain that is sharp in nature. She does reports intermittent numbness and tingling pain down the right arm to her hand and fingers. She also experiences occasional pain into her right neck and jaw. She denies any left arm pain. She denies any weakness or dropping objects. She reports one day of relief with recent right subacromial bursa injection. She has previously been treated by Dr. Rey and received multiple MAYLIN without lasting relief. She was recently evaluated by Dr. Abdi in Sports Medicine who has recommended she see Dr. Garza. She is scheduled for consult with Dr. Garza next week. She is also scheduled for EMG in September. She is currently participating in physical therapy. She is currently taking Zanaflex with some benefit. She has tried Gabapentin and Lyrica in the past which caused side effects. She denies any other health changes. Her pain today is 4/10.        Physical Therapy/Home Exercise: yes      Pain Disability Index Review:  Last 3 PDI Scores 6/27/2019   Pain Disability Index (PDI) 34       Pain Medications:  Tizanidine     report:  Reviewed and consistent with medication use as prescribed.    Pain Procedures:  3 cervical ESIs with Dr Rikki Reyes  2 ESIs with Napoleon Rey (notes reviewed)     -Paramedian R C7/T1 ILESI 7/24/18-"most relief she has had so far" per Dr Dye notes     -Paramedian R C7/T1 ILESI 11/20/18-patient reports helpful for approximately 3-4 months    Imaging:  MRI Right Shoulder 6/25/2019 in media:   Mild supra and infraspinatus tendinitis without evidence of rotator cuff tear.   Mechanical impingement from hypertrophic AC " joint changes and down sloping acromion with mild subacromial and subdeltoid bursitis.   Mild fraying to the labrum which appears mildly insufficiency posteriorly due to small labrum.   Thickening of the IGL with small axillary pouch. Correlate for adhesive capsulitis.     5/19/18 MRI C-spine:   -C2-3: prominent right disc ostephyte complex in the right paracentral and right foraminal region along the uncovertebral joint indenting the ventral thecal sace and contributing to mild right neural foraminal stenosis with contact of the right exiting nerve root  -C3-4: moderate NF stenosis from mild hypertrophy of uncovertebral and facet joints  -C4-5: broad based disc bulge without NF or central stenosis  -C5-6:disc osteophyte complex causing mild central canal narrowing, no neuroforaminal stenosis  -C6-C7-T1: normal    8/16/16 MRI R shoulder: mild changes of tendinosis involving supraspinatus, type II acromion with evidence of impingement, Mild osteoarthritis right glenohumeral joint    EMG 6/29/16: NCS with mild right carpal tunnel syndrome with normal EMG examination    Past Medical History:   Diagnosis Date    AR (allergic rhinitis)     Asthma     Eczema     Endometriosis, mild 2002    Factor V Leiden mutation     Ovarian cyst, bilateral      Past Surgical History:   Procedure Laterality Date    BUNIONECTOMY Left 02/06/2018    CYSTOSCOPY N/A 1/20/2015    [facility]    HYSTERECTOMY      total    HYSTERECTOMY-TOTAL LAPAROSCOPIC (TLH) with right salpingo-oopherectomy Right 1/20/2015    Performed by [facility] at [facility]    LAPAROSCOPY W/ MINI-LAPAROTOMY  2003    OOPHORECTOMY Right 3/2012    OOPHORECTOMY Left 01/2015    TOTAL VAGINAL HYSTERECTOMY  1/2015    w/Right USO    WISDOM TOOTH EXTRACTION Bilateral      Social History     Socioeconomic History    Marital status:      Spouse name: Not on file    Number of children: 2    Years of education:  Not on file    Highest education level: Not on file   Occupational History    Not on file   Social Needs    Financial resource strain: Not on file    Food insecurity:     Worry: Not on file     Inability: Not on file    Transportation needs:     Medical: Not on file     Non-medical: Not on file   Tobacco Use    Smoking status: Never Smoker    Smokeless tobacco: Never Used   Substance and Sexual Activity    Alcohol use: Yes     Alcohol/week: 3.0 - 3.6 oz     Types: 5 - 6 Glasses of wine per week     Frequency: 2-3 times a week     Comment: week    Drug use: No    Sexual activity: Yes     Partners: Male     Birth control/protection: Other-see comments, Post-menopausal   Lifestyle    Physical activity:     Days per week: Not on file     Minutes per session: Not on file    Stress: Not on file   Relationships    Social connections:     Talks on phone: Not on file     Gets together: Not on file     Attends Dru[employer] service: Not on file     Active member of club or organization: Not on file     Attends meetings of clubs or organizations: Not on file     Relationship status: Not on file   Other Topics Concern    Not on file   Social History Narrative    Not on file     Family History   Problem Relation Age of Onset    Cancer Mother     Thyroid disease Mother     Breast cancer Mother         late 60's    Cancer Father     Diabetes Brother     Factor V Leiden deficiency Brother     Allergies Brother     Asthma Brother     Breast cancer Sister 46        inflammatory    Diabetes Maternal Aunt     Diabetes Paternal Uncle     Cancer Cousin     Breast cancer Paternal Aunt        Review of patient's allergies indicates:   Allergen Reactions    Niacin preparations      Patient states she has flushing    Sulfa (sulfonamide antibiotics)      Tongue swelling    Iodine and iodide containing products Rash       Current Outpatient Medications   Medication Sig    ALPRAZolam (XANAX) 1 MG tablet Take 1 tablet  (1 mg total) by mouth 3 (three) times daily as needed.    aspirin (ECOTRIN) 81 MG EC tablet Take 81 mg by mouth once daily.    buPROPion (WELLBUTRIN XL) 150 MG TB24 tablet Take 1 tablet (150 mg total) by mouth once daily.    cetirizine (ZYRTEC) 10 MG tablet Take 1 tablet (10 mg total) by mouth once daily. (Patient taking differently: Take 10 mg by mouth once as needed. )    cyanocobalamin, vitamin B-12, 5,000 mcg Subl Place under the tongue once a week.    DULoxetine (CYMBALTA) 30 MG capsule Take 1 capsule (30 mg total) by mouth once daily.    ergocalciferol (VITAMIN D2) 50,000 unit Cap Take 50,000 Units by mouth every 7 days.    fluticasone (FLONASE) 50 mcg/actuation nasal spray PLACE 2 SPRAYS IN EACH NOSTRIL ONCE DAILY    ibuprofen (ADVIL,MOTRIN) 600 MG tablet Take 1 tablet (600 mg total) by mouth every 6 (six) hours as needed.    melatonin 1 mg Tab Take by mouth.    PROGESTERONE, MICRONIZED (PROMETRIUM ORAL) Take 300 mg by mouth nightly.    FLUZONE QUAD 5110-4823, PF, 60 mcg (15 mcg x 4)/0.5 mL Syrg ADM 0.5ML IM UTD    tiZANidine (ZANAFLEX) 4 MG tablet Take 1 tablet (4 mg total) by mouth 2 (two) times daily as needed.     Current Facility-Administered Medications   Medication    estradiol cypionate 5 mg/mL injection 10 mg    estradiol valerate injection 40 mg       REVIEW OF SYSTEMS:    GENERAL:  No weight loss, malaise or fevers.  HEENT:  +ve for frequent or significant headaches.  NECK:  Negative for lumps, goiter, pain and significant neck swelling.  RESPIRATORY:  Negative for cough, wheezing or shortness of breath.  CARDIOVASCULAR:  Negative for chest pain, leg swelling or palpitations.  GI:  Negative for abdominal discomfort, blood in stools or black stools or change in bowel habits.  MUSCULOSKELETAL:  See HPI.  SKIN:  Negative for lesions, rash, and itching.  PSYCH:  Negative for mood disorder and recent psychosocial stressors. Positive for sleep disturbance.   HEMATOLOGY/LYMPHOLOGY:  Negative  "for prolonged bleeding, bruising easily or swollen nodes.  NEURO:   No history of syncope, paralysis, seizures or tremors.  All other reviewed and negative other than HPI.    OBJECTIVE:    /86   Pulse 105   Temp 98.6 °F (37 °C)   Resp 18   Ht 5' 11" (1.803 m)   Wt 76.2 kg (167 lb 15.9 oz)   LMP 02/01/2014   BMI 23.43 kg/m²     PHYSICAL EXAMINATION:    General appearance: Well appearing, in no acute distress, alert and oriented x3.  Psych:  Mood and affect appropriate.  Skin: Skin color, texture, turgor normal, no rashes or lesions, in both upper and lower body.  Head/face:  Normocephalic, atraumatic. No palpable lymph nodes.  Neck: There is pain to palpation over the cervical paraspinous and trapezius muscles on the right. Spurling Negative. No pain with neck flexion, extension, or lateral flexion.   Cor: RRR  Pulm: CTA  GI:  Soft and non-tender.  Extremities: Peripheral joint ROM is full and pain free without obvious instability or laxity in all four extremities. No deformities, edema, or skin discoloration. Good capillary refill.  Musculoskeletal: Hip, sacroiliac and knee provocative maneuvers are negative. Bilateral upper and lower extremity strength is normal and symmetric.  No atrophy or tone abnormalities are noted. Right shoulder with excellent range of motion. No pain with overhead motion. No tenderness about the shoulder. Negative Neer's/Hawkin's. Negative testing of rotator cuff muscles.   Neuro: Bilateral upper and lower extremity coordination and muscle stretch reflexes are physiologic and symmetric.  Plantar response are downgoing. No loss of sensation is noted. Absent Polanco's. Negative Tinel's and Durkan's compression test bilatearl carpal tunnel. + Phalen's for paresthesias right index.   Gait: normal.    ASSESSMENT: 43 y.o. year old female with neck and and R shoulder pain, consistent with     1. Thoracic outlet syndrome     2. Disorder of right rotator cuff     3. Subacromial bursitis "     4. Myofascial pain  tiZANidine (ZANAFLEX) 4 MG tablet     PLAN:     - The patient would like to switch provider to Dr. Mcclendon. I do not think this is unreasonable. I will have Dr. cMclendon assume care.     - Previous imaging was reviewed and discussed with the patient today.    - She is s/p subacromial bursa injections with limited relief.     - She is scheduled for Dr. Garza and have EMG. I encouraged her to keep these appointments.    - Continue Zanaflex 4 mg BID PRN. Refill provided today.     - Continue physical therapy.    - RTC in 2-3 weeks.     - Dr. Mcclendon was consulted on the patient and agrees with this plan.    The above plan and management options were discussed at length with patient. Patient is in agreement with the above and verbalized understanding.     Lizzie Cordero NP  08/08/2019

## 2019-08-09 ENCOUNTER — CLINICAL SUPPORT (OUTPATIENT)
Dept: REHABILITATION | Facility: HOSPITAL | Age: 44
End: 2019-08-09
Attending: ORTHOPAEDIC SURGERY
Payer: COMMERCIAL

## 2019-08-09 DIAGNOSIS — M25.511 RIGHT SHOULDER PAIN, UNSPECIFIED CHRONICITY: ICD-10-CM

## 2019-08-09 PROCEDURE — 97140 MANUAL THERAPY 1/> REGIONS: CPT

## 2019-08-09 PROCEDURE — 97110 THERAPEUTIC EXERCISES: CPT

## 2019-08-09 NOTE — PROGRESS NOTES
"                            Physical Therapy Daily Treatment Note     Name: Mirian Gill العلي  Clinic Number: [id number]    Therapy Diagnosis:   Encounter Diagnosis   Name Primary?    Right shoulder pain, unspecified chronicity      Physician: Christy Abdi MD    Visit Date: 2019  Physician Orders: PT Eval and Treat  Medical Diagnosis: M25.511 (ICD-10-CM) - Right shoulder pain, unspecified chronicity  Evaluation Date: 2019  Authorization Period Expiration: 2019  Plan of Care Certification Period: 2019  Visit # / Visits authorized:   Date of Surgery: NA  Precautions: Standard    Time In: 700a  Time Out: 810a  Total Billable Time: 50 minutes    Subjective      Pt reports: she was compliant with home exercise program given last session.   Response to previous treatment: Patient states that she had a lot of neck and shoulder pain for the day after last visit, but hand and forearm are better    Pain: 3/10  Location: right arms, hands  and shoulder      Objective     Mirian received therapeutic exercises to develop strength, posture and core stabilization for 30 minutes including:  Supine on fr - 4'  Shoulder ext prone - 2 x 8  Prone thoracic extension mobilization of thoracic spine - Gr II-III  Pec minor stretch - 3 x 20"    Dry needling to patient prone for upper trapezius left and levator scapula, timed in and out, consent obtained, no adverse effects, performed by Pietro Anderson PT, DPT  Mirian received the following manual therapy techniques for 20 minutes, includinst rib depression mobilization GR IV  Pec minor release right    Home Exercises Provided and Patient Education Provided     Education provided:   Continue current HEP    Written Home Exercises Provided: Continue with current HEP  Exercises were reviewed and Mirian was able to demonstrate them prior to the end of the session.      Pt received a written copy of exercises to perform at home.   See EMR under patient " instructions for exercises given.     Mirian demonstrated good  understanding of the education provided.     Assessment     The patient is receiving some relief from treatment but has not demonstrated any consistent positional patterns for reproduction of symptoms  Mirian is progressing well towards her goals.   Pt prognosis is Excellent.     Pt will continue to benefit from skilled outpatient physical therapy to address the deficits listed in the problem list box on initial evaluation, provide pt/family education and to maximize pt's level of independence in the home and community environment.     Pt's spiritual, cultural and educational needs considered and pt agreeable to plan of care and goals.    Anticipated barriers to physical therapy: None    Goals:   Short Term Goals:  4 weeks  1.Patient will demonstrate ability to sleep without waking.   2.Patient will demonstrate 4+/5 strength of affected shoulder flexion and abduction without pain to increase ease with lifting/carrying.     Long Term Goals: 16 weeks  1.Increase strength to >/= 4+/5 in all planes of cervical and shoulder ROM to increase tolerance for ADL and work activities.  3. Pt goal: To resume playing tennis    Plan     Progress education and postural modification for decreased symptoms    Dario Jain, PT

## 2019-08-12 ENCOUNTER — PATIENT MESSAGE (OUTPATIENT)
Dept: OBSTETRICS AND GYNECOLOGY | Facility: CLINIC | Age: 44
End: 2019-08-12

## 2019-08-13 ENCOUNTER — CLINICAL SUPPORT (OUTPATIENT)
Dept: REHABILITATION | Facility: HOSPITAL | Age: 44
End: 2019-08-13
Attending: ORTHOPAEDIC SURGERY
Payer: COMMERCIAL

## 2019-08-13 ENCOUNTER — TELEPHONE (OUTPATIENT)
Dept: PAIN MEDICINE | Facility: CLINIC | Age: 44
End: 2019-08-13

## 2019-08-13 ENCOUNTER — PATIENT OUTREACH (OUTPATIENT)
Dept: ADMINISTRATIVE | Facility: OTHER | Age: 44
End: 2019-08-13

## 2019-08-13 DIAGNOSIS — M25.511 RIGHT SHOULDER PAIN, UNSPECIFIED CHRONICITY: ICD-10-CM

## 2019-08-13 PROCEDURE — 97110 THERAPEUTIC EXERCISES: CPT

## 2019-08-13 PROCEDURE — 97140 MANUAL THERAPY 1/> REGIONS: CPT

## 2019-08-13 NOTE — PROGRESS NOTES
"                            Physical Therapy Daily Treatment Note     Name: Mirian العلي  Clinic Number: [id number]    Therapy Diagnosis:   Encounter Diagnosis   Name Primary?    Right shoulder pain, unspecified chronicity      Physician: Christy Abdi MD    Visit Date: 2019  Physician Orders: PT Eval and Treat  Medical Diagnosis: M25.511 (ICD-10-CM) - Right shoulder pain, unspecified chronicity  Evaluation Date: 2019  Authorization Period Expiration: 2019  Plan of Care Certification Period: 2019  Visit # / Visits authorized:   Date of Surgery: NA  Precautions: Standard    Time In: 700a  Time Out: 810a  Total Billable Time: 50 minutes    Subjective      Pt reports: she was compliant with home exercise program given last session.   Response to previous treatment: Patient states that she is noticing a lot less pain in neck and shoulder, more posterior forearm pain    Pain: 3/10  Location: right arms, hands  and shoulder      Objective     Mirian received therapeutic exercises to develop strength, posture and core stabilization for 30 minutes including:  Supine on fr - 4'  Shoulder ext prone - 2 x 8  Prone thoracic extension mobilization of thoracic spine - Gr II-III  Pec minor stretch - 3 x 20"    Dry needling to patient prone for upper trapezius left and levator scapula, timed in and out, consent obtained, no adverse effects, performed by Marcelo Castillo PT, DPT  Mirian received the following manual therapy techniques for 20 minutes, includinst rib depression mobilization GR IV  Pec minor release right    Home Exercises Provided and Patient Education Provided     Education provided:   Continue current HEP    Written Home Exercises Provided: Continue with current HEP  Exercises were reviewed and Mirian was able to demonstrate them prior to the end of the session.      Pt received a written copy of exercises to perform at home.   See EMR under patient instructions for exercises " given.     Mirian demonstrated good  understanding of the education provided.     Assessment     The patient is able to tolerate more cervical sideglide without increase in symptoms and increased fluidity of movement with transitions    Mirian is progressing well towards her goals.   Pt prognosis is Excellent.     Pt will continue to benefit from skilled outpatient physical therapy to address the deficits listed in the problem list box on initial evaluation, provide pt/family education and to maximize pt's level of independence in the home and community environment.     Pt's spiritual, cultural and educational needs considered and pt agreeable to plan of care and goals.    Anticipated barriers to physical therapy: None    Goals:   Short Term Goals:  4 weeks  1.Patient will demonstrate ability to sleep without waking.   2.Patient will demonstrate 4+/5 strength of affected shoulder flexion and abduction without pain to increase ease with lifting/carrying.     Long Term Goals: 16 weeks  1.Increase strength to >/= 4+/5 in all planes of cervical and shoulder ROM to increase tolerance for ADL and work activities.  3. Pt goal: To resume playing tennis    Plan     Progress education and postural modification for decreased symptoms    Dario Jain, PT

## 2019-08-14 ENCOUNTER — OFFICE VISIT (OUTPATIENT)
Dept: PAIN MEDICINE | Facility: CLINIC | Age: 44
End: 2019-08-14
Attending: ANESTHESIOLOGY
Payer: COMMERCIAL

## 2019-08-14 ENCOUNTER — PATIENT MESSAGE (OUTPATIENT)
Dept: PAIN MEDICINE | Facility: CLINIC | Age: 44
End: 2019-08-14

## 2019-08-14 ENCOUNTER — OFFICE VISIT (OUTPATIENT)
Dept: CARDIOTHORACIC SURGERY | Facility: CLINIC | Age: 44
End: 2019-08-14
Payer: COMMERCIAL

## 2019-08-14 ENCOUNTER — PATIENT MESSAGE (OUTPATIENT)
Dept: CARDIOTHORACIC SURGERY | Facility: CLINIC | Age: 44
End: 2019-08-14

## 2019-08-14 ENCOUNTER — CLINICAL SUPPORT (OUTPATIENT)
Dept: OBSTETRICS AND GYNECOLOGY | Facility: CLINIC | Age: 44
End: 2019-08-14
Payer: COMMERCIAL

## 2019-08-14 VITALS
HEIGHT: 71 IN | HEART RATE: 98 BPM | DIASTOLIC BLOOD PRESSURE: 83 MMHG | TEMPERATURE: 98 F | BODY MASS INDEX: 23.27 KG/M2 | SYSTOLIC BLOOD PRESSURE: 115 MMHG | OXYGEN SATURATION: 99 % | WEIGHT: 166.25 LBS

## 2019-08-14 VITALS
HEART RATE: 98 BPM | SYSTOLIC BLOOD PRESSURE: 115 MMHG | HEIGHT: 71 IN | BODY MASS INDEX: 23.27 KG/M2 | TEMPERATURE: 98 F | WEIGHT: 166.25 LBS | DIASTOLIC BLOOD PRESSURE: 83 MMHG

## 2019-08-14 DIAGNOSIS — G89.29 CHRONIC RIGHT SHOULDER PAIN: Primary | ICD-10-CM

## 2019-08-14 DIAGNOSIS — Z79.890 HORMONE REPLACEMENT THERAPY (HRT): Primary | ICD-10-CM

## 2019-08-14 DIAGNOSIS — G24.3 ISOLATED CERVICAL DYSTONIA: Primary | ICD-10-CM

## 2019-08-14 DIAGNOSIS — Z78.0 MENOPAUSE: Primary | ICD-10-CM

## 2019-08-14 DIAGNOSIS — M62.838 MUSCLE SPASM: ICD-10-CM

## 2019-08-14 DIAGNOSIS — G54.0 THORACIC OUTLET SYNDROME: ICD-10-CM

## 2019-08-14 DIAGNOSIS — M25.511 CHRONIC RIGHT SHOULDER PAIN: Primary | ICD-10-CM

## 2019-08-14 PROCEDURE — 99999 PR PBB SHADOW E&M-EST. PATIENT-LVL III: CPT | Mod: PBBFAC,,, | Performed by: ANESTHESIOLOGY

## 2019-08-14 PROCEDURE — 99245 PR OFFICE CONSULTATION,LEVEL V: ICD-10-PCS | Mod: S$GLB,,, | Performed by: THORACIC SURGERY (CARDIOTHORACIC VASCULAR SURGERY)

## 2019-08-14 PROCEDURE — 96372 THER/PROPH/DIAG INJ SC/IM: CPT | Mod: S$GLB,,, | Performed by: OBSTETRICS & GYNECOLOGY

## 2019-08-14 PROCEDURE — 99999 PR PBB SHADOW E&M-EST. PATIENT-LVL I: CPT | Mod: PBBFAC,,,

## 2019-08-14 PROCEDURE — 99215 OFFICE O/P EST HI 40 MIN: CPT | Mod: S$GLB,,, | Performed by: ANESTHESIOLOGY

## 2019-08-14 PROCEDURE — 96372 PR INJECTION,THERAP/PROPH/DIAG2ST, IM OR SUBCUT: ICD-10-PCS | Mod: S$GLB,,, | Performed by: OBSTETRICS & GYNECOLOGY

## 2019-08-14 PROCEDURE — 99999 PR PBB SHADOW E&M-EST. PATIENT-LVL IV: ICD-10-PCS | Mod: PBBFAC,,, | Performed by: THORACIC SURGERY (CARDIOTHORACIC VASCULAR SURGERY)

## 2019-08-14 PROCEDURE — 99999 PR PBB SHADOW E&M-EST. PATIENT-LVL III: ICD-10-PCS | Mod: PBBFAC,,, | Performed by: ANESTHESIOLOGY

## 2019-08-14 PROCEDURE — 3008F BODY MASS INDEX DOCD: CPT | Mod: CPTII,S$GLB,, | Performed by: ANESTHESIOLOGY

## 2019-08-14 PROCEDURE — 99999 PR PBB SHADOW E&M-EST. PATIENT-LVL I: ICD-10-PCS | Mod: PBBFAC,,,

## 2019-08-14 PROCEDURE — 3008F PR BODY MASS INDEX (BMI) DOCUMENTED: ICD-10-PCS | Mod: CPTII,S$GLB,, | Performed by: ANESTHESIOLOGY

## 2019-08-14 PROCEDURE — 99215 PR OFFICE/OUTPT VISIT, EST, LEVL V, 40-54 MIN: ICD-10-PCS | Mod: S$GLB,,, | Performed by: ANESTHESIOLOGY

## 2019-08-14 PROCEDURE — 99245 OFF/OP CONSLTJ NEW/EST HI 55: CPT | Mod: S$GLB,,, | Performed by: THORACIC SURGERY (CARDIOTHORACIC VASCULAR SURGERY)

## 2019-08-14 PROCEDURE — 99999 PR PBB SHADOW E&M-EST. PATIENT-LVL IV: CPT | Mod: PBBFAC,,, | Performed by: THORACIC SURGERY (CARDIOTHORACIC VASCULAR SURGERY)

## 2019-08-14 RX ORDER — TESTOSTERONE CYPIONATE 200 MG/ML
50 INJECTION, SOLUTION INTRAMUSCULAR
Status: COMPLETED | OUTPATIENT
Start: 2019-08-14 | End: 2020-01-02

## 2019-08-14 RX ORDER — DULOXETIN HYDROCHLORIDE 60 MG/1
CAPSULE, DELAYED RELEASE ORAL
Refills: 3 | COMMUNITY
Start: 2019-08-08 | End: 2020-10-08

## 2019-08-14 RX ADMIN — TESTOSTERONE CYPIONATE 50 MG: 200 INJECTION, SOLUTION INTRAMUSCULAR at 02:08

## 2019-08-14 NOTE — PROGRESS NOTES
Subjective:      Patient ID: Mirian العلي is a 43 y.o. female.    Chief Complaint: Evaluation for TOS      HPI:  Mirian العلي is a 43 y.o. female who presents  for evaluation of possible TOS/right shoulder pain. Referred by Dr. Abdi. Pt is an  at a non-profit. Reports that the pain is severe and not responding to any conservative care. In 2013 she was lifting a suitcase overhead and felt a sharp pain in her neck. She came home and had an MRI that looked normal and she had steroid injections and therapy with mild relief. She got a second opinion from another Orthopaedist who gave her more injections and therapy with mild relief. She switched her PCP who then referred her to Dr. Hewitt. Dr. Hewitt ordered her an MRI on June 25. He followed this with a bursa injection (July). Her pain went away for one day and then her pain came right back. She states that her pain is almost constant but does go away at times. Nothing specifically makes the pain worse. Has numbness that radiates down her arm. Most of her pain is in her upper trap region and she has weakness with prolonged activities. Pain is worse with overhead activity. It also bothers her at night. Has done 3 rounds of physical therapy and currently goes 2x weekly. Several times right hand has turned red and swollen and she was evaluated for DVT as she has a family history and a clotting disorder, but no DVT was noted on US. Occasionally gets pain in left forearm.   Temperature and color changes in the right hand    Family and social history reviewed    Review of patient's allergies indicates:   Allergen Reactions    Niacin preparations      Patient states she has flushing    Sulfa (sulfonamide antibiotics)      Tongue swelling    Iodine and iodide containing products Rash     Past Medical History:   Diagnosis Date    AR (allergic rhinitis)     Asthma     Eczema     Endometriosis, mild 2002    Factor V Leiden mutation      Ovarian cyst, bilateral      Past Surgical History:   Procedure Laterality Date    BUNIONECTOMY Left 02/06/2018    CYSTOSCOPY N/A 1/20/2015    [facility]    HYSTERECTOMY      total    HYSTERECTOMY-TOTAL LAPAROSCOPIC (TLH) with right salpingo-oopherectomy Right 1/20/2015    Performed by [facility] at [facility] OR    LAPAROSCOPY W/ MINI-LAPAROTOMY  2003    OOPHORECTOMY Right 3/2012    OOPHORECTOMY Left 01/2015    TOTAL VAGINAL HYSTERECTOMY  1/2015    w/Right USO    WISDOM TOOTH EXTRACTION Bilateral      Family History     Problem Relation (Age of Onset)    Allergies Brother    Asthma Brother    Breast cancer Mother, Sister (46), Paternal Aunt    Cancer Mother, Father, Cousin    Diabetes Brother, Maternal Aunt, Paternal Uncle    Factor V Leiden deficiency Brother    Thyroid disease Mother        Social History     Socioeconomic History    Marital status:      Spouse name: Not on file    Number of children: 2    Years of education: Not on file    Highest education level: Not on file   Occupational History    Not on file   Social Needs    Financial resource strain: Not on file    Food insecurity:     Worry: Not on file     Inability: Not on file    Transportation needs:     Medical: Not on file     Non-medical: Not on file   Tobacco Use    Smoking status: Never Smoker    Smokeless tobacco: Never Used   Substance and Sexual Activity    Alcohol use: Yes     Alcohol/week: 3.0 - 3.6 oz     Types: 5 - 6 Glasses of wine per week     Frequency: 2-3 times a week     Comment: week    Drug use: No    Sexual activity: Yes     Partners: Male     Birth control/protection: Other-see comments, Post-menopausal   Lifestyle    Physical activity:     Days per week: Not on file     Minutes per session: Not on file    Stress: Not on file   Relationships    Social connections:     Talks on phone: Not on file     Gets together: Not on file     Attends [employer]  service: Not on file     Active member of club or organization: Not on file     Attends meetings of clubs or organizations: Not on file     Relationship status: Not on file   Other Topics Concern    Not on file   Social History Narrative    Not on file       Current medications Reviewed    Review of Systems   Constitutional: Negative for chills, fatigue, fever and unexpected weight change.   HENT: Negative for hearing loss and nosebleeds.    Eyes: Negative for pain, redness and visual disturbance.   Respiratory: Negative for cough, chest tightness and shortness of breath.    Cardiovascular: Negative for chest pain and leg swelling.   Gastrointestinal: Negative for abdominal distention.   Genitourinary: Negative for difficulty urinating and hematuria.   Musculoskeletal: Positive for myalgias and neck pain. Negative for back pain and gait problem.   Skin: Negative for wound.   Neurological: Positive for numbness. Negative for dizziness, seizures, syncope and headaches.   Hematological: Negative for adenopathy. Does not bruise/bleed easily.   Psychiatric/Behavioral: Negative for behavioral problems.     Objective:   Physical Exam   Constitutional: She is oriented to person, place, and time. She appears well-developed and well-nourished.   HENT:   Head: Normocephalic and atraumatic.   Eyes: Pupils are equal, round, and reactive to light. Conjunctivae and EOM are normal.   Neck: Normal range of motion. Neck supple. No JVD present. No tracheal deviation present. No thyromegaly present.   Cardiovascular: Normal rate, regular rhythm, normal heart sounds and intact distal pulses.   Pulmonary/Chest: Effort normal and breath sounds normal.   Abdominal: Soft. Bowel sounds are normal.   Neurological: She is alert and oriented to person, place, and time. She has normal reflexes.   Skin: Skin is warm and dry.   Psychiatric: She has a normal mood and affect. Her behavior is normal. Judgment and thought content normal.   Nursing  note and vitals reviewed.    Right side, right hand dominant  Scalene Triangle: tender to palpation  Pectoralis Minor Tendon: tender to palpation    Elevated Arm Stress Test (EAST): positive at 90 seconds for pain and paresthesia   ADSON: positive  Radial Pulse Obliteration with Hyperabduction: yes    Hand: no weakness no atrophy  Deltoid: no weakness no pain no atrophy  Trapezius tight and tender    Physical Therapy: started at [place] for TOS    Pain Management: [facility] today for TOS    Diagnostic Results: reviewed   Shoulder Xray  Visualized osseous structures appear unremarkable, with no evidence of recent or healing fracture, lytic destructive process, appreciable glenohumeral arthritic change, or other significant abnormality noted.  No glenohumeral dislocation.  No abnormal soft tissue calcifications.    MRI:  - Mild supra and infraspinatus tendinitis without evidence of rotator cuff tear  - Mechanical impingement from hypertrophic AC joint changes and down slopping acromion with mild subacromial and subdeltoid bursitis   - Mild fraying of the labrum which appears mildly insufficiency posteriorly due to small labrum   - Thickening of the IGL with small axillary pouch. Correlate for adhesive capsulitis.   Assessment:   1. Right shoulder pain / TOS   Plan:   I reviewed all the available studies and the patient's history and symptoms. Physical exam included provocative testing and a direct examination of the scalene triangle and pectoralis minor tendon. I discussed Thoracic Outlet Syndrome in detail with the patient. We discussed the importance of physical therapy in diagnosis and treatment as well as the possibility of a consult with pain management to assist in diagnosis and treatment. Based on the visit today I feel there is a good chance that NTOS is a siginificant cause of this patient's symptoms.  Greater than 30 minutes spent on history, exam and counseling. She has the following appointments [facility]  [facility] for PT  I do not think she needs an EMG for TOS evaluation, she had an EMG in 2016 that ruled out carpal and cubital tunnel

## 2019-08-14 NOTE — PROGRESS NOTES
Here for hormone therapy injection, no complaints at this time, Injection given as ordered, tolerated well, no report of pain prior to or after injection. Return to clinic as scheduled.     Site - RB    Testosterone 50 mg  Depo Estradiol 15 mg    Clinic Supplied Medication

## 2019-08-14 NOTE — PROGRESS NOTES
"Subjective:      Patient ID: Mirian العلي is a 43 y.o. female.    Chief Complaint: No chief complaint on file.    Referred by: No ref. provider found     Interval History:  Ms. العلي presents to clinic today for follow-up. She continues to endorse right-sided neck, trapezius, and shoulder pain as well as a sensation of a "heavy right hand". She says her pain began in 2013 after lifting a suitcase and since this time, she states her symptoms of varied from mostly neck pain to shoulder pain and now a combination of the two. She states she has had cervical ESIs in the past which have never provided significant relief as well as dry needling without relief. She is currently in her 3 round of Physical Therapy which she says has helped. Finally, she recently had a right subacromial bursa injection on 6/27/19 with Dr. Stroud which she states provided significant short term relief. Finally, she was referred to Dr. Garza after Dr. Abdi with Sports Medicine was concerned for potential Thoracic outlet syndrome. She presents today for further options for her managing her Pain.      Interventional Pain History  3 cervical ESIs with Dr Rikki Reyes  2 ESIs with Napoleon Rey (notes reviewed)     -Paramedian R C7/T1 ILESI 7/24/18-"most relief she has had so far" per Dr Dye notes     -Paramedian R C7/T1 ILESI 11/20/18-patient reports helpful for approximately 3-4 months  Right Subacromial Bursa Injection 6/27/19    Past Medical History:   Diagnosis Date    AR (allergic rhinitis)     Asthma     Eczema     Endometriosis, mild 2002    Factor V Leiden mutation     Ovarian cyst, bilateral        Past Surgical History:   Procedure Laterality Date    BUNIONECTOMY Left 02/06/2018    CYSTOSCOPY N/A 1/20/2015    [facility]    HYSTERECTOMY      total    HYSTERECTOMY-TOTAL LAPAROSCOPIC (TLH) with right salpingo-oopherectomy Right 1/20/2015    Per[facility] Genet TUCKER" [facility] Emil [facility]    LAPAROSCOPY W/ MINI-LAPAROTOMY  2003    OOPHORECTOMY Right 3/2012    OOPHORECTOMY Left 01/2015    TOTAL VAGINAL HYSTERECTOMY  1/2015    w/Right USO    WISDOM TOOTH EXTRACTION Bilateral        Review of patient's allergies indicates:   Allergen Reactions    Niacin preparations      Patient states she has flushing    Sulfa (sulfonamide antibiotics)      Tongue swelling    Iodine and iodide containing products Rash       Current Outpatient Medications   Medication Sig Dispense Refill    ALPRAZolam (XANAX) 1 MG tablet Take 1 tablet (1 mg total) by mouth 3 (three) times daily as needed. 90 tablet 0    aspirin (ECOTRIN) 81 MG EC tablet Take 81 mg by mouth once daily.      buPROPion (WELLBUTRIN XL) 150 MG TB24 tablet Take 1 tablet (150 mg total) by mouth once daily. 90 tablet 2    cetirizine (ZYRTEC) 10 MG tablet Take 1 tablet (10 mg total) by mouth once daily. (Patient taking differently: Take 10 mg by mouth once as needed. ) 30 tablet 2    cyanocobalamin, vitamin B-12, 5,000 mcg Subl Place under the tongue once a week.      DULoxetine (CYMBALTA) 30 MG capsule Take 1 capsule (30 mg total) by mouth once daily. 30 capsule 1    DULoxetine (CYMBALTA) 60 MG capsule TK 1 C PO QD  3    ergocalciferol (VITAMIN D2) 50,000 unit Cap Take 50,000 Units by mouth every 7 days.      fluticasone (FLONASE) 50 mcg/actuation nasal spray PLACE 2 SPRAYS IN EACH NOSTRIL ONCE DAILY 1 Bottle 1    FLUZONE QUAD [id number], PF, 60 mcg (15 mcg x 4)/0.5 mL Syrg ADM 0.5ML IM UTD  0    ibuprofen (ADVIL,MOTRIN) 600 MG tablet Take 1 tablet (600 mg total) by mouth every 6 (six) hours as needed. 20 tablet 0    melatonin 1 mg Tab Take by mouth.      PROGESTERONE, MICRONIZED (PROMETRIUM ORAL) Take 300 mg by mouth nightly.      tiZANidine (ZANAFLEX) 4 MG tablet Take 1 tablet (4 mg total) by mouth 2 (two) times daily as needed. 60 tablet 2     Current Facility-Administered Medications   Medication Dose Route  Frequency Provider Last Rate Last Dose    estradiol cypionate 5 mg/mL injection 10 mg  10 mg Intramuscular Q28 Days Genet Stein MD   10 mg at 07/16/19 0850    estradiol cypionate 5 mg/mL injection 15 mg  15 mg Intramuscular Q28 Days Genet Stein MD        estradiol valerate injection 40 mg  40 mg Intramuscular Q30 Days Genet Stein MD           Family History   Problem Relation Age of Onset    Cancer Mother     Thyroid disease Mother     Breast cancer Mother         late 60's    Cancer Father     Diabetes Brother     Factor V Leiden deficiency Brother     Allergies Brother     Asthma Brother     Breast cancer Sister 46        inflammatory    Diabetes Maternal Aunt     Diabetes Paternal Uncle     Cancer Cousin     Breast cancer Paternal Aunt        Social History     Socioeconomic History    Marital status:      Spouse name: Not on file    Number of children: 2    Years of education: Not on file    Highest education level: Not on file   Occupational History    Not on file   Social Needs    Financial resource strain: Not on file    Food insecurity:     Worry: Not on file     Inability: Not on file    Transportation needs:     Medical: Not on file     Non-medical: Not on file   Tobacco Use    Smoking status: Never Smoker    Smokeless tobacco: Never Used   Substance and Sexual Activity    Alcohol use: Yes     Alcohol/week: 3.0 - 3.6 oz     Types: 5 - 6 Glasses of wine per week     Frequency: 2-3 times a week     Comment: week    Drug use: No    Sexual activity: Yes     Partners: Male     Birth control/protection: Other-see comments, Post-menopausal   Lifestyle    Physical activity:     Days per week: Not on file     Minutes per session: Not on file    Stress: Not on file   Relationships    Social connections:     Talks on phone: Not on file     Gets together: Not on file     Attends Confucianist service: Not on file     Active member of club or organization:  "Not on file     Attends meetings of clubs or organizations: Not on file     Relationship status: Not on file   Other Topics Concern    Not on file   Social History Narrative    Not on file           Review of Systems   Constitution: Positive for malaise/fatigue.   HENT: Negative.    Eyes: Negative.    Cardiovascular: Negative.    Respiratory: Negative.    Endocrine: Negative.    Skin: Negative.    Musculoskeletal: Positive for joint pain, myalgias, neck pain and stiffness.   Genitourinary: Negative.    Neurological: Positive for focal weakness (occassional right hand weakness) and paresthesias.   Psychiatric/Behavioral: Negative.            Objective:   /83   Pulse 98   Temp 98.3 °F (36.8 °C)   Ht 5' 11" (1.803 m)   Wt 75.4 kg (166 lb 3.6 oz)   LMP 02/01/2014   BMI 23.18 kg/m²   Pain Disability Index Review:  Last 3 PDI Scores 8/14/2019 8/8/2019 6/27/2019   Pain Disability Index (PDI) 41 39 34     Normocephalic.  Atraumatic.  Affect appropriate.  Breathing unlabored.  Extra ocular muscles intact.           General    Constitutional: She is oriented to person, place, and time. She appears well-developed and well-nourished. No distress.   HENT:   Head: Normocephalic and atraumatic.   Eyes: EOM are normal. Pupils are equal, round, and reactive to light.   Neck: Normal range of motion.   Cardiovascular: Normal rate and regular rhythm.    Pulmonary/Chest: Effort normal and breath sounds normal. No respiratory distress.   Abdominal: Soft. Bowel sounds are normal.   Neurological: She is alert and oriented to person, place, and time. No cranial nerve deficit.   Psychiatric: She has a normal mood and affect. Her behavior is normal. Thought content normal.         Back (L-Spine & T-Spine) / Neck (C-Spine) Exam     Comments:  NECK: No pain to palpation over the cervical paraspinous muscles. Spurling Negative. No pain with neck flexion, extension, or lateral flexion.       Left Shoulder Exam     Comments:  " EXTREMITIES: Peripheral joint ROM is full and pain free without obvious instability or laxity in all four extremities. No deformities, edema, or skin discoloration. Good capillary refill.  MUSCULOSKELETAL: Pain with deep palpation of subacromial bursa. Bilateral upper and lower extremity strength is normal and symmetric.  No atrophy or tone abnormalities are noted.  NEURO: Bilateral upper and lower extremity coordination and muscle stretch reflexes are physiologic and symmetric.  Plantar response are downgoing. No clonus.  No loss of sensation is noted.          Assessment:       Encounter Diagnoses   Name Primary?    Isolated cervical dystonia Yes    Muscle spasm     Thoracic outlet syndrome          Plan:   We discussed with the patient the assessment and recommendations. The following is the plan we agreed on:      1. Given patient recent diagnosis of neuropathic thoracic outlet syndrome, will schedule for Botox injection of scalene muscles for future.    2. Recommend continuing Physical Therapy to improve range of motion and core strengthening.    3. Continue Zanaflex 4 mg BID PRN for myofascial pain    4. RTC in 1 week.       Diagnoses and all orders for this visit:    Isolated cervical dystonia  -     Medication Pre-Authorization    Muscle spasm  -     Medication Pre-Authorization    Thoracic outlet syndrome  -     Medication Pre-Authorization         Angel Child MD  [facility], PGY V      I have personally taken the history and examined this patient and agree with the fellow's note as stated above.

## 2019-08-14 NOTE — LETTER
August 14, 2019      Christy Abdi MD  [address]  [place] [address]           [facility]  [address] [place] [address]  [facility] [address]  Phone: [phone]          Patient: Mirian العلي   MR Number: [id number]   YOB: 1975   Date of Visit: 8/14/2019       Dear Dr. Christy Abdi:    Thank you for referring Mirian العلي to me for evaluation. Attached you will find relevant portions of my assessment and plan of care.    If you have questions, please do not hesitate to call me. I look forward to following Mirian العلي along with you.    Sincerely,    Napoleon Garza MD    Enclosure  CC:  No Recipients    If you would like to receive this communication electronically, please contact [email or web address] or [phone] to request more information on [employer] access.    For providers and/or their staff who would like to refer a patient to [facility], please contact us through our one-stop-shop provider referral line, [facility] , at [phone].    If you feel you have received this communication in error or would no longer like to receive these types of communications, please e-mail [email or web address]

## 2019-08-15 ENCOUNTER — PATIENT MESSAGE (OUTPATIENT)
Dept: OBSTETRICS AND GYNECOLOGY | Facility: CLINIC | Age: 44
End: 2019-08-15

## 2019-08-16 ENCOUNTER — CLINICAL SUPPORT (OUTPATIENT)
Dept: REHABILITATION | Facility: HOSPITAL | Age: 44
End: 2019-08-16
Attending: ORTHOPAEDIC SURGERY
Payer: COMMERCIAL

## 2019-08-16 DIAGNOSIS — G89.29 CHRONIC RIGHT SHOULDER PAIN: ICD-10-CM

## 2019-08-16 DIAGNOSIS — M25.511 CHRONIC RIGHT SHOULDER PAIN: ICD-10-CM

## 2019-08-16 PROCEDURE — 97110 THERAPEUTIC EXERCISES: CPT

## 2019-08-16 PROCEDURE — 97140 MANUAL THERAPY 1/> REGIONS: CPT

## 2019-08-16 NOTE — PROGRESS NOTES
"                            Physical Therapy Daily Treatment Note     Name: Mirian العلي  Clinic Number: [id number]    Therapy Diagnosis:   Encounter Diagnosis   Name Primary?    Chronic right shoulder pain      Physician: Christy Abdi MD    Visit Date: 2019  Physician Orders: PT Eval and Treat  Medical Diagnosis: M25.511 (ICD-10-CM) - Right shoulder pain, unspecified chronicity  Evaluation Date: 2019  Authorization Period Expiration: 2019  Plan of Care Certification Period: 2019  Visit # / Visits authorized:   Date of Surgery: NA  Precautions: Standard    Time In: 700a  Time Out: 810a  Total Billable Time: 50 minutes    Subjective      Pt reports: she was compliant with home exercise program given last session.   Response to previous treatment: Patient states that she is having continued headaches but her right arm has been much better since last visit    Pain: 3/10  Location: right arms, hands  and shoulder      Objective     Mirian received therapeutic exercises to develop strength, posture and core stabilization for 30 minutes including:  Supine on fr - 4'  Shoulder ext prone - 2 x 8  Prone thoracic extension mobilization of thoracic spine - Gr II-III  Pec minor stretch - 3 x 20"    Dry needling to patient prone for upper trapezius left and levator scapula, timed in and out, consent obtained, no adverse effects, performed by Pietro Anderson PT, DPT  Mirian received the following manual therapy techniques for 20 minutes, includinst rib depression mobilization GR IV  Pec minor release right    Home Exercises Provided and Patient Education Provided     Education provided:   Continue current HEP    Written Home Exercises Provided: Continue with current HEP  Exercises were reviewed and Mirian was able to demonstrate them prior to the end of the session.      Pt received a written copy of exercises to perform at home.   See EMR under patient instructions for exercises given. "     Mirian demonstrated good  understanding of the education provided.     Assessment     The patient has minimal reaction to treatment in hand and forearm, able to maintain cervical posture with increased ease in standing and sitting    Mirian is progressing well towards her goals.   Pt prognosis is Excellent.     Pt will continue to benefit from skilled outpatient physical therapy to address the deficits listed in the problem list box on initial evaluation, provide pt/family education and to maximize pt's level of independence in the home and community environment.     Pt's spiritual, cultural and educational needs considered and pt agreeable to plan of care and goals.    Anticipated barriers to physical therapy: None    Goals:   Short Term Goals:  4 weeks  1.Patient will demonstrate ability to sleep without waking.   2.Patient will demonstrate 4+/5 strength of affected shoulder flexion and abduction without pain to increase ease with lifting/carrying.     Long Term Goals: 16 weeks  1.Increase strength to >/= 4+/5 in all planes of cervical and shoulder ROM to increase tolerance for ADL and work activities.  3. Pt goal: To resume playing tennis    Plan     Progress education and postural modification for decreased symptoms    Dario Jain, PT

## 2019-08-20 ENCOUNTER — TELEPHONE (OUTPATIENT)
Dept: PAIN MEDICINE | Facility: CLINIC | Age: 44
End: 2019-08-20

## 2019-08-20 ENCOUNTER — CLINICAL SUPPORT (OUTPATIENT)
Dept: REHABILITATION | Facility: HOSPITAL | Age: 44
End: 2019-08-20
Attending: ORTHOPAEDIC SURGERY
Payer: COMMERCIAL

## 2019-08-20 DIAGNOSIS — M25.511 CHRONIC RIGHT SHOULDER PAIN: ICD-10-CM

## 2019-08-20 DIAGNOSIS — G89.29 CHRONIC RIGHT SHOULDER PAIN: ICD-10-CM

## 2019-08-20 PROCEDURE — 97140 MANUAL THERAPY 1/> REGIONS: CPT

## 2019-08-20 PROCEDURE — 97110 THERAPEUTIC EXERCISES: CPT

## 2019-08-20 NOTE — PROGRESS NOTES
"                            Physical Therapy Daily Treatment Note     Name: Mirian Perezsey  Clinic Number: [id number]    Therapy Diagnosis:   Encounter Diagnosis   Name Primary?    Chronic right shoulder pain      Physician: Christy Abdi MD    Visit Date: 2019  Physician Orders: PT Eval and Treat  Medical Diagnosis: M25.511 (ICD-10-CM) - Right shoulder pain, unspecified chronicity  Evaluation Date: 2019  Authorization Period Expiration: 2019  Plan of Care Certification Period: 2019  Visit # / Visits authorized: 10/20  Date of Surgery: NA  Precautions: Standard    Time In: 700a  Time Out: 810a  Total Billable Time: 50 minutes    Subjective      Pt reports: she was compliant with home exercise program given last session.   Response to previous treatment: Patient states that she is feeling about the same.  Symptoms are decreased in neck though.    Pain: 3/10  Location: right arms, hands  and shoulder      Objective     Mirian received therapeutic exercises to develop strength, posture and core stabilization for 30 minutes including:  Supine on fr - 4'  Shoulder ext prone - 2 x 8  Prone thoracic extension mobilization of thoracic spine - Gr II-III  Pec minor stretch - 3 x 20"    Dry needling to patient prone for upper trapezius left and levator scapula, timed in and out, consent obtained, no adverse effects, performed by Yesenia Westbrook PT, DPT  Mirian received the following manual therapy techniques for 20 minutes, includinst rib depression mobilization GR IV  Pec minor release right    Home Exercises Provided and Patient Education Provided     Education provided:   Continue current HEP    Written Home Exercises Provided: Continue with current HEP  Exercises were reviewed and Mirian was able to demonstrate them prior to the end of the session.      Pt received a written copy of exercises to perform at home.   See EMR under patient instructions for exercises given.     Mirian " demonstrated good  understanding of the education provided.     Assessment     The patient has decreased pec minor stiffness and cervical suboccipitals are less reactive.    Mirian is progressing well towards her goals.   Pt prognosis is Excellent.     Pt will continue to benefit from skilled outpatient physical therapy to address the deficits listed in the problem list box on initial evaluation, provide pt/family education and to maximize pt's level of independence in the home and community environment.     Pt's spiritual, cultural and educational needs considered and pt agreeable to plan of care and goals.    Anticipated barriers to physical therapy: None    Goals:   Short Term Goals:  4 weeks  1.Patient will demonstrate ability to sleep without waking.   2.Patient will demonstrate 4+/5 strength of affected shoulder flexion and abduction without pain to increase ease with lifting/carrying.     Long Term Goals: 16 weeks  1.Increase strength to >/= 4+/5 in all planes of cervical and shoulder ROM to increase tolerance for ADL and work activities.  3. Pt goal: To resume playing tennis    Plan     Progress education and postural modification for decreased symptoms    Dario Jain, PT

## 2019-08-20 NOTE — TELEPHONE ENCOUNTER
My name is Staff, I am contacting you from [facility] regarding your appointment scheduled for 08.21.19, with Provider Dr. Mcclendon, just confirming you will be able to make it.    If you feel you need to reschedule or canceled please give our office a call so we can better assist you.      Staff requesting patient to arrive 15 mins ahead of schedule appointment time.    Staff left voicemail reminding patient of their appt

## 2019-08-21 ENCOUNTER — OFFICE VISIT (OUTPATIENT)
Dept: PAIN MEDICINE | Facility: CLINIC | Age: 44
End: 2019-08-21
Attending: ANESTHESIOLOGY
Payer: COMMERCIAL

## 2019-08-21 VITALS
HEIGHT: 71 IN | SYSTOLIC BLOOD PRESSURE: 108 MMHG | DIASTOLIC BLOOD PRESSURE: 79 MMHG | HEART RATE: 109 BPM | BODY MASS INDEX: 23.27 KG/M2 | WEIGHT: 166.25 LBS

## 2019-08-21 DIAGNOSIS — M62.838 MUSCLE SPASM: ICD-10-CM

## 2019-08-21 DIAGNOSIS — G24.3 ISOLATED CERVICAL DYSTONIA: Primary | ICD-10-CM

## 2019-08-21 PROCEDURE — 64616 CHEMODENERV MUSC NECK DYSTON: CPT | Mod: RT,S$GLB,, | Performed by: ANESTHESIOLOGY

## 2019-08-21 PROCEDURE — 99999 PR PBB SHADOW E&M-EST. PATIENT-LVL III: ICD-10-PCS | Mod: PBBFAC,,, | Performed by: ANESTHESIOLOGY

## 2019-08-21 PROCEDURE — 95874 PR NEEDLE EMG GUIDANCE FOR CHEMODENERVATION: ICD-10-PCS | Mod: S$GLB,,, | Performed by: ANESTHESIOLOGY

## 2019-08-21 PROCEDURE — 95874 GUIDE NERV DESTR NEEDLE EMG: CPT | Mod: S$GLB,,, | Performed by: ANESTHESIOLOGY

## 2019-08-21 PROCEDURE — 99999 PR PBB SHADOW E&M-EST. PATIENT-LVL III: CPT | Mod: PBBFAC,,, | Performed by: ANESTHESIOLOGY

## 2019-08-21 PROCEDURE — 99499 NO LOS: ICD-10-PCS | Mod: S$GLB,,, | Performed by: ANESTHESIOLOGY

## 2019-08-21 PROCEDURE — 64616 PR CHEMODENERVATION NECK MUSCLES EXC LARNYNX, UNI: ICD-10-PCS | Mod: RT,S$GLB,, | Performed by: ANESTHESIOLOGY

## 2019-08-21 PROCEDURE — 99499 UNLISTED E&M SERVICE: CPT | Mod: S$GLB,,, | Performed by: ANESTHESIOLOGY

## 2019-08-21 NOTE — PROGRESS NOTES
Subjective:      Patient ID: Mirian العلي is a 43 y.o. female.    Chief Complaint: No chief complaint on file.    Referred by: Fatemeh Mcclendon MD     HPI  She is here for follow-up and for Botox injections.  She continues to have the muscle spasm on the right side of the neck and chest. No new symptomatology otherwise.      Past Medical History:   Diagnosis Date    AR (allergic rhinitis)     Asthma     Eczema     Endometriosis, mild 2002    Factor V Leiden mutation     Ovarian cyst, bilateral        Past Surgical History:   Procedure Laterality Date    BUNIONECTOMY Left 02/06/2018    CYSTOSCOPY N/A 1/20/2015    Performed by [facility]    HYSTERECTOMY      total    HYSTERECTOMY-TOTAL LAPAROSCOPIC (TLH) with right salpingo-oopherectomy Right 1/20/2015    Performed by [facility] at [facility] OR    LAPAROSCOPY W/ MINI-LAPAROTOMY  2003    OOPHORECTOMY Right 3/2012    OOPHORECTOMY Left 01/2015    TOTAL VAGINAL HYSTERECTOMY  1/2015    w/Right USO    WISDOM TOOTH EXTRACTION Bilateral        Review of patient's allergies indicates:   Allergen Reactions    Niacin preparations      Patient states she has flushing    Sulfa (sulfonamide antibiotics)      Tongue swelling    Iodine and iodide containing products Rash       Current Outpatient Medications   Medication Sig Dispense Refill    ALPRAZolam (XANAX) 1 MG tablet Take 1 tablet (1 mg total) by mouth 3 (three) times daily as needed. 90 tablet 0    aspirin (ECOTRIN) 81 MG EC tablet Take 81 mg by mouth once daily.      buPROPion (WELLBUTRIN XL) 150 MG TB24 tablet Take 1 tablet (150 mg total) by mouth once daily. 90 tablet 2    cetirizine (ZYRTEC) 10 MG tablet Take 1 tablet (10 mg total) by mouth once daily. (Patient taking differently: Take 10 mg by mouth once as needed. ) 30 tablet 2    cyanocobalamin, vitamin B-12, 5,000 mcg Subl Place under the tongue once a week.      DULoxetine (CYMBALTA) 30 MG capsule Take 1  capsule (30 mg total) by mouth once daily. 30 capsule 1    DULoxetine (CYMBALTA) 60 MG capsule TK 1 C PO QD  3    ergocalciferol (VITAMIN D2) 50,000 unit Cap Take 50,000 Units by mouth every 7 days.      fluticasone (FLONASE) 50 mcg/actuation nasal spray PLACE 2 SPRAYS IN EACH NOSTRIL ONCE DAILY 1 Bottle 1    FLUZONE QUAD [id number], PF, 60 mcg (15 mcg x 4)/0.5 mL Syrg ADM 0.5ML IM UTD  0    ibuprofen (ADVIL,MOTRIN) 600 MG tablet Take 1 tablet (600 mg total) by mouth every 6 (six) hours as needed. 20 tablet 0    melatonin 1 mg Tab Take by mouth.      PROGESTERONE, MICRONIZED (PROMETRIUM ORAL) Take 300 mg by mouth nightly.      tiZANidine (ZANAFLEX) 4 MG tablet Take 1 tablet (4 mg total) by mouth 2 (two) times daily as needed. 60 tablet 2     Current Facility-Administered Medications   Medication Dose Route Frequency Provider Last Rate Last Dose    estradiol cypionate 5 mg/mL injection 10 mg  10 mg Intramuscular Q28 Days Genet Stein MD   10 mg at 07/16/19 0850    estradiol cypionate 5 mg/mL injection 15 mg  15 mg Intramuscular Q28 Days Genet Stein MD   15 mg at 08/14/19 1424    estradiol valerate injection 40 mg  40 mg Intramuscular Q30 Days Genet Stein MD        testosterone cypionate injection 50 mg  50 mg Intramuscular Q28 Days Genet Stein MD   50 mg at 08/14/19 1424       Family History   Problem Relation Age of Onset    Cancer Mother     Thyroid disease Mother     Breast cancer Mother         late 60's    Cancer Father     Diabetes Brother     Factor V Leiden deficiency Brother     Allergies Brother     Asthma Brother     Breast cancer Sister 46        inflammatory    Diabetes Maternal Aunt     Diabetes Paternal Uncle     Cancer Cousin     Breast cancer Paternal Aunt        Social History     Socioeconomic History    Marital status:      Spouse name: Not on file    Number of children: 2    Years of education: Not on file    Highest education  "level: Not on file   Occupational History    Not on file   Social Needs    Financial resource strain: Not on file    Food insecurity:     Worry: Not on file     Inability: Not on file    Transportation needs:     Medical: Not on file     Non-medical: Not on file   Tobacco Use    Smoking status: Never Smoker    Smokeless tobacco: Never Used   Substance and Sexual Activity    Alcohol use: Yes     Alcohol/week: 3.0 - 3.6 oz     Types: 5 - 6 Glasses of wine per week     Frequency: 2-3 times a week     Comment: week    Drug use: No    Sexual activity: Yes     Partners: Male     Birth control/protection: Other-see comments, Post-menopausal   Lifestyle    Physical activity:     Days per week: Not on file     Minutes per session: Not on file    Stress: Not on file   Relationships    Social connections:     Talks on phone: Not on file     Gets together: Not on file     Attends Rastafari service: Not on file     Active member of club or organization: Not on file     Attends meetings of clubs or organizations: Not on file     Relationship status: Not on file   Other Topics Concern    Not on file   Social History Narrative    Not on file           ROS        Objective:   /79   Pulse 109   Ht 5' 11" (1.803 m)   Wt 75.4 kg (166 lb 3.6 oz)   LMP 02/01/2014   BMI 23.18 kg/m²   Pain Disability Index Review:  Last 3 PDI Scores 8/21/2019 8/14/2019 8/8/2019   Pain Disability Index (PDI) 28 41 39     Normocephalic.  Atraumatic.  Affect appropriate.  Breathing unlabored.  Extra ocular muscles intact.           Ortho/SPM Exam    muscle spasms noted with prominent muscles on the right side more so than the left side.  Assessment:       Encounter Diagnoses   Name Primary?    Isolated cervical dystonia Yes    Muscle spasm          Plan:   We discussed with the patient the assessment and recommendations. The following is the plan we agreed on:  1.  Procedure:  Under clean technique, EMG guidance and after discussing " with the patient 200 units of Botox were injected.  We injected the right splenius capitis, longismus, upper trapezius, and levator scapula. We also injected the right upper pectoral.   She tolerated procedure well.   2.  Return as needed.  Otherwise follow-up in 1 month to see how well this worked.      Diagnoses and all orders for this visit:    Isolated cervical dystonia  -     onabotulinumtoxina injection 200 Units    Muscle spasm  -     onabotulinumtoxina injection 200 Units

## 2019-08-23 ENCOUNTER — CLINICAL SUPPORT (OUTPATIENT)
Dept: REHABILITATION | Facility: HOSPITAL | Age: 44
End: 2019-08-23
Attending: ORTHOPAEDIC SURGERY
Payer: COMMERCIAL

## 2019-08-23 DIAGNOSIS — M25.511 CHRONIC RIGHT SHOULDER PAIN: ICD-10-CM

## 2019-08-23 DIAGNOSIS — G89.29 CHRONIC RIGHT SHOULDER PAIN: ICD-10-CM

## 2019-08-23 PROCEDURE — 97110 THERAPEUTIC EXERCISES: CPT

## 2019-08-23 PROCEDURE — 97140 MANUAL THERAPY 1/> REGIONS: CPT

## 2019-08-23 NOTE — PROGRESS NOTES
"                            Physical Therapy Daily Treatment Note     Name: Mirian Perezsey  Clinic Number: [id number]    Therapy Diagnosis:   Encounter Diagnosis   Name Primary?    Chronic right shoulder pain      Physician: Christy Abdi MD    Visit Date: 2019  Physician Orders: PT Eval and Treat  Medical Diagnosis: M25.511 (ICD-10-CM) - Right shoulder pain, unspecified chronicity  Evaluation Date: 2019  Authorization Period Expiration: 2019  Plan of Care Certification Period: 2019  Visit # / Visits authorized:   Date of Surgery: NA  Precautions: Standard    Time In: 810a  Time Out: 910a  Total Billable Time: 50 minutes    Subjective      Pt reports: she was compliant with home exercise program given last session.   Response to previous treatment: Patient states that she has had a lot less pain in arm this week, headaches are worse    Pain: 310  Location: right arms, hands  and shoulder      Objective     Mirian received therapeutic exercises to develop strength, posture and core stabilization for 30 minutes including:  Supine on fr - 4'  Shoulder ext prone - 2 x 8  Prone thoracic extension mobilization of thoracic spine - Gr II-III  Pec minor stretch - 3 x 20"    Dry needling to patient prone for upper trapezius left and levator scapula, timed in and out, consent obtained, no adverse effects, performed by Pietro Anderson PT, DPT  Mirian received the following manual therapy techniques for 20 minutes, includinst rib depression mobilization GR IV  Pec minor release right    Home Exercises Provided and Patient Education Provided     Education provided:   Continue current HEP    Written Home Exercises Provided: Continue with current HEP  Exercises were reviewed and Mirian was able to demonstrate them prior to the end of the session.      Pt received a written copy of exercises to perform at home.   See EMR under patient instructions for exercises given.     Mirian demonstrated " good  understanding of the education provided.     Assessment     The patient has proving cervical mobility segmentally with sideglides    Mirian is progressing well towards her goals.   Pt prognosis is Excellent.     Pt will continue to benefit from skilled outpatient physical therapy to address the deficits listed in the problem list box on initial evaluation, provide pt/family education and to maximize pt's level of independence in the home and community environment.     Pt's spiritual, cultural and educational needs considered and pt agreeable to plan of care and goals.    Anticipated barriers to physical therapy: None    Goals:   Short Term Goals:  4 weeks  1.Patient will demonstrate ability to sleep without waking.   2.Patient will demonstrate 4+/5 strength of affected shoulder flexion and abduction without pain to increase ease with lifting/carrying.     Long Term Goals: 16 weeks  1.Increase strength to >/= 4+/5 in all planes of cervical and shoulder ROM to increase tolerance for ADL and work activities.  3. Pt goal: To resume playing tennis    Plan     Progress education and postural modification for decreased symptoms    Dario Jain, PT

## 2019-08-28 DIAGNOSIS — Z91.89 AT HIGH RISK FOR BREAST CANCER: ICD-10-CM

## 2019-08-29 ENCOUNTER — CLINICAL SUPPORT (OUTPATIENT)
Dept: REHABILITATION | Facility: HOSPITAL | Age: 44
End: 2019-08-29
Attending: ORTHOPAEDIC SURGERY
Payer: COMMERCIAL

## 2019-08-29 DIAGNOSIS — G89.29 CHRONIC RIGHT SHOULDER PAIN: ICD-10-CM

## 2019-08-29 DIAGNOSIS — M25.511 CHRONIC RIGHT SHOULDER PAIN: ICD-10-CM

## 2019-08-29 PROCEDURE — 97110 THERAPEUTIC EXERCISES: CPT

## 2019-08-29 PROCEDURE — 97140 MANUAL THERAPY 1/> REGIONS: CPT

## 2019-09-03 ENCOUNTER — CLINICAL SUPPORT (OUTPATIENT)
Dept: REHABILITATION | Facility: HOSPITAL | Age: 44
End: 2019-09-03
Attending: ORTHOPAEDIC SURGERY
Payer: COMMERCIAL

## 2019-09-03 DIAGNOSIS — G89.29 CHRONIC RIGHT SHOULDER PAIN: ICD-10-CM

## 2019-09-03 DIAGNOSIS — M25.511 CHRONIC RIGHT SHOULDER PAIN: ICD-10-CM

## 2019-09-03 PROCEDURE — 97140 MANUAL THERAPY 1/> REGIONS: CPT

## 2019-09-03 PROCEDURE — 97110 THERAPEUTIC EXERCISES: CPT

## 2019-09-03 NOTE — PROGRESS NOTES
"                            Physical Therapy Daily Treatment Note     Name: Mirian Perezsey  Clinic Number: [id number]    Therapy Diagnosis:   Encounter Diagnosis   Name Primary?    Chronic right shoulder pain      Physician: Christy Abdi MD    Visit Date: 2019  Physician Orders: PT Eval and Treat  Medical Diagnosis: M25.511 (ICD-10-CM) - Right shoulder pain, unspecified chronicity  Evaluation Date: 2019  Authorization Period Expiration: 2019  Plan of Care Certification Period: 2019  Visit # / Visits authorized:   Date of Surgery: NA  Precautions: Standard    Time In: 200p  Time Out: 300p  Total Billable Time: 50 minutes    Subjective      Pt reports: she was compliant with home exercise program given last session.   Response to previous treatment: Patient states that she is feeling better, but right hand has been numb again at times    Pain: 3/10  Location: right arms, hands  and shoulder      Objective     Mirian received therapeutic exercises to develop strength, posture and core stabilization for 15 minutes including:  Supine on fr - 4'  Bolster series - 15x  Pec minor stretch - 3 x 20"    Dry needling to patient prone for upper trapezius left and levator scapula, timed in and out, consent obtained, no adverse effects, performed by Pietro Anderson PT, DPT  Mirian received the following manual therapy techniques for 30 minutes, includinst rib depression mobilization GR IV  Pec minor release right  STM right scalenes and suboccipitals    Home Exercises Provided and Patient Education Provided     Education provided:   Continue current HEP    Written Home Exercises Provided: Continue with current HEP  Exercises were reviewed and Mirian was able to demonstrate them prior to the end of the session.      Pt received a written copy of exercises to perform at home.   See EMR under patient instructions for exercises given.     Mirian demonstrated good  understanding of the " education provided.     Assessment     The patient has decreased tingling in hand with cervical traction manually and 1st rib depression     Mirian is progressing well towards her goals.   Pt prognosis is Excellent.     Pt will continue to benefit from skilled outpatient physical therapy to address the deficits listed in the problem list box on initial evaluation, provide pt/family education and to maximize pt's level of independence in the home and community environment.     Pt's spiritual, cultural and educational needs considered and pt agreeable to plan of care and goals.    Anticipated barriers to physical therapy: None    Goals:   Short Term Goals:  4 weeks  1.Patient will demonstrate ability to sleep without waking.   2.Patient will demonstrate 4+/5 strength of affected shoulder flexion and abduction without pain to increase ease with lifting/carrying.     Long Term Goals: 16 weeks  1.Increase strength to >/= 4+/5 in all planes of cervical and shoulder ROM to increase tolerance for ADL and work activities.  3. Pt goal: To resume playing tennis    Plan     Progress education and postural modification for decreased symptoms    Dario Jain, PT

## 2019-09-04 ENCOUNTER — PROCEDURE VISIT (OUTPATIENT)
Dept: NEUROLOGY | Facility: CLINIC | Age: 44
End: 2019-09-04
Payer: COMMERCIAL

## 2019-09-04 DIAGNOSIS — M25.511 RIGHT SHOULDER PAIN, UNSPECIFIED CHRONICITY: ICD-10-CM

## 2019-09-04 PROCEDURE — 95886 PR EMG COMPLETE, W/ NERVE CONDUCTION STUDIES, 5+ MUSCLES: ICD-10-PCS | Mod: S$GLB,,, | Performed by: PSYCHIATRY & NEUROLOGY

## 2019-09-04 PROCEDURE — 95913 NRV CNDJ TEST 13/> STUDIES: CPT | Mod: S$GLB,,, | Performed by: PSYCHIATRY & NEUROLOGY

## 2019-09-04 PROCEDURE — 95886 MUSC TEST DONE W/N TEST COMP: CPT | Mod: S$GLB,,, | Performed by: PSYCHIATRY & NEUROLOGY

## 2019-09-04 PROCEDURE — 95913 PR NERVE CONDUCTION STUDY; 13 OR MORE STUDIES: ICD-10-PCS | Mod: S$GLB,,, | Performed by: PSYCHIATRY & NEUROLOGY

## 2019-09-04 NOTE — PROGRESS NOTES
"                            Physical Therapy Daily Treatment Note     Name: Mirian Perezsey  Clinic Number: [id number]    Therapy Diagnosis:   Encounter Diagnosis   Name Primary?    Chronic right shoulder pain      Physician: Christy Abdi MD    Visit Date: 9/3/2019  Physician Orders: PT Eval and Treat  Medical Diagnosis: M25.511 (ICD-10-CM) - Right shoulder pain, unspecified chronicity  Evaluation Date: 2019  Authorization Period Expiration: 2019  Plan of Care Certification Period: 2019  Visit # / Visits authorized:   Date of Surgery: NA  Precautions: Standard    Time In: 700a  Time Out: 800a  Total Billable Time: 50 minutes    Subjective      Pt reports: she was compliant with home exercise program given last session.   Response to previous treatment: Patient states that she is able to do more during the day before onset of pain    Pain: 3/10  Location: right arms, hands  and shoulder      Objective     Mirian received therapeutic exercises to develop strength, posture and core stabilization for 15 minutes including:  Supine on fr - 4'  Bolster series - 15x  Pec minor stretch - 3 x 20"    Dry needling to patient prone for upper trapezius left and levator scapula, timed in and out, consent obtained, no adverse effects, performed by Pietro Anderson PT, DPT  Mirian received the following manual therapy techniques for 30 minutes, includinst rib depression mobilization GR IV  Pec minor release right  STM right scalenes and suboccipitals    Home Exercises Provided and Patient Education Provided     Education provided:   Continue current HEP    Written Home Exercises Provided: Continue with current HEP  Exercises were reviewed and Mirian was able to demonstrate them prior to the end of the session.      Pt received a written copy of exercises to perform at home.   See EMR under patient instructions for exercises given.     Mirian demonstrated good  understanding of the education " provided.     Assessment     The patient has improvement in cervical sidebending to left without symptoms passively and actively     Mirian is progressing well towards her goals.   Pt prognosis is Excellent.     Pt will continue to benefit from skilled outpatient physical therapy to address the deficits listed in the problem list box on initial evaluation, provide pt/family education and to maximize pt's level of independence in the home and community environment.     Pt's spiritual, cultural and educational needs considered and pt agreeable to plan of care and goals.    Anticipated barriers to physical therapy: None    Goals:   Short Term Goals:  4 weeks  1.Patient will demonstrate ability to sleep without waking.   2.Patient will demonstrate 4+/5 strength of affected shoulder flexion and abduction without pain to increase ease with lifting/carrying.     Long Term Goals: 16 weeks  1.Increase strength to >/= 4+/5 in all planes of cervical and shoulder ROM to increase tolerance for ADL and work activities.  3. Pt goal: To resume playing tennis    Plan     Progress education and postural modification for decreased symptoms    Dario Jain, PT

## 2019-09-09 ENCOUNTER — PATIENT MESSAGE (OUTPATIENT)
Dept: PRIMARY CARE CLINIC | Facility: CLINIC | Age: 44
End: 2019-09-09

## 2019-09-09 ENCOUNTER — PATIENT MESSAGE (OUTPATIENT)
Dept: OBSTETRICS AND GYNECOLOGY | Facility: CLINIC | Age: 44
End: 2019-09-09

## 2019-09-09 DIAGNOSIS — F41.9 ANXIETY: ICD-10-CM

## 2019-09-09 DIAGNOSIS — R07.1 CHEST PAIN ON BREATHING: ICD-10-CM

## 2019-09-09 RX ORDER — ALPRAZOLAM 1 MG/1
1 TABLET ORAL 3 TIMES DAILY PRN
Qty: 90 TABLET | Refills: 0 | Status: SHIPPED | OUTPATIENT
Start: 2019-09-09 | End: 2019-11-29 | Stop reason: SDUPTHER

## 2019-09-10 ENCOUNTER — CLINICAL SUPPORT (OUTPATIENT)
Dept: REHABILITATION | Facility: HOSPITAL | Age: 44
End: 2019-09-10
Payer: COMMERCIAL

## 2019-09-10 DIAGNOSIS — M25.511 CHRONIC RIGHT SHOULDER PAIN: ICD-10-CM

## 2019-09-10 DIAGNOSIS — G89.29 CHRONIC RIGHT SHOULDER PAIN: ICD-10-CM

## 2019-09-10 PROCEDURE — 97110 THERAPEUTIC EXERCISES: CPT

## 2019-09-10 PROCEDURE — 97140 MANUAL THERAPY 1/> REGIONS: CPT

## 2019-09-10 NOTE — PROGRESS NOTES
"                            Physical Therapy Daily Treatment Note     Name: Mirian Gill Hastings  Clinic Number: [id number]    Therapy Diagnosis:   Encounter Diagnosis   Name Primary?    Chronic right shoulder pain      Physician: Christy Abdi MD    Visit Date: 9/10/2019  Physician Orders: PT Eval and Treat  Medical Diagnosis: M25.511 (ICD-10-CM) - Right shoulder pain, unspecified chronicity  Evaluation Date: 2019  Authorization Period Expiration: 2019  Plan of Care Certification Period: 2019  Visit # / Visits authorized:   Date of Surgery: NA  Precautions: Standard    Time In: 700a  Time Out: 800a  Total Billable Time: 50 minutes    Subjective      Pt reports: she was compliant with home exercise program given last session.   Response to previous treatment: Patient states that she is feeling big improvement in nerve pain, but her headaches have been worse the last week    Pain: 3/10  Location: right arms, hands  and shoulder      Objective     Mirian received therapeutic exercises to develop strength, posture and core stabilization for 15 minutes including:  Supine on fr - 4'  Bolster series - 15x  Pec minor stretch - 3 x 20"    Dry needling to patient prone for upper trapezius left and levator scapula, timed in and out, consent obtained, no adverse effects, performed by Pietro Anderson PT, DPT  Mirian received the following manual therapy techniques for 30 minutes, includinst rib depression mobilization GR IV  Pec minor release right  STM right scalenes and suboccipitals    Home Exercises Provided and Patient Education Provided     Education provided:   Continue current HEP    Written Home Exercises Provided: Continue with current HEP  Exercises were reviewed and Mirian was able to demonstrate them prior to the end of the session.      Pt received a written copy of exercises to perform at home.   See EMR under patient instructions for exercises given.     Mirian demonstrated good  " understanding of the education provided.     Assessment     The patient demonstrates improvement in ability to perform UE AROM without numbness and tingling in hand, less shoulder pain but initiated headache with soft tissue mobilization    Mirian is progressing well towards her goals.   Pt prognosis is Excellent.     Pt will continue to benefit from skilled outpatient physical therapy to address the deficits listed in the problem list box on initial evaluation, provide pt/family education and to maximize pt's level of independence in the home and community environment.     Pt's spiritual, cultural and educational needs considered and pt agreeable to plan of care and goals.    Anticipated barriers to physical therapy: None    Goals:   Short Term Goals:  4 weeks  1.Patient will demonstrate ability to sleep without waking.   2.Patient will demonstrate 4+/5 strength of affected shoulder flexion and abduction without pain to increase ease with lifting/carrying.     Long Term Goals: 16 weeks  1.Increase strength to >/= 4+/5 in all planes of cervical and shoulder ROM to increase tolerance for ADL and work activities.  3. Pt goal: To resume playing tennis    Plan     Progress education and postural modification for decreased symptoms    Dario Jain, PT

## 2019-09-11 ENCOUNTER — CLINICAL SUPPORT (OUTPATIENT)
Dept: OBSTETRICS AND GYNECOLOGY | Facility: CLINIC | Age: 44
End: 2019-09-11
Payer: COMMERCIAL

## 2019-09-11 PROCEDURE — 99999 PR PBB SHADOW E&M-EST. PATIENT-LVL I: ICD-10-PCS | Mod: PBBFAC,,,

## 2019-09-11 PROCEDURE — 96372 PR INJECTION,THERAP/PROPH/DIAG2ST, IM OR SUBCUT: ICD-10-PCS | Mod: S$GLB,,, | Performed by: OBSTETRICS & GYNECOLOGY

## 2019-09-11 PROCEDURE — 96372 THER/PROPH/DIAG INJ SC/IM: CPT | Mod: S$GLB,,, | Performed by: OBSTETRICS & GYNECOLOGY

## 2019-09-11 PROCEDURE — 99999 PR PBB SHADOW E&M-EST. PATIENT-LVL I: CPT | Mod: PBBFAC,,,

## 2019-09-11 RX ADMIN — TESTOSTERONE CYPIONATE 50 MG: 200 INJECTION, SOLUTION INTRAMUSCULAR at 01:09

## 2019-09-11 NOTE — PROGRESS NOTES
Here for hormone therapy injection, no complaints at this time, Injection given as ordered, tolerated well, no report of pain prior to or after injection. Return to clinic as scheduled.     Site - LB    Testosterone 50 mg  Depo Estradiol 15 mg    Clinic Supplied Medication

## 2019-09-13 ENCOUNTER — CLINICAL SUPPORT (OUTPATIENT)
Dept: REHABILITATION | Facility: HOSPITAL | Age: 44
End: 2019-09-13
Attending: ORTHOPAEDIC SURGERY
Payer: COMMERCIAL

## 2019-09-13 DIAGNOSIS — M25.511 CHRONIC RIGHT SHOULDER PAIN: ICD-10-CM

## 2019-09-13 DIAGNOSIS — G89.29 CHRONIC RIGHT SHOULDER PAIN: ICD-10-CM

## 2019-09-13 PROCEDURE — 97110 THERAPEUTIC EXERCISES: CPT

## 2019-09-13 PROCEDURE — 97140 MANUAL THERAPY 1/> REGIONS: CPT

## 2019-09-13 NOTE — PROGRESS NOTES
"                            Physical Therapy Daily Treatment Note     Name: Mirian Perezsey  Clinic Number: [id number]    Therapy Diagnosis:   Encounter Diagnosis   Name Primary?    Chronic right shoulder pain      Physician: Christy Abdi MD    Visit Date: 2019  Physician Orders: PT Eval and Treat  Medical Diagnosis: M25.511 (ICD-10-CM) - Right shoulder pain, unspecified chronicity  Evaluation Date: 2019  Authorization Period Expiration: 2019  Plan of Care Certification Period: 2019  Visit # / Visits authorized: 15/20  Date of Surgery: NA  Precautions: Standard    Time In: 700a  Time Out: 800a  Total Billable Time: 50 minutes    Subjective      Pt reports: she was compliant with home exercise program given last session.   Response to previous treatment: Patient states that she has had return to right hand numbness intermittently the last 24 hours    Pain: 3/10  Location: right arms, hands  and shoulder      Objective     Mirian received therapeutic exercises to develop strength, posture and core stabilization for 15 minutes including:  Supine on fr - 4'  Bolster series - 15x  Pec minor stretch - 3 x 20"    Dry needling to patient prone for upper trapezius left and levator scapula, timed in and out, consent obtained, no adverse effects, performed by Pietro Anderson PT, DPT  Mirian received the following manual therapy techniques for 30 minutes, includinst rib depression mobilization GR IV  Pec minor release right  STM right scalenes and suboccipitals    Home Exercises Provided and Patient Education Provided     Education provided:   Continue current HEP    Written Home Exercises Provided: Continue with current HEP  Exercises were reviewed and Mirian was able to demonstrate them prior to the end of the session.      Pt received a written copy of exercises to perform at home.   See EMR under patient instructions for exercises given.     Mirian demonstrated good  understanding of the " education provided.     Assessment     The patient demonstrates left upper trap stiffness but improvement in ability to tolerate 1st rib mobilization    Mirian is progressing well towards her goals.   Pt prognosis is Excellent.     Pt will continue to benefit from skilled outpatient physical therapy to address the deficits listed in the problem list box on initial evaluation, provide pt/family education and to maximize pt's level of independence in the home and community environment.     Pt's spiritual, cultural and educational needs considered and pt agreeable to plan of care and goals.    Anticipated barriers to physical therapy: None    Goals:   Short Term Goals:  4 weeks  1.Patient will demonstrate ability to sleep without waking.   2.Patient will demonstrate 4+/5 strength of affected shoulder flexion and abduction without pain to increase ease with lifting/carrying.     Long Term Goals: 16 weeks  1.Increase strength to >/= 4+/5 in all planes of cervical and shoulder ROM to increase tolerance for ADL and work activities.  3. Pt goal: To resume playing tennis    Plan     Progress education and postural modification for decreased symptoms    Dario Jain, PT

## 2019-09-17 ENCOUNTER — CLINICAL SUPPORT (OUTPATIENT)
Dept: REHABILITATION | Facility: HOSPITAL | Age: 44
End: 2019-09-17
Attending: ORTHOPAEDIC SURGERY
Payer: COMMERCIAL

## 2019-09-17 DIAGNOSIS — M25.511 CHRONIC RIGHT SHOULDER PAIN: ICD-10-CM

## 2019-09-17 DIAGNOSIS — G89.29 CHRONIC RIGHT SHOULDER PAIN: ICD-10-CM

## 2019-09-17 PROCEDURE — 97110 THERAPEUTIC EXERCISES: CPT

## 2019-09-17 PROCEDURE — 97140 MANUAL THERAPY 1/> REGIONS: CPT

## 2019-09-17 NOTE — PROGRESS NOTES
"                            Physical Therapy Daily Treatment Note     Name: Mirian Perezsey  Clinic Number: [id number]    Therapy Diagnosis:   Encounter Diagnosis   Name Primary?    Chronic right shoulder pain      Physician: Christy Abdi MD    Visit Date: 2019  Physician Orders: PT Eval and Treat  Medical Diagnosis: M25.511 (ICD-10-CM) - Right shoulder pain, unspecified chronicity  Evaluation Date: 2019  Authorization Period Expiration: 2019  Plan of Care Certification Period: 2019  Visit # / Visits authorized: 15/20  Date of Surgery: NA  Precautions: Standard    Time In: 700a  Time Out: 800a  Total Billable Time: 50 minutes    Subjective      Pt reports: she was compliant with home exercise program given last session.   Response to previous treatment: Patient states that she actually felt great Saturday morning and arms are feeling better overall    Pain: 3/10  Location: right arms, hands  and shoulder      Objective     Mirian received therapeutic exercises to develop strength, posture and core stabilization for 15 minutes including:  Supine on fr - 4'  Bolster series - 15x  Pec minor stretch - 3 x 20"    Dry needling to patient prone for upper trapezius left and levator scapula, timed in and out, consent obtained, no adverse effects, performed by Marcelo Castillo PT, DPT  Mirian received the following manual therapy techniques for 30 minutes, includinst rib depression mobilization GR IV  Pec minor release right  STM right scalenes and suboccipitals    Home Exercises Provided and Patient Education Provided     Education provided:   Continue current HEP    Written Home Exercises Provided: Continue with current HEP  Exercises were reviewed and Mirian was able to demonstrate them prior to the end of the session.      Pt received a written copy of exercises to perform at home.   See EMR under patient instructions for exercises given.     Mirian demonstrated good  understanding of " the education provided.     Assessment     The patient demonstrates improvement in left upper trap and levator tenderness    Mirian is progressing well towards her goals.   Pt prognosis is Excellent.     Pt will continue to benefit from skilled outpatient physical therapy to address the deficits listed in the problem list box on initial evaluation, provide pt/family education and to maximize pt's level of independence in the home and community environment.     Pt's spiritual, cultural and educational needs considered and pt agreeable to plan of care and goals.    Anticipated barriers to physical therapy: None    Goals:   Short Term Goals:  4 weeks  1.Patient will demonstrate ability to sleep without waking.   2.Patient will demonstrate 4+/5 strength of affected shoulder flexion and abduction without pain to increase ease with lifting/carrying.     Long Term Goals: 16 weeks  1.Increase strength to >/= 4+/5 in all planes of cervical and shoulder ROM to increase tolerance for ADL and work activities.  3. Pt goal: To resume playing tennis    Plan     Progress education and postural modification for decreased symptoms    Dario Jain, PT

## 2019-09-19 ENCOUNTER — PATIENT OUTREACH (OUTPATIENT)
Dept: ADMINISTRATIVE | Facility: OTHER | Age: 44
End: 2019-09-19

## 2019-09-23 PROBLEM — M79.89 SWELLING OF ARM: Status: ACTIVE | Noted: 2019-02-13

## 2019-09-26 ENCOUNTER — PATIENT OUTREACH (OUTPATIENT)
Dept: ADMINISTRATIVE | Facility: OTHER | Age: 44
End: 2019-09-26

## 2019-09-26 ENCOUNTER — TELEPHONE (OUTPATIENT)
Dept: PAIN MEDICINE | Facility: CLINIC | Age: 44
End: 2019-09-26

## 2019-09-26 NOTE — TELEPHONE ENCOUNTER
Good morning,afternoon Mr./Mrs.    My name is Lisa I am contacting you from [facility] regarding your appointment scheduled for, 09/30/2019 with, Lizzie Garcia just confirming you will be able to make it.    Staff left voicemail reminding pt of their appt.  MD

## 2019-09-27 ENCOUNTER — OFFICE VISIT (OUTPATIENT)
Dept: PAIN MEDICINE | Facility: CLINIC | Age: 44
End: 2019-09-27
Attending: ANESTHESIOLOGY
Payer: COMMERCIAL

## 2019-09-27 ENCOUNTER — CLINICAL SUPPORT (OUTPATIENT)
Dept: REHABILITATION | Facility: HOSPITAL | Age: 44
End: 2019-09-27
Attending: ORTHOPAEDIC SURGERY
Payer: COMMERCIAL

## 2019-09-27 VITALS
SYSTOLIC BLOOD PRESSURE: 117 MMHG | HEART RATE: 95 BPM | WEIGHT: 175 LBS | HEIGHT: 71 IN | TEMPERATURE: 98 F | DIASTOLIC BLOOD PRESSURE: 81 MMHG | RESPIRATION RATE: 18 BRPM | BODY MASS INDEX: 24.5 KG/M2 | OXYGEN SATURATION: 100 %

## 2019-09-27 DIAGNOSIS — G54.0 THORACIC OUTLET SYNDROME: ICD-10-CM

## 2019-09-27 DIAGNOSIS — M62.838 MUSCLE SPASM: ICD-10-CM

## 2019-09-27 DIAGNOSIS — G89.29 CHRONIC RIGHT SHOULDER PAIN: ICD-10-CM

## 2019-09-27 DIAGNOSIS — M25.511 CHRONIC RIGHT SHOULDER PAIN: ICD-10-CM

## 2019-09-27 DIAGNOSIS — G24.3 ISOLATED CERVICAL DYSTONIA: Primary | ICD-10-CM

## 2019-09-27 PROCEDURE — 3008F BODY MASS INDEX DOCD: CPT | Mod: CPTII,S$GLB,, | Performed by: NURSE PRACTITIONER

## 2019-09-27 PROCEDURE — 99999 PR PBB SHADOW E&M-EST. PATIENT-LVL III: ICD-10-PCS | Mod: PBBFAC,,, | Performed by: NURSE PRACTITIONER

## 2019-09-27 PROCEDURE — 97110 THERAPEUTIC EXERCISES: CPT

## 2019-09-27 PROCEDURE — 99213 PR OFFICE/OUTPT VISIT, EST, LEVL III, 20-29 MIN: ICD-10-PCS | Mod: S$GLB,,, | Performed by: NURSE PRACTITIONER

## 2019-09-27 PROCEDURE — 97140 MANUAL THERAPY 1/> REGIONS: CPT

## 2019-09-27 PROCEDURE — 99213 OFFICE O/P EST LOW 20 MIN: CPT | Mod: S$GLB,,, | Performed by: NURSE PRACTITIONER

## 2019-09-27 PROCEDURE — 99999 PR PBB SHADOW E&M-EST. PATIENT-LVL III: CPT | Mod: PBBFAC,,, | Performed by: NURSE PRACTITIONER

## 2019-09-27 PROCEDURE — 3008F PR BODY MASS INDEX (BMI) DOCUMENTED: ICD-10-PCS | Mod: CPTII,S$GLB,, | Performed by: NURSE PRACTITIONER

## 2019-09-27 RX ORDER — TIZANIDINE HYDROCHLORIDE 4 MG/1
CAPSULE, GELATIN COATED ORAL
COMMUNITY
Start: 2019-02-10 | End: 2019-11-19

## 2019-09-27 NOTE — PROGRESS NOTES
"Chronic Pain - Established Visit    Referring Physician: No ref. provider found    Chief Complaint:   Chief Complaint   Patient presents with    Neck Pain     1 month fopllow up after botox        SUBJECTIVE:    Mirian العلي returns to clinic today for follow up. She is s/p Botox 200 units on 8/21/2019. She reports that this has been significantly helpful for the past 3 weeks. She does report increased neck pain this last week. She does report decreased PT over the last week due to vacation. She did return to PT today. She denies any untoward side effects from Botox. She continues participate in physical therapy and a home exercise routine. She denies any other health changes. Her pain today is 0/10.      Physical Therapy/Home Exercise: yes      Pain Disability Index Review:  Last 3 PDI Scores 8/21/2019 8/14/2019 8/8/2019   Pain Disability Index (PDI) 28 41 39       Pain Medications:  Tizanidine     report:  Reviewed and consistent with medication use as prescribed.    Pain Procedures:  3 cervical ESIs with Dr Rikki Reyes  2 ESIs with Napoleon Rey (notes reviewed)     -Paramedian R C7/T1 ILESI 7/24/18-"most relief she has had so far" per Dr Dye notes     -Paramedian R C7/T1 ILESI 11/20/18-patient reports helpful for approximately 3-4 months      Imaging:  MRI Right Shoulder 6/25/2019 in media:   Mild supra and infraspinatus tendinitis without evidence of rotator cuff tear.   Mechanical impingement from hypertrophic AC joint changes and down sloping acromion with mild subacromial and subdeltoid bursitis.   Mild fraying to the labrum which appears mildly insufficiency posteriorly due to small labrum.   Thickening of the IGL with small axillary pouch. Correlate for adhesive capsulitis.     5/19/18 MRI C-spine:   -C2-3: prominent right disc ostephyte complex in the right paracentral and right foraminal region along the uncovertebral joint indenting the ventral thecal sace and contributing to " mild right neural foraminal stenosis with contact of the right exiting nerve root  -C3-4: moderate NF stenosis from mild hypertrophy of uncovertebral and facet joints  -C4-5: broad based disc bulge without NF or central stenosis  -C5-6:disc osteophyte complex causing mild central canal narrowing, no neuroforaminal stenosis  -C6-C7-T1: normal    8/16/16 MRI R shoulder: mild changes of tendinosis involving supraspinatus, type II acromion with evidence of impingement, Mild osteoarthritis right glenohumeral joint    EMG 6/29/16: NCS with mild right carpal tunnel syndrome with normal EMG examination    Past Medical History:   Diagnosis Date    AR (allergic rhinitis)     Asthma     Eczema     Endometriosis, mild 2002    Factor V Leiden mutation     Ovarian cyst, bilateral      Past Surgical History:   Procedure Laterality Date    BUNIONECTOMY Left 02/06/2018    HYSTERECTOMY      total    LAPAROSCOPY W/ MINI-LAPAROTOMY  2003    OOPHORECTOMY Right 3/2012    OOPHORECTOMY Left 01/2015    TOTAL VAGINAL HYSTERECTOMY  1/2015    w/Right USO    WISDOM TOOTH EXTRACTION Bilateral      Social History     Socioeconomic History    Marital status:      Spouse name: Not on file    Number of children: 2    Years of education: Not on file    Highest education level: Not on file   Occupational History    Not on file   Social Needs    Financial resource strain: Not on file    Food insecurity:     Worry: Not on file     Inability: Not on file    Transportation needs:     Medical: Not on file     Non-medical: Not on file   Tobacco Use    Smoking status: Never Smoker    Smokeless tobacco: Never Used   Substance and Sexual Activity    Alcohol use: Yes     Alcohol/week: 5.0 - 6.0 standard drinks     Types: 5 - 6 Glasses of wine per week     Frequency: 2-3 times a week     Comment: week    Drug use: No    Sexual activity: Yes     Partners: Male     Birth control/protection: Other-see comments, Post-menopausal    Lifestyle    Physical activity:     Days per week: Not on file     Minutes per session: Not on file    Stress: Not on file   Relationships    Social connections:     Talks on phone: Not on file     Gets together: Not on file     Attends Islam service: Not on file     Active member of club or organization: Not on file     Attends meetings of clubs or organizations: Not on file     Relationship status: Not on file   Other Topics Concern    Not on file   Social History Narrative    Not on file     Family History   Problem Relation Age of Onset    Cancer Mother     Thyroid disease Mother     Breast cancer Mother         late 60's    Cancer Father     Diabetes Brother     Factor V Leiden deficiency Brother     Allergies Brother     Asthma Brother     Breast cancer Sister 46        inflammatory    Diabetes Maternal Aunt     Diabetes Paternal Uncle     Cancer Cousin     Breast cancer Paternal Aunt        Review of patient's allergies indicates:   Allergen Reactions    Niacin preparations      Patient states she has flushing    Sulfa (sulfonamide antibiotics)      Tongue swelling    Iodine and iodide containing products Rash       Current Outpatient Medications   Medication Sig    ALPRAZolam (XANAX) 1 MG tablet Take 1 tablet (1 mg total) by mouth 3 (three) times daily as needed.    aspirin (ECOTRIN) 81 MG EC tablet Take 81 mg by mouth once daily.    buPROPion (WELLBUTRIN XL) 150 MG TB24 tablet Take 1 tablet (150 mg total) by mouth once daily.    cetirizine (ZYRTEC) 10 MG tablet Take 1 tablet (10 mg total) by mouth once daily. (Patient taking differently: Take 10 mg by mouth once as needed. )    DULoxetine (CYMBALTA) 60 MG capsule TK 1 C PO QD    ergocalciferol (VITAMIN D2) 50,000 unit Cap Take 50,000 Units by mouth every 7 days.    fluticasone (FLONASE) 50 mcg/actuation nasal spray PLACE 2 SPRAYS IN EACH NOSTRIL ONCE DAILY    FLUZONE QUAD 0276-1268, PF, 60 mcg (15 mcg x 4)/0.5 mL Syrg ADM  "0.5ML IM UTD    ibuprofen (ADVIL,MOTRIN) 600 MG tablet Take 1 tablet (600 mg total) by mouth every 6 (six) hours as needed.    PROGESTERONE, MICRONIZED (PROMETRIUM ORAL) Take 300 mg by mouth nightly.    tiZANidine 4 mg Cap     cyanocobalamin, vitamin B-12, 5,000 mcg Subl Place under the tongue once a week.    melatonin 1 mg Tab Take by mouth.     Current Facility-Administered Medications   Medication    estradiol cypionate 5 mg/mL injection 10 mg    estradiol cypionate 5 mg/mL injection 15 mg    estradiol valerate injection 40 mg    testosterone cypionate injection 50 mg       REVIEW OF SYSTEMS:    GENERAL:  No weight loss, malaise or fevers.  HEENT:  +ve for frequent or significant headaches.  NECK:  Negative for lumps, goiter, pain and significant neck swelling.  RESPIRATORY:  Negative for cough, wheezing or shortness of breath.  CARDIOVASCULAR:  Negative for chest pain, leg swelling or palpitations.  GI:  Negative for abdominal discomfort, blood in stools or black stools or change in bowel habits.  MUSCULOSKELETAL:  See HPI.  SKIN:  Negative for lesions, rash, and itching.  PSYCH:  Negative for mood disorder and recent psychosocial stressors. Positive for sleep disturbance.   HEMATOLOGY/LYMPHOLOGY:  Negative for prolonged bleeding, bruising easily or swollen nodes.  NEURO:   No history of syncope, paralysis, seizures or tremors.  All other reviewed and negative other than HPI.    OBJECTIVE:    /81   Pulse 95   Temp 98.1 °F (36.7 °C) (Oral)   Resp 18   Ht 5' 11" (1.803 m)   Wt 79.4 kg (175 lb)   LMP 02/01/2014   SpO2 100%   BMI 24.41 kg/m²     PHYSICAL EXAMINATION:    General appearance: Well appearing, in no acute distress, alert and oriented x3.  Psych:  Mood and affect appropriate.  Skin: Skin color, texture, turgor normal, no rashes or lesions, in both upper and lower body.  Head/face:  Normocephalic, atraumatic. No palpable lymph nodes.  Neck: There is mild pain to palpation over the " cervical paraspinous and trapezius muscles on the right. Spurling Negative. No pain with neck flexion, extension, or lateral flexion.   Cor: RRR  Pulm: CTA  GI:  Soft and non-tender.  Extremities: Peripheral joint ROM is full and pain free without obvious instability or laxity in all four extremities. No deformities, edema, or skin discoloration. Good capillary refill.  Musculoskeletal: Hip, sacroiliac and knee provocative maneuvers are negative. Bilateral upper and lower extremity strength is normal and symmetric.  No atrophy or tone abnormalities are noted. Right shoulder with normal range of motion. No pain with overhead motion. No tenderness about the shoulder. Negative Neer's/Hawkin's. Negative testing of rotator cuff muscles.   Neuro: Bilateral upper and lower extremity coordination and muscle stretch reflexes are physiologic and symmetric.  Plantar response are downgoing. No loss of sensation is noted. Absent Polanco's. Negative Tinel's and Durkan's compression test bilatearl carpal tunnel. + Phalen's for paresthesias right index.   Gait: normal.    ASSESSMENT: 43 y.o. year old female with neck and and R shoulder pain, consistent with     1. Isolated cervical dystonia  Medication Pre-Authorization   2. Muscle spasm  Medication Pre-Authorization   3. Thoracic outlet syndrome  Medication Pre-Authorization       PLAN:     - Previous imaging was reviewed and discussed with the patient today.    - She is s/p Botox 200 units. Consider increasing to 300 units.     - Continue Zanaflex 4 mg BID PRN.     - Continue physical therapy.    - RTC in 2 months for repeat Botox.     The above plan and management options were discussed at length with patient. Patient is in agreement with the above and verbalized understanding.     Lizzie Cordero NP  09/27/2019

## 2019-09-27 NOTE — PROGRESS NOTES
"                            Physical Therapy Daily Treatment Note     Name: Mirian [place]  Clinic Number: [id number]    Therapy Diagnosis:   Encounter Diagnosis   Name Primary?    Chronic right shoulder pain      Physician: Christy Abdi MD    Visit Date: 2019  Physician Orders: PT Eval and Treat  Medical Diagnosis: M25.511 (ICD-10-CM) - Right shoulder pain, unspecified chronicity  Evaluation Date: 2019  Authorization Period Expiration: 2019  Plan of Care Certification Period: 2019  Visit # / Visits authorized:   Date of Surgery: NA  Precautions: Standard    Time In: 700a  Time Out: 800a  Total Billable Time: 50 minutes    Subjective      Pt reports: she was compliant with home exercise program given last session.   Response to previous treatment: Patient states that she has had increased right hand numbness since she did not have PT for over a week    Pain: 3/10  Location: right arms, hands  and shoulder      Objective     Mirian received therapeutic exercises to develop strength, posture and core stabilization for 15 minutes including:  Supine on fr - 4'  Bolster series - 15x  Pec minor stretch - 3 x 20"    Dry needling to patient prone for upper trapezius left and levator scapula, timed in and out, consent obtained, no adverse effects, performed by Pietro Anderson PT, DPT  Mirian received the following manual therapy techniques for 30 minutes, includinst rib depression mobilization GR IV  Pec minor release right  STM right scalenes and suboccipitals    Home Exercises Provided and Patient Education Provided     Education provided:   Continue current HEP    Written Home Exercises Provided: Continue with current HEP  Exercises were reviewed and Mirian was able to demonstrate them prior to the end of the session.      Pt received a written copy of exercises to perform at home.   See EMR under patient instructions for exercises given.     Mirian demonstrated good  " understanding of the education provided.     Assessment     The patient demonstrates increased muscle stiffness of anterior scalenes and pec major on right side    Mirain is progressing well towards her goals.   Pt prognosis is Excellent.     Pt will continue to benefit from skilled outpatient physical therapy to address the deficits listed in the problem list box on initial evaluation, provide pt/family education and to maximize pt's level of independence in the home and community environment.     Pt's spiritual, cultural and educational needs considered and pt agreeable to plan of care and goals.    Anticipated barriers to physical therapy: None    Goals:   Short Term Goals:  4 weeks  1.Patient will demonstrate ability to sleep without waking.   2.Patient will demonstrate 4+/5 strength of affected shoulder flexion and abduction without pain to increase ease with lifting/carrying.     Long Term Goals: 16 weeks  1.Increase strength to >/= 4+/5 in all planes of cervical and shoulder ROM to increase tolerance for ADL and work activities.  3. Pt goal: To resume playing tennis    Plan     Progress education and postural modification for decreased symptoms    Dario Jain, PT

## 2019-10-01 ENCOUNTER — CLINICAL SUPPORT (OUTPATIENT)
Dept: REHABILITATION | Facility: HOSPITAL | Age: 44
End: 2019-10-01
Attending: ORTHOPAEDIC SURGERY
Payer: COMMERCIAL

## 2019-10-01 DIAGNOSIS — M25.511 CHRONIC RIGHT SHOULDER PAIN: ICD-10-CM

## 2019-10-01 DIAGNOSIS — G89.29 CHRONIC RIGHT SHOULDER PAIN: ICD-10-CM

## 2019-10-01 PROCEDURE — 97140 MANUAL THERAPY 1/> REGIONS: CPT

## 2019-10-01 PROCEDURE — 97110 THERAPEUTIC EXERCISES: CPT

## 2019-10-02 NOTE — PROGRESS NOTES
"                            Physical Therapy Daily Treatment Note     Name: Mirian Gill [place]  Clinic Number: [id number]    Therapy Diagnosis:   Encounter Diagnosis   Name Primary?    Chronic right shoulder pain      Physician: Christy Abdi MD    Visit Date: 10/1/2019  Physician Orders: PT Eval and Treat  Medical Diagnosis: M25.511 (ICD-10-CM) - Right shoulder pain, unspecified chronicity  Evaluation Date: 2019  Authorization Period Expiration: 2019  Plan of Care Certification Period: 2019  Visit # / Visits authorized:   Date of Surgery: NA  Precautions: Standard    Time In: 800a  Time Out: 900a  Total Billable Time: 50 minutes    Subjective      Pt reports: she was compliant with home exercise program given last session.   Response to previous treatment: Patient states that she has been feeling mostly better for the last 2 weeks    Pain: 3/10  Location: right arms, hands  and shoulder      Objective     Mirian received therapeutic exercises to develop strength, posture and core stabilization for 15 minutes including:  Supine on fr - 4'  Bolster series - 15x  Pec minor stretch - 3 x 20"    Dry needling to patient prone for upper trapezius left and levator scapula, timed in and out, consent obtained, no adverse effects, performed by Milton Carter PT, DPT  Mirian received the following manual therapy techniques for 30 minutes, includinst rib depression mobilization GR IV  Pec minor release right  STM right scalenes and suboccipitals    Home Exercises Provided and Patient Education Provided     Education provided:   Continue current HEP    Written Home Exercises Provided: Continue with current HEP  Exercises were reviewed and Mirian was able to demonstrate them prior to the end of the session.      Pt received a written copy of exercises to perform at home.   See EMR under patient instructions for exercises given.     Mirian demonstrated good  understanding of the education provided. "     Assessment     The patient demonstrates decreased resistance to 1st rib mobilization and decreased pain    Mirian is progressing well towards her goals.   Pt prognosis is Excellent.     Pt will continue to benefit from skilled outpatient physical therapy to address the deficits listed in the problem list box on initial evaluation, provide pt/family education and to maximize pt's level of independence in the home and community environment.     Pt's spiritual, cultural and educational needs considered and pt agreeable to plan of care and goals.    Anticipated barriers to physical therapy: None    Goals:   Short Term Goals:  4 weeks  1.Patient will demonstrate ability to sleep without waking.   2.Patient will demonstrate 4+/5 strength of affected shoulder flexion and abduction without pain to increase ease with lifting/carrying.     Long Term Goals: 16 weeks  1.Increase strength to >/= 4+/5 in all planes of cervical and shoulder ROM to increase tolerance for ADL and work activities.  3. Pt goal: To resume playing tennis    Plan     Progress education and postural modification for decreased symptoms    Dario Jain, PT

## 2019-10-04 ENCOUNTER — CLINICAL SUPPORT (OUTPATIENT)
Dept: REHABILITATION | Facility: HOSPITAL | Age: 44
End: 2019-10-04
Attending: ORTHOPAEDIC SURGERY
Payer: COMMERCIAL

## 2019-10-04 DIAGNOSIS — G89.29 CHRONIC RIGHT SHOULDER PAIN: ICD-10-CM

## 2019-10-04 DIAGNOSIS — M25.511 CHRONIC RIGHT SHOULDER PAIN: ICD-10-CM

## 2019-10-04 PROCEDURE — 97110 THERAPEUTIC EXERCISES: CPT

## 2019-10-04 PROCEDURE — 97140 MANUAL THERAPY 1/> REGIONS: CPT

## 2019-10-04 NOTE — PROGRESS NOTES
"                            Physical Therapy Daily Treatment Note     Name: Mirian Perezsey  Clinic Number: [id number]    Therapy Diagnosis:   Encounter Diagnosis   Name Primary?    Chronic right shoulder pain      Physician: Christy Abdi MD    Visit Date: 10/4/2019  Physician Orders: PT Eval and Treat  Medical Diagnosis: M25.511 (ICD-10-CM) - Right shoulder pain, unspecified chronicity  Evaluation Date: 2019  Authorization Period Expiration: 2019  Plan of Care Certification Period: 2019  Visit # / Visits authorized:   Date of Surgery: NA  Precautions: Standard    Time In: 700a  Time Out: 810a  Total Billable Time: 50 minutes    Subjective      Pt reports: she was compliant with home exercise program given last session.   Response to previous treatment: Patient states that she feels like her pain is mostly just shoulder now and in general not having much problem with sensation change in right hand    Pain: 3/10  Location: right arms, hands  and shoulder      Objective     Mirian received therapeutic exercises to develop strength, posture and core stabilization for 15 minutes including:  Supine on fr - 4'  Bolster series - 15x  Pec minor stretch - 3 x 20"  Scalene stretch - 10x  Seated right nerve glide brachial plexus - 10x  Scap retract with chin tuck - 4 x 20"    Dry needling to patient prone for upper trapezius left and levator scapula, timed in and out, consent obtained, no adverse effects, performed by Pietro Anderson PT, DPT  Mirian received the following manual therapy techniques for 30 minutes, includinst rib depression mobilization GR IV  Pec minor release right  STM right scalenes and suboccipitals    Home Exercises Provided and Patient Education Provided     Education provided:   Continue current HEP    Written Home Exercises Provided: Continue with current HEP  Exercises were reviewed and Mirian was able to demonstrate them prior to the end of the session.      Pt " received a written copy of exercises to perform at home.   See EMR under patient instructions for exercises given.     Mirian demonstrated good  understanding of the education provided.     Assessment     The patient demonstrates improvement in cervical sideglide right and cervical extension    Mirian is progressing well towards her goals.   Pt prognosis is Excellent.     Pt will continue to benefit from skilled outpatient physical therapy to address the deficits listed in the problem list box on initial evaluation, provide pt/family education and to maximize pt's level of independence in the home and community environment.     Pt's spiritual, cultural and educational needs considered and pt agreeable to plan of care and goals.    Anticipated barriers to physical therapy: None    Goals:   Short Term Goals:  4 weeks  1.Patient will demonstrate ability to sleep without waking.   2.Patient will demonstrate 4+/5 strength of affected shoulder flexion and abduction without pain to increase ease with lifting/carrying.     Long Term Goals: 16 weeks  1.Increase strength to >/= 4+/5 in all planes of cervical and shoulder ROM to increase tolerance for ADL and work activities.  3. Pt goal: To resume playing tennis    Plan     Progress education and postural modification for decreased symptoms    Dario Jain, PT

## 2019-10-07 ENCOUNTER — OFFICE VISIT (OUTPATIENT)
Dept: PRIMARY CARE CLINIC | Facility: CLINIC | Age: 44
End: 2019-10-07
Attending: FAMILY MEDICINE
Payer: COMMERCIAL

## 2019-10-07 VITALS
BODY MASS INDEX: 23.52 KG/M2 | HEART RATE: 92 BPM | HEIGHT: 71 IN | WEIGHT: 168 LBS | OXYGEN SATURATION: 99 % | DIASTOLIC BLOOD PRESSURE: 68 MMHG | SYSTOLIC BLOOD PRESSURE: 108 MMHG

## 2019-10-07 DIAGNOSIS — M79.89 LEFT ARM SWELLING: ICD-10-CM

## 2019-10-07 DIAGNOSIS — D68.51 FACTOR V LEIDEN CARRIER: ICD-10-CM

## 2019-10-07 DIAGNOSIS — M79.89 SWELLING OF ARM: ICD-10-CM

## 2019-10-07 DIAGNOSIS — Z00.00 ANNUAL PHYSICAL EXAM: Primary | ICD-10-CM

## 2019-10-07 PROCEDURE — 99396 PR PREVENTIVE VISIT,EST,40-64: ICD-10-PCS | Mod: 25,S$GLB,, | Performed by: FAMILY MEDICINE

## 2019-10-07 PROCEDURE — 99999 PR PBB SHADOW E&M-EST. PATIENT-LVL III: ICD-10-PCS | Mod: PBBFAC,,, | Performed by: FAMILY MEDICINE

## 2019-10-07 PROCEDURE — 99999 PR PBB SHADOW E&M-EST. PATIENT-LVL III: CPT | Mod: PBBFAC,,, | Performed by: FAMILY MEDICINE

## 2019-10-07 PROCEDURE — 90715 TDAP VACCINE 7 YRS/> IM: CPT | Mod: S$GLB,,, | Performed by: FAMILY MEDICINE

## 2019-10-07 PROCEDURE — 99396 PREV VISIT EST AGE 40-64: CPT | Mod: 25,S$GLB,, | Performed by: FAMILY MEDICINE

## 2019-10-07 PROCEDURE — 90471 TDAP VACCINE GREATER THAN OR EQUAL TO 7YO IM: ICD-10-PCS | Mod: S$GLB,,, | Performed by: FAMILY MEDICINE

## 2019-10-07 PROCEDURE — 90715 TDAP VACCINE GREATER THAN OR EQUAL TO 7YO IM: ICD-10-PCS | Mod: S$GLB,,, | Performed by: FAMILY MEDICINE

## 2019-10-07 PROCEDURE — 90471 IMMUNIZATION ADMIN: CPT | Mod: S$GLB,,, | Performed by: FAMILY MEDICINE

## 2019-10-07 NOTE — PROGRESS NOTES
"Patient was given vaccine information sheet for the Tdap immunization. The area of injection was palpated using the acromion process as a landmark. This area was cleaned with alcohol. Using a 25g 1" safety needle, 0.5mL of the vaccine was placed into the left muscle. The injection site was dressed with a bandage. Patient experienced no complications and was discharged in stable condition. Tdap Lot: [id number] Exp: 05/15/2021.  Edwin Dixon LPN      "

## 2019-10-07 NOTE — PROGRESS NOTES
Subjective:       Patient ID: Mirian العلي is a 43 y.o. female.    Chief Complaint: Annual Exam and Immunizations (T-dap)    Pt presents today to [facility] care. Pt with recent loss of sister from DCIS breast [place] age 49. Pt states that she left behind a 10 yo son. Pt with tears as she discusses her loss of sister.     Pt happy with recent botox injections for neck pain  Pt also notes that she when she had her BP taken last week at endo, her left hand pufed up and turned red. Pt is very worried about her intermittent symptoms that appear to be related to vascular issuess (was told that she might have thoracic outlet syndrome all work up neg with neuro and ortho per pt but has never seen vasc)    Is factor 5 leiden positive. Has seen heme. Rheum for pos SHRAVAN. Is being followed. Nothing conclusive has been found    Pt with toes and hands that are purple at tips, cold. Noted while in office today      Review of Systems   Constitutional: Negative for activity change and unexpected weight change.   HENT: Negative for hearing loss, rhinorrhea and trouble swallowing.    Eyes: Negative for discharge and visual disturbance.   Respiratory: Negative for chest tightness and wheezing.    Cardiovascular: Positive for palpitations. Negative for chest pain.   Gastrointestinal: Positive for constipation. Negative for blood in stool, diarrhea and vomiting.   Endocrine: Negative for polydipsia and polyuria.   Genitourinary: Negative for difficulty urinating, dysuria, hematuria and menstrual problem.   Musculoskeletal: Positive for arthralgias and neck pain. Negative for back pain, gait problem, joint swelling and myalgias.   Neurological: Positive for numbness and headaches. Negative for tremors, seizures, syncope, speech difficulty and weakness.   Psychiatric/Behavioral: Negative for confusion and dysphoric mood.   All other systems reviewed and are negative.      Objective:      Physical Exam   Constitutional: She is oriented to  person, place, and time. She appears well-developed and well-nourished.   HENT:   Head: Normocephalic and atraumatic.   Right Ear: External ear normal.   Left Ear: External ear normal.   Nose: Nose normal.   Mouth/Throat: Oropharynx is clear and moist. No oropharyngeal exudate.   Eyes: Pupils are equal, round, and reactive to light. Conjunctivae and EOM are normal. Right eye exhibits no discharge. Left eye exhibits no discharge.   Neck: Normal range of motion. Neck supple. No thyromegaly present.   Cardiovascular: Normal rate, regular rhythm, normal heart sounds and intact distal pulses. Exam reveals no gallop and no friction rub.   No murmur heard.  Pulmonary/Chest: Effort normal and breath sounds normal. No stridor. No respiratory distress. She has no wheezes. She has no rales. She exhibits no tenderness.   Abdominal: Soft. Bowel sounds are normal. She exhibits no distension and no mass. There is no tenderness. There is no rebound and no guarding.   Musculoskeletal: Normal range of motion. She exhibits no edema or tenderness.   Lymphadenopathy:     She has no cervical adenopathy.   Neurological: She is alert and oriented to person, place, and time. She has normal reflexes.   Skin: Skin is warm and dry. Capillary refill takes 2 to 3 seconds. No rash noted. There is erythema. There is pallor.   Pos fingertips and toes are bluish tinged and cold to touch  Pos DP pulses b/l feet and pos radial pulses as well b/l hands   Psychiatric: She has a normal mood and affect. Her behavior is normal. Judgment and thought content normal.       Assessment:       1. Annual physical exam    2. Left arm swelling    3. Swelling of arm        Plan:       Unclear of etiology of symptoms but do think pt will benefit to see [facility] for opinion on present symptoms. Pt amenable    Annual PE wnl  Raynauds possibility: pt declines meds and will f/u  With rheum pending symptoms    Grieving: normal after recent loss of her sister    RTC prn or  annually

## 2019-10-08 ENCOUNTER — CLINICAL SUPPORT (OUTPATIENT)
Dept: REHABILITATION | Facility: HOSPITAL | Age: 44
End: 2019-10-08
Attending: ORTHOPAEDIC SURGERY
Payer: COMMERCIAL

## 2019-10-08 ENCOUNTER — TELEPHONE (OUTPATIENT)
Dept: VASCULAR SURGERY | Facility: CLINIC | Age: 44
End: 2019-10-08

## 2019-10-08 ENCOUNTER — PATIENT MESSAGE (OUTPATIENT)
Dept: PRIMARY CARE CLINIC | Facility: CLINIC | Age: 44
End: 2019-10-08

## 2019-10-08 ENCOUNTER — HOSPITAL ENCOUNTER (OUTPATIENT)
Dept: RADIOLOGY | Facility: OTHER | Age: 44
Discharge: HOME | End: 2019-10-08
Attending: OBSTETRICS & GYNECOLOGY
Payer: COMMERCIAL

## 2019-10-08 DIAGNOSIS — G89.29 CHRONIC RIGHT SHOULDER PAIN: ICD-10-CM

## 2019-10-08 DIAGNOSIS — Z91.89 AT HIGH RISK FOR BREAST CANCER: ICD-10-CM

## 2019-10-08 DIAGNOSIS — M25.511 CHRONIC RIGHT SHOULDER PAIN: ICD-10-CM

## 2019-10-08 PROCEDURE — 77049 MRI BREAST W/WO CONTRAST, W/CAD, BILATERAL: ICD-10-PCS | Mod: 26,,, | Performed by: RADIOLOGY

## 2019-10-08 PROCEDURE — 77049 MRI BREAST C-+ W/CAD BI: CPT | Mod: 26,,, | Performed by: RADIOLOGY

## 2019-10-08 PROCEDURE — 77049 MRI BREAST C-+ W/CAD BI: CPT | Mod: TC

## 2019-10-08 PROCEDURE — 97110 THERAPEUTIC EXERCISES: CPT

## 2019-10-08 PROCEDURE — A9577 INJ MULTIHANCE: HCPCS | Performed by: OBSTETRICS & GYNECOLOGY

## 2019-10-08 PROCEDURE — 97140 MANUAL THERAPY 1/> REGIONS: CPT

## 2019-10-08 PROCEDURE — 25500020 PHARM REV CODE 255: Performed by: OBSTETRICS & GYNECOLOGY

## 2019-10-08 RX ADMIN — GADOBENATE DIMEGLUMINE 14 ML: 529 INJECTION, SOLUTION INTRAVENOUS at 03:10

## 2019-10-08 NOTE — TELEPHONE ENCOUNTER
----- Message from aSrah Sellers sent at 10/8/2019 10:06 AM CDT -----  Contact: PT  PT called to schedule her referral  Reason: M79.89 (ICD-10-CM) - Swelling of arm    Callback: [phone]

## 2019-10-08 NOTE — PROGRESS NOTES
"                            Physical Therapy Daily Treatment Note     Name: Mirian العلي  Clinic Number: [id number]    Therapy Diagnosis:   Encounter Diagnosis   Name Primary?    Chronic right shoulder pain      Physician: Christy Abdi MD    Visit Date: 10/8/2019  Physician Orders: PT Eval and Treat  Medical Diagnosis: M25.511 (ICD-10-CM) - Right shoulder pain, unspecified chronicity  Evaluation Date: 2019  Authorization Period Expiration: 2019  Plan of Care Certification Period: 2019  Visit # / Visits authorized:   Date of Surgery: NA  Precautions: Standard    Time In: 700a  Time Out: 810a  Total Billable Time: 50 minutes    Subjective      Pt reports: she was compliant with home exercise program given last session.   Response to previous treatment: Patient states that she is feeling a lot better in right hand tingling frequency    Pain: 310  Location: right arms, hands  and shoulder      Objective     Mirian received therapeutic exercises to develop strength, posture and core stabilization for 15 minutes including:  Supine on fr - 4'  Bolster series - 15x  Pec minor stretch - 3 x 20"  Scalene stretch - 10x  Seated right nerve glide brachial plexus - 10x  Scap retract with chin tuck - 4 x 20"    Dry needling to patient prone for upper trapezius left and levator scapula, timed in and out, consent obtained, no adverse effects, performed by Marcelo Castillo PT, DPT  Mirian received the following manual therapy techniques for 30 minutes, includinst rib depression mobilization GR IV  Pec minor release right  STM right scalenes and suboccipitals    Home Exercises Provided and Patient Education Provided     Education provided:   Continue current HEP    Written Home Exercises Provided: Continue with current HEP  Exercises were reviewed and Mirian was able to demonstrate them prior to the end of the session.      Pt received a written copy of exercises to perform at home.   See EMR " under patient instructions for exercises given.     Mirian demonstrated good  understanding of the education provided.     Assessment     The patient demonstrates improvement in performance of cervical nerve glides and elbow extension    Mirian is progressing well towards her goals.   Pt prognosis is Excellent.     Pt will continue to benefit from skilled outpatient physical therapy to address the deficits listed in the problem list box on initial evaluation, provide pt/family education and to maximize pt's level of independence in the home and community environment.     Pt's spiritual, cultural and educational needs considered and pt agreeable to plan of care and goals.    Anticipated barriers to physical therapy: None    Goals:   Short Term Goals:  4 weeks  1.Patient will demonstrate ability to sleep without waking.   2.Patient will demonstrate 4+/5 strength of affected shoulder flexion and abduction without pain to increase ease with lifting/carrying.     Long Term Goals: 16 weeks  1.Increase strength to >/= 4+/5 in all planes of cervical and shoulder ROM to increase tolerance for ADL and work activities.  3. Pt goal: To resume playing tennis    Plan     Progress education and postural modification for decreased symptoms    Dario Jain, PT

## 2019-10-09 ENCOUNTER — LAB VISIT (OUTPATIENT)
Dept: LAB | Facility: HOSPITAL | Age: 44
End: 2019-10-09
Payer: COMMERCIAL

## 2019-10-09 ENCOUNTER — CLINICAL SUPPORT (OUTPATIENT)
Dept: OBSTETRICS AND GYNECOLOGY | Facility: CLINIC | Age: 44
End: 2019-10-09
Payer: COMMERCIAL

## 2019-10-09 DIAGNOSIS — Z00.00 ANNUAL PHYSICAL EXAM: ICD-10-CM

## 2019-10-09 LAB
ALBUMIN SERPL BCP-MCNC: 4.5 G/DL (ref 3.5–5.2)
ALP SERPL-CCNC: 48 U/L (ref 55–135)
ALT SERPL W/O P-5'-P-CCNC: 11 U/L (ref 10–44)
ANION GAP SERPL CALC-SCNC: 9 MMOL/L (ref 8–16)
AST SERPL-CCNC: 13 U/L (ref 10–40)
BASOPHILS # BLD AUTO: 0.04 K/UL (ref 0–0.2)
BASOPHILS NFR BLD: 0.8 % (ref 0–1.9)
BILIRUB SERPL-MCNC: 0.5 MG/DL (ref 0.1–1)
BUN SERPL-MCNC: 13 MG/DL (ref 6–20)
CALCIUM SERPL-MCNC: 9.4 MG/DL (ref 8.7–10.5)
CHLORIDE SERPL-SCNC: 103 MMOL/L (ref 95–110)
CHOLEST SERPL-MCNC: 186 MG/DL (ref 120–199)
CHOLEST/HDLC SERPL: 2.9 {RATIO} (ref 2–5)
CO2 SERPL-SCNC: 27 MMOL/L (ref 23–29)
CREAT SERPL-MCNC: 0.8 MG/DL (ref 0.5–1.4)
DIFFERENTIAL METHOD: ABNORMAL
EOSINOPHIL # BLD AUTO: 0 K/UL (ref 0–0.5)
EOSINOPHIL NFR BLD: 0 % (ref 0–8)
ERYTHROCYTE [DISTWIDTH] IN BLOOD BY AUTOMATED COUNT: 12.1 % (ref 11.5–14.5)
EST. GFR  (AFRICAN AMERICAN): >60 ML/MIN/1.73 M^2
EST. GFR  (NON AFRICAN AMERICAN): >60 ML/MIN/1.73 M^2
GLUCOSE SERPL-MCNC: 83 MG/DL (ref 70–110)
HCT VFR BLD AUTO: 41.8 % (ref 37–48.5)
HDLC SERPL-MCNC: 64 MG/DL (ref 40–75)
HDLC SERPL: 34.4 % (ref 20–50)
HGB BLD-MCNC: 13.2 G/DL (ref 12–16)
IMM GRANULOCYTES # BLD AUTO: 0.01 K/UL (ref 0–0.04)
IMM GRANULOCYTES NFR BLD AUTO: 0.2 % (ref 0–0.5)
LDLC SERPL CALC-MCNC: 99.2 MG/DL (ref 63–159)
LYMPHOCYTES # BLD AUTO: 1.3 K/UL (ref 1–4.8)
LYMPHOCYTES NFR BLD: 26.2 % (ref 18–48)
MCH RBC QN AUTO: 29.9 PG (ref 27–31)
MCHC RBC AUTO-ENTMCNC: 31.6 G/DL (ref 32–36)
MCV RBC AUTO: 95 FL (ref 82–98)
MONOCYTES # BLD AUTO: 0.4 K/UL (ref 0.3–1)
MONOCYTES NFR BLD: 7.9 % (ref 4–15)
NEUTROPHILS # BLD AUTO: 3.2 K/UL (ref 1.8–7.7)
NEUTROPHILS NFR BLD: 64.9 % (ref 38–73)
NONHDLC SERPL-MCNC: 122 MG/DL
NRBC BLD-RTO: 0 /100 WBC
PLATELET # BLD AUTO: 219 K/UL (ref 150–350)
PMV BLD AUTO: 10.6 FL (ref 9.2–12.9)
POTASSIUM SERPL-SCNC: 4.3 MMOL/L (ref 3.5–5.1)
PROT SERPL-MCNC: 7.4 G/DL (ref 6–8.4)
RBC # BLD AUTO: 4.41 M/UL (ref 4–5.4)
SODIUM SERPL-SCNC: 139 MMOL/L (ref 136–145)
TRIGL SERPL-MCNC: 114 MG/DL (ref 30–150)
TSH SERPL DL<=0.005 MIU/L-ACNC: 2.3 UIU/ML (ref 0.4–4)
WBC # BLD AUTO: 4.96 K/UL (ref 3.9–12.7)

## 2019-10-09 PROCEDURE — 84443 ASSAY THYROID STIM HORMONE: CPT

## 2019-10-09 PROCEDURE — 80053 COMPREHEN METABOLIC PANEL: CPT

## 2019-10-09 PROCEDURE — 85025 COMPLETE CBC W/AUTO DIFF WBC: CPT

## 2019-10-09 PROCEDURE — 96372 THER/PROPH/DIAG INJ SC/IM: CPT | Mod: S$GLB,,, | Performed by: OBSTETRICS & GYNECOLOGY

## 2019-10-09 PROCEDURE — 36415 COLL VENOUS BLD VENIPUNCTURE: CPT | Mod: PN

## 2019-10-09 PROCEDURE — 80061 LIPID PANEL: CPT

## 2019-10-09 PROCEDURE — 96372 PR INJECTION,THERAP/PROPH/DIAG2ST, IM OR SUBCUT: ICD-10-PCS | Mod: S$GLB,,, | Performed by: OBSTETRICS & GYNECOLOGY

## 2019-10-09 PROCEDURE — 99999 PR PBB SHADOW E&M-EST. PATIENT-LVL I: ICD-10-PCS | Mod: PBBFAC,,,

## 2019-10-09 PROCEDURE — 99999 PR PBB SHADOW E&M-EST. PATIENT-LVL I: CPT | Mod: PBBFAC,,,

## 2019-10-09 RX ADMIN — TESTOSTERONE CYPIONATE 50 MG: 200 INJECTION, SOLUTION INTRAMUSCULAR at 01:10

## 2019-10-15 ENCOUNTER — CLINICAL SUPPORT (OUTPATIENT)
Dept: REHABILITATION | Facility: HOSPITAL | Age: 44
End: 2019-10-15
Attending: FAMILY MEDICINE
Payer: COMMERCIAL

## 2019-10-15 DIAGNOSIS — G89.29 CHRONIC RIGHT SHOULDER PAIN: ICD-10-CM

## 2019-10-15 DIAGNOSIS — M25.511 CHRONIC RIGHT SHOULDER PAIN: ICD-10-CM

## 2019-10-15 PROCEDURE — 97110 THERAPEUTIC EXERCISES: CPT

## 2019-10-15 PROCEDURE — 97140 MANUAL THERAPY 1/> REGIONS: CPT

## 2019-10-16 NOTE — PROGRESS NOTES
"                            Physical Therapy Daily Treatment Note     Name: Mirian العلي  Clinic Number: [id number]    Therapy Diagnosis:   Encounter Diagnosis   Name Primary?    Chronic right shoulder pain      Physician: Christy Abdi MD    Visit Date: 10/15/2019  Physician Orders: PT Eval and Treat  Medical Diagnosis: M25.511 (ICD-10-CM) - Right shoulder pain, unspecified chronicity  Evaluation Date: 2019  Authorization Period Expiration: 2019  Plan of Care Certification Period: 2019  Visit # / Visits authorized:   Date of Surgery: NA  Precautions: Standard    Time In: 700a  Time Out: 810a  Total Billable Time: 50 minutes    Subjective      Pt reports: she was compliant with home exercise program given last session.   Response to previous treatment: Patient states that she is feeling better, but had a lot of pain with turning her head to the right and her neck locked up.  She had to go to the chiropractor.    Pain: 3/10  Location: right arms, hands  and shoulder      Objective     Mirian received therapeutic exercises to develop strength, posture and core stabilization for 15 minutes including:  Supine on fr - 4'  Bolster series - 15x  Pec minor stretch - 3 x 20"  Scalene stretch - 10x  Seated right nerve glide brachial plexus - 10x  Scap retract with chin tuck - 4 x 20"    Dry needling to patient prone for upper trapezius left and levator scapula, timed in and out, consent obtained, no adverse effects, performed by Marcelo Castillo PT, DPT  Mirian received the following manual therapy techniques for 30 minutes, includinst rib depression mobilization GR IV  Pec minor release right  STM right scalenes and suboccipitals    Home Exercises Provided and Patient Education Provided     Education provided:   Continue current HEP    Written Home Exercises Provided: Continue with current HEP  Exercises were reviewed and Mirian was able to demonstrate them prior to the end of the session. "      Pt received a written copy of exercises to perform at home.   See EMR under patient instructions for exercises given.     Mirian demonstrated good  understanding of the education provided.     Assessment     The patient demonstrates increased numbness in right hand, but decreased tone in right upper trapezius    Mirian is progressing well towards her goals.   Pt prognosis is Excellent.     Pt will continue to benefit from skilled outpatient physical therapy to address the deficits listed in the problem list box on initial evaluation, provide pt/family education and to maximize pt's level of independence in the home and community environment.     Pt's spiritual, cultural and educational needs considered and pt agreeable to plan of care and goals.    Anticipated barriers to physical therapy: None    Goals:   Short Term Goals:  4 weeks  1.Patient will demonstrate ability to sleep without waking.   2.Patient will demonstrate 4+/5 strength of affected shoulder flexion and abduction without pain to increase ease with lifting/carrying.     Long Term Goals: 16 weeks  1.Increase strength to >/= 4+/5 in all planes of cervical and shoulder ROM to increase tolerance for ADL and work activities.  3. Pt goal: To resume playing tennis    Plan     Progress education and postural modification for decreased symptoms    Dario Jain, PT

## 2019-10-18 ENCOUNTER — CLINICAL SUPPORT (OUTPATIENT)
Dept: REHABILITATION | Facility: HOSPITAL | Age: 44
End: 2019-10-18
Attending: ORTHOPAEDIC SURGERY
Payer: COMMERCIAL

## 2019-10-18 DIAGNOSIS — G89.29 CHRONIC RIGHT SHOULDER PAIN: ICD-10-CM

## 2019-10-18 DIAGNOSIS — M25.511 CHRONIC RIGHT SHOULDER PAIN: ICD-10-CM

## 2019-10-18 PROCEDURE — 97110 THERAPEUTIC EXERCISES: CPT

## 2019-10-18 PROCEDURE — 97140 MANUAL THERAPY 1/> REGIONS: CPT

## 2019-10-18 NOTE — PROGRESS NOTES
"                            Physical Therapy Daily Treatment Note     Name: Mirian Gill Roxton  Clinic Number: [id number]    Therapy Diagnosis:   Encounter Diagnosis   Name Primary?    Chronic right shoulder pain      Physician: Christy Abdi MD    Visit Date: 10/18/2019  Physician Orders: PT Eval and Treat  Medical Diagnosis: M25.511 (ICD-10-CM) - Right shoulder pain, unspecified chronicity  Evaluation Date: 2019  Authorization Period Expiration: 2019  Plan of Care Certification Period: 2019  Visit # / Visits authorized:   Date of Surgery: NA  Precautions: Standard    Time In: 700a  Time Out: 810a  Total Billable Time: 50 minutes    Subjective      Pt reports: she was compliant with home exercise program given last session.   Response to previous treatment: Patient states that she is feeling looser, less pain with muscle relaxers regularly    Pain: 3/10  Location: right arms, hands  and shoulder      Objective     Mirian received therapeutic exercises to develop strength, posture and core stabilization for 15 minutes including:  Supine on fr - 4'  Bolster series - 15x  Pec minor stretch - 3 x 20"  Standing median nerve glide - 15x  Thorp - 15x  Arm resting in abduction - scap elevation and depression - 20x  Scap retract with chin tuck - 4 x 20"    Dry needling to patient prone for upper trapezius left and levator scapula, timed in and out, consent obtained, no adverse effects, performed by Marcelo Castillo PT, DPT  Mirian received the following manual therapy techniques for 30 minutes, includinst rib depression mobilization GR IV  Pec minor release right  STM right scalenes and suboccipitals    Home Exercises Provided and Patient Education Provided     Education provided:   Continue current HEP    Written Home Exercises Provided: Continue with current HEP  Exercises were reviewed and Mirian was able to demonstrate them prior to the end of the session.      Pt received a written copy " of exercises to perform at home.   See EMR under patient instructions for exercises given.     Mirian demonstrated good  understanding of the education provided.     Assessment     The patient demonstrates increasing mobility of left 1st rib and decreased pain with intervention for affected shoulder    Mirian is progressing well towards her goals.   Pt prognosis is Excellent.     Pt will continue to benefit from skilled outpatient physical therapy to address the deficits listed in the problem list box on initial evaluation, provide pt/family education and to maximize pt's level of independence in the home and community environment.     Pt's spiritual, cultural and educational needs considered and pt agreeable to plan of care and goals.    Anticipated barriers to physical therapy: None    Goals:   Short Term Goals:  4 weeks  1.Patient will demonstrate ability to sleep without waking.   2.Patient will demonstrate 4+/5 strength of affected shoulder flexion and abduction without pain to increase ease with lifting/carrying.     Long Term Goals: 16 weeks  1.Increase strength to >/= 4+/5 in all planes of cervical and shoulder ROM to increase tolerance for ADL and work activities.  3. Pt goal: To resume playing tennis    Plan     Progress education and postural modification for decreased symptoms    Dario Jain, PT

## 2019-11-05 DIAGNOSIS — M79.18 MYOFASCIAL PAIN: ICD-10-CM

## 2019-11-05 RX ORDER — TIZANIDINE 4 MG/1
TABLET ORAL
Qty: 60 TABLET | Refills: 1 | Status: SHIPPED | OUTPATIENT
Start: 2019-11-05 | End: 2019-12-30

## 2019-11-06 ENCOUNTER — CLINICAL SUPPORT (OUTPATIENT)
Dept: OBSTETRICS AND GYNECOLOGY | Facility: CLINIC | Age: 44
End: 2019-11-06
Payer: COMMERCIAL

## 2019-11-06 PROCEDURE — 99999 PR PBB SHADOW E&M-EST. PATIENT-LVL I: CPT | Mod: PBBFAC,,,

## 2019-11-06 PROCEDURE — 96372 PR INJECTION,THERAP/PROPH/DIAG2ST, IM OR SUBCUT: ICD-10-PCS | Mod: S$GLB,,, | Performed by: OBSTETRICS & GYNECOLOGY

## 2019-11-06 PROCEDURE — 96372 THER/PROPH/DIAG INJ SC/IM: CPT | Mod: S$GLB,,, | Performed by: OBSTETRICS & GYNECOLOGY

## 2019-11-06 PROCEDURE — 99999 PR PBB SHADOW E&M-EST. PATIENT-LVL I: ICD-10-PCS | Mod: PBBFAC,,,

## 2019-11-06 RX ADMIN — TESTOSTERONE CYPIONATE 50 MG: 200 INJECTION, SOLUTION INTRAMUSCULAR at 01:11

## 2019-11-06 NOTE — PROGRESS NOTES
Here for  hormone therapy injection, no complaints at this time , Injection given as ordered, tolerated well, no report of pain prior to or after injection. Return to clinic as scheduled.     Site - LB    Testosterone 50mg  Depo Estradiol 15 mg    Clinic Supplied Medication

## 2019-11-11 ENCOUNTER — TELEPHONE (OUTPATIENT)
Dept: CARDIOTHORACIC SURGERY | Facility: CLINIC | Age: 44
End: 2019-11-11

## 2019-11-11 NOTE — TELEPHONE ENCOUNTER
Contacted pt because pt cancelled f/u with Dr. Garza. Informed pt that Wed. 11/13 is Dr. Garza last week in clinic and if she wanted to reschedule she would have to do so with Dr. Butt. Pt states she was given recommendations of other providers and she would consult with them, so there is no need to reschedule.

## 2019-11-13 ENCOUNTER — TELEPHONE (OUTPATIENT)
Dept: PAIN MEDICINE | Facility: CLINIC | Age: 44
End: 2019-11-13

## 2019-11-19 ENCOUNTER — OFFICE VISIT (OUTPATIENT)
Dept: PAIN MEDICINE | Facility: CLINIC | Age: 44
End: 2019-11-19
Attending: ANESTHESIOLOGY
Payer: COMMERCIAL

## 2019-11-19 VITALS
WEIGHT: 178 LBS | HEART RATE: 112 BPM | TEMPERATURE: 99 F | SYSTOLIC BLOOD PRESSURE: 112 MMHG | BODY MASS INDEX: 24.92 KG/M2 | RESPIRATION RATE: 18 BRPM | HEIGHT: 71 IN | DIASTOLIC BLOOD PRESSURE: 78 MMHG

## 2019-11-19 DIAGNOSIS — M62.838 MUSCLE SPASM: ICD-10-CM

## 2019-11-19 DIAGNOSIS — G24.3 ISOLATED CERVICAL DYSTONIA: Primary | ICD-10-CM

## 2019-11-19 PROCEDURE — 99999 PR PBB SHADOW E&M-EST. PATIENT-LVL III: ICD-10-PCS | Mod: PBBFAC,,, | Performed by: ANESTHESIOLOGY

## 2019-11-19 PROCEDURE — 95874 GUIDE NERV DESTR NEEDLE EMG: CPT | Mod: S$GLB,,, | Performed by: ANESTHESIOLOGY

## 2019-11-19 PROCEDURE — 99499 UNLISTED E&M SERVICE: CPT | Mod: S$GLB,,, | Performed by: ANESTHESIOLOGY

## 2019-11-19 PROCEDURE — 95874 PR NEEDLE EMG GUIDANCE FOR CHEMODENERVATION: ICD-10-PCS | Mod: S$GLB,,, | Performed by: ANESTHESIOLOGY

## 2019-11-19 PROCEDURE — 99499 NO LOS: ICD-10-PCS | Mod: S$GLB,,, | Performed by: ANESTHESIOLOGY

## 2019-11-19 PROCEDURE — 64616 CHEMODENERV MUSC NECK DYSTON: CPT | Mod: RT,S$GLB,, | Performed by: ANESTHESIOLOGY

## 2019-11-19 PROCEDURE — 64616 PR CHEMODENERVATION NECK MUSCLES EXC LARNYNX, UNI: ICD-10-PCS | Mod: RT,S$GLB,, | Performed by: ANESTHESIOLOGY

## 2019-11-19 PROCEDURE — 99999 PR PBB SHADOW E&M-EST. PATIENT-LVL III: CPT | Mod: PBBFAC,,, | Performed by: ANESTHESIOLOGY

## 2019-11-19 NOTE — PROGRESS NOTES
Chronic Pain - Established Visit    Referring Physician: No ref. provider found    Chief Complaint:   No chief complaint on file.       SUBJECTIVE:    HPI:     Mirian العلي returns to clinic today for follow up. She is s/p Botox 200 units on 8/21/2019 and has been taking Zanaflex 4 mg BID PRN (and only takes at night) and continuing with physical therapy for cervical dystonia and muscle spasms. Patient has also had a number of cervical ESIs over the years with the most recent occurring 7/24/18 with Dr. Rey. She is here today for scheduled repeat botox injections. She has been doing HEP at this point for her neck pain and believes PT has run its course. Main sources of the pain are the R upper trap with radiation into her R jaw and upper R arm. She does report some swelling, redness and heat in hands. She does report a history of rheumatologic conditions in the family and is followed by a rheumatologist; she also plans to f/u with a vascular physician. She reports Raynaud's.     Regarding the botox, patient reports that she did feel a couple of days of generalized malaise, myalgias. She was able to go to work. After this brief period she received excellent relief. She says it is the most successful treatment she has had to this point. She no longer has to wear her mouth guard at night until a few weeks ago (around 3 months of relief total). She does continue to take Zanaflex at night and is reporting improved sleep.      Physical Therapy/Home Exercise: yes  (HEP)    Pain Disability Index Review:  Last 3 PDI Scores 11/19/2019 8/21/2019 8/14/2019   Pain Disability Index (PDI) 40 28 41       Pain Medications:  Tizanidine     report:  Reviewed and consistent with medication use as prescribed.    Pain/interventional Procedures:  9/29: 200 unit botox injections for cervical dystonia/muscle spasms: Excellent relief for 3 months  3 cervical ESIs with Dr Rikki Reyes  2 ESIs with Napoleon Rey (notes  "reviewed)     -Paramedian R C7/T1 ILESI 7/24/18-"most relief she has had so far" per Dr Dye notes     -Paramedian R C7/T1 ILESI 11/20/18-patient reports helpful for approximately 3-4 months      Imaging:  MRI Right Shoulder 6/25/2019 in media:   Mild supra and infraspinatus tendinitis without evidence of rotator cuff tear.   Mechanical impingement from hypertrophic AC joint changes and down sloping acromion with mild subacromial and subdeltoid bursitis.   Mild fraying to the labrum which appears mildly insufficiency posteriorly due to small labrum.   Thickening of the IGL with small axillary pouch. Correlate for adhesive capsulitis.     5/19/18 MRI C-spine:   -C2-3: prominent right disc ostephyte complex in the right paracentral and right foraminal region along the uncovertebral joint indenting the ventral thecal sace and contributing to mild right neural foraminal stenosis with contact of the right exiting nerve root  -C3-4: moderate NF stenosis from mild hypertrophy of uncovertebral and facet joints  -C4-5: broad based disc bulge without NF or central stenosis  -C5-6:disc osteophyte complex causing mild central canal narrowing, no neuroforaminal stenosis  -C6-C7-T1: normal    8/16/16 MRI R shoulder: mild changes of tendinosis involving supraspinatus, type II acromion with evidence of impingement, Mild osteoarthritis right glenohumeral joint    EMG 6/29/16: NCS with mild right carpal tunnel syndrome with normal EMG examination    Past Medical History:   Diagnosis Date    AR (allergic rhinitis)     Asthma     Eczema     Endometriosis, mild 2002    Factor V Leiden mutation     Ovarian cyst, bilateral      Past Surgical History:   Procedure Laterality Date    BUNIONECTOMY Left 02/06/2018    HYSTERECTOMY      total    LAPAROSCOPY W/ MINI-LAPAROTOMY  2003    OOPHORECTOMY Right 3/2012    OOPHORECTOMY Left 01/2015    TOTAL VAGINAL HYSTERECTOMY  1/2015    w/Right USO    WISDOM TOOTH EXTRACTION Bilateral  "     Social History     Socioeconomic History    Marital status:      Spouse name: Not on file    Number of children: 2    Years of education: Not on file    Highest education level: Not on file   Occupational History    Not on file   Social Needs    Financial resource strain: Not on file    Food insecurity:     Worry: Not on file     Inability: Not on file    Transportation needs:     Medical: Not on file     Non-medical: Not on file   Tobacco Use    Smoking status: Never Smoker    Smokeless tobacco: Never Used   Substance and Sexual Activity    Alcohol use: Yes     Alcohol/week: 5.0 - 6.0 standard drinks     Types: 5 - 6 Glasses of wine per week     Frequency: 2-3 times a week     Comment: week    Drug use: No    Sexual activity: Yes     Partners: Male     Birth control/protection: Other-see comments, Post-menopausal   Lifestyle    Physical activity:     Days per week: Not on file     Minutes per session: Not on file    Stress: Not at all   Relationships    Social connections:     Talks on phone: Not on file     Gets together: Not on file     Attends Judaism service: Not on file     Active member of club or organization: Not on file     Attends meetings of clubs or organizations: Not on file     Relationship status: Not on file   Other Topics Concern    Not on file   Social History Narrative    Not on file     Family History   Problem Relation Age of Onset    Cancer Mother     Thyroid disease Mother     Breast cancer Mother         late 60's    Cancer Father     Diabetes Brother     Factor V Leiden deficiency Brother     Allergies Brother     Asthma Brother     Breast cancer Sister 46        inflammatory    Diabetes Maternal Aunt     Diabetes Paternal Uncle     Cancer Cousin     Breast cancer Paternal Aunt        Review of patient's allergies indicates:   Allergen Reactions    Niacin preparations      Patient states she has flushing    Sulfa (sulfonamide antibiotics)       Tongue swelling    Iodine and iodide containing products Rash       Current Outpatient Medications   Medication Sig    ALPRAZolam (XANAX) 1 MG tablet Take 1 tablet (1 mg total) by mouth 3 (three) times daily as needed.    aspirin (ECOTRIN) 81 MG EC tablet Take 81 mg by mouth once daily.    buPROPion (WELLBUTRIN XL) 150 MG TB24 tablet Take 1 tablet (150 mg total) by mouth once daily.    cetirizine (ZYRTEC) 10 MG tablet Take 1 tablet (10 mg total) by mouth once daily. (Patient taking differently: Take 10 mg by mouth once as needed. )    cyanocobalamin, vitamin B-12, 5,000 mcg Subl Place under the tongue once a week.    DULoxetine (CYMBALTA) 60 MG capsule TK 1 C PO QD    fluticasone (FLONASE) 50 mcg/actuation nasal spray PLACE 2 SPRAYS IN EACH NOSTRIL ONCE DAILY    FLUZONE QUAD 9672-8349, PF, 60 mcg (15 mcg x 4)/0.5 mL Syrg ADM 0.5ML IM UTD    ibuprofen (ADVIL,MOTRIN) 600 MG tablet Take 1 tablet (600 mg total) by mouth every 6 (six) hours as needed.    PROGESTERONE, MICRONIZED (PROMETRIUM ORAL) Take 300 mg by mouth nightly.    tiZANidine (ZANAFLEX) 4 MG tablet TAKE 1 TABLET(4 MG) BY MOUTH TWICE DAILY AS NEEDED    melatonin 1 mg Tab Take by mouth.     Current Facility-Administered Medications   Medication    estradiol cypionate 5 mg/mL injection 10 mg    estradiol cypionate 5 mg/mL injection 15 mg    estradiol valerate injection 40 mg    testosterone cypionate injection 50 mg       REVIEW OF SYSTEMS:    GENERAL:  No weight loss, malaise or fevers.  HEENT:  +ve for frequent or significant headaches.  NECK:  Negative for lumps, goiter, pain and significant neck swelling.  RESPIRATORY:  Negative for cough, wheezing or shortness of breath.  CARDIOVASCULAR:  Negative for chest pain, leg swelling or palpitations.  GI:  Negative for abdominal discomfort, blood in stools or black stools or change in bowel habits.  MUSCULOSKELETAL:  See HPI.  SKIN:  Negative for lesions, rash, and itching.  PSYCH:  Negative for  "mood disorder and recent psychosocial stressors. Positive for sleep disturbance.   HEMATOLOGY/LYMPHOLOGY:  Negative for prolonged bleeding, bruising easily or swollen nodes.  NEURO:   No history of syncope, paralysis, seizures or tremors.  All other reviewed and negative other than HPI.    OBJECTIVE:    /78   Pulse (!) 112   Temp 99.2 °F (37.3 °C)   Resp 18   Ht 5' 11" (1.803 m)   Wt 80.7 kg (178 lb)   LMP 02/01/2014   BMI 24.83 kg/m²     PHYSICAL EXAMINATION:    General appearance: Well appearing, NAD.   MSK/Ortho: TTP over the cervical paraspinals and trapzius muscles on the right. No restrictions with neck ROM in any plane. Negative spurling's. Patient with full strength in BUE and passive ROM in bilateral shoulders. Weakly + Empty Can, Neers and Palafox on the right; negative Scarf test. No swelling appreciated in joints in fingers but some redness/discoloration in the phalanges with no signs of skin breakdown or ulceration. Negative Adson's bilaterally.     ASSESSMENT: 44 y.o. year old female with neck and and R shoulder pain, consistent with     1. Isolated cervical dystonia  Medication Pre-Authorization    onabotulinumtoxina injection 300 Units   2. Muscle spasm  Medication Pre-Authorization    onabotulinumtoxina injection 300 Units       PLAN:     - Consent obtained today for repeat botox injections after explaining the risks and benefits of this procedure.     - Procedure:  Under clean technique, EMG guidance and after discussing with the patient 300 units of Botox were injected.  We injected the right splenius capitis, longismus, upper trapezius, and levator scapula. We also injected the right upper pectoral. She tolerated procedure well.     - Continue Zanaflex 4 mg BID PRN at night to help with sleep    - May continue with HEP to maintain ROM and strength in neck    - Encourage f/u with rheumatologist and vascular surgeon regarding Raynaud's/swelling in fingers    - Patient may RTC in 3 " months for repeat botox    The above plan and management options were discussed at length with patient. Patient is in agreement with the above and verbalized understanding.     Estelita Chua MD  PGY-2 [employer] [facility]    I have personally taken the history and examined this patient and agree with the resident's note as stated above.

## 2019-11-29 ENCOUNTER — PATIENT MESSAGE (OUTPATIENT)
Dept: PRIMARY CARE CLINIC | Facility: CLINIC | Age: 44
End: 2019-11-29

## 2019-11-29 DIAGNOSIS — R07.1 CHEST PAIN ON BREATHING: ICD-10-CM

## 2019-11-29 DIAGNOSIS — F41.9 ANXIETY: ICD-10-CM

## 2019-11-29 NOTE — TELEPHONE ENCOUNTER
LOV 10/07/19  Requesting xanax due to loss of friend insomnia and nightmares,    msg on portal   Sent a reply that provider is out of office

## 2019-12-02 RX ORDER — ALPRAZOLAM 1 MG/1
1 TABLET ORAL 3 TIMES DAILY PRN
Qty: 90 TABLET | Refills: 0 | Status: SHIPPED | OUTPATIENT
Start: 2019-12-02 | End: 2020-01-29 | Stop reason: SDUPTHER

## 2019-12-03 ENCOUNTER — PATIENT MESSAGE (OUTPATIENT)
Dept: PRIMARY CARE CLINIC | Facility: CLINIC | Age: 44
End: 2019-12-03

## 2019-12-03 ENCOUNTER — TELEPHONE (OUTPATIENT)
Dept: PRIMARY CARE CLINIC | Facility: CLINIC | Age: 44
End: 2019-12-03

## 2019-12-04 ENCOUNTER — CLINICAL SUPPORT (OUTPATIENT)
Dept: OBSTETRICS AND GYNECOLOGY | Facility: CLINIC | Age: 44
End: 2019-12-04
Payer: COMMERCIAL

## 2019-12-04 PROCEDURE — 96372 PR INJECTION,THERAP/PROPH/DIAG2ST, IM OR SUBCUT: ICD-10-PCS | Mod: S$GLB,,, | Performed by: OBSTETRICS & GYNECOLOGY

## 2019-12-04 PROCEDURE — 99999 PR PBB SHADOW E&M-EST. PATIENT-LVL I: ICD-10-PCS | Mod: PBBFAC,,,

## 2019-12-04 PROCEDURE — 99999 PR PBB SHADOW E&M-EST. PATIENT-LVL I: CPT | Mod: PBBFAC,,,

## 2019-12-04 PROCEDURE — 96372 THER/PROPH/DIAG INJ SC/IM: CPT | Mod: S$GLB,,, | Performed by: OBSTETRICS & GYNECOLOGY

## 2019-12-04 RX ADMIN — TESTOSTERONE CYPIONATE 50 MG: 200 INJECTION, SOLUTION INTRAMUSCULAR at 03:12

## 2019-12-30 DIAGNOSIS — M79.18 MYOFASCIAL PAIN: ICD-10-CM

## 2019-12-30 RX ORDER — TIZANIDINE 4 MG/1
TABLET ORAL
Qty: 60 TABLET | Refills: 1 | Status: SHIPPED | OUTPATIENT
Start: 2019-12-30 | End: 2020-02-27

## 2020-01-02 ENCOUNTER — CLINICAL SUPPORT (OUTPATIENT)
Dept: OBSTETRICS AND GYNECOLOGY | Facility: CLINIC | Age: 45
End: 2020-01-02
Payer: COMMERCIAL

## 2020-01-02 PROCEDURE — 99999 PR PBB SHADOW E&M-EST. PATIENT-LVL I: CPT | Mod: PBBFAC,,,

## 2020-01-02 PROCEDURE — 99999 PR PBB SHADOW E&M-EST. PATIENT-LVL I: ICD-10-PCS | Mod: PBBFAC,,,

## 2020-01-02 PROCEDURE — 96372 PR INJECTION,THERAP/PROPH/DIAG2ST, IM OR SUBCUT: ICD-10-PCS | Mod: S$GLB,,, | Performed by: OBSTETRICS & GYNECOLOGY

## 2020-01-02 PROCEDURE — 96372 THER/PROPH/DIAG INJ SC/IM: CPT | Mod: S$GLB,,, | Performed by: OBSTETRICS & GYNECOLOGY

## 2020-01-02 RX ADMIN — TESTOSTERONE CYPIONATE 50 MG: 200 INJECTION, SOLUTION INTRAMUSCULAR at 09:01

## 2020-01-15 ENCOUNTER — TELEPHONE (OUTPATIENT)
Dept: PRIMARY CARE CLINIC | Facility: CLINIC | Age: 45
End: 2020-01-15

## 2020-01-15 DIAGNOSIS — Z12.39 ENCOUNTER FOR SCREENING BREAST EXAMINATION: ICD-10-CM

## 2020-01-15 DIAGNOSIS — Z12.39 SCREENING FOR BREAST CANCER: Primary | ICD-10-CM

## 2020-01-15 NOTE — TELEPHONE ENCOUNTER
Last mammogram 01/03/19  Called pt scheduled mammo for next wed at [facility]

## 2020-01-15 NOTE — TELEPHONE ENCOUNTER
----- Message from Rubin Echavarria sent at 1/15/2020  2:52 PM CST -----  Contact: Pt      The Pt would like for you to send her Mammo order over and call her after so that she will know to call us back please.    Phone # [phone]

## 2020-01-16 ENCOUNTER — PATIENT MESSAGE (OUTPATIENT)
Dept: PRIMARY CARE CLINIC | Facility: CLINIC | Age: 45
End: 2020-01-16

## 2020-01-16 RX ORDER — HYDROXYZINE HYDROCHLORIDE 10 MG/1
10-20 TABLET, FILM COATED ORAL 3 TIMES DAILY PRN
Qty: 30 TABLET | Refills: 0 | Status: SHIPPED | OUTPATIENT
Start: 2020-01-16 | End: 2020-01-29

## 2020-01-22 ENCOUNTER — PATIENT MESSAGE (OUTPATIENT)
Dept: PRIMARY CARE CLINIC | Facility: CLINIC | Age: 45
End: 2020-01-22

## 2020-01-22 ENCOUNTER — HOSPITAL ENCOUNTER (OUTPATIENT)
Dept: RADIOLOGY | Facility: OTHER | Age: 45
Discharge: HOME | End: 2020-01-22
Attending: FAMILY MEDICINE
Payer: COMMERCIAL

## 2020-01-22 VITALS — BODY MASS INDEX: 24.91 KG/M2 | HEIGHT: 71 IN | WEIGHT: 177.94 LBS

## 2020-01-22 DIAGNOSIS — Z12.39 BREAST CANCER SCREENING, HIGH RISK PATIENT: Primary | ICD-10-CM

## 2020-01-22 DIAGNOSIS — Z12.39 SCREENING FOR BREAST CANCER: ICD-10-CM

## 2020-01-22 DIAGNOSIS — Z12.39 ENCOUNTER FOR SCREENING BREAST EXAMINATION: ICD-10-CM

## 2020-01-22 PROCEDURE — 77063 BREAST TOMOSYNTHESIS BI: CPT | Mod: 26,,, | Performed by: INTERNAL MEDICINE

## 2020-01-22 PROCEDURE — 77067 SCR MAMMO BI INCL CAD: CPT | Mod: TC

## 2020-01-22 PROCEDURE — 77067 MAMMO DIGITAL SCREENING BILAT WITH TOMOSYNTHESIS_CAD: ICD-10-PCS | Mod: 26,,, | Performed by: INTERNAL MEDICINE

## 2020-01-22 PROCEDURE — 77063 MAMMO DIGITAL SCREENING BILAT WITH TOMOSYNTHESIS_CAD: ICD-10-PCS | Mod: 26,,, | Performed by: INTERNAL MEDICINE

## 2020-01-22 PROCEDURE — 77067 SCR MAMMO BI INCL CAD: CPT | Mod: 26,,, | Performed by: INTERNAL MEDICINE

## 2020-01-23 ENCOUNTER — PATIENT MESSAGE (OUTPATIENT)
Dept: SURGERY | Facility: CLINIC | Age: 45
End: 2020-01-23

## 2020-01-23 ENCOUNTER — TELEPHONE (OUTPATIENT)
Dept: SURGERY | Facility: CLINIC | Age: 45
End: 2020-01-23

## 2020-01-23 NOTE — TELEPHONE ENCOUNTER
Massage was left for  regarding a referral that was placed in the system by Dr.Priya Brooks for High Risk due to Family Hx of Breast Cancer. Patient with a TC score of 26.58% . Please call Margarita mendez @ ([phone] as I would be happy to schedule this apt.

## 2020-01-29 ENCOUNTER — OFFICE VISIT (OUTPATIENT)
Dept: PRIMARY CARE CLINIC | Facility: CLINIC | Age: 45
End: 2020-01-29
Attending: FAMILY MEDICINE
Payer: COMMERCIAL

## 2020-01-29 VITALS
HEIGHT: 71 IN | DIASTOLIC BLOOD PRESSURE: 73 MMHG | OXYGEN SATURATION: 99 % | WEIGHT: 178.13 LBS | SYSTOLIC BLOOD PRESSURE: 107 MMHG | BODY MASS INDEX: 24.94 KG/M2 | HEART RATE: 100 BPM

## 2020-01-29 DIAGNOSIS — R07.1 CHEST PAIN ON BREATHING: ICD-10-CM

## 2020-01-29 DIAGNOSIS — F41.9 ANXIETY: ICD-10-CM

## 2020-01-29 DIAGNOSIS — F51.01 PRIMARY INSOMNIA: Primary | ICD-10-CM

## 2020-01-29 PROCEDURE — 3008F BODY MASS INDEX DOCD: CPT | Mod: CPTII,S$GLB,, | Performed by: FAMILY MEDICINE

## 2020-01-29 PROCEDURE — 99214 OFFICE O/P EST MOD 30 MIN: CPT | Mod: S$GLB,,, | Performed by: FAMILY MEDICINE

## 2020-01-29 PROCEDURE — 99999 PR PBB SHADOW E&M-EST. PATIENT-LVL III: CPT | Mod: PBBFAC,,, | Performed by: FAMILY MEDICINE

## 2020-01-29 PROCEDURE — 99214 PR OFFICE/OUTPT VISIT, EST, LEVL IV, 30-39 MIN: ICD-10-PCS | Mod: S$GLB,,, | Performed by: FAMILY MEDICINE

## 2020-01-29 PROCEDURE — 3008F PR BODY MASS INDEX (BMI) DOCUMENTED: ICD-10-PCS | Mod: CPTII,S$GLB,, | Performed by: FAMILY MEDICINE

## 2020-01-29 PROCEDURE — 99999 PR PBB SHADOW E&M-EST. PATIENT-LVL III: ICD-10-PCS | Mod: PBBFAC,,, | Performed by: FAMILY MEDICINE

## 2020-01-29 RX ORDER — BUPROPION HYDROCHLORIDE 300 MG/1
300 TABLET ORAL DAILY
Qty: 90 TABLET | Refills: 1 | Status: SHIPPED | OUTPATIENT
Start: 2020-01-29 | End: 2020-02-24 | Stop reason: SDUPTHER

## 2020-01-29 RX ORDER — HYDROXYZINE HYDROCHLORIDE 50 MG/1
50 TABLET, FILM COATED ORAL NIGHTLY PRN
Qty: 90 TABLET | Refills: 0 | Status: SHIPPED | OUTPATIENT
Start: 2020-01-29 | End: 2020-10-08

## 2020-01-29 RX ORDER — ALPRAZOLAM 1 MG/1
1 TABLET ORAL 2 TIMES DAILY PRN
Qty: 60 TABLET | Refills: 1 | Status: SHIPPED | OUTPATIENT
Start: 2020-01-29 | End: 2020-08-17 | Stop reason: SDUPTHER

## 2020-01-29 NOTE — PROGRESS NOTES
Subjective:       Patient ID: Mirian العلي is a 44 y.o. female.    Chief Complaint: Medication Refill and Sore Throat    Pt presents today for RF of her anxiety med, which she was taking sparingly. But, since death of sister from DCIS as well as MIL  Does note that she has been taking xanax more and is receptive to other options. Is also on cymbalta and wellbutrin for pain and anxiety. Pt also wants med for sleep. States that atarax did not help her   Pt c/o achiness along UE and neck right side. Was doing steroid injections in the past. States that xanax does help morgan when it worsens due to anxiety. Pt also with neuro pain right foot and notes that it helps with this as well.    Pt also c/o sore throat. Neg n/v/d/HA/rash/f/c/sob        Medication Refill   Associated symptoms include arthralgias, joint swelling, myalgias, neck pain, numbness and a sore throat. Pertinent negatives include no abdominal pain, chest pain, chills, congestion, coughing, diaphoresis, fatigue, fever, headaches, nausea, vomiting or weakness.   Sore Throat    Associated symptoms include neck pain. Pertinent negatives include no abdominal pain, congestion, coughing, diarrhea, ear pain, headaches, shortness of breath, trouble swallowing or vomiting.     Review of Systems   Constitutional: Positive for activity change. Negative for appetite change, chills, diaphoresis, fatigue, fever and unexpected weight change.   HENT: Positive for sore throat. Negative for congestion, ear pain, hearing loss, rhinorrhea, sinus pressure and trouble swallowing.    Eyes: Negative for photophobia, pain, discharge and visual disturbance.   Respiratory: Negative for apnea, cough, chest tightness, shortness of breath and wheezing.    Cardiovascular: Negative for chest pain, palpitations and leg swelling.   Gastrointestinal: Negative for abdominal distention, abdominal pain, blood in stool, constipation, diarrhea, nausea and vomiting.   Endocrine: Negative  for polydipsia and polyuria.   Genitourinary: Negative.  Negative for difficulty urinating, dysuria, hematuria and menstrual problem.   Musculoskeletal: Positive for arthralgias, back pain, joint swelling, myalgias, neck pain and neck stiffness. Negative for gait problem.   Skin: Negative.    Neurological: Positive for numbness. Negative for dizziness, tremors, weakness and headaches.   Psychiatric/Behavioral: Positive for dysphoric mood and sleep disturbance. Negative for behavioral problems, confusion, decreased concentration, hallucinations, self-injury and suicidal ideas. The patient is nervous/anxious. The patient is not hyperactive.    All other systems reviewed and are negative.      Objective:      Physical Exam   Constitutional: She is oriented to person, place, and time. She appears well-developed and well-nourished.   HENT:   Head: Normocephalic and atraumatic.   Right Ear: External ear normal.   Left Ear: External ear normal.   Nose: Nose normal.   Mouth/Throat: Oropharynx is clear and moist. No oropharyngeal exudate.   Eyes: Pupils are equal, round, and reactive to light. EOM are normal.   Neck: Normal range of motion. Neck supple. No thyromegaly present.   Cardiovascular: Normal rate, regular rhythm, normal heart sounds and intact distal pulses.   No murmur heard.  Pulmonary/Chest: Effort normal and breath sounds normal. No respiratory distress.   Abdominal: Soft. Bowel sounds are normal. She exhibits no distension and no mass. There is no tenderness. There is no rebound and no guarding.   Musculoskeletal: Normal range of motion. She exhibits edema (RUE shoulder and neck) and tenderness.   Lymphadenopathy:     She has no cervical adenopathy.   Neurological: She is alert and oriented to person, place, and time. She has normal reflexes. She displays normal reflexes. No cranial nerve deficit or sensory deficit. She exhibits normal muscle tone. Coordination normal.   Skin: Skin is warm and dry. No  erythema.   Psychiatric: She has a normal mood and affect. Her behavior is normal. Judgment and thought content normal.       Assessment:       1. Chest pain on breathing    2. Anxiety        Plan:       Anxiety and neuro pain in feet and neck  Pt is carrier for factor 5 leiden  Chest pain on breathing  -     ALPRAZolam (XANAX) 1 MG tablet; Take 1 tablet (1 mg total) by mouth 2 (two) times daily as needed.  Dispense: 60 tablet; Refill: 1    Anxiety  -     ALPRAZolam (XANAX) 1 MG tablet; Take 1 tablet (1 mg total) by mouth 2 (two) times daily as needed.  Dispense: 60 tablet; Refill: 1    Other orders  -     hydrOXYzine (ATARAX) 50 MG tablet; Take 1 tablet (50 mg total) by mouth nightly as needed for Itching.  Dispense: 90 tablet; Refill: 0  -     buPROPion (WELLBUTRIN XL) 300 MG 24 hr tablet; Take 1 tablet (300 mg total) by mouth once daily.  Dispense: 90 tablet; Refill: 1      increase wellbutrin so that dependence on xanax is minimized.  ED prompts d/ w pt  Se's of med d/w pt  Atarax for sleep at night do not take xanax at same time      RTC 6 mos

## 2020-01-30 ENCOUNTER — CLINICAL SUPPORT (OUTPATIENT)
Dept: OBSTETRICS AND GYNECOLOGY | Facility: CLINIC | Age: 45
End: 2020-01-30
Payer: COMMERCIAL

## 2020-01-30 DIAGNOSIS — N95.1 MENOPAUSAL SYMPTOMS: Primary | ICD-10-CM

## 2020-01-30 DIAGNOSIS — Z79.890 HORMONE REPLACEMENT THERAPY (HRT): Primary | ICD-10-CM

## 2020-01-30 PROCEDURE — 99999 PR PBB SHADOW E&M-EST. PATIENT-LVL I: CPT | Mod: PBBFAC,,,

## 2020-01-30 PROCEDURE — 96372 PR INJECTION,THERAP/PROPH/DIAG2ST, IM OR SUBCUT: ICD-10-PCS | Mod: S$GLB,,, | Performed by: OBSTETRICS & GYNECOLOGY

## 2020-01-30 PROCEDURE — 99999 PR PBB SHADOW E&M-EST. PATIENT-LVL I: ICD-10-PCS | Mod: PBBFAC,,,

## 2020-01-30 PROCEDURE — 96372 THER/PROPH/DIAG INJ SC/IM: CPT | Mod: S$GLB,,, | Performed by: OBSTETRICS & GYNECOLOGY

## 2020-01-30 RX ORDER — TESTOSTERONE CYPIONATE 200 MG/ML
50 INJECTION, SOLUTION INTRAMUSCULAR
Status: SHIPPED | OUTPATIENT
Start: 2020-01-30 | End: 2020-07-16

## 2020-01-30 RX ADMIN — TESTOSTERONE CYPIONATE 50 MG: 200 INJECTION, SOLUTION INTRAMUSCULAR at 01:01

## 2020-02-17 ENCOUNTER — TELEPHONE (OUTPATIENT)
Dept: PAIN MEDICINE | Facility: CLINIC | Age: 45
End: 2020-02-17

## 2020-02-17 NOTE — TELEPHONE ENCOUNTER
My name is Staff, I am contacting you from [facility] regarding your appointment scheduled for 02.19.202, with Provider Dr. Mcclendon, just confirming you will be able to make it.    If you feel you need to reschedule or canceled please give our office a call so we can better assist you.      Staff requesting patient to arrive 15 mins ahead of schedule appointment time.    Pt verbalized understanding and has confirmed appt

## 2020-02-19 ENCOUNTER — OFFICE VISIT (OUTPATIENT)
Dept: PAIN MEDICINE | Facility: CLINIC | Age: 45
End: 2020-02-19
Attending: ANESTHESIOLOGY
Payer: COMMERCIAL

## 2020-02-19 VITALS
BODY MASS INDEX: 25.06 KG/M2 | TEMPERATURE: 99 F | OXYGEN SATURATION: 100 % | HEIGHT: 71 IN | RESPIRATION RATE: 18 BRPM | DIASTOLIC BLOOD PRESSURE: 78 MMHG | SYSTOLIC BLOOD PRESSURE: 116 MMHG | WEIGHT: 179 LBS | HEART RATE: 93 BPM

## 2020-02-19 DIAGNOSIS — M25.511 CHRONIC RIGHT SHOULDER PAIN: ICD-10-CM

## 2020-02-19 DIAGNOSIS — M75.41 IMPINGEMENT SYNDROME OF RIGHT SHOULDER: ICD-10-CM

## 2020-02-19 DIAGNOSIS — G89.29 CHRONIC RIGHT SHOULDER PAIN: ICD-10-CM

## 2020-02-19 DIAGNOSIS — M62.838 MUSCLE SPASM: ICD-10-CM

## 2020-02-19 DIAGNOSIS — M25.511 ARTHRALGIA OF RIGHT ACROMIOCLAVICULAR JOINT: ICD-10-CM

## 2020-02-19 DIAGNOSIS — G24.3 ISOLATED CERVICAL DYSTONIA: Primary | ICD-10-CM

## 2020-02-19 PROCEDURE — 99212 PR OFFICE/OUTPT VISIT, EST, LEVL II, 10-19 MIN: ICD-10-PCS | Mod: 25,S$GLB,, | Performed by: ANESTHESIOLOGY

## 2020-02-19 PROCEDURE — 99212 OFFICE O/P EST SF 10 MIN: CPT | Mod: 25,S$GLB,, | Performed by: ANESTHESIOLOGY

## 2020-02-19 PROCEDURE — 3008F PR BODY MASS INDEX (BMI) DOCUMENTED: ICD-10-PCS | Mod: CPTII,S$GLB,, | Performed by: ANESTHESIOLOGY

## 2020-02-19 PROCEDURE — 64616 PR CHEMODENERVATION NECK MUSCLES EXC LARNYNX, UNI: ICD-10-PCS | Mod: RT,S$GLB,, | Performed by: ANESTHESIOLOGY

## 2020-02-19 PROCEDURE — 20550 NJX 1 TENDON SHEATH/LIGAMENT: CPT | Mod: 59,RT,S$GLB, | Performed by: ANESTHESIOLOGY

## 2020-02-19 PROCEDURE — 99999 PR PBB SHADOW E&M-EST. PATIENT-LVL III: CPT | Mod: PBBFAC,,, | Performed by: ANESTHESIOLOGY

## 2020-02-19 PROCEDURE — 95874 PR NEEDLE EMG GUIDANCE FOR CHEMODENERVATION: ICD-10-PCS | Mod: S$GLB,,, | Performed by: ANESTHESIOLOGY

## 2020-02-19 PROCEDURE — 95874 GUIDE NERV DESTR NEEDLE EMG: CPT | Mod: S$GLB,,, | Performed by: ANESTHESIOLOGY

## 2020-02-19 PROCEDURE — 99999 PR PBB SHADOW E&M-EST. PATIENT-LVL III: ICD-10-PCS | Mod: PBBFAC,,, | Performed by: ANESTHESIOLOGY

## 2020-02-19 PROCEDURE — 3008F BODY MASS INDEX DOCD: CPT | Mod: CPTII,S$GLB,, | Performed by: ANESTHESIOLOGY

## 2020-02-19 PROCEDURE — 64616 CHEMODENERV MUSC NECK DYSTON: CPT | Mod: RT,S$GLB,, | Performed by: ANESTHESIOLOGY

## 2020-02-19 PROCEDURE — 20600 PR DRAIN/INJECT SMALL JOINT/BURSA: ICD-10-PCS | Mod: 59,RT,S$GLB, | Performed by: ANESTHESIOLOGY

## 2020-02-19 PROCEDURE — 20550 PR INJECT TENDON SHEATH/LIGAMENT: ICD-10-PCS | Mod: 59,RT,S$GLB, | Performed by: ANESTHESIOLOGY

## 2020-02-19 PROCEDURE — 20600 DRAIN/INJ JOINT/BURSA W/O US: CPT | Mod: 59,RT,S$GLB, | Performed by: ANESTHESIOLOGY

## 2020-02-19 RX ORDER — BETAMETHASONE SODIUM PHOSPHATE AND BETAMETHASONE ACETATE 3; 3 MG/ML; MG/ML
6 INJECTION, SUSPENSION INTRA-ARTICULAR; INTRALESIONAL; INTRAMUSCULAR; SOFT TISSUE
Status: COMPLETED | OUTPATIENT
Start: 2020-02-19 | End: 2020-02-19

## 2020-02-19 RX ADMIN — BETAMETHASONE SODIUM PHOSPHATE AND BETAMETHASONE ACETATE 6 MG: 3; 3 INJECTION, SUSPENSION INTRA-ARTICULAR; INTRALESIONAL; INTRAMUSCULAR; SOFT TISSUE at 10:02

## 2020-02-19 NOTE — PROGRESS NOTES
Subjective:      Patient ID: Mirian العلي is a 44 y.o. female.    Chief Complaint: No chief complaint on file.    Referred by: Fatemeh Mcclendon MD     HPI  She is here for follow-up in Botox injection.  She is also complaining of right shoulder pain. She points to the lateral aspect of her right shoulder along the supraspinatus tendon.  She said previous injection in that area helped her tremendously.  No untoward side effects.  No new symptomatology.      Past Medical History:   Diagnosis Date    AR (allergic rhinitis)     Asthma     Eczema     Endometriosis, mild 2002    Factor V Leiden mutation     Ovarian cyst, bilateral        Past Surgical History:   Procedure Laterality Date    BUNIONECTOMY Left 02/06/2018    HYSTERECTOMY      total    LAPAROSCOPY W/ MINI-LAPAROTOMY  2003    OOPHORECTOMY Right 3/2012    OOPHORECTOMY Left 01/2015    TOTAL VAGINAL HYSTERECTOMY  1/2015    w/Right USO    WISDOM TOOTH EXTRACTION Bilateral        Review of patient's allergies indicates:   Allergen Reactions    Niacin preparations      Patient states she has flushing    Sulfa (sulfonamide antibiotics)      Tongue swelling    Iodine and iodide containing products Rash       Current Outpatient Medications   Medication Sig Dispense Refill    ALPRAZolam (XANAX) 1 MG tablet Take 1 tablet (1 mg total) by mouth 2 (two) times daily as needed. 60 tablet 1    aspirin (ECOTRIN) 81 MG EC tablet Take 81 mg by mouth once daily.      buPROPion (WELLBUTRIN XL) 300 MG 24 hr tablet Take 1 tablet (300 mg total) by mouth once daily. 90 tablet 1    cetirizine (ZYRTEC) 10 MG tablet Take 1 tablet (10 mg total) by mouth once daily. (Patient taking differently: Take 10 mg by mouth once as needed. ) 30 tablet 2    cyanocobalamin, vitamin B-12, 5,000 mcg Subl Place under the tongue once a week.      DULoxetine (CYMBALTA) 60 MG capsule TK 1 C PO QD  3    fluticasone (FLONASE) 50 mcg/actuation nasal spray PLACE 2 SPRAYS IN EACH  NOSTRIL ONCE DAILY 1 Bottle 1    hydrOXYzine (ATARAX) 50 MG tablet Take 1 tablet (50 mg total) by mouth nightly as needed for Itching. 90 tablet 0    ibuprofen (ADVIL,MOTRIN) 600 MG tablet Take 1 tablet (600 mg total) by mouth every 6 (six) hours as needed. 20 tablet 0    melatonin 1 mg Tab Take by mouth.      PROGESTERONE, MICRONIZED (PROMETRIUM ORAL) Take 300 mg by mouth nightly.      tiZANidine (ZANAFLEX) 4 MG tablet TAKE 1 TABLET(4 MG) BY MOUTH TWICE DAILY AS NEEDED 60 tablet 1    FLUZONE QUAD 5056-4531, PF, 60 mcg (15 mcg x 4)/0.5 mL Syrg ADM 0.5ML IM UTD  0     Current Facility-Administered Medications   Medication Dose Route Frequency Provider Last Rate Last Dose    estradiol cypionate 5 mg/mL injection 10 mg  10 mg Intramuscular Q28 Days Genet Stein MD   10 mg at 07/16/19 0850    estradiol cypionate 5 mg/mL injection 15 mg  15 mg Intramuscular Q28 Days Genet Stein MD   15 mg at 01/30/20 1348    estradiol valerate injection 40 mg  40 mg Intramuscular Q30 Days Genet Stein MD        testosterone cypionate injection 50 mg  50 mg Intramuscular Q28 Days Genet Stein MD   50 mg at 01/30/20 1348       Family History   Problem Relation Age of Onset    Cancer Mother     Thyroid disease Mother     Breast cancer Mother         late 60's    Cancer Father     Diabetes Brother     Factor V Leiden deficiency Brother     Allergies Brother     Asthma Brother     Breast cancer Sister 46        inflammatory    Diabetes Maternal Aunt     Diabetes Paternal Uncle     Cancer Cousin     Breast cancer Paternal Aunt        Social History     Socioeconomic History    Marital status:      Spouse name: Not on file    Number of children: 2    Years of education: Not on file    Highest education level: Not on file   Occupational History    Not on file   Social Needs    Financial resource strain: Not on file    Food insecurity:     Worry: Not on file     Inability: Not  "on file    Transportation needs:     Medical: Not on file     Non-medical: Not on file   Tobacco Use    Smoking status: Never Smoker    Smokeless tobacco: Never Used   Substance and Sexual Activity    Alcohol use: Yes     Alcohol/week: 5.0 - 6.0 standard drinks     Types: 5 - 6 Glasses of wine per week     Frequency: 2-3 times a week     Comment: week    Drug use: No    Sexual activity: Yes     Partners: Male     Birth control/protection: Other-see comments, Post-menopausal   Lifestyle    Physical activity:     Days per week: Not on file     Minutes per session: Not on file    Stress: Not at all   Relationships    Social connections:     Talks on phone: Not on file     Gets together: Not on file     Attends Protestant service: Not on file     Active member of club or organization: Not on file     Attends meetings of clubs or organizations: Not on file     Relationship status: Not on file   Other Topics Concern    Not on file   Social History Narrative    Not on file           ROS        Objective:   /78   Pulse 93   Temp 98.7 °F (37.1 °C)   Resp 18   Ht 5' 11" (1.803 m)   Wt 81.2 kg (179 lb 0.2 oz)   LMP 02/01/2014   SpO2 100%   BMI 24.97 kg/m²   Pain Disability Index Review:  Last 3 PDI Scores 2/19/2020 11/19/2019 8/21/2019   Pain Disability Index (PDI) 22 40 28     Normocephalic.  Atraumatic.  Affect appropriate.  Breathing unlabored.  Extra ocular muscles intact.           Ortho/SPM Exam    positive Neer's and Palafox on the right side as well as cross arm table.  Muscle spasm noted on the right side.  Assessment:       Encounter Diagnoses   Name Primary?    Isolated cervical dystonia Yes    Muscle spasm     Chronic right shoulder pain     Arthralgia of right acromioclavicular joint     Impingement syndrome of right shoulder          Plan:   We discussed with the patient the assessment and recommendations. The following is the plan we agreed on:  1.  Procedure:  Under clean technique, " EMG guidance after discussing with the patient 300 units of Botox were mixed and used in the following muscles:  The right splenius capitis, longismus, scalene, upper trapezius, levator scapula.  She tolerated procedure well.  2.  Procedure under clean technique and after discussing with the patient 0.4 mL of betamethasone mixed with 10 mL of bupivacaine 0.25% were used.  Injected in the subacromial space with 3 mL. The patient continued to have pain with cross-arm table and raising her hand above her head.  We injected the acromioclavicular joint with 0.5 mL of the mixture and she had resolution of her symptomatology.  3.  Return as needed.  Otherwise follow-up in 3 months to repeat Botox      Diagnoses and all orders for this visit:    Isolated cervical dystonia  -     onabotulinumtoxina injection 300 Units    Muscle spasm  -     onabotulinumtoxina injection 300 Units    Chronic right shoulder pain  -     betamethasone acetate-betamethasone sodium phosphate injection 6 mg    Arthralgia of right acromioclavicular joint  -     betamethasone acetate-betamethasone sodium phosphate injection 6 mg    Impingement syndrome of right shoulder  -     betamethasone acetate-betamethasone sodium phosphate injection 6 mg

## 2020-02-24 RX ORDER — BUPROPION HYDROCHLORIDE 300 MG/1
300 TABLET ORAL DAILY
Qty: 90 TABLET | Refills: 1 | Status: SHIPPED | OUTPATIENT
Start: 2020-02-24 | End: 2020-03-10 | Stop reason: SDUPTHER

## 2020-02-27 ENCOUNTER — CLINICAL SUPPORT (OUTPATIENT)
Dept: OBSTETRICS AND GYNECOLOGY | Facility: CLINIC | Age: 45
End: 2020-02-27
Payer: COMMERCIAL

## 2020-02-27 DIAGNOSIS — N95.1 MENOPAUSAL SYMPTOMS: Primary | ICD-10-CM

## 2020-02-27 DIAGNOSIS — M79.18 MYOFASCIAL PAIN: ICD-10-CM

## 2020-02-27 PROCEDURE — 99999 PR PBB SHADOW E&M-EST. PATIENT-LVL I: CPT | Mod: PBBFAC,,,

## 2020-02-27 PROCEDURE — 96372 PR INJECTION,THERAP/PROPH/DIAG2ST, IM OR SUBCUT: ICD-10-PCS | Mod: S$GLB,,, | Performed by: OBSTETRICS & GYNECOLOGY

## 2020-02-27 PROCEDURE — 99999 PR PBB SHADOW E&M-EST. PATIENT-LVL I: ICD-10-PCS | Mod: PBBFAC,,,

## 2020-02-27 PROCEDURE — 96372 THER/PROPH/DIAG INJ SC/IM: CPT | Mod: S$GLB,,, | Performed by: OBSTETRICS & GYNECOLOGY

## 2020-02-27 RX ORDER — TIZANIDINE 4 MG/1
TABLET ORAL
Qty: 60 TABLET | Refills: 1 | Status: SHIPPED | OUTPATIENT
Start: 2020-02-27 | End: 2020-04-27

## 2020-02-27 RX ADMIN — TESTOSTERONE CYPIONATE 50 MG: 200 INJECTION, SOLUTION INTRAMUSCULAR at 08:02

## 2020-03-10 RX ORDER — BUPROPION HYDROCHLORIDE 300 MG/1
300 TABLET ORAL DAILY
Qty: 90 TABLET | Refills: 1 | Status: SHIPPED | OUTPATIENT
Start: 2020-03-10 | End: 2021-03-23 | Stop reason: SDUPTHER

## 2020-03-20 ENCOUNTER — TELEPHONE (OUTPATIENT)
Dept: OBSTETRICS AND GYNECOLOGY | Facility: CLINIC | Age: 45
End: 2020-03-20

## 2020-03-20 NOTE — TELEPHONE ENCOUNTER
----- Message from Sil Simmonsfield sent at 3/20/2020 10:15 AM CDT -----  Contact: RENAE MONK  Type:  Patient Returning Call    Who Called: RENAE MONK    Who Left Message for Patient: Bill CADETGermania    Does the patient know what this is regarding?: Yes    Best Call Back Number: [phone]    Additional Information:

## 2020-03-20 NOTE — TELEPHONE ENCOUNTER
----- Message from Earline Moncada LPN sent at 3/20/2020 12:46 PM CDT -----  Contact: RENAE MONK [[id number]]  PLEASE SEE BELOW. ROUTED INCORRECTLY   ----- Message -----  From: Yuri Wilkins  Sent: 3/20/2020  12:21 PM CDT  To: Emil [facility] Staff    Name of Who is Calling: RENAE MONK [[id number]]      What is the request in detail: Pt is requesting a call back from clinical team in regard to  Hormones   Please contact to further discuss and advise.          Can the clinic reply by [facility]:       What Number to Call Back if not in [facility]: [phone]

## 2020-03-20 NOTE — TELEPHONE ENCOUNTER
Spoke with patient and confirmed f/u - injection appointment. Patient has no further questions or complaints.

## 2020-03-23 ENCOUNTER — TELEPHONE (OUTPATIENT)
Dept: OBSTETRICS AND GYNECOLOGY | Facility: CLINIC | Age: 45
End: 2020-03-23

## 2020-03-23 NOTE — TELEPHONE ENCOUNTER
Patient phoned regarding her injection tomorrow. Patient prefers to transition to oral therapy amid COVID-19 precautions. Message sent to Dr. Stein/staff requesting Rx. KEATON Kovacs.

## 2020-03-24 RX ORDER — ESTERIFIED ESTROGEN AND METHYLTESTOSTERONE 1.25; 2.5 MG/1; MG/1
1 TABLET ORAL DAILY
Qty: 30 TABLET | Refills: 5 | Status: SHIPPED | OUTPATIENT
Start: 2020-03-24 | End: 2020-10-05 | Stop reason: SDUPTHER

## 2020-03-25 ENCOUNTER — PATIENT MESSAGE (OUTPATIENT)
Dept: PAIN MEDICINE | Facility: CLINIC | Age: 45
End: 2020-03-25

## 2020-03-25 ENCOUNTER — TELEPHONE (OUTPATIENT)
Dept: PAIN MEDICINE | Facility: CLINIC | Age: 45
End: 2020-03-25

## 2020-03-25 NOTE — TELEPHONE ENCOUNTER
Staff left a voicemail for patient to contact office back about her 5/18/20 botox appt.     Staff informed patient through message that by receiving this medication it can increase the risk of infection and can cause exposure to covid-19(if patient choose to still keep appt provider will move forward with treatment, or patient cancel she will be reschedule once clearance has lifted).

## 2020-04-02 ENCOUNTER — PATIENT MESSAGE (OUTPATIENT)
Dept: PAIN MEDICINE | Facility: CLINIC | Age: 45
End: 2020-04-02

## 2020-04-27 DIAGNOSIS — M79.18 MYOFASCIAL PAIN: ICD-10-CM

## 2020-04-27 RX ORDER — TIZANIDINE 4 MG/1
TABLET ORAL
Qty: 60 TABLET | Refills: 1 | Status: SHIPPED | OUTPATIENT
Start: 2020-04-27 | End: 2020-09-24

## 2020-05-16 ENCOUNTER — PATIENT MESSAGE (OUTPATIENT)
Dept: PAIN MEDICINE | Facility: CLINIC | Age: 45
End: 2020-05-16

## 2020-05-20 ENCOUNTER — OFFICE VISIT (OUTPATIENT)
Dept: PAIN MEDICINE | Facility: CLINIC | Age: 45
End: 2020-05-20
Attending: ANESTHESIOLOGY
Payer: COMMERCIAL

## 2020-05-20 ENCOUNTER — TELEPHONE (OUTPATIENT)
Dept: PAIN MEDICINE | Facility: CLINIC | Age: 45
End: 2020-05-20

## 2020-05-20 VITALS
BODY MASS INDEX: 26.75 KG/M2 | WEIGHT: 191.81 LBS | HEART RATE: 112 BPM | TEMPERATURE: 98 F | DIASTOLIC BLOOD PRESSURE: 82 MMHG | SYSTOLIC BLOOD PRESSURE: 120 MMHG

## 2020-05-20 DIAGNOSIS — G24.3 ISOLATED CERVICAL DYSTONIA: Primary | ICD-10-CM

## 2020-05-20 DIAGNOSIS — M62.838 MUSCLE SPASM: ICD-10-CM

## 2020-05-20 PROCEDURE — 99499 UNLISTED E&M SERVICE: CPT | Mod: S$GLB,,, | Performed by: ANESTHESIOLOGY

## 2020-05-20 PROCEDURE — 99499 NO LOS: ICD-10-PCS | Mod: S$GLB,,, | Performed by: ANESTHESIOLOGY

## 2020-05-20 PROCEDURE — 95874 PR NEEDLE EMG GUIDANCE FOR CHEMODENERVATION: ICD-10-PCS | Mod: S$GLB,,, | Performed by: ANESTHESIOLOGY

## 2020-05-20 PROCEDURE — 99999 PR PBB SHADOW E&M-EST. PATIENT-LVL III: CPT | Mod: PBBFAC,,, | Performed by: ANESTHESIOLOGY

## 2020-05-20 PROCEDURE — 64616 PR CHEMODENERVATION NECK MUSCLES EXC LARNYNX, UNI: ICD-10-PCS | Mod: RT,S$GLB,, | Performed by: ANESTHESIOLOGY

## 2020-05-20 PROCEDURE — 95874 GUIDE NERV DESTR NEEDLE EMG: CPT | Mod: S$GLB,,, | Performed by: ANESTHESIOLOGY

## 2020-05-20 PROCEDURE — 99999 PR PBB SHADOW E&M-EST. PATIENT-LVL III: ICD-10-PCS | Mod: PBBFAC,,, | Performed by: ANESTHESIOLOGY

## 2020-05-20 PROCEDURE — 64616 CHEMODENERV MUSC NECK DYSTON: CPT | Mod: RT,S$GLB,, | Performed by: ANESTHESIOLOGY

## 2020-05-20 NOTE — PROGRESS NOTES
Subjective:      Patient ID: Mirian العلي is a 44 y.o. female.    Chief Complaint: Arm Pain (right arm pit )    Referred by: Fatemeh Mcclendon MD     HPI  She is here for follow-up and for Botox injection.  No untoward side effects with the last injection.  She received 300 units that worked very well.  She is a little delayed in getting her injection today because of COVID.  She has more spasm than usual.  She is feeling some radiation down the upper extremity on the right side.  No new symptomatology otherwise.      Past Medical History:   Diagnosis Date    AR (allergic rhinitis)     Asthma     Eczema     Endometriosis, mild 2002    Factor V Leiden mutation     Ovarian cyst, bilateral        Past Surgical History:   Procedure Laterality Date    BUNIONECTOMY Left 02/06/2018    HYSTERECTOMY      total    LAPAROSCOPY W/ MINI-LAPAROTOMY  2003    OOPHORECTOMY Right 3/2012    OOPHORECTOMY Left 01/2015    TOTAL VAGINAL HYSTERECTOMY  1/2015    w/Right USO    WISDOM TOOTH EXTRACTION Bilateral        Review of patient's allergies indicates:   Allergen Reactions    Niacin preparations      Patient states she has flushing    Sulfa (sulfonamide antibiotics)      Tongue swelling    Iodine and iodide containing products Rash       Current Outpatient Medications   Medication Sig Dispense Refill    ALPRAZolam (XANAX) 1 MG tablet Take 1 tablet (1 mg total) by mouth 2 (two) times daily as needed. 60 tablet 1    aspirin (ECOTRIN) 81 MG EC tablet Take 81 mg by mouth once daily.      buPROPion (WELLBUTRIN XL) 300 MG 24 hr tablet Take 1 tablet (300 mg total) by mouth once daily. 90 tablet 1    cetirizine (ZYRTEC) 10 MG tablet Take 1 tablet (10 mg total) by mouth once daily. (Patient taking differently: Take 10 mg by mouth once as needed. ) 30 tablet 2    cyanocobalamin, vitamin B-12, 5,000 mcg Subl Place under the tongue once a week.      DULoxetine (CYMBALTA) 60 MG capsule TK 1 C PO QD  3     estrogens,conjugated,-methyltestosterone 1.25-2.5mg (ESTRATEST) 1.25-2.5 mg per tablet Take 1 tablet by mouth once daily. 30 tablet 5    fluticasone (FLONASE) 50 mcg/actuation nasal spray PLACE 2 SPRAYS IN EACH NOSTRIL ONCE DAILY 1 Bottle 1    FLUZONE QUAD 5659-3811, PF, 60 mcg (15 mcg x 4)/0.5 mL Syrg ADM 0.5ML IM UTD  0    hydrOXYzine (ATARAX) 50 MG tablet Take 1 tablet (50 mg total) by mouth nightly as needed for Itching. 90 tablet 0    ibuprofen (ADVIL,MOTRIN) 600 MG tablet Take 1 tablet (600 mg total) by mouth every 6 (six) hours as needed. 20 tablet 0    melatonin 1 mg Tab Take by mouth.      PROGESTERONE, MICRONIZED (PROMETRIUM ORAL) Take 300 mg by mouth nightly.      tiZANidine (ZANAFLEX) 4 MG tablet TAKE 1 TABLET(4 MG) BY MOUTH TWICE DAILY AS NEEDED 60 tablet 1     Current Facility-Administered Medications   Medication Dose Route Frequency Provider Last Rate Last Dose    estradiol cypionate 5 mg/mL injection 10 mg  10 mg Intramuscular Q28 Days Genet Stein MD   10 mg at 07/16/19 0850    estradiol cypionate 5 mg/mL injection 15 mg  15 mg Intramuscular Q28 Days Genet Stein MD   15 mg at 02/27/20 0840    estradiol valerate injection 40 mg  40 mg Intramuscular Q30 Days Genet Stein MD        testosterone cypionate injection 50 mg  50 mg Intramuscular Q28 Days Genet Stein MD   50 mg at 02/27/20 0840       Family History   Problem Relation Age of Onset    Cancer Mother     Thyroid disease Mother     Breast cancer Mother         late 60's    Cancer Father     Diabetes Brother     Factor V Leiden deficiency Brother     Allergies Brother     Asthma Brother     Breast cancer Sister 46        inflammatory    Diabetes Maternal Aunt     Diabetes Paternal Uncle     Cancer Cousin     Breast cancer Paternal Aunt        Social History     Socioeconomic History    Marital status:      Spouse name: Not on file    Number of children: 2    Years of education:  "Not on file    Highest education level: Not on file   Occupational History    Not on file   Social Needs    Financial resource strain: Not on file    Food insecurity:     Worry: Not on file     Inability: Not on file    Transportation needs:     Medical: Not on file     Non-medical: Not on file   Tobacco Use    Smoking status: Never Smoker    Smokeless tobacco: Never Used   Substance and Sexual Activity    Alcohol use: Yes     Alcohol/week: 5.0 - 6.0 standard drinks     Types: 5 - 6 Glasses of wine per week     Frequency: 2-3 times a week     Comment: week    Drug use: No    Sexual activity: Yes     Partners: Male     Birth control/protection: Other-see comments, Post-menopausal   Lifestyle    Physical activity:     Days per week: Not on file     Minutes per session: Not on file    Stress: Not at all   Relationships    Social connections:     Talks on phone: Not on file     Gets together: Not on file     Attends Muslim service: Not on file     Active member of club or organization: Not on file     Attends meetings of clubs or organizations: Not on file     Relationship status: Not on file   Other Topics Concern    Not on file   Social History Narrative    Not on file           ROS        Objective:   /82   Pulse (!) 112   Temp 98.3 °F (36.8 °C)   Resp (P) 18   Ht (P) 5' 11" (1.803 m)   Wt 87 kg (191 lb 12.8 oz)   LMP 02/01/2014   BMI (P) 26.75 kg/m²   Pain Disability Index Review:  Last 3 PDI Scores 5/20/2020 2/19/2020 11/19/2019   Pain Disability Index (PDI) 44 22 40     Normocephalic.  Atraumatic.  Affect appropriate.  Breathing unlabored.  Extra ocular muscles intact.           Ortho/SPM Exam    muscle spasm on the right side of the neck and right upper pectoral.  Shoulder examination with mildly positive Palafox sign.  Spurling sign negative.  Assessment:       Encounter Diagnoses   Name Primary?    Isolated cervical dystonia Yes    Muscle spasm          Plan:   We discussed with " the patient the assessment and recommendations. The following is the plan we agreed on:  1.  Procedure:  Under clean technique, EMG guidance after discussing the patient's 300 units of Botox were used.  We injected the splenius capitis, longismus, scalene, upper trapezius, levator scapula, and upper pectoral.  She tolerated procedure well.  2.  Return as needed.  Otherwise follow-up in 3 months for repeat injection      Mirian was seen today for arm pain.    Diagnoses and all orders for this visit:    Isolated cervical dystonia  -     onabotulinumtoxina injection 300 Units    Muscle spasm  -     onabotulinumtoxina injection 300 Units

## 2020-07-08 ENCOUNTER — LAB VISIT (OUTPATIENT)
Dept: PRIMARY CARE CLINIC | Facility: OTHER | Age: 45
End: 2020-07-08
Attending: INTERNAL MEDICINE
Payer: COMMERCIAL

## 2020-07-08 DIAGNOSIS — Z03.818 ENCOUNTER FOR OBSERVATION FOR SUSPECTED EXPOSURE TO OTHER BIOLOGICAL AGENTS RULED OUT: ICD-10-CM

## 2020-07-08 PROCEDURE — U0003 INFECTIOUS AGENT DETECTION BY NUCLEIC ACID (DNA OR RNA); SEVERE ACUTE RESPIRATORY SYNDROME CORONAVIRUS 2 (SARS-COV-2) (CORONAVIRUS DISEASE [COVID-19]), AMPLIFIED PROBE TECHNIQUE, MAKING USE OF HIGH THROUGHPUT TECHNOLOGIES AS DESCRIBED BY CMS-2020-01-R: HCPCS | Mod: ST72

## 2020-07-12 LAB — SARS-COV-2 RNA RESP QL NAA+PROBE: NEGATIVE

## 2020-08-06 ENCOUNTER — OFFICE VISIT (OUTPATIENT)
Dept: PAIN MEDICINE | Facility: CLINIC | Age: 45
End: 2020-08-06
Attending: ANESTHESIOLOGY
Payer: COMMERCIAL

## 2020-08-06 VITALS — WEIGHT: 185 LBS | HEIGHT: 71 IN | BODY MASS INDEX: 25.9 KG/M2

## 2020-08-06 DIAGNOSIS — M75.41 IMPINGEMENT SYNDROME OF SHOULDER REGION, RIGHT: ICD-10-CM

## 2020-08-06 DIAGNOSIS — M75.51 CHRONIC BURSITIS OF RIGHT SHOULDER: ICD-10-CM

## 2020-08-06 DIAGNOSIS — M25.511 CHRONIC RIGHT SHOULDER PAIN: Primary | ICD-10-CM

## 2020-08-06 DIAGNOSIS — G89.29 CHRONIC RIGHT SHOULDER PAIN: Primary | ICD-10-CM

## 2020-08-06 PROCEDURE — 64418 PR NERVE BLOCK INJ, ANES/STEROID, SUPRASCAPULAR: ICD-10-PCS | Mod: 59,RT,S$GLB, | Performed by: ANESTHESIOLOGY

## 2020-08-06 PROCEDURE — 20610 PR DRAIN/INJECT LARGE JOINT/BURSA: ICD-10-PCS | Mod: 59,RT,S$GLB, | Performed by: ANESTHESIOLOGY

## 2020-08-06 PROCEDURE — 99999 PR PBB SHADOW E&M-EST. PATIENT-LVL IV: CPT | Mod: PBBFAC,,, | Performed by: ANESTHESIOLOGY

## 2020-08-06 PROCEDURE — 20610 DRAIN/INJ JOINT/BURSA W/O US: CPT | Mod: 59,RT,S$GLB, | Performed by: ANESTHESIOLOGY

## 2020-08-06 PROCEDURE — 99213 OFFICE O/P EST LOW 20 MIN: CPT | Mod: 25,S$GLB,, | Performed by: ANESTHESIOLOGY

## 2020-08-06 PROCEDURE — 64418 NJX AA&/STRD SPRSCAP NRV: CPT | Mod: 59,RT,S$GLB, | Performed by: ANESTHESIOLOGY

## 2020-08-06 PROCEDURE — 20550 PR INJECT TENDON SHEATH/LIGAMENT: ICD-10-PCS | Mod: 59,RT,S$GLB, | Performed by: ANESTHESIOLOGY

## 2020-08-06 PROCEDURE — 3008F PR BODY MASS INDEX (BMI) DOCUMENTED: ICD-10-PCS | Mod: CPTII,S$GLB,, | Performed by: ANESTHESIOLOGY

## 2020-08-06 PROCEDURE — 99213 PR OFFICE/OUTPT VISIT, EST, LEVL III, 20-29 MIN: ICD-10-PCS | Mod: 25,S$GLB,, | Performed by: ANESTHESIOLOGY

## 2020-08-06 PROCEDURE — 20550 NJX 1 TENDON SHEATH/LIGAMENT: CPT | Mod: 59,RT,S$GLB, | Performed by: ANESTHESIOLOGY

## 2020-08-06 PROCEDURE — 3008F BODY MASS INDEX DOCD: CPT | Mod: CPTII,S$GLB,, | Performed by: ANESTHESIOLOGY

## 2020-08-06 PROCEDURE — 99999 PR PBB SHADOW E&M-EST. PATIENT-LVL IV: ICD-10-PCS | Mod: PBBFAC,,, | Performed by: ANESTHESIOLOGY

## 2020-08-06 RX ORDER — BETAMETHASONE SODIUM PHOSPHATE AND BETAMETHASONE ACETATE 3; 3 MG/ML; MG/ML
6 INJECTION, SUSPENSION INTRA-ARTICULAR; INTRALESIONAL; INTRAMUSCULAR; SOFT TISSUE
Status: COMPLETED | OUTPATIENT
Start: 2020-08-06 | End: 2020-08-06

## 2020-08-06 RX ADMIN — BETAMETHASONE SODIUM PHOSPHATE AND BETAMETHASONE ACETATE 6 MG: 3; 3 INJECTION, SUSPENSION INTRA-ARTICULAR; INTRALESIONAL; INTRAMUSCULAR; SOFT TISSUE at 05:08

## 2020-08-06 NOTE — PROGRESS NOTES
Subjective:      Patient ID: Mirian العلي is a 44 y.o. female.    Chief Complaint: Shoulder Pain (right)    Referred by: No ref. provider found     HPI  She is here for follow-up.  She is complaining of right shoulder pain and is requesting an injection.  About 2 years ago she had the same pain.  It is related to activities.  She was on vacation and feels like she sprained her shoulder.  No radicular component.  Previous injection helped her tremendously.  Symptoms are identical.  No fevers or chills.    Past Medical History:   Diagnosis Date    AR (allergic rhinitis)     Asthma     Eczema     Endometriosis, mild 2002    Factor V Leiden mutation     Ovarian cyst, bilateral        Past Surgical History:   Procedure Laterality Date    BUNIONECTOMY Left 02/06/2018    HYSTERECTOMY      total    LAPAROSCOPY W/ MINI-LAPAROTOMY  2003    OOPHORECTOMY Right 3/2012    OOPHORECTOMY Left 01/2015    TOTAL VAGINAL HYSTERECTOMY  1/2015    w/Right USO    WISDOM TOOTH EXTRACTION Bilateral        Review of patient's allergies indicates:   Allergen Reactions    Niacin preparations      Patient states she has flushing    Sulfa (sulfonamide antibiotics)      Tongue swelling    Iodine and iodide containing products Rash       Current Outpatient Medications   Medication Sig Dispense Refill    ALPRAZolam (XANAX) 1 MG tablet Take 1 tablet (1 mg total) by mouth 2 (two) times daily as needed. 60 tablet 1    aspirin (ECOTRIN) 81 MG EC tablet Take 81 mg by mouth once daily.      buPROPion (WELLBUTRIN XL) 300 MG 24 hr tablet Take 1 tablet (300 mg total) by mouth once daily. 90 tablet 1    cetirizine (ZYRTEC) 10 MG tablet Take 1 tablet (10 mg total) by mouth once daily. (Patient taking differently: Take 10 mg by mouth once as needed. ) 30 tablet 2    cyanocobalamin, vitamin B-12, 5,000 mcg Subl Place under the tongue once a week.      DULoxetine (CYMBALTA) 60 MG capsule TK 1 C PO QD  3     estrogens,conjugated,-methyltestosterone 1.25-2.5mg (ESTRATEST) 1.25-2.5 mg per tablet Take 1 tablet by mouth once daily. 30 tablet 5    fluticasone (FLONASE) 50 mcg/actuation nasal spray PLACE 2 SPRAYS IN EACH NOSTRIL ONCE DAILY 1 Bottle 1    FLUZONE QUAD 6495-2787, PF, 60 mcg (15 mcg x 4)/0.5 mL Syrg ADM 0.5ML IM UTD  0    hydrOXYzine (ATARAX) 50 MG tablet Take 1 tablet (50 mg total) by mouth nightly as needed for Itching. 90 tablet 0    ibuprofen (ADVIL,MOTRIN) 600 MG tablet Take 1 tablet (600 mg total) by mouth every 6 (six) hours as needed. 20 tablet 0    PROGESTERONE, MICRONIZED (PROMETRIUM ORAL) Take 300 mg by mouth nightly.      tiZANidine (ZANAFLEX) 4 MG tablet TAKE 1 TABLET(4 MG) BY MOUTH TWICE DAILY AS NEEDED 60 tablet 1     Current Facility-Administered Medications   Medication Dose Route Frequency Provider Last Rate Last Dose    estradiol cypionate 5 mg/mL injection 10 mg  10 mg Intramuscular Q28 Days Genet Stein MD   10 mg at 07/16/19 0850    estradiol cypionate 5 mg/mL injection 15 mg  15 mg Intramuscular Q28 Days Genet Stein MD   15 mg at 02/27/20 0840    estradiol valerate injection 40 mg  40 mg Intramuscular Q30 Days Genet Stein MD           Family History   Problem Relation Age of Onset    Cancer Mother     Thyroid disease Mother     Breast cancer Mother         late 60's    Cancer Father     Diabetes Brother     Factor V Leiden deficiency Brother     Allergies Brother     Asthma Brother     Breast cancer Sister 46        inflammatory    Diabetes Maternal Aunt     Diabetes Paternal Uncle     Cancer Cousin     Breast cancer Paternal Aunt        Social History     Socioeconomic History    Marital status:      Spouse name: Not on file    Number of children: 2    Years of education: Not on file    Highest education level: Not on file   Occupational History    Not on file   Social Needs    Financial resource strain: Not on file    Food  "insecurity     Worry: Not on file     Inability: Not on file    Transportation needs     Medical: Not on file     Non-medical: Not on file   Tobacco Use    Smoking status: Never Smoker    Smokeless tobacco: Never Used   Substance and Sexual Activity    Alcohol use: Yes     Alcohol/week: 5.0 - 6.0 standard drinks     Types: 5 - 6 Glasses of wine per week     Frequency: 2-3 times a week     Comment: week    Drug use: No    Sexual activity: Yes     Partners: Male     Birth control/protection: Other-see comments, Post-menopausal   Lifestyle    Physical activity     Days per week: Not on file     Minutes per session: Not on file    Stress: Not at all   Relationships    Social connections     Talks on phone: Not on file     Gets together: Not on file     Attends Bahai service: Not on file     Active member of club or organization: Not on file     Attends meetings of clubs or organizations: Not on file     Relationship status: Not on file   Other Topics Concern    Not on file   Social History Narrative    Not on file           ROS        Objective:   Ht 5' 11" (1.803 m)   Wt 83.9 kg (185 lb)   LMP 02/01/2014   BMI 25.80 kg/m²   Pain Disability Index Review:  Last 3 PDI Scores 5/20/2020 2/19/2020 11/19/2019   Pain Disability Index (PDI) 44 22 40     Normocephalic.  Atraumatic.  Affect appropriate.  Breathing unlabored.  Extra ocular muscles intact.           Ortho/SPM Exam    shoulder examination on the right side positive for Neer's, Palafox, and empty can sign.  Negative drop-arm test.  No erythema.  No effusion.  The joint is not hot.  Assessment:       Encounter Diagnoses   Name Primary?    Chronic right shoulder pain Yes    Impingement syndrome of shoulder region, right     Chronic bursitis of right shoulder          Plan:   We discussed with the patient the assessment and recommendations. The following is the plan we agreed on:  1.PROCEDURE:  Under clean technique & after discussing with the " patient, 0.4 mL of betamethasone X with 10 mL of bupivacaine 0.25% were used to inject on right suprascapular nerve, supraspinatus tendon sheath & subacromial bursa.   2.  Return in 1 week for Botox injections.      Mirian was seen today for shoulder pain.    Diagnoses and all orders for this visit:    Chronic right shoulder pain  -     betamethasone acetate-betamethasone sodium phosphate injection 6 mg    Impingement syndrome of shoulder region, right  -     betamethasone acetate-betamethasone sodium phosphate injection 6 mg    Chronic bursitis of right shoulder  -     betamethasone acetate-betamethasone sodium phosphate injection 6 mg

## 2020-08-12 ENCOUNTER — OFFICE VISIT (OUTPATIENT)
Dept: PAIN MEDICINE | Facility: CLINIC | Age: 45
End: 2020-08-12
Attending: ANESTHESIOLOGY
Payer: COMMERCIAL

## 2020-08-12 VITALS — BODY MASS INDEX: 25.93 KG/M2 | HEIGHT: 71 IN | OXYGEN SATURATION: 100 % | RESPIRATION RATE: 18 BRPM | WEIGHT: 185.19 LBS

## 2020-08-12 DIAGNOSIS — G24.3 ISOLATED CERVICAL DYSTONIA: Primary | ICD-10-CM

## 2020-08-12 DIAGNOSIS — M62.838 MUSCLE SPASM: ICD-10-CM

## 2020-08-12 PROCEDURE — 64616 CHEMODENERV MUSC NECK DYSTON: CPT | Mod: RT,S$GLB,, | Performed by: ANESTHESIOLOGY

## 2020-08-12 PROCEDURE — 99999 PR PBB SHADOW E&M-EST. PATIENT-LVL III: CPT | Mod: PBBFAC,,, | Performed by: ANESTHESIOLOGY

## 2020-08-12 PROCEDURE — 64616 PR CHEMODENERVATION NECK MUSCLES EXC LARNYNX, UNI: ICD-10-PCS | Mod: RT,S$GLB,, | Performed by: ANESTHESIOLOGY

## 2020-08-12 PROCEDURE — 99499 NO LOS: ICD-10-PCS | Mod: S$GLB,,, | Performed by: ANESTHESIOLOGY

## 2020-08-12 PROCEDURE — 99999 PR PBB SHADOW E&M-EST. PATIENT-LVL III: ICD-10-PCS | Mod: PBBFAC,,, | Performed by: ANESTHESIOLOGY

## 2020-08-12 PROCEDURE — 95874 PR NEEDLE EMG GUIDANCE FOR CHEMODENERVATION: ICD-10-PCS | Mod: S$GLB,,, | Performed by: ANESTHESIOLOGY

## 2020-08-12 PROCEDURE — 99499 UNLISTED E&M SERVICE: CPT | Mod: S$GLB,,, | Performed by: ANESTHESIOLOGY

## 2020-08-12 PROCEDURE — 95874 GUIDE NERV DESTR NEEDLE EMG: CPT | Mod: S$GLB,,, | Performed by: ANESTHESIOLOGY

## 2020-08-12 NOTE — PROGRESS NOTES
Chronic patient Established Note (Follow up visit)          SUBJECTIVE:    Mirian العلي presents to the clinic for a follow-up appointment for right shoulder pain. She had right suprascapular nerve, supraspinatus tendon sheath & subacromial bursa Steroid injection on 8/6/20. She reports that the pain has improved, . Since the last visit, Mirian العلي states the pain has been improving. She says that the pain is mostly in the upper arm especially when she reaches behind her back. Current pain intensity is 3/10.    Pain Disability Index Review:  Last 3 PDI Scores 8/12/2020 5/20/2020 2/19/2020   Pain Disability Index (PDI) 38 44 22       Pain Medications:        Opioid Contract: not applicable     report:  Not applicable    Pain Procedures:  right suprascapular nerve, supraspinatus tendon sheath & subacromial bursa Steroid injection   Botox injection       Physical Therapy/Home Exercise: no    Imaging:    Allergies:   Review of patient's allergies indicates:   Allergen Reactions    Niacin preparations      Patient states she has flushing    Sulfa (sulfonamide antibiotics)      Tongue swelling    Iodine and iodide containing products Rash       Current Medications:   Current Outpatient Medications   Medication Sig Dispense Refill    ALPRAZolam (XANAX) 1 MG tablet Take 1 tablet (1 mg total) by mouth 2 (two) times daily as needed. 60 tablet 1    aspirin (ECOTRIN) 81 MG EC tablet Take 81 mg by mouth once daily.      buPROPion (WELLBUTRIN XL) 300 MG 24 hr tablet Take 1 tablet (300 mg total) by mouth once daily. 90 tablet 1    cetirizine (ZYRTEC) 10 MG tablet Take 1 tablet (10 mg total) by mouth once daily. (Patient taking differently: Take 10 mg by mouth once as needed. ) 30 tablet 2    cyanocobalamin, vitamin B-12, 5,000 mcg Subl Place under the tongue once a week.      DULoxetine (CYMBALTA) 60 MG capsule TK 1 C PO QD  3    estrogens,conjugated,-methyltestosterone 1.25-2.5mg (ESTRATEST)  1.25-2.5 mg per tablet Take 1 tablet by mouth once daily. 30 tablet 5    fluticasone (FLONASE) 50 mcg/actuation nasal spray PLACE 2 SPRAYS IN EACH NOSTRIL ONCE DAILY 1 Bottle 1    FLUZONE QUAD 8611-9423, PF, 60 mcg (15 mcg x 4)/0.5 mL Syrg ADM 0.5ML IM UTD  0    hydrOXYzine (ATARAX) 50 MG tablet Take 1 tablet (50 mg total) by mouth nightly as needed for Itching. 90 tablet 0    ibuprofen (ADVIL,MOTRIN) 600 MG tablet Take 1 tablet (600 mg total) by mouth every 6 (six) hours as needed. 20 tablet 0    PROGESTERONE, MICRONIZED (PROMETRIUM ORAL) Take 300 mg by mouth nightly.      tiZANidine (ZANAFLEX) 4 MG tablet TAKE 1 TABLET(4 MG) BY MOUTH TWICE DAILY AS NEEDED 60 tablet 1     Current Facility-Administered Medications   Medication Dose Route Frequency Provider Last Rate Last Dose    estradiol cypionate 5 mg/mL injection 10 mg  10 mg Intramuscular Q28 Days Genet Stein MD   10 mg at 07/16/19 0850    estradiol cypionate 5 mg/mL injection 15 mg  15 mg Intramuscular Q28 Days Genet Stein MD   15 mg at 02/27/20 0840    estradiol valerate injection 40 mg  40 mg Intramuscular Q30 Days Genet Stein MD           REVIEW OF SYSTEMS:    GENERAL:  No weight loss, malaise or fevers.  HEENT:  Negative for frequent or significant headaches.  NECK:  Negative for lumps, goiter, pain and significant neck swelling.  RESPIRATORY:  Negative for cough, wheezing or shortness of breath.  CARDIOVASCULAR:  Negative for chest pain, leg swelling or palpitations.  GI:  Negative for abdominal discomfort, blood in stools or black stools or change in bowel habits.  MUSCULOSKELETAL:  See HPI.  SKIN:  Negative for lesions, rash, and itching.  PSYCH:  Negative for sleep disturbance, mood disorder and recent psychosocial stressors.  HEMATOLOGY/LYMPHOLOGY:  Negative for prolonged bleeding, bruising easily or swollen nodes.  NEURO:   No history of headaches, syncope, paralysis, seizures or tremors.  All other reviewed and  negative other than HPI.    Past Medical History:  Past Medical History:   Diagnosis Date    AR (allergic rhinitis)     Asthma     Eczema     Endometriosis, mild 2002    Factor V Leiden mutation     Ovarian cyst, bilateral        Past Surgical History:  Past Surgical History:   Procedure Laterality Date    BUNIONECTOMY Left 02/06/2018    HYSTERECTOMY      total    LAPAROSCOPY W/ MINI-LAPAROTOMY  2003    OOPHORECTOMY Right 3/2012    OOPHORECTOMY Left 01/2015    TOTAL VAGINAL HYSTERECTOMY  1/2015    w/Right USO    WISDOM TOOTH EXTRACTION Bilateral        Family History:  Family History   Problem Relation Age of Onset    Cancer Mother     Thyroid disease Mother     Breast cancer Mother         late 60's    Cancer Father     Diabetes Brother     Factor V Leiden deficiency Brother     Allergies Brother     Asthma Brother     Breast cancer Sister 46        inflammatory    Diabetes Maternal Aunt     Diabetes Paternal Uncle     Cancer Cousin     Breast cancer Paternal Aunt        Social History:  Social History     Socioeconomic History    Marital status:      Spouse name: Not on file    Number of children: 2    Years of education: Not on file    Highest education level: Not on file   Occupational History    Not on file   Social Needs    Financial resource strain: Not on file    Food insecurity     Worry: Not on file     Inability: Not on file    Transportation needs     Medical: Not on file     Non-medical: Not on file   Tobacco Use    Smoking status: Never Smoker    Smokeless tobacco: Never Used   Substance and Sexual Activity    Alcohol use: Yes     Alcohol/week: 5.0 - 6.0 standard drinks     Types: 5 - 6 Glasses of wine per week     Frequency: 2-3 times a week     Comment: week    Drug use: No    Sexual activity: Yes     Partners: Male     Birth control/protection: Other-see comments, Post-menopausal   Lifestyle    Physical activity     Days per week: Not on file      "Minutes per session: Not on file    Stress: Not at all   Relationships    Social connections     Talks on phone: Not on file     Gets together: Not on file     Attends Rastafari service: Not on file     Active member of club or organization: Not on file     Attends meetings of clubs or organizations: Not on file     Relationship status: Not on file   Other Topics Concern    Not on file   Social History Narrative    Not on file       OBJECTIVE:    Resp 18   Ht 5' 11" (1.803 m)   Wt 84 kg (185 lb 3 oz)   LMP 02/01/2014   SpO2 100%   BMI 25.83 kg/m²     PHYSICAL EXAMINATION:    General appearance: Well appearing, in no acute distress, alert and oriented x3.  Psych:  Mood and affect appropriate.  Skin: Skin color, texture, turgor normal, no rashes or lesions, in both upper and lower body.  Head/face:  Atraumatic, normocephalic. No palpable lymph nodes  Neck: No pain to palpation over the cervical paraspinous muscles. Spurling Negative. No pain with neck flexion, extension, or lateral flexion. .  Cor: RRR  Pulm: CTA  GI: Abdomen soft and non-tender.  Back: Straight leg raising in the sitting and supine positions is negative to radicular pain. No pain to palpation over the spine or costovertebral angles. Normal range of motion without pain reproduction.  Extremities: Peripheral joint ROM is full and pain free without obvious instability or laxity in all four extremities. No deformities, edema, or skin discoloration. Good capillary refill.  Musculoskeletal: Right shoulder Palafox, Neer's positive. Bilateral upper and lower extremity strength is normal and symmetric.  No atrophy or tone abnormalities are noted.  Neuro: Bilateral upper and lower extremity coordination and muscle stretch reflexes are physiologic and symmetric.  Plantar response are downgoing. No loss of sensation is noted.  Gait: Normal.    ASSESSMENT: 44 y.o. year old female with right shoulder and right neck pain, consistent with     1. Isolated " cervical dystonia  Prior authorization Order    onabotulinumtoxina injection 300 Units   2. Muscle spasm  Prior authorization Order    onabotulinumtoxina injection 300 Units         PLAN:   1. Procedure: Under clean technique, EMG guidance after discussing the patient's 300 units of Botox were used.  We injected the splenius capitis, longismus, upper trapezius, levator scapula, and sternocleidomastoid.  She tolerated procedure well.  2. RTC in 3 months.  3. Consider referral to orthopedics for right shoulder evaluation if no improvement in 1 month.  4. Counseled patient regarding the importance of activity modification.    Procedure      The above plan and management options were discussed at length with patient. Patient is in agreement with the above and verbalized understanding.    Don Tobin MD fellow  08/12/2020  I have personally taken the history and examined this patient and agree with the fellow's note as stated above.

## 2020-08-17 ENCOUNTER — OFFICE VISIT (OUTPATIENT)
Dept: PRIMARY CARE CLINIC | Facility: CLINIC | Age: 45
End: 2020-08-17
Attending: FAMILY MEDICINE
Payer: COMMERCIAL

## 2020-08-17 ENCOUNTER — TELEPHONE (OUTPATIENT)
Dept: PRIMARY CARE CLINIC | Facility: CLINIC | Age: 45
End: 2020-08-17

## 2020-08-17 DIAGNOSIS — F41.9 ANXIETY: ICD-10-CM

## 2020-08-17 DIAGNOSIS — F41.9 ANXIETY: Primary | ICD-10-CM

## 2020-08-17 DIAGNOSIS — R07.1 CHEST PAIN ON BREATHING: ICD-10-CM

## 2020-08-17 PROCEDURE — 99214 OFFICE O/P EST MOD 30 MIN: CPT | Mod: 95,,, | Performed by: FAMILY MEDICINE

## 2020-08-17 PROCEDURE — 99214 PR OFFICE/OUTPT VISIT, EST, LEVL IV, 30-39 MIN: ICD-10-PCS | Mod: 95,,, | Performed by: FAMILY MEDICINE

## 2020-08-17 RX ORDER — ALPRAZOLAM 1 MG/1
1 TABLET ORAL 2 TIMES DAILY PRN
Qty: 60 TABLET | Refills: 1 | Status: SHIPPED | OUTPATIENT
Start: 2020-08-17 | End: 2021-03-17 | Stop reason: SDUPTHER

## 2020-08-18 NOTE — PROGRESS NOTES
Subjective:       Patient ID: Mirian العلي is a 44 y.o. female.    Chief Complaint: No chief complaint on file.    Pt presents today for RF of her anxiety med, which she was taking sparingly. But, since death of sister from DCIS and an upcoming family wedding, pt is feeling very overwhelmed.  Does note that she has been taking xanax more and is receptive to other options. Is also on cymbalta and wellbutrin for pain and anxiety. Pt also wants med for sleep. States that atarax did not help her   Did have labs with external rheum. Will send to us once avail  Pt also c/o sore throat. Neg n/v/d/HA/rash/f/c/sob        Medication Refill  Associated symptoms include arthralgias, joint swelling, myalgias, neck pain, numbness and a sore throat. Pertinent negatives include no abdominal pain, chest pain, chills, congestion, coughing, diaphoresis, fatigue, fever, headaches, nausea, vomiting or weakness.   Sore Throat   Associated symptoms include neck pain. Pertinent negatives include no abdominal pain, congestion, coughing, diarrhea, ear pain, headaches, shortness of breath, trouble swallowing or vomiting.     Review of Systems   Constitutional: Positive for activity change. Negative for appetite change, chills, diaphoresis, fatigue, fever and unexpected weight change.   HENT: Positive for sore throat. Negative for congestion, ear pain, hearing loss, rhinorrhea, sinus pressure and trouble swallowing.    Eyes: Negative for photophobia, pain, discharge and visual disturbance.   Respiratory: Negative for apnea, cough, chest tightness, shortness of breath and wheezing.    Cardiovascular: Negative for chest pain, palpitations and leg swelling.   Gastrointestinal: Negative for abdominal distention, abdominal pain, blood in stool, constipation, diarrhea, nausea and vomiting.   Endocrine: Negative for polydipsia and polyuria.   Genitourinary: Negative.  Negative for difficulty urinating, dysuria, hematuria and menstrual  problem.   Musculoskeletal: Positive for arthralgias, back pain, joint swelling, myalgias, neck pain and neck stiffness. Negative for gait problem.   Skin: Negative.    Neurological: Positive for numbness. Negative for dizziness, tremors, weakness and headaches.   Psychiatric/Behavioral: Positive for dysphoric mood and sleep disturbance. Negative for behavioral problems, confusion, decreased concentration, hallucinations, self-injury and suicidal ideas. The patient is nervous/anxious. The patient is not hyperactive.    All other systems reviewed and are negative.      Objective:      Physical Exam  Constitutional:       Appearance: She is well-developed.   HENT:      Head: Normocephalic and atraumatic.   Eyes:      Pupils: Pupils are equal, round, and reactive to light.   Neck:      Musculoskeletal: Neck supple.   Pulmonary:      Effort: Pulmonary effort is normal.   Musculoskeletal: Normal range of motion.   Neurological:      Mental Status: She is oriented to person, place, and time.   Psychiatric:         Behavior: Behavior normal.         Thought Content: Thought content normal.         Judgment: Judgment normal.         Assessment:       anxiety  Plan:       Anxiety and neuro pain in feet and neck  Pt is carrier for factor 5 leiden  RF xanax.  ED prompts d/ w pt  Se's of med d/w pt  Atarax for sleep at night do not take xanax at same time      RTC 6 mos      The patient location is: HOME  The chief complaint leading to consultation is: anxiety    Visit type: audiovisual    Face to Face time with patient: 10 mins  15 minutes of total time spent on the encounter, which includes face to face time and non-face to face time preparing to see the patient (eg, review of tests), Obtaining and/or reviewing separately obtained history, Documenting clinical information in the electronic or other health record, Independently interpreting results (not separately reported) and communicating results to the  patient/family/caregiver, or Care coordination (not separately reported).         Each patient to whom he or she provides medical services by telemedicine is:  (1) informed of the relationship between the physician and patient and the respective role of any other health care provider with respect to management of the patient; and (2) notified that he or she may decline to receive medical services by telemedicine and may withdraw from such care at any time.    Notes:

## 2020-08-19 ENCOUNTER — TELEPHONE (OUTPATIENT)
Dept: OBSTETRICS AND GYNECOLOGY | Facility: CLINIC | Age: 45
End: 2020-08-19

## 2020-08-19 ENCOUNTER — PATIENT MESSAGE (OUTPATIENT)
Dept: PRIMARY CARE CLINIC | Facility: CLINIC | Age: 45
End: 2020-08-19

## 2020-08-19 NOTE — TELEPHONE ENCOUNTER
----- Message from Sabina Reich sent at 8/19/2020  9:42 AM CDT -----  Contact: RENAE MONK [[id number]]  Name of Who is Calling: RENAE MONK [[id number]]      What is the request in detail: Patient would like orders for bilateral breast MRI. Please call      Can the clinic reply by [facility]: no      What Number to Call Back if not in [facility]: [phone]

## 2020-09-15 ENCOUNTER — PATIENT OUTREACH (OUTPATIENT)
Dept: ADMINISTRATIVE | Facility: OTHER | Age: 45
End: 2020-09-15

## 2020-09-16 ENCOUNTER — OFFICE VISIT (OUTPATIENT)
Dept: SPORTS MEDICINE | Facility: CLINIC | Age: 45
End: 2020-09-16
Payer: COMMERCIAL

## 2020-09-16 ENCOUNTER — HOSPITAL ENCOUNTER (OUTPATIENT)
Dept: RADIOLOGY | Facility: HOSPITAL | Age: 45
Discharge: HOME | End: 2020-09-16
Attending: ORTHOPAEDIC SURGERY
Payer: COMMERCIAL

## 2020-09-16 VITALS
SYSTOLIC BLOOD PRESSURE: 118 MMHG | WEIGHT: 181.5 LBS | BODY MASS INDEX: 25.41 KG/M2 | HEIGHT: 71 IN | HEART RATE: 91 BPM | DIASTOLIC BLOOD PRESSURE: 88 MMHG

## 2020-09-16 DIAGNOSIS — M25.511 RIGHT SHOULDER PAIN, UNSPECIFIED CHRONICITY: ICD-10-CM

## 2020-09-16 DIAGNOSIS — M25.511 RIGHT SHOULDER PAIN, UNSPECIFIED CHRONICITY: Primary | ICD-10-CM

## 2020-09-16 PROCEDURE — 73030 X-RAY EXAM OF SHOULDER: CPT | Mod: 26,RT,, | Performed by: RADIOLOGY

## 2020-09-16 PROCEDURE — 99214 OFFICE O/P EST MOD 30 MIN: CPT | Mod: S$GLB,,, | Performed by: ORTHOPAEDIC SURGERY

## 2020-09-16 PROCEDURE — 99999 PR PBB SHADOW E&M-EST. PATIENT-LVL IV: ICD-10-PCS | Mod: PBBFAC,,, | Performed by: ORTHOPAEDIC SURGERY

## 2020-09-16 PROCEDURE — 3008F BODY MASS INDEX DOCD: CPT | Mod: CPTII,S$GLB,, | Performed by: ORTHOPAEDIC SURGERY

## 2020-09-16 PROCEDURE — 73030 X-RAY EXAM OF SHOULDER: CPT | Mod: TC,RT

## 2020-09-16 PROCEDURE — 3008F PR BODY MASS INDEX (BMI) DOCUMENTED: ICD-10-PCS | Mod: CPTII,S$GLB,, | Performed by: ORTHOPAEDIC SURGERY

## 2020-09-16 PROCEDURE — 73030 XR SHOULDER COMPLETE 2 OR MORE VIEWS RIGHT: ICD-10-PCS | Mod: 26,RT,, | Performed by: RADIOLOGY

## 2020-09-16 PROCEDURE — 99214 PR OFFICE/OUTPT VISIT, EST, LEVL IV, 30-39 MIN: ICD-10-PCS | Mod: S$GLB,,, | Performed by: ORTHOPAEDIC SURGERY

## 2020-09-16 PROCEDURE — 99999 PR PBB SHADOW E&M-EST. PATIENT-LVL IV: CPT | Mod: PBBFAC,,, | Performed by: ORTHOPAEDIC SURGERY

## 2020-09-16 NOTE — PROGRESS NOTES
CC: right shoulder pain   Referred to me by Dr. Purdy     44 y.o. Female RHD. She is an  at a non-profit. Reports that the pain is severe and not responding to any conservative care.    Here for 2 month follow-up. Has gotten EMG and seen .  Negative EMG no concern for TOS.  Had some therapy in the past that was helpful.  5 weeks prior had Subacromial CSI with minimal relief of symptoms.  Still notes pain with shoulder abduction and abduction and internal rotation.      Pt does note she sees a Rheumatologist.  Has raynauds and SHRAVAN + on lab exam. Her rheumatologist is monitoring her thyroid as well but not on any medication.   MR from 1 year prior reported tendinosis with decreased axillary pouch.     SANE:  2    Previously:     In 2013 she was lifting a suitcase overhead and felt a sharp pain in her neck   She came home and had an MRI that looked normal and she had steroid injections and therapy with mild relief   She got a second opinion from another Orthopaedist and gave her more injections and therapy with mild relief   She switched her PCP who then referred her to Dr. Hewitt.  Dr. Hewitt ordered her an MRI on June 25. He followed this with a bursa injection   Her pain went away for one day and then her pain came right back    She states that her pain is almost constant but does go away at times   She states that nothing specifically makes the pain worse it just happens at times   She states that she has numbness that radiates down her arm   She states that most of her pain is in her upper trap region   She states that she has weakness with prolonged activites     She reports that the pain is worse with overhead activity. It also bothers her at night.    Is affecting ADLs.     Review of Systems   Constitution: Negative. Negative for chills, fever and night sweats.   HENT: Negative for congestion and headaches.    Eyes: Negative for blurred vision, left vision loss and right vision loss.    Cardiovascular: Negative for chest pain and syncope.   Respiratory: Negative for cough and shortness of breath.    Endocrine: Negative for polydipsia, polyphagia and polyuria.   Hematologic/Lymphatic: Negative for bleeding problem. Does not bruise/bleed easily.   Skin: Negative for dry skin, itching and rash.   Musculoskeletal: Negative for falls and muscle weakness.   Gastrointestinal: Negative for abdominal pain and bowel incontinence.   Genitourinary: Negative for bladder incontinence and nocturia.   Neurological: Negative for disturbances in coordination, loss of balance and seizures.   Psychiatric/Behavioral: Negative for depression. The patient does not have insomnia.    Allergic/Immunologic: Negative for hives and persistent infections.     PAST MEDICAL HISTORY:   Past Medical History:   Diagnosis Date    AR (allergic rhinitis)     Asthma     Eczema     Endometriosis, mild 2002    Factor V Leiden mutation     Ovarian cyst, bilateral      PAST SURGICAL HISTORY:   Past Surgical History:   Procedure Laterality Date    BUNIONECTOMY Left 02/06/2018    HYSTERECTOMY      total    LAPAROSCOPY W/ MINI-LAPAROTOMY  2003    OOPHORECTOMY Right 3/2012    OOPHORECTOMY Left 01/2015    TOTAL VAGINAL HYSTERECTOMY  1/2015    w/Right USO    WISDOM TOOTH EXTRACTION Bilateral      FAMILY HISTORY:   Family History   Problem Relation Age of Onset    Cancer Mother     Thyroid disease Mother     Breast cancer Mother         late 60's    Cancer Father     Diabetes Brother     Factor V Leiden deficiency Brother     Allergies Brother     Asthma Brother     Breast cancer Sister 46        inflammatory    Diabetes Maternal Aunt     Diabetes Paternal Uncle     Cancer Cousin     Breast cancer Paternal Aunt      SOCIAL HISTORY:   Social History     Socioeconomic History    Marital status:      Spouse name: Not on file    Number of children: 2    Years of education: Not on file    Highest education level:  Not on file   Occupational History    Not on file   Social Needs    Financial resource strain: Not on file    Food insecurity     Worry: Not on file     Inability: Not on file    Transportation needs     Medical: Not on file     Non-medical: Not on file   Tobacco Use    Smoking status: Never Smoker    Smokeless tobacco: Never Used   Substance and Sexual Activity    Alcohol use: Yes     Alcohol/week: 5.0 - 6.0 standard drinks     Types: 5 - 6 Glasses of wine per week     Frequency: 2-3 times a week     Comment: week    Drug use: No    Sexual activity: Yes     Partners: Male     Birth control/protection: Other-see comments, Post-menopausal   Lifestyle    Physical activity     Days per week: Not on file     Minutes per session: Not on file    Stress: Not at all   Relationships    Social connections     Talks on phone: Not on file     Gets together: Not on file     Attends Spiritism service: Not on file     Active member of club or organization: Not on file     Attends meetings of clubs or organizations: Not on file     Relationship status: Not on file   Other Topics Concern    Not on file   Social History Narrative    Not on file       MEDICATIONS:   Current Outpatient Medications:     ALPRAZolam (XANAX) 1 MG tablet, Take 1 tablet (1 mg total) by mouth 2 (two) times daily as needed., Disp: 60 tablet, Rfl: 1    aspirin (ECOTRIN) 81 MG EC tablet, Take 81 mg by mouth once daily., Disp: , Rfl:     buPROPion (WELLBUTRIN XL) 300 MG 24 hr tablet, Take 1 tablet (300 mg total) by mouth once daily., Disp: 90 tablet, Rfl: 1    cetirizine (ZYRTEC) 10 MG tablet, Take 1 tablet (10 mg total) by mouth once daily. (Patient taking differently: Take 10 mg by mouth once as needed. ), Disp: 30 tablet, Rfl: 2    cyanocobalamin, vitamin B-12, 5,000 mcg Subl, Place under the tongue once a week., Disp: , Rfl:     DULoxetine (CYMBALTA) 60 MG capsule, TK 1 C PO QD, Disp: , Rfl: 3    estrogens,conjugated,-methyltestosterone  "1.25-2.5mg (ESTRATEST) 1.25-2.5 mg per tablet, Take 1 tablet by mouth once daily., Disp: 30 tablet, Rfl: 5    fluticasone (FLONASE) 50 mcg/actuation nasal spray, PLACE 2 SPRAYS IN EACH NOSTRIL ONCE DAILY, Disp: 1 Bottle, Rfl: 1    FLUZONE QUAD 0247-4131, PF, 60 mcg (15 mcg x 4)/0.5 mL Syrg, ADM 0.5ML IM UTD, Disp: , Rfl: 0    hydrOXYzine (ATARAX) 50 MG tablet, Take 1 tablet (50 mg total) by mouth nightly as needed for Itching., Disp: 90 tablet, Rfl: 0    ibuprofen (ADVIL,MOTRIN) 600 MG tablet, Take 1 tablet (600 mg total) by mouth every 6 (six) hours as needed., Disp: 20 tablet, Rfl: 0    PROGESTERONE, MICRONIZED (PROMETRIUM ORAL), Take 300 mg by mouth nightly., Disp: , Rfl:     tiZANidine (ZANAFLEX) 4 MG tablet, TAKE 1 TABLET(4 MG) BY MOUTH TWICE DAILY AS NEEDED, Disp: 60 tablet, Rfl: 1    Current Facility-Administered Medications:     estradiol cypionate 5 mg/mL injection 10 mg, 10 mg, Intramuscular, Q28 Days, Genet Stein MD, 10 mg at 07/16/19 0850    estradiol cypionate 5 mg/mL injection 15 mg, 15 mg, Intramuscular, Q28 Days, Genet Stein MD, 15 mg at 02/27/20 0840    estradiol valerate injection 40 mg, 40 mg, Intramuscular, Q30 Days, Genet Stein MD  ALLERGIES:   Review of patient's allergies indicates:   Allergen Reactions    Niacin preparations      Patient states she has flushing    Sulfa (sulfonamide antibiotics)      Tongue swelling    Iodine and iodide containing products Rash       VITAL SIGNS: /88   Pulse 91   Ht 5' 11" (1.803 m)   Wt 82.3 kg (181 lb 8 oz)   LMP 02/01/2014   BMI 25.31 kg/m²      PHYSICAL EXAMINATION:  General:  The patient is alert and oriented x 3.  Mood is pleasant.  Observation of ears, eyes and nose reveal no gross abnormalities.  No labored breathing observed.  Gait is coordinated. Patient can toe walk and heel walk without difficulty.      right Shoulder / Upper Extremity Exam    OBSERVATION:  "    Swelling  none  Deformity  none   Discoloration  none   Scapular winging none   Scars   none  Atrophy  none    TENDERNESS / CREPITUS (T/C):          T/C      T/C   Clavicle   -/-  SUPRAspinatus    -/-     AC Jt.    -/-  INFRAspinatus  -/-    SC Jt.    -/-  Deltoid    -/-      G. Tuberosity  -/-  LH BICEP groove  +/-   Acromion:  -/-  Midline Neck   -/-     Scapular Spine -/-  Trapezium   -/-   SMA Scapula  -/-  GH jt. line - post  -/-     Scapulothoracic  -/-         ROM: (* = with pain)  Right shoulder   Left shoulder        AROM (PROM)   AROM (PROM)   FE    170° (175°)     170° (175°)     ER at 0°    60°  (65°)    60°  (65°)   ER at 90° ABD  90°  (90°)    90°  (90°)   IR at 90°  ABD   NA  (40°)     NA  (40°)      IR (spine level)   T10     T10    STRENGTH: (* = with pain) RIGHT SHOULDER  LEFT SHOULDER   SCAPTION at 0  5/5*    5/5   SCAPTION at 30  5/5*    5/5    IR    5/5*    5/5   ER    5/5*    5/5   BICEPS   5/5    5/5   Deltoid    5/5    5/5     SIGNS:  Painful side       NEER   -    OARLETHS  -    FAITH   -    SPEEDS  neg     DROP ARM   neg   BELLY PRESS neg   Superior escape none    LIFT-OFF  neg   X-Body ADD    neg    MOVING VALGUS neg        STABILITY TESTING    RIGHT SHOULDER   LEFT SHOULDER     Translation     Anterior  up face     up face    Posterior  up face    up face    Sulcus   < 10mm    < 10 mm     Signs   Apprehension   neg      neg       Relocation   no change     no change      Jerk test  neg     neg    EXTREMITY NEURO-VASCULAR EXAM    Sensation grossly intact to light touch all dermatomal regions.    DTR 2+ Biceps, Triceps, BR and Negative Elizs sign   Grossly intact motor function at Elbow, Wrist and Hand   Distal pulses radial and ulnar 2+, brisk cap refill, symmetric.      NECK:  Painless FROM and spinous processes non-tender. Negative Spurlings sign.      OTHER FINDINGS:  Mild Scapular dyskinesia  pec minor NTTP.       XRAYS:  Shoulder trauma series right,  were obtained  and reviewed  No convincing fracture or dislocation is noted. The osseous structures appear well mineralized and well aligned      ASSESSMENT:   Right:    1. Adhesive capsulitis.     PLAN:      Restart physical therapy With [facility].   Intra-articular CSI in 3 months if not better  F/u in 3 months.       All questions were answered, pt will contact us for questions or concerns in the interim.

## 2020-10-01 ENCOUNTER — TELEPHONE (OUTPATIENT)
Dept: OBSTETRICS AND GYNECOLOGY | Facility: CLINIC | Age: 45
End: 2020-10-01

## 2020-10-01 ENCOUNTER — HOSPITAL ENCOUNTER (OUTPATIENT)
Dept: RADIOLOGY | Facility: HOSPITAL | Age: 45
Discharge: HOME | End: 2020-10-01
Attending: OBSTETRICS & GYNECOLOGY
Payer: COMMERCIAL

## 2020-10-01 DIAGNOSIS — Z91.89 AT HIGH RISK FOR BREAST CANCER: Primary | ICD-10-CM

## 2020-10-01 DIAGNOSIS — Z91.89 AT HIGH RISK FOR BREAST CANCER: ICD-10-CM

## 2020-10-01 PROCEDURE — 77049 MRI BREAST C-+ W/CAD BI: CPT | Mod: 26,,, | Performed by: RADIOLOGY

## 2020-10-01 PROCEDURE — 25500020 PHARM REV CODE 255: Performed by: OBSTETRICS & GYNECOLOGY

## 2020-10-01 PROCEDURE — 77049 MRI BREAST C-+ W/CAD BI: CPT | Mod: TC

## 2020-10-01 PROCEDURE — A9577 INJ MULTIHANCE: HCPCS | Performed by: OBSTETRICS & GYNECOLOGY

## 2020-10-01 PROCEDURE — 77049 MRI BREAST W/WO CONTRAST, W/CAD, BILATERAL: ICD-10-PCS | Mod: 26,,, | Performed by: RADIOLOGY

## 2020-10-01 RX ADMIN — GADOBENATE DIMEGLUMINE 18 ML: 529 INJECTION, SOLUTION INTRAVENOUS at 04:10

## 2020-10-01 NOTE — TELEPHONE ENCOUNTER
----- Message from Yaquelin Britton sent at 10/1/2020 12:11 PM CDT -----  Regarding: Patient Advice  Name of Who is Calling:  Mirain العلي    What is the request in detail:   Patient called requesting to schedule her annual Breast MRI.  Please put the orders in at your earliest convenience and further advise      Reply by [facility]: no      Call Back: [phone]

## 2020-10-04 ENCOUNTER — PATIENT MESSAGE (OUTPATIENT)
Dept: OBSTETRICS AND GYNECOLOGY | Facility: CLINIC | Age: 45
End: 2020-10-04

## 2020-10-05 DIAGNOSIS — Z78.0 MENOPAUSE: Primary | ICD-10-CM

## 2020-10-05 RX ORDER — ESTERIFIED ESTROGEN AND METHYLTESTOSTERONE 1.25; 2.5 MG/1; MG/1
1 TABLET ORAL DAILY
Qty: 30 TABLET | Refills: 1 | Status: SHIPPED | OUTPATIENT
Start: 2020-10-05 | End: 2020-11-02 | Stop reason: ALTCHOICE

## 2020-10-06 ENCOUNTER — OFFICE VISIT (OUTPATIENT)
Dept: URGENT CARE | Facility: CLINIC | Age: 45
End: 2020-10-06
Payer: COMMERCIAL

## 2020-10-06 VITALS
BODY MASS INDEX: 25.34 KG/M2 | DIASTOLIC BLOOD PRESSURE: 90 MMHG | OXYGEN SATURATION: 100 % | WEIGHT: 181 LBS | HEART RATE: 108 BPM | TEMPERATURE: 98 F | HEIGHT: 71 IN | SYSTOLIC BLOOD PRESSURE: 137 MMHG | RESPIRATION RATE: 18 BRPM

## 2020-10-06 DIAGNOSIS — J32.9 SINUSITIS, UNSPECIFIED CHRONICITY, UNSPECIFIED LOCATION: Primary | ICD-10-CM

## 2020-10-06 DIAGNOSIS — J02.9 SORE THROAT: ICD-10-CM

## 2020-10-06 DIAGNOSIS — J30.9 ALLERGIC RHINITIS, UNSPECIFIED SEASONALITY, UNSPECIFIED TRIGGER: ICD-10-CM

## 2020-10-06 LAB
CTP QC/QA: YES
SARS-COV-2 RDRP RESP QL NAA+PROBE: NEGATIVE

## 2020-10-06 PROCEDURE — U0002 COVID-19 LAB TEST NON-CDC: HCPCS | Mod: QW,S$GLB,, | Performed by: FAMILY MEDICINE

## 2020-10-06 PROCEDURE — 99213 OFFICE O/P EST LOW 20 MIN: CPT | Mod: S$GLB,,, | Performed by: FAMILY MEDICINE

## 2020-10-06 PROCEDURE — 99213 PR OFFICE/OUTPT VISIT, EST, LEVL III, 20-29 MIN: ICD-10-PCS | Mod: S$GLB,,, | Performed by: FAMILY MEDICINE

## 2020-10-06 PROCEDURE — U0002: ICD-10-PCS | Mod: QW,S$GLB,, | Performed by: FAMILY MEDICINE

## 2020-10-06 RX ORDER — PREDNISONE 20 MG/1
40 TABLET ORAL DAILY
Qty: 6 TABLET | Refills: 0 | Status: SHIPPED | OUTPATIENT
Start: 2020-10-06 | End: 2020-10-09

## 2020-10-06 RX ORDER — INFLUENZA A VIRUS A/VICTORIA/2454/2019 IVR-207 (H1N1) ANTIGEN (PROPIOLACTONE INACTIVATED), INFLUENZA A VIRUS A/HONG KONG/2671/2019 IVR-208 (H3N2) ANTIGEN (PROPIOLACTONE INACTIVATED), INFLUENZA B VIRUS B/VICTORIA/705/2018 BVR-11 ANTIGEN (PROPIOLACTONE INACTIVATED), INFLUENZA B VIRUS B/PHUKET/3073/2013 BVR-1B ANTIGEN (PROPIOLACTONE INACTIVATED) 15; 15; 15; 15 UG/.5ML; UG/.5ML; UG/.5ML; UG/.5ML
INJECTION, SUSPENSION INTRAMUSCULAR
COMMUNITY
Start: 2020-09-27 | End: 2021-03-23

## 2020-10-06 RX ORDER — HYDROXYCHLOROQUINE SULFATE 200 MG/1
TABLET, FILM COATED ORAL
COMMUNITY
Start: 2020-09-27

## 2020-10-07 DIAGNOSIS — Z91.89 INCREASED RISK OF BREAST CANCER: Primary | ICD-10-CM

## 2020-10-07 NOTE — PROGRESS NOTES
"Subjective:       Patient ID: Mirian العلي is a 44 y.o. female.    Vitals:  height is 5' 11" (1.803 m) and weight is 82.1 kg (181 lb). Her temperature is 98.4 °F (36.9 °C). Her blood pressure is 137/90 (abnormal) and her pulse is 108. Her respiration is 18 and oxygen saturation is 100%.     Chief Complaint: Sinus Problem    44-year-old female with history of allergic rhinitis.  Four day history irritated throat, headache, throat congestion, posterior nasal drainage, aches and T-max 99.3°.  Occasional colored drainage although mostly clear.  She has been using infrequent Benadryl and Flonase.  No known exposures to COVID.  Wishes COVID testing.        Sinus Problem  This is a new problem. The problem is unchanged. There has been no fever. The pain is mild. Associated symptoms include congestion, coughing, headaches and a sore throat. Pertinent negatives include no chills or shortness of breath. Past treatments include acetaminophen. The treatment provided no relief.       Constitution: Positive for appetite change and fatigue. Negative for chills and fever.   HENT: Positive for congestion and sore throat.    Neck: Negative for painful lymph nodes.   Cardiovascular: Negative for chest pain and leg swelling.   Eyes: Negative for double vision and blurred vision.   Respiratory: Positive for cough. Negative for shortness of breath.    Gastrointestinal: Negative for nausea, vomiting and diarrhea.   Genitourinary: Negative for dysuria, frequency, urgency and history of kidney stones.   Musculoskeletal: Negative for joint pain, joint swelling, muscle cramps and muscle ache.   Skin: Negative for color change, pale, rash and bruising.   Allergic/Immunologic: Negative for seasonal allergies.   Neurological: Positive for headaches. Negative for dizziness, history of vertigo, light-headedness and passing out.   Hematologic/Lymphatic: Negative for swollen lymph nodes.   Psychiatric/Behavioral: Negative for " nervous/anxious, sleep disturbance and depression. The patient is not nervous/anxious.        Objective:      Physical Exam   Constitutional: She is oriented to person, place, and time. She appears well-developed. She is cooperative.  Non-toxic appearance. She does not appear ill. No distress.   HENT:   Head: Normocephalic and atraumatic.      Comments: Sinuses nontender.  Ears:   Right Ear: Hearing, external ear and ear canal normal.   Left Ear: Hearing, external ear and ear canal normal.      Comments: Serous changes, bilateral TMs.  No injection.  Nose: Nose normal. No mucosal edema, rhinorrhea or nasal deformity. No epistaxis. Right sinus exhibits no maxillary sinus tenderness and no frontal sinus tenderness. Left sinus exhibits no maxillary sinus tenderness and no frontal sinus tenderness.   Mouth/Throat: Uvula is midline, oropharynx is clear and moist and mucous membranes are normal. No trismus in the jaw. Normal dentition. No uvula swelling. No oropharyngeal exudate, posterior oropharyngeal edema or posterior oropharyngeal erythema.      Comments: Pharynx with cobblestoning.  Eyes: Conjunctivae and lids are normal. No scleral icterus.   Neck: Full passive range of motion without pain and phonation normal. Neck supple. No neck rigidity. No edema and no erythema present.   Cardiovascular: Normal rate, regular rhythm, normal heart sounds and normal pulses.   Pulmonary/Chest: Effort normal and breath sounds normal. No stridor. No respiratory distress. She has no decreased breath sounds. She has no wheezes. She has no rhonchi. She has no rales.   Abdominal: Normal appearance.   Musculoskeletal: Normal range of motion.         General: No deformity.   Lymphadenopathy:     She has no cervical adenopathy.   Neurological: She is alert and oriented to person, place, and time. She exhibits normal muscle tone. Coordination normal.   Skin: Skin is warm, dry, intact, not diaphoretic and not pale. Psychiatric: Her speech is  normal and behavior is normal. Judgment and thought content normal.   Nursing note and vitals reviewed.        Results for orders placed or performed in visit on 10/06/20   POCT COVID-19 Rapid Screening   Result Value Ref Range    POC Rapid COVID Negative Negative     Acceptable Yes      Assessment:       1. Sinusitis, unspecified chronicity, unspecified location    2. Sore throat    3. Allergic rhinitis, unspecified seasonality, unspecified trigger        Plan:         Sinusitis, unspecified chronicity, unspecified location  -     predniSONE (DELTASONE) 20 MG tablet; Take 2 tablets (40 mg total) by mouth once daily. for 3 days  Dispense: 6 tablet; Refill: 0    Sore throat  -     POCT COVID-19 Rapid Screening    Allergic rhinitis, unspecified seasonality, unspecified trigger    TRY ZYRTEC OR CLARITIN OR ALLEGRA (OR GENERIC FOR THESE) DAILY AS NEEDED.    TRY USING THE FLONASE ON A REGULAR BASIS DURING DIFFICULT TIMES TO KEEP YOU FROM HAVING TO COME IN FOR A STEROID SHOT OR PILLS OR FROM DEVELOPING A SINUSITIS OR EAR INFECTION.      Make sure that you follow up with your primary care doctor in the next 2-5 days if needed .  Return to the Urgent Care if signs or symptoms change and certainly if you have worsening symptoms go to the nearest emergency department for further evaluation.

## 2020-10-07 NOTE — PATIENT INSTRUCTIONS
Understanding Nasal Allergies  Nasal allergies (also called allergic rhinitis) are a common health problem. They may be seasonal. This means they cause symptoms only at certain times of the year. Or they may be perennial. This means they cause symptoms all year long. Other health problems, such as asthma, often occur along with allergies as well.    What is an allergic reaction?  An allergy is a reaction to a substance called an allergen. Common allergens include:  · Wind-borne pollen  · Mold  · Dust mites  · Furry and feathered animals  · Cockroaches  Normally, allergens are harmless. But when a person has allergies, the body thinks they are harmful. The body then attacks allergens with antibodies. Antibodies are attached to special cells called mast cells. Allergens stick to the antibodies. This makes the mast cells release histamine and other chemicals. This is an allergic reaction. The chemicals irritate nearby nasal tissue. This causes nasal allergy symptoms.  Common nasal allergy symptoms  Allergies can cause nasal tissue to swell. This makes the air passages smaller. The nose may feel stuffed up. The nose may also make extra mucus, which can plug the nasal passages or drip out of the nose. Mucus can drip down the back of the throat (postnasal drip) as well. Sinus tissue can swell. This may cause pain and headache. Common allergy symptoms include:  · Runny nose with clear, watery discharge  · Stuffy nose (nasal congestion)  · Drainage down your throat (postnasal drip)  · Sneezing  · Red, watery eyes  · Itchy nose, eyes, ears, and throat  · Plugged-up ears (ear congestion)  · Sore throat  · Coughing  · Sinus pain and swelling  · Headache  It may not be allergies  Other health problems can cause symptoms like those of nasal allergies. These include:  · Nonallergic rhinitis and viruses such as colds  · Irritants and pollutants, such as strong odors or smoke  · Certain medicines  · Changes in the weather    Treatment  Your healthcare provider will evaluate you to find the cause of your symptoms then recommend treatment. If your symptoms are due to nasal allergies, your healthcare provider may prescribe nasal steroid sprays or oral antihistamines to help reduce symptoms. Avoidance of the allergen will also be suggested. You may also be referred to an allergist.   Date Last Reviewed: 10/1/2016  © 2575-8104 [employer]. [address] [place] [address], [place] [address]. All rights reserved. This information is not intended as a substitute for professional medical care. Always follow your healthcare professional's instructions.      TRY ZYRTEC OR CLARITIN OR ALLEGRA (OR GENERIC FOR THESE) DAILY AS NEEDED.    TRY USING THE FLONASE ON A REGULAR BASIS DURING DIFFICULT TIMES TO KEEP YOU FROM HAVING TO COME IN FOR A STEROID SHOT OR PILLS OR FROM DEVELOPING A SINUSITIS OR EAR INFECTION.      Make sure that you follow up with your primary care doctor in the next 2-5 days if needed .  Return to the Urgent Care if signs or symptoms change and certainly if you have worsening symptoms go to the nearest emergency department for further evaluation.

## 2020-10-08 ENCOUNTER — LAB VISIT (OUTPATIENT)
Dept: LAB | Facility: OTHER | Age: 45
End: 2020-10-08
Attending: OBSTETRICS & GYNECOLOGY
Payer: COMMERCIAL

## 2020-10-08 ENCOUNTER — TELEPHONE (OUTPATIENT)
Dept: OBSTETRICS AND GYNECOLOGY | Facility: CLINIC | Age: 45
End: 2020-10-08

## 2020-10-08 ENCOUNTER — OFFICE VISIT (OUTPATIENT)
Dept: OBSTETRICS AND GYNECOLOGY | Facility: CLINIC | Age: 45
End: 2020-10-08
Attending: OBSTETRICS & GYNECOLOGY
Payer: COMMERCIAL

## 2020-10-08 VITALS
HEIGHT: 71 IN | WEIGHT: 177.38 LBS | SYSTOLIC BLOOD PRESSURE: 132 MMHG | BODY MASS INDEX: 24.83 KG/M2 | DIASTOLIC BLOOD PRESSURE: 83 MMHG

## 2020-10-08 DIAGNOSIS — Z91.89 INCREASED RISK OF BREAST CANCER: ICD-10-CM

## 2020-10-08 DIAGNOSIS — Z79.890 POSTMENOPAUSAL HORMONE REPLACEMENT THERAPY: ICD-10-CM

## 2020-10-08 DIAGNOSIS — Z91.89 INCREASED RISK OF BREAST CANCER: Primary | ICD-10-CM

## 2020-10-08 DIAGNOSIS — Z01.419 ENCOUNTER FOR GYNECOLOGICAL EXAMINATION WITHOUT ABNORMAL FINDING: Primary | ICD-10-CM

## 2020-10-08 LAB — ESTRADIOL SERPL-MCNC: 35 PG/ML

## 2020-10-08 PROCEDURE — 99396 PR PREVENTIVE VISIT,EST,40-64: ICD-10-PCS | Mod: S$GLB,,, | Performed by: OBSTETRICS & GYNECOLOGY

## 2020-10-08 PROCEDURE — 36415 COLL VENOUS BLD VENIPUNCTURE: CPT

## 2020-10-08 PROCEDURE — 84402 ASSAY OF FREE TESTOSTERONE: CPT

## 2020-10-08 PROCEDURE — 3008F PR BODY MASS INDEX (BMI) DOCUMENTED: ICD-10-PCS | Mod: CPTII,S$GLB,, | Performed by: OBSTETRICS & GYNECOLOGY

## 2020-10-08 PROCEDURE — 99396 PREV VISIT EST AGE 40-64: CPT | Mod: S$GLB,,, | Performed by: OBSTETRICS & GYNECOLOGY

## 2020-10-08 PROCEDURE — 82670 ASSAY OF TOTAL ESTRADIOL: CPT

## 2020-10-08 PROCEDURE — 3008F BODY MASS INDEX DOCD: CPT | Mod: CPTII,S$GLB,, | Performed by: OBSTETRICS & GYNECOLOGY

## 2020-10-08 NOTE — PROGRESS NOTES
Subjective:       Patient ID: Mirian العلي is a 44 y.o. female.    Chief Complaint:  Well Woman and hormone replacement      History of Present Illness  HPI  Mirian العلي is a 44 y.o. female  (new to me , patient of Dr. Stein) here for her annual GYN exam.  She is also concerned about continuing HRT due to her Hysterectomy with oophorectomy at age 39 due to endometriosis. Her sister also had inflammatory Breast cancer at age 46. She has a family history of Factor V Mutation and has also tested positive for this, but has never had any thrombosis.  She was on oral medication following her hysterectomy with poor results, then switched to injections in May 2018 which she did well on . In 2020 she had to be changed to oral Estratest due to limited availability of injections from the Covid Pandemic. Her symptoms have been well controlled on this dose.      denies vaginal itching or irritation.  Denies vaginal discharge.  She is sexually active. She has had 1 partner for the past 21 years .   History of abnormal pap: No  Last Pap: approximate date  and was normal  Last MMG: normal-2020-routine follow-up in 12 months for Mammogram, but MRI recommended annually alternating 6 months from Mammogram  Last Colonoscopy:  None  denies domestic violence. She does feel safe at home.     Past Medical History:   Diagnosis Date    AR (allergic rhinitis)     Asthma     Eczema     Endometriosis, mild     Factor V Leiden mutation     Ovarian cyst, bilateral      Past Surgical History:   Procedure Laterality Date    BUNIONECTOMY Left 2018    HYSTERECTOMY  2015    TLHBSO    LAPAROSCOPY W/ MINI-LAPAROTOMY  2003    OOPHORECTOMY Right 3/2012    OOPHORECTOMY Left 2015    TOTAL VAGINAL HYSTERECTOMY  2015    w/Right USO    WISDOM TOOTH EXTRACTION Bilateral      Social History     Socioeconomic History    Marital status:      Spouse name: Not on file    Number of  children: 2    Years of education: Not on file    Highest education level: Not on file   Occupational History    Not on file   Social Needs    Financial resource strain: Not on file    Food insecurity     Worry: Not on file     Inability: Not on file    Transportation needs     Medical: Not on file     Non-medical: Not on file   Tobacco Use    Smoking status: Never Smoker    Smokeless tobacco: Never Used   Substance and Sexual Activity    Alcohol use: Yes     Alcohol/week: 5.0 - 6.0 standard drinks     Types: 5 - 6 Glasses of wine per week     Frequency: 2-3 times a week     Comment: week    Drug use: No    Sexual activity: Yes     Partners: Male     Birth control/protection: Post-menopausal, See Surgical Hx     Comment:  since ,     Lifestyle    Physical activity     Days per week: Not on file     Minutes per session: Not on file    Stress: Not at all   Relationships    Social connections     Talks on phone: Not on file     Gets together: Not on file     Attends Yazdanism service: Not on file     Active member of club or organization: Not on file     Attends meetings of clubs or organizations: Not on file     Relationship status: Not on file   Other Topics Concern    Not on file   Social History Narrative    Not on file     Family History   Problem Relation Age of Onset    Cancer Mother     Thyroid disease Mother     Breast cancer Mother         late 60's    Cancer Father     Diabetes Brother     Factor V Leiden deficiency Brother     Allergies Brother     Asthma Brother     Breast cancer Sister 46        inflammatory    Diabetes Maternal Aunt     Diabetes Paternal Uncle     Cancer Cousin     Breast cancer Paternal Aunt      OB History        4    Para   2    Term   2            AB   2    Living   2       SAB   2    TAB        Ectopic        Multiple        Live Births   2           Obstetric Comments   Menarche age 14. LMP age 39  "(postsurgical).  First child born age 30.  History of abnormal PAP smear: NO.  History of abnormal mammogram: NO.  History of sexually transmitted disease:  NO                   /83   Ht 5' 11" (1.803 m)   Wt 80.4 kg (177 lb 5.8 oz)   LMP 2015 (Approximate)   BMI 24.74 kg/m²         GYN & OB History    Date of Last Pap: 3/1/2014    OB History    Para Term  AB Living   4 2 2   2 2   SAB TAB Ectopic Multiple Live Births   2       2      # Outcome Date GA Lbr Elan/2nd Weight Sex Delivery Anes PTL Lv   4 SAB            3 SAB            2 Term      Vag-Spont   FERMÍN   1 Term      Vag-Spont   FERMÍN      Obstetric Comments   Menarche age 14. LMP age 39 (postsurgical).   First child born age 30.   History of abnormal PAP smear: NO.   History of abnormal mammogram: NO.   History of sexually transmitted disease:  NO             Review of Systems  Review of Systems   Constitutional: Negative for activity change, appetite change, fatigue and unexpected weight change.   HENT: Negative.    Eyes: Negative for visual disturbance.   Respiratory: Negative for shortness of breath and wheezing.    Cardiovascular: Negative for chest pain, palpitations and leg swelling.   Gastrointestinal: Negative for abdominal pain, bloating and blood in stool.   Endocrine: Negative for diabetes, hair loss and hot flashes.   Genitourinary: Negative for decreased libido, dyspareunia, hot flashes and vaginal dryness.   Musculoskeletal: Negative for back pain and joint swelling.   Integumentary:  Negative for acne, hair changes and nipple discharge.   Neurological: Negative for headaches.   Hematological: Does not bruise/bleed easily.   Psychiatric/Behavioral: Negative for depression and sleep disturbance. The patient is not nervous/anxious.    Breast: Negative for mastodynia and nipple discharge          Objective:      Physical Exam:   Constitutional: She is oriented to person, place, and time. She appears well-developed and " well-nourished.    HENT:   Head: Normocephalic and atraumatic.    Eyes: Pupils are equal, round, and reactive to light. EOM are normal.    Neck: Normal range of motion. Neck supple.    Cardiovascular: Normal rate and regular rhythm.     Pulmonary/Chest: Effort normal and breath sounds normal.   BREASTS:  no mass, no tenderness, no deformity and no retraction. Right breast exhibits no inverted nipple, no mass, no nipple discharge, no skin change, no tenderness, no bleeding and no swelling. Left breast exhibits no inverted nipple, no mass, no nipple discharge, no skin change, no tenderness, no bleeding and no swelling. Breasts are symmetrical.                Abdominal: Soft. Bowel sounds are normal.     Genitourinary:    Pelvic exam was performed with patient supine.      Genitourinary Comments: PELVIC: Normal external female genitalia without lesions. Normal hair distribution. Adequate perineal body, normal urethral meatus. Vagina Moderately well Rugated without lesions or discharge. No significant cystocele or rectocele. Bimanual exam shows uterus and cervix to be surgically absent. Adnexa without masses or tenderness.  RECTAL: Deferred               Musculoskeletal: Normal range of motion and moves all extremeties.       Neurological: She is alert and oriented to person, place, and time.    Skin: Skin is warm and dry.    Psychiatric: She has a normal mood and affect.              Assessment:        1. Encounter for gynecological examination without abnormal finding    2. Postmenopausal hormone replacement therapy    3. Increased risk of breast cancer               Plan:        1. Encounter for gynecological examination without abnormal finding  COUNSELING:  The patient was counseled today on regular weight bearing exercise. Patient was counseled today on the new ACS guidelines for cervical cytology screening as well as the current recommendations for breast cancer screening. Counseling session lasted approximately  10 minutes, and all her questions were answered. She was advised to see her primary care physician for all other health maintenance.   FOLLOW-UP with me for next routine visit.         2. Postmenopausal hormone replacement therapy  We spent a significant amt of time discussing estrogen replacement theropy.  We discussed the risks and benefits including breast cancer and embolic risk, osteoporosis and heart and colon health benefits.  Pt understands that there are many different ways to take estrogen and many different doses and that she does not have to take long term unless she chooses.  We discussed the increased risks of premature heart disease and osteoporosis in patients with premature surgical menopause before the age of 50 as well as the recent studies that indicate that estrogen alone does not seem to increase risk of breast cancer. We discussed that stopping Progesterone may be appropriate since she no longer has a uterus /no longer needs endometrial protection. We also discussed her Factor V Leiden , and that relative contraindication to ERT, which is somewhat felt to be less of an issue with transdermal administration which has less thrombotic risk  as well as keeping the dose of estrogen in a lower normal range since it does seem to be dose related risk.   She keeps a healthy lifestyle, does not smoke, exercises regularly and eats a healthy diet.   - Estradiol; Future  - Testosterone, Free; Future    Consider transition to estrogen patches/topical gel pending results of labs, and use of Topical Testosterone     3. Increased risk of breast cancer  Breast MRI ordered       Follow up in about 1 year (around 10/8/2021).

## 2020-10-12 LAB — TESTOST FREE SERPL-MCNC: 0.6 PG/ML

## 2020-10-13 ENCOUNTER — TELEPHONE (OUTPATIENT)
Dept: PAIN MEDICINE | Facility: CLINIC | Age: 45
End: 2020-10-13

## 2020-10-13 NOTE — TELEPHONE ENCOUNTER
Staff left a voicemail for patient to return call to clinical staff to reschedule 11/11/20 8:30 Botox appointment(provider will not be available).     Staff informed patient in message they will canceled original appointment until they hear back from patient to go over new dates & times with her.

## 2020-10-15 NOTE — PROGRESS NOTES
"Hereditary and High-Risk Clinic  Department of Hematology and Oncology  [facility]  [facility] [place], [place]    10/16/2020  Provider:  Alvin Daniels DNP    Patient ID: Mirian العلي is a 44 y.o. female.    Chief Complaint: Genetic Evaluation      Referring Provider:  Tiff Mir MD  [address]  [place] [address]    SUBJECTIVE:      History of Present Illness (HPI):  New patient presents today for an evaluation as it pertains to hereditary cancer syndromes.    Pedigree      Jimmy, version 8.0b (estimated lifetime risk of breast cancer):  - Height:  5'11"  - Weight:  176 lb  - Breast density per BI-RADS:  heterogeneously dense  - Age at menarche:  13 YO  - Age at first live birth:  31 YO  - Age at menopause, if applicable:  38 YO  - HRT usage:  current user, estrogen-only, used for >6 year(s)  - BRCA testing:  underway  - Personal history of ovarian cancer:  no  - Personal history of breast biopsy:  no  - Ashkenazi Jehovah's witness inheritance:  no  - Family history:  as detailed in pedigree     Review of Systems   - See HPI.    - Patient's Distress Score today was 0/10 (with 10 being the worst).    OBJECTIVE:     Physical Exam   Pleasant patient.  Presents alone.  /79 (BP Location: Left arm, Patient Position: Sitting, BP Method: Medium (Automatic))   Pulse 94   Temp 98.4 °F (36.9 °C) (Oral)   Ht 5' 11" (1.803 m)   Wt 80.9 kg (178 lb 5.6 oz)   LMP 01/01/2015 (Approximate)   BMI 24.88 kg/m²    Constitutional: She appears well developed and well nourished. No distress.   Pulmonary/Chest: Effort normal.   Neurological: She is alert.   Psychiatric: She has a normal mood and affect. Her speech is normal and behavior is normal. Her thought content is normal.     Jimmy  22.2% estimated lifetime risk of breast cancer as calculated today.    COUNSELING:     Based on the information provided to me by the patient, Mirian العلي, " "she meets criteria for genetic testing for Breast and Ovarian Cancer Susceptibility Genes based on current National Comprehensive Cancer Network (NCCN) Guidelines.  There are significant limitations of interpreting a negative genetic testing result in a patient unaffected with cancer prior to testing appropriate affected relative(s).     Germline genetic oncology panel testing consists of testing multiple genes known to be related to hereditary cancer syndromes.  These genes are known as "tumor suppressor genes."  A key role of tumor suppressor genes is to prevent cancer.  When a tumor suppressor gene has a clinically significant mutation, it affects the functioning of the gene, and the carrier may be more likely to develop cancers in certain organs.      Only some cancers are hereditary.  When a gene mutation is not identified, it does not completely rule out the possibility of hereditary cancers but does make them less likely.  Additionally, it is possible to see familial clustering of related cancer amongst family members, and these are sometimes caused by environmental factors and/or lifestyle factors that may be shared amongst family members.      Results of genetic testing include positive, negative, and variant of uncertain significance (VUS) (i.e. unclear) results.  If positive, the patient's specific cancer risks vary depending upon the tumor suppressor gene(s) in which there is/are a mutation(s).  In some cases, depending upon the result and the patient's clinical history, modified management may be recommended, including measures for risk reduction and/or surveillance, though modified management is not always an option.  Modified management may also be recommended, even with a negative result, based upon risk assessment that incorporates the family history.  A VUS indicates that there is not presently enough data for the laboratory to make a determination as to whether the variant is benign or pathogenic; " VUSs are not typically acted upon clinically.       If Mirian tests positive for a mutation inherited in an autosomal-dominant manner, her first-degree relatives would each have a 50% chance of having the same mutation, and other blood-relatives would also be at (a lesser) risk of having the same mutation.       The Genetic Information Nondiscrimination Act (ASA) prohibits most health insurance agencies and employers with 16 or more employees from discriminating against an individual based on the results of genetic testing; however, ASA does not protect individuals from discrimination by other types of policies/entities, including but not limited to life insurance, disability, long-term care insurance,  benefits or insurance, and  Health Services benefits).     An outside laboratory would perform the testing after a blood sample is collected at an [facility] laboratory or a saliva sample is collected by the patient at home.  With genetic testing, there is a potential for the patient to incur out-of-pocket costs.  Results can take several weeks.       Offered Mirian germline genetic oncology testing today, versus deferring testing at this time or declining testing altogether.  Mirian desires to proceed with germline genetic oncology testing today and has provided informed consent to proceed with the test(s) indicated in the plan as below, after a discussion regarding various genetic testing panels that could be performed as well as associated risks.     Post-test genetic counseling will be conducted once the genetic testing results are available.     Questions were encouraged and answered to the patient's satisfaction, and she verbalized understanding of information and agreement with the plan.       ASSESSMENT/PLAN:       Mirian was seen today for genetic evaluation.    Diagnoses and all orders for this visit:    Encounter for nonprocreative genetic counseling        - A blood sample for the  Invitae Multi-Cancer Panel and the Invitae Myelodysplastic Syndrome/Leukemia Panel was collected by [facility] today, with results expected in approximately 2-3 weeks, at which point post-test genetic counseling is to be conducted either in person or via audiovisual virtual visit.    Family history of breast cancer  -     Genetic Misc Sendout Test, Blood; Future  At high risk for breast cancer based on family history and other factors; also, on HRT        - Sent message to [facility], referring to VIANCA Moody, for evaluation and management of breast cancer risk as well as management of HRT      Follow up in about 1 month (around 11/16/2020) for post-test genetics visit.      During this encounter, I spent approximately 40 minutes face-to-face with this patient, more than half of which was spent counseling the patient and/or coordinating her care.    Alvin Jones, DNP, APRN, FNP-BC, AOCNP  Nurse Practitioner, Hereditary and High-Risk Clinic  Department of Hematology and Oncology  [facility] [place], 3rd floor  [facility] [address], [place]  [address]  office phone: [phone]    office fax: [phone]     10/16/2020

## 2020-10-16 ENCOUNTER — LAB VISIT (OUTPATIENT)
Dept: LAB | Facility: HOSPITAL | Age: 45
End: 2020-10-16
Payer: COMMERCIAL

## 2020-10-16 ENCOUNTER — OFFICE VISIT (OUTPATIENT)
Dept: HEMATOLOGY/ONCOLOGY | Facility: CLINIC | Age: 45
End: 2020-10-16
Payer: COMMERCIAL

## 2020-10-16 VITALS
HEART RATE: 94 BPM | HEIGHT: 71 IN | TEMPERATURE: 98 F | BODY MASS INDEX: 24.97 KG/M2 | DIASTOLIC BLOOD PRESSURE: 79 MMHG | SYSTOLIC BLOOD PRESSURE: 124 MMHG | WEIGHT: 178.38 LBS

## 2020-10-16 DIAGNOSIS — Z80.3 FAMILY HISTORY OF BREAST CANCER: ICD-10-CM

## 2020-10-16 DIAGNOSIS — Z71.83 ENCOUNTER FOR NONPROCREATIVE GENETIC COUNSELING: Primary | ICD-10-CM

## 2020-10-16 PROCEDURE — 99203 PR OFFICE/OUTPT VISIT, NEW, LEVL III, 30-44 MIN: ICD-10-PCS | Mod: S$GLB,,, | Performed by: NURSE PRACTITIONER

## 2020-10-16 PROCEDURE — 99999 PR PBB SHADOW E&M-EST. PATIENT-LVL IV: ICD-10-PCS | Mod: PBBFAC,,, | Performed by: NURSE PRACTITIONER

## 2020-10-16 PROCEDURE — 99203 OFFICE O/P NEW LOW 30 MIN: CPT | Mod: S$GLB,,, | Performed by: NURSE PRACTITIONER

## 2020-10-16 PROCEDURE — 3008F PR BODY MASS INDEX (BMI) DOCUMENTED: ICD-10-PCS | Mod: CPTII,S$GLB,, | Performed by: NURSE PRACTITIONER

## 2020-10-16 PROCEDURE — 99999 PR PBB SHADOW E&M-EST. PATIENT-LVL IV: CPT | Mod: PBBFAC,,, | Performed by: NURSE PRACTITIONER

## 2020-10-16 PROCEDURE — 3008F BODY MASS INDEX DOCD: CPT | Mod: CPTII,S$GLB,, | Performed by: NURSE PRACTITIONER

## 2020-10-16 PROCEDURE — 36415 COLL VENOUS BLD VENIPUNCTURE: CPT

## 2020-10-16 NOTE — Clinical Note
Jodi- This is the pt I discussed with you and Dr. Pedroza. TC score was 22.2% today, so needs breast high-risk visit with Florinda, please; additionally, she is on HRT currently. Her cancer-genetic testing is underway. (She previously had BRCA2 single-site testing for a familial BRCA2 mutation which the family has since determined originated in individuals not blood-related to the pt, just FYI.)

## 2020-10-16 NOTE — LETTER
October 16, 2020      Tiff Mir MD  [address]  [address]  [facility] [address]           [facility]  [address]  [facility] [address]  Phone: [phone]  Fax: [phone]          Patient: Mirian العلي   MR Number: [id number]   YOB: 1975   Date of Visit: 10/16/2020       Dear Dr. Tiff Mir:    Thank you for referring Mirian العلي to me for evaluation. Attached you will find relevant portions of my assessment and plan of care.    If you have questions, please do not hesitate to call me. I look forward to following Mirian العلي along with you.    Sincerely,    Alvin Daniels, DNP    Enclosure  CC:  No Recipients    If you would like to receive this communication electronically, please contact [email or web address] or [phone] to request more information on [employer] Link access.    For providers and/or their staff who would like to refer a patient to [facility], please contact us through our one-stop-shop provider referral line, [facility], at [phone].    If you feel you have received this communication in error or would no longer like to receive these types of communications, please e-mail [email or web address]

## 2020-10-22 NOTE — ED TRIAGE NOTES
Pt reports she felt discomfort to hand while at home and she noticed warmth to the area. She felt tightness to the hand. She is also c/o frequent muscle spasms that she is taking muscle relaxer for. Pt denies any other s/s. Denies recent fever.    No

## 2020-10-28 ENCOUNTER — PATIENT MESSAGE (OUTPATIENT)
Dept: HEMATOLOGY/ONCOLOGY | Facility: CLINIC | Age: 45
End: 2020-10-28

## 2020-10-28 LAB
GENETIC COUNSELING?: YES
GENSO SPECIMEN TYPE: NORMAL
MISCELLANEOUS GENETIC TEST NAME: NORMAL
PARTENTAL OR SIBLING TESTING?: NO
REFERENCE LAB: NORMAL
TEST RESULT: NORMAL

## 2020-10-30 ENCOUNTER — PATIENT MESSAGE (OUTPATIENT)
Dept: OBSTETRICS AND GYNECOLOGY | Facility: CLINIC | Age: 45
End: 2020-10-30

## 2020-10-30 DIAGNOSIS — N95.1 MENOPAUSAL SYNDROME: Primary | ICD-10-CM

## 2020-10-30 NOTE — TELEPHONE ENCOUNTER
Called patient to discuss labs and will need to switch to transdermal estradiol and testosterone from the oral. Consider Vivelle dot 0.05 or divigel 0.05 and Testosterone 1%.     Left message on voice mail.

## 2020-11-02 ENCOUNTER — PATIENT MESSAGE (OUTPATIENT)
Dept: PAIN MEDICINE | Facility: CLINIC | Age: 45
End: 2020-11-02

## 2020-11-02 DIAGNOSIS — N95.1 MENOPAUSAL SYNDROME: Primary | ICD-10-CM

## 2020-11-02 RX ORDER — ESTRADIOL 0.07 MG/D
1 FILM, EXTENDED RELEASE TRANSDERMAL
Qty: 8 PATCH | Refills: 11 | Status: SHIPPED | OUTPATIENT
Start: 2020-11-02 | End: 2021-09-28

## 2020-11-02 RX ORDER — TESTOSTERONE 50 MG/5G
0.5 GEL TRANSDERMAL DAILY
Qty: 30 G | Refills: 3 | Status: SHIPPED | OUTPATIENT
Start: 2020-11-02 | End: 2021-11-04 | Stop reason: SDUPTHER

## 2020-11-03 NOTE — TELEPHONE ENCOUNTER
Called patient and discussed changing to transdermal Estradiol which has lower/no risk of increasing thrombosis and this is especially important given hx Factor V Leiden. Also advised that should continue to take baby ASA.     Will stop Estratest, and begin Vivelle dot 0.075 twice weekly.     Will begin Testosterone gel 1% 2 clicks nightly to inner thigh (to be called in to [facility].

## 2020-11-04 ENCOUNTER — OFFICE VISIT (OUTPATIENT)
Dept: PAIN MEDICINE | Facility: CLINIC | Age: 45
End: 2020-11-04
Attending: ANESTHESIOLOGY
Payer: COMMERCIAL

## 2020-11-04 VITALS
BODY MASS INDEX: 24.88 KG/M2 | OXYGEN SATURATION: 100 % | SYSTOLIC BLOOD PRESSURE: 107 MMHG | HEIGHT: 71 IN | HEART RATE: 83 BPM | DIASTOLIC BLOOD PRESSURE: 72 MMHG | RESPIRATION RATE: 18 BRPM

## 2020-11-04 DIAGNOSIS — G24.3 ISOLATED CERVICAL DYSTONIA: Primary | ICD-10-CM

## 2020-11-04 DIAGNOSIS — M62.838 MUSCLE SPASM: ICD-10-CM

## 2020-11-04 PROCEDURE — 99499 UNLISTED E&M SERVICE: CPT | Mod: S$GLB,,, | Performed by: ANESTHESIOLOGY

## 2020-11-04 PROCEDURE — 99999 PR PBB SHADOW E&M-EST. PATIENT-LVL III: ICD-10-PCS | Mod: PBBFAC,,, | Performed by: ANESTHESIOLOGY

## 2020-11-04 PROCEDURE — 99499 NO LOS: ICD-10-PCS | Mod: S$GLB,,, | Performed by: ANESTHESIOLOGY

## 2020-11-04 PROCEDURE — 95874 GUIDE NERV DESTR NEEDLE EMG: CPT | Mod: S$GLB,,, | Performed by: ANESTHESIOLOGY

## 2020-11-04 PROCEDURE — 95874 PR NEEDLE EMG GUIDANCE FOR CHEMODENERVATION: ICD-10-PCS | Mod: S$GLB,,, | Performed by: ANESTHESIOLOGY

## 2020-11-04 PROCEDURE — 99999 PR PBB SHADOW E&M-EST. PATIENT-LVL III: CPT | Mod: PBBFAC,,, | Performed by: ANESTHESIOLOGY

## 2020-11-04 PROCEDURE — 64616 PR CHEMODENERVATION NECK MUSCLES EXC LARNYNX, UNI: ICD-10-PCS | Mod: 50,S$GLB,, | Performed by: ANESTHESIOLOGY

## 2020-11-04 PROCEDURE — 64616 CHEMODENERV MUSC NECK DYSTON: CPT | Mod: 50,S$GLB,, | Performed by: ANESTHESIOLOGY

## 2020-11-04 NOTE — PROGRESS NOTES
Subjective:      Patient ID: Mirian العلي is a 45 y.o. female.    Chief Complaint: No chief complaint on file.    Referred by: Fatemeh Mcclendon MD     Pain Scales  Best: 4/10  Worst: 8/10  Usually: 4/10  Today: 4/10      Ms. العلي is a 45 year old female here for follow-up and Botox injections for cervical dystonia. She notes that the upper trapezius started to become more contracted at the beginning of October but the other muscles are still doing well but slowly starting to come back. No side effects noted from previous injection. No contraindications today.    She is following up with Dr. Abdi for shoulder pain, diagnosed as adhesive capsulitis, considering shoulder injection if her symptoms do not improve.    V 8/12/2020  Mirian العلي presents to the clinic for a follow-up appointment for right shoulder pain. She had right suprascapular nerve, supraspinatus tendon sheath & subacromial bursa Steroid injection on 8/6/20. She reports that the pain has improved, . Since the last visit, Mirian العلي states the pain has been improving. She says that the pain is mostly in the upper arm especially when she reaches behind her back. Current pain intensity is 3/10.    Interventional Pain History  right suprascapular nerve, supraspinatus tendon sheath & subacromial bursa Steroid injection   Botox injection      Past Medical History:   Diagnosis Date    AR (allergic rhinitis)     Asthma     Eczema     Endometriosis, mild 2002    Factor V Leiden mutation     Ovarian cyst, bilateral        Past Surgical History:   Procedure Laterality Date    BUNIONECTOMY Left 02/06/2018    HYSTERECTOMY  01/2015    TLHBSO    LAPAROSCOPY W/ MINI-LAPAROTOMY  2003    OOPHORECTOMY Right 3/2012    OOPHORECTOMY Left 01/2015    TOTAL VAGINAL HYSTERECTOMY  1/2015    w/Right USO    WISDOM TOOTH EXTRACTION Bilateral        Review of patient's allergies indicates:   Allergen Reactions    Niacin preparations       Patient states she has flushing    Sulfa (sulfonamide antibiotics)      Tongue swelling    Iodine and iodide containing products Rash       Current Outpatient Medications   Medication Sig Dispense Refill    AFLURIA QD 2020-21,3YR UP,,PF, 60 mcg (15 mcg x 4)/0.5 mL Syrg ADM 0.5ML IM UTD      ALPRAZolam (XANAX) 1 MG tablet Take 1 tablet (1 mg total) by mouth 2 (two) times daily as needed. 60 tablet 1    aspirin (ECOTRIN) 81 MG EC tablet Take 81 mg by mouth once daily.      buPROPion (WELLBUTRIN XL) 300 MG 24 hr tablet Take 1 tablet (300 mg total) by mouth once daily. 90 tablet 1    cetirizine (ZYRTEC) 10 MG tablet Take 1 tablet (10 mg total) by mouth once daily. (Patient taking differently: Take 10 mg by mouth as needed. ) 30 tablet 2    cyanocobalamin, vitamin B-12, 5,000 mcg Subl Place under the tongue once a week.      estradioL (VIVELLE-DOT) 0.075 mg/24 hr Place 1 patch onto the skin twice a week. Apply twice weekly 8 patch 11    fluticasone (FLONASE) 50 mcg/actuation nasal spray PLACE 2 SPRAYS IN EACH NOSTRIL ONCE DAILY 1 Bottle 1    hydrOXYchloroQUINE (PLAQUENIL) 200 mg tablet TK 2 TS PO QD      testosterone (TESTIM) 50 mg/5 gram (1 %) Gel Apply 0.5 g topically once daily. 30 g 3    tiZANidine (ZANAFLEX) 4 MG tablet TAKE 1 TABLET(4 MG) BY MOUTH TWICE DAILY AS NEEDED 60 tablet 1     No current facility-administered medications for this visit.        Family History   Problem Relation Age of Onset    Cancer Mother     Thyroid disease Mother     Breast cancer Mother         late 60's    Cancer Father     Diabetes Brother     Factor V Leiden deficiency Brother     Allergies Brother     Asthma Brother     Breast cancer Sister 46        inflammatory    Other Sister         dermatothyocitis    Diabetes Maternal Aunt     Diabetes Paternal Uncle     Cancer Cousin     Breast cancer Paternal Aunt        Social History     Socioeconomic History    Marital status:      Spouse name: Not on  file    Number of children: 2    Years of education: Not on file    Highest education level: Not on file   Occupational History    Not on file   Social Needs    Financial resource strain: Not on file    Food insecurity     Worry: Not on file     Inability: Not on file    Transportation needs     Medical: Not on file     Non-medical: Not on file   Tobacco Use    Smoking status: Never Smoker    Smokeless tobacco: Never Used   Substance and Sexual Activity    Alcohol use: Yes     Alcohol/week: 5.0 - 6.0 standard drinks     Types: 5 - 6 Glasses of wine per week     Frequency: 2-3 times a week     Comment: week    Drug use: No    Sexual activity: Yes     Partners: Male     Birth control/protection: Post-menopausal, See Surgical Hx     Comment:  since 1999,  2002   Lifestyle    Physical activity     Days per week: Not on file     Minutes per session: Not on file    Stress: Not at all   Relationships    Social connections     Talks on phone: Not on file     Gets together: Not on file     Attends Caodaism service: Not on file     Active member of club or organization: Not on file     Attends meetings of clubs or organizations: Not on file     Relationship status: Not on file   Other Topics Concern    Not on file   Social History Narrative    Not on file           ROS  Constitutional: Denies fever/chills/night sweats/unexplained weight changes   HEENT: denies headache, vision changes, ear pain, sore throat/lump in throat   CV: Denies chest pain/palpitations   Pulm: Denies shortness of breath/wheezing/waking up in the night gasping for air   GI: normal appetite, regular bowel patterns, no nausea/vomitting/abd pain; denies bowel incontinence  : normal urine output/normal bladder function/denies bloody/burning urination/trouble controlling bladder/urinary leakage/urinary tract infection  Neuromusculoskeletal: denies weakness/recent falling/seizures/saddle anesthesia/paresthesia   Psych: mood  "stable, denies homicidal/suicidal ideation         Objective:   Resp 18   Ht 5' 11" (1.803 m)   LMP 01/01/2015 (Approximate)   SpO2 100%   BMI 24.88 kg/m²   Pain Disability Index Review:  Last 3 PDI Scores 8/12/2020 5/20/2020 2/19/2020   Pain Disability Index (PDI) 38 44 22     Normocephalic.  Atraumatic.  Affect appropriate.  Breathing unlabored.  Extra ocular muscles intact.           Ortho/SPM Exam    CERVICAL SPINE AND BILATERAL UPPER EXTREMITY EXAM:   INSPECTION: No gross deformities or abnormalities noted.   RANGE OF MOTION: Full but with more difficulty turning head to left  STABILITY: No instability noted/appreciated.   MYOFASCIAL EXAM: +ttp over right upper trapezius, taut  SPURLING: Negative bilaterally for radiating arm pain.   FACET LOADING: Unremarkable bilaterally.    MUSCLE STRENGTH: 5/5 and symmetrical, bilateral upper extremities.   SENSORY EXAM: Intact to light touch, bilateral upper extremities.   DTRs: 2+ and symmetrical.    Assessment:       Encounter Diagnoses   Name Primary?    Isolated cervical dystonia Yes    Muscle spasm          Plan:   We discussed with the patient the assessment and recommendations. The following is the plan we agreed on:        Diagnoses and all orders for this visit:    Isolated cervical dystonia    Muscle spasm       1. Procedure: Under clean technique, EMG guidance after discussing the patient's 300 units of Botox were used.  We injected the splenius capitis, longismus, upper trapezius, lower trapezius, levator scapula, and sternocleidomastoid. 50 U at each site.  She tolerated procedure well.  2. Continue follow-up with Dr. Abdi for shoulder.  3. RTC 3 months for Botox 300 U    Leonidas Mills MD      I have personally taken the history and examined this patient and agree with the fellow's note as stated above.   "

## 2020-11-05 ENCOUNTER — TELEPHONE (OUTPATIENT)
Dept: OBSTETRICS AND GYNECOLOGY | Facility: CLINIC | Age: 45
End: 2020-11-05

## 2020-11-06 NOTE — TELEPHONE ENCOUNTER
Appt confirmed with VIANCA Neely for 11/09/2020 at 1520 per patient request. Questions and concerns addressed to patient's satisfaction. Directions and contact information provided. KEATON Kovacs.

## 2020-11-09 ENCOUNTER — PATIENT MESSAGE (OUTPATIENT)
Dept: HEMATOLOGY/ONCOLOGY | Facility: CLINIC | Age: 45
End: 2020-11-09

## 2020-11-09 ENCOUNTER — OFFICE VISIT (OUTPATIENT)
Dept: HEMATOLOGY/ONCOLOGY | Facility: CLINIC | Age: 45
End: 2020-11-09
Payer: COMMERCIAL

## 2020-11-09 VITALS — HEIGHT: 71 IN | BODY MASS INDEX: 24.5 KG/M2 | WEIGHT: 175 LBS

## 2020-11-09 DIAGNOSIS — Z12.39 ENCOUNTER FOR SCREENING FOR MALIGNANT NEOPLASM OF BREAST, UNSPECIFIED SCREENING MODALITY: ICD-10-CM

## 2020-11-09 DIAGNOSIS — Z91.89 INCREASED RISK OF BREAST CANCER: Primary | ICD-10-CM

## 2020-11-09 PROCEDURE — 99999 PR PBB SHADOW E&M-EST. PATIENT-LVL III: CPT | Mod: PBBFAC,,, | Performed by: PHYSICIAN ASSISTANT

## 2020-11-09 PROCEDURE — 3008F PR BODY MASS INDEX (BMI) DOCUMENTED: ICD-10-PCS | Mod: CPTII,S$GLB,, | Performed by: PHYSICIAN ASSISTANT

## 2020-11-09 PROCEDURE — 99999 PR PBB SHADOW E&M-EST. PATIENT-LVL III: ICD-10-PCS | Mod: PBBFAC,,, | Performed by: PHYSICIAN ASSISTANT

## 2020-11-09 PROCEDURE — 99214 PR OFFICE/OUTPT VISIT, EST, LEVL IV, 30-39 MIN: ICD-10-PCS | Mod: S$GLB,,, | Performed by: PHYSICIAN ASSISTANT

## 2020-11-09 PROCEDURE — 99214 OFFICE O/P EST MOD 30 MIN: CPT | Mod: S$GLB,,, | Performed by: PHYSICIAN ASSISTANT

## 2020-11-09 PROCEDURE — 3008F BODY MASS INDEX DOCD: CPT | Mod: CPTII,S$GLB,, | Performed by: PHYSICIAN ASSISTANT

## 2020-11-09 NOTE — PROGRESS NOTES
History:     Reason For Consultation:   Increased lifetime risk of breast cancer    Referring Provider:   Alvin Daniels, DNP  [address] [place] [address]      HPI:   Mirian العلي presents for consultation of increased risk of breast cancer. She is postmenopausal. She presented for screening mammogram which was benign but revealed a Tyrer-Cuzick score of 26.58%. She had a total hysterectomy at age 40 and taking HRT. Was seeing Dr. Stein but now sees Dr. Mir with Ob/gyn. Also positive for Factor V Leiden but denies personal history of thrombosis. Dr. Mir just changed hormones to Vivelle dot and testosterone gel.     Personal history of cancer: no  Prior chest wall radiation at ages [age]: no  Genetic testing: negative; Recently met with Alvin Daniels and had genetic testing  Ashkenazi inheritance: no  OB/Gyn history:    Number of pregnancies & age at first gestation: 2 pregnancies, first at 30 years   Age of menarche: 14 years  Age of menopause: 40, surgical   Last menstrual period:    Body mass index is 24.41 kg/m².   HRT Use: Yes, current estrogen use only   Number of previous biopsies: None    Tyrer-Cuzick Score:   22.3% (re-calculated in clinic today).     Family History:  Mother- Breast Cancer at 68; Lung cancer ( at 74)  Father- Kidney and bladder cancers  Sister- Inflammatory Breast Cancer at 46 ( at 48); She had genetic testing that was negative.    Past Medical   Past Medical History:   Diagnosis Date    AR (allergic rhinitis)     Asthma     Eczema     Endometriosis, mild     Factor V Leiden mutation     Ovarian cyst, bilateral      Patient Active Problem List   Diagnosis    Fatigue    Ovarian cyst    Breast tenderness in female    Generalized headaches    Endometriosis of pelvis    Unmedicated IUD    Factor V Leiden carrier    Rhinitis, allergic    Pelvic pain in female    S/P hysterectomy with oophorectomy    Family history of breast cancer  in mother    Chest pain on breathing    Precordial pain    Hemangioma    Corneal abrasion, left    Pectus excavatum    Shingles    Abnormal laboratory test    Carpal tunnel syndrome of right wrist    Right shoulder pain    Swelling of arm    Primary insomnia       Social History   Social History     Tobacco Use    Smoking status: Never Smoker    Smokeless tobacco: Never Used   Substance Use Topics    Alcohol use: Yes     Alcohol/week: 5.0 - 6.0 standard drinks     Types: 5 - 6 Glasses of wine per week     Frequency: 2-3 times a week     Comment: week    Drug use: No       Family History  Family History   Problem Relation Age of Onset    Cancer Mother     Thyroid disease Mother     Breast cancer Mother         late 60's    Cancer Father     Diabetes Brother     Factor V Leiden deficiency Brother     Allergies Brother     Asthma Brother     Breast cancer Sister 46        inflammatory    Other Sister         dermatothyocitis    Diabetes Maternal Aunt     Diabetes Paternal Uncle     Cancer Cousin     Breast cancer Paternal Aunt        Medications    Current Outpatient Medications:     AFLURIA QD 2020-21,3YR UP,,PF, 60 mcg (15 mcg x 4)/0.5 mL Syrg, ADM 0.5ML IM UTD, Disp: , Rfl:     ALPRAZolam (XANAX) 1 MG tablet, Take 1 tablet (1 mg total) by mouth 2 (two) times daily as needed., Disp: 60 tablet, Rfl: 1    aspirin (ECOTRIN) 81 MG EC tablet, Take 81 mg by mouth once daily., Disp: , Rfl:     buPROPion (WELLBUTRIN XL) 300 MG 24 hr tablet, Take 1 tablet (300 mg total) by mouth once daily., Disp: 90 tablet, Rfl: 1    cetirizine (ZYRTEC) 10 MG tablet, Take 1 tablet (10 mg total) by mouth once daily. (Patient taking differently: Take 10 mg by mouth as needed. ), Disp: 30 tablet, Rfl: 2    cyanocobalamin, vitamin B-12, 5,000 mcg Subl, Place under the tongue once a week., Disp: , Rfl:     estradioL (VIVELLE-DOT) 0.075 mg/24 hr, Place 1 patch onto the skin twice a week. Apply twice weekly,  "Disp: 8 patch, Rfl: 11    fluticasone (FLONASE) 50 mcg/actuation nasal spray, PLACE 2 SPRAYS IN EACH NOSTRIL ONCE DAILY, Disp: 1 Bottle, Rfl: 1    hydrOXYchloroQUINE (PLAQUENIL) 200 mg tablet, TK 2 TS PO QD, Disp: , Rfl:     testosterone (TESTIM) 50 mg/5 gram (1 %) Gel, Apply 0.5 g topically once daily., Disp: 30 g, Rfl: 3    tiZANidine (ZANAFLEX) 4 MG tablet, TAKE 1 TABLET(4 MG) BY MOUTH TWICE DAILY AS NEEDED, Disp: 60 tablet, Rfl: 1    Allergies  Review of patient's allergies indicates:   Allergen Reactions    Niacin preparations      Patient states she has flushing    Sulfa (sulfonamide antibiotics)      Tongue swelling    Iodine and iodide containing products Rash       Review of Systems  General: No fever, chills, or weight loss.  Chest: No chest pain, shortness of breath, or palpitations.  Breast: No pain, masses, or nipple discharge.  Vulva: No pain, lesions, or itching.  Vagina: No relaxation, itching, discharge, or lesions.  Abdomen: No pain, nausea, vomiting, diarrhea, or constipation.  Urinary: No incontinence, nocturia, frequency, or dysuria.  Extremities:  No leg cramps, edema, or calf pain.  Neurologic: No headaches, dizziness, or visual changes.    Objective:     Vitals:    11/09/20 1544   Weight: 79.4 kg (175 lb)   Height: 5' 11" (1.803 m)     Body mass index is 24.41 kg/m².    GENERAL:  Well-developed, well-nourished female in no acute distress. Vital signs reviewed.   SKIN:  Warm, dry without rashes, purpura or petechiae.  EYES:  EOMs intact.  Conjunctivae normal.  HEAD:  Normocephalic.  EARS/NOSE/MOUTH/THROAT:  Hearing intact.   NECK:  Supple with good range of motion; no masses  PSYCHIATRIC:  Normal affect and mood.  Alert and Oriented x 3.   BREASTS:Symmetrical, no skin changes or visible lesions.  No palpable masses, nipple discharge bilaterally.    BREAST IMAGING  Mammogram: 1/22/20  Ultrasound:   MRI: 10/1/20 with Dr. Mir    Assessment:     Increased risk of breast cancer: This is " a 45 y.o. female with an increased risk of breast cancer based on Tyrer Cuzick score of 22.3%.       Plan:   We discussed that she would benefit from continuing annual MRI's in addition to mammograms, alternating imaging every 6 months until age 77.    I also recommended two physical exams per year, one can be with her OB/GYN, Dr. Mir  Since she just had her breast MRI, will plan to repeat her annual screening mammogram in 4/2020 get get them 6 months apart. Repeat MRI in 10/2021. This has already been ordered by Dr. Mir    Risk reduction options with chemoprevention such as Tamoxifen or Raloxifene were discussed.  These have been shown to lower the risk of breast cancer incidence, however there is no survival benefit in patients who don't have breast cancer.  I explained the most common side effects and risks including hot flashes, thromboembolism, stroke, endometrial cancer, weight changes, and pain. She is not a good candidate for chemoprevention given HRT and increased risk of thrombosis with Factor V Leiden.  Discussed that benefits do not out weight risk in her case.     Reviewed lifestyle modifications that have shown benefit or reducing breast cancer risk.   Including: Limit alcohol to less than one drink per day, Exercise at least 150 minutes per week of moderate intensity aerobic activity or at least 75 minutes of vigorous activity, Maintaining a healthy weight, Limit red meat to no more than 2-3x per week, Reduce processed foods and meat. Also encouraged wide variety of fruits and vegetables, specifically cruciferous vegetables.    Follow up in 1 year for CBE.     I spent 35 minutes with this patient today, >50% counseling and coordinating care.

## 2020-11-09 NOTE — LETTER
November 9, 2020      Alvin Daniels, DNP  [address]  [facility] [address]           [facility]  [address]  [facility] [address]  Phone: [phone]  Fax: [phone]          Patient: Mirian اعللي   MR Number: [id number]   YOB: 1975   Date of Visit: 11/9/2020       Dear Alvin Daniels:    Thank you for referring Mirina العلي to me for evaluation. Attached you will find relevant portions of my assessment and plan of care.    If you have questions, please do not hesitate to call me. I look forward to following Mirian العلي along with you.    Sincerely,    Florinda Prado PA-C    Enclosure  CC:  No Recipients    If you would like to receive this communication electronically, please contact [email or web address] or [phone] to request more information on imagel[employer] Link access.    For providers and/or their staff who would like to refer a patient to [facility], please contact us through our one-stop-shop provider referral line, [facility], at [phone].    If you feel you have received this communication in error or would no longer like to receive these types of communications, please e-mail [email or web address]

## 2020-11-17 ENCOUNTER — OFFICE VISIT (OUTPATIENT)
Dept: HEMATOLOGY/ONCOLOGY | Facility: CLINIC | Age: 45
End: 2020-11-17
Payer: COMMERCIAL

## 2020-11-17 ENCOUNTER — PATIENT MESSAGE (OUTPATIENT)
Dept: HEMATOLOGY/ONCOLOGY | Facility: CLINIC | Age: 45
End: 2020-11-17

## 2020-11-17 VITALS
SYSTOLIC BLOOD PRESSURE: 125 MMHG | HEIGHT: 71 IN | OXYGEN SATURATION: 99 % | HEART RATE: 82 BPM | BODY MASS INDEX: 24.41 KG/M2 | DIASTOLIC BLOOD PRESSURE: 86 MMHG

## 2020-11-17 DIAGNOSIS — Z71.83 ENCOUNTER FOR NONPROCREATIVE GENETIC COUNSELING: Primary | ICD-10-CM

## 2020-11-17 DIAGNOSIS — Z91.89 AT HIGH RISK FOR BREAST CANCER: ICD-10-CM

## 2020-11-17 PROCEDURE — 1125F AMNT PAIN NOTED PAIN PRSNT: CPT | Mod: S$GLB,,, | Performed by: NURSE PRACTITIONER

## 2020-11-17 PROCEDURE — 1125F PR PAIN SEVERITY QUANTIFIED, PAIN PRESENT: ICD-10-PCS | Mod: S$GLB,,, | Performed by: NURSE PRACTITIONER

## 2020-11-17 PROCEDURE — 3008F PR BODY MASS INDEX (BMI) DOCUMENTED: ICD-10-PCS | Mod: CPTII,S$GLB,, | Performed by: NURSE PRACTITIONER

## 2020-11-17 PROCEDURE — 99999 PR PBB SHADOW E&M-EST. PATIENT-LVL III: ICD-10-PCS | Mod: PBBFAC,,, | Performed by: NURSE PRACTITIONER

## 2020-11-17 PROCEDURE — 99999 PR PBB SHADOW E&M-EST. PATIENT-LVL III: CPT | Mod: PBBFAC,,, | Performed by: NURSE PRACTITIONER

## 2020-11-17 PROCEDURE — 3008F BODY MASS INDEX DOCD: CPT | Mod: CPTII,S$GLB,, | Performed by: NURSE PRACTITIONER

## 2020-11-17 PROCEDURE — 99213 OFFICE O/P EST LOW 20 MIN: CPT | Mod: S$GLB,,, | Performed by: NURSE PRACTITIONER

## 2020-11-17 PROCEDURE — 99213 PR OFFICE/OUTPT VISIT, EST, LEVL III, 20-29 MIN: ICD-10-PCS | Mod: S$GLB,,, | Performed by: NURSE PRACTITIONER

## 2020-11-17 NOTE — PROGRESS NOTES
"Hereditary and High-Risk Clinic  Department of Hematology and Oncology  [facility]  [facility] [place], [place]    11/17/2020    Provider:  Alvin Daniels DNP    Patient ID: Mirian العلي is a 45 y.o. female.    Chief Complaint: Follow-up (1 month) and Results (post test )      Referring Provider:  Tiff Mir MD  [address] [address]    SUBJECTIVE:      History of Present Illness (HPI):  Established patient presents today for post-test genetic counseling.    Pedigree      Jimmy, version 8.0b (estimated lifetime risk of breast cancer):  - Height:  5'11"  - Weight:  176 lb  - Breast density per BI-RADS:  heterogeneously dense  - Age at menarche:  13 YO  - Age at first live birth:  31 YO  - Age at menopause, if applicable:  40 YO  - HRT usage:  current user, estrogen-only, used for >6 year(s)  - BRCA testing:  performed, and negative  - Personal history of ovarian cancer:  no  - Personal history of breast biopsy:  no  - Ashkenazi Baptism inheritance:  no  - Family history:  as detailed in pedigree     Review of Systems   - See HPI.    - Right shoulder pain 8/10- Chronic frozen shoulder for which she receives Botox.    OBJECTIVE:      Physical Exam   Pleasant patient.  Presents alone today.  /86 (BP Location: Left arm, Patient Position: Sitting, BP Method: Medium (Automatic))   Pulse 82   Ht 5' 11" (1.803 m)   LMP 01/01/2015 (Approximate)   SpO2 99%   BMI 24.41 kg/m²    Constitutional: She appears well developed and well nourished. No distress.   Pulmonary/Chest: Effort normal.   Neurological: She is alert.   Psychiatric: She has a normal mood and affect. Her speech is normal and behavior is normal. Her thought content is normal.     I reviewed today's vital signs (above), distress score (0/10), pain rating (as above), and falls (none).    I also reviewed the patient's mother's pathology reports and somatic genetic testing " results report that the patient submitted previously.        - Her right breast cancer was ER+, IN+, HER2-.        - Her left breast cancer was ER+, IN+, HER2-.        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    Germline Cancer-Genetic Testing  · Test(s) performed:  Invitae Multi-Cancer Panel, Invitae Myelodysplastic Syndrome/Leukemia Panel  Number of genes analyzed:  84  Laboratory:  Echograph  Sample collection date:  10/16/2020  Results report date:  10/27/2020              Full report to be scanned into Epic under the Labs and/or Media tab(s).   FINDINGS:  NEGATIVE.    Breast Cancer Risk Stratification  Estimated lifetime risk of breast cancer as calculated today utilizing [employer] version 8.0b:  21.8%.    COUNSELING:     Mirian العلي's germline cancer-genetic panel testing came back negative, as detailed above.  I provided Mirian with a copy of her results report today.    These results do not completely rule out the possibility of a hereditary cancer, nor do they necessarily indicate that the patient is not at increased risk for certain cancers, as she may share other risk factors, such as environmental and lifestyle factors, with her family members affected by cancer.        I discussed with Mirian today that (per [facility] as of today), no germinal ROS1 gene mutations are established, and, therefore, germline testing of Mirian's ROS1 gene is not indicated at this time, but we can follow up on this at future visits, ensuring that this ROS1 information has not changed.     Mirian is to ensure that her healthcare providers are aware of her personal and family histories, especially of cancers, so that her medical management including cancer screenings can be based in part off of these histories.      Mirian is established with primary care provider (PCP) Dr. Nora Brooks and Gynecology provider Dr. Belgica Mir.  I recommend that the patient  undergo total-body skin examinations annually with a Dermatology provider; Mirian is established with Dermatology provider Dr. Sarah Dixon.       Based on Mirian's increased risk of breast cancer, I recommend continued follow-up in the High-Risk Breast Clinic.  Mirian and I also discussed breast awareness today, and I recommended that she perform monthly breast self-exams and report any changes/concerns to her breast specialist.      Mirian advised me that hormonal replacement therapy implications were discussed with her by Florinda Prado PA-C, whom she saw recently.    Mirian's relatives may be at increased risk for certain cancers for which they share other risk factors, such as environmental and lifestyle factors, with affected relatives.  Further regarding the patient's relatives, it is possible for them to have cancer-related genetic mutations that were not identified in Mirian.  The patient's relatives should consider speaking with a healthcare provider regarding undergoing genetic counseling/testing; I am available for this purpose, or they can visit [email or web address] to locate a local genetic counselor, and I have shared the contact information for both with the patient and encouraged her to share that information with her relatives.     Mirian should update me with any changes to her personal or family history and with any relative's genetic testing results and check in with me annually to determine if updated genetic testing is indicated for her.     Questions were encouraged and answered to the patient's satisfaction, and she verbalized understanding of information and agreement with the plan.    ASSESSMENT/PLAN:      Mirian was seen today for follow-up and results.    Diagnoses and all orders for this visit:    Encounter for nonprocreative genetic counseling  Mirianerlin العلي's germline cancer-genetic panel testing came back negative, as detailed above.          - Post-test genetic  counseling conducted, as above.        - I provided Mirian with a copy of her results report today.        - I discussed with Mirian today that (per [facility] as of today), no germinal ROS1 gene mutations are established, and, therefore, germline testing of Mirian's ROS1 gene is not indicated at this time, but we can follow up on this at future visits, ensuring that this ROS1 information has not changed.        - Mirian should update me with any changes to her personal or family history and with any relative's genetic testing results and check in with me annually to determine if updated genetic testing is indicated for her.    At high risk for breast cancer  JelaniyueRaphael lifetime risk 21.8% today        - Based on Mirian's increased risk of breast cancer, I recommend continued follow-up in the High-Risk Breast Clinic.  Mirian and I also discussed breast awareness today, and I recommended that she perform monthly breast self-exams and report any changes/concerns to her breast specialist.        No follow-ups on file.     During this encounter, I spent approximately 15 minutes face-to-face with this patient, more than half of which was spent counseling the patient and/or coordinating her care.    SignedAlvin, DOMONIQUE, APRN, FNP-BC, AOCNP  Nurse Practitioner, Hereditary and High-Risk Clinic  Department of Hematology and Oncology  [facility], 3rd floor  [facility] [address], [place]  [address]  office phone: [phone]    office fax: [phone]     11/17/2020

## 2020-12-16 ENCOUNTER — OFFICE VISIT (OUTPATIENT)
Dept: SPORTS MEDICINE | Facility: CLINIC | Age: 45
End: 2020-12-16
Payer: COMMERCIAL

## 2020-12-16 VITALS — BODY MASS INDEX: 23.8 KG/M2 | WEIGHT: 170 LBS | HEIGHT: 71 IN

## 2020-12-16 DIAGNOSIS — M75.00 ADHESIVE CAPSULITIS OF SHOULDER, UNSPECIFIED LATERALITY: Primary | ICD-10-CM

## 2020-12-16 PROCEDURE — 3008F PR BODY MASS INDEX (BMI) DOCUMENTED: ICD-10-PCS | Mod: CPTII,S$GLB,, | Performed by: ORTHOPAEDIC SURGERY

## 2020-12-16 PROCEDURE — 99999 PR PBB SHADOW E&M-EST. PATIENT-LVL III: ICD-10-PCS | Mod: PBBFAC,,, | Performed by: ORTHOPAEDIC SURGERY

## 2020-12-16 PROCEDURE — 99214 PR OFFICE/OUTPT VISIT, EST, LEVL IV, 30-39 MIN: ICD-10-PCS | Mod: 25,S$GLB,, | Performed by: ORTHOPAEDIC SURGERY

## 2020-12-16 PROCEDURE — 1125F AMNT PAIN NOTED PAIN PRSNT: CPT | Mod: S$GLB,,, | Performed by: ORTHOPAEDIC SURGERY

## 2020-12-16 PROCEDURE — 99214 OFFICE O/P EST MOD 30 MIN: CPT | Mod: 25,S$GLB,, | Performed by: ORTHOPAEDIC SURGERY

## 2020-12-16 PROCEDURE — 3008F BODY MASS INDEX DOCD: CPT | Mod: CPTII,S$GLB,, | Performed by: ORTHOPAEDIC SURGERY

## 2020-12-16 PROCEDURE — 1125F PR PAIN SEVERITY QUANTIFIED, PAIN PRESENT: ICD-10-PCS | Mod: S$GLB,,, | Performed by: ORTHOPAEDIC SURGERY

## 2020-12-16 PROCEDURE — 99999 PR PBB SHADOW E&M-EST. PATIENT-LVL III: CPT | Mod: PBBFAC,,, | Performed by: ORTHOPAEDIC SURGERY

## 2020-12-16 NOTE — PROGRESS NOTES
CC: right shoulder pain   Referred to me by Dr. Purdy     45 y.o. Female RHD. She is an  at a non-profit. Reports that the pain is severe and not responding to any conservative care.    Here for 2 month follow-up. Has gotten EMG and seen .  Negative EMG no concern for TOS.  Had some therapy in the past that was helpful.  5 weeks prior had Subacromial CSI with minimal relief of symptoms.  Still notes pain with shoulder abduction and abduction and internal rotation.      Pt does note she sees a Rheumatologist.  Has raynauds and SHRAVAN + on lab exam. Her rheumatologist is monitoring her thyroid as well but not on any medication.   MR from 1 year prior reported tendinosis with decreased axillary pouch.     SANE:  2    Previously:     In 2013 she was lifting a suitcase overhead and felt a sharp pain in her neck   She came home and had an MRI that looked normal and she had steroid injections and therapy with mild relief   She got a second opinion from another Orthopaedist and gave her more injections and therapy with mild relief   She switched her PCP who then referred her to Dr. Hewitt.  Dr. Hewitt ordered her an MRI on June 25. He followed this with a bursa injection   Her pain went away for one day and then her pain came right back    She states that her pain is almost constant but does go away at times   She states that nothing specifically makes the pain worse it just happens at times   She states that she has numbness that radiates down her arm   She states that most of her pain is in her upper trap region   She states that she has weakness with prolonged activites     She reports that the pain is worse with overhead activity. It also bothers her at night.    Is affecting ADLs.     Review of Systems   Constitution: Negative. Negative for chills, fever and night sweats.   HENT: Negative for congestion and headaches.    Eyes: Negative for blurred vision, left vision loss and right vision loss.    Cardiovascular: Negative for chest pain and syncope.   Respiratory: Negative for cough and shortness of breath.    Endocrine: Negative for polydipsia, polyphagia and polyuria.   Hematologic/Lymphatic: Negative for bleeding problem. Does not bruise/bleed easily.   Skin: Negative for dry skin, itching and rash.   Musculoskeletal: Negative for falls and muscle weakness.   Gastrointestinal: Negative for abdominal pain and bowel incontinence.   Genitourinary: Negative for bladder incontinence and nocturia.   Neurological: Negative for disturbances in coordination, loss of balance and seizures.   Psychiatric/Behavioral: Negative for depression. The patient does not have insomnia.    Allergic/Immunologic: Negative for hives and persistent infections.     PAST MEDICAL HISTORY:   Past Medical History:   Diagnosis Date    AR (allergic rhinitis)     Asthma     Eczema     Endometriosis, mild 2002    Factor V Leiden mutation     Genetic testing 10/2020    Invitae Multi-Cancer Panel & Invitae Myelodysplastic Syndrome/Leukemia Panel (84 genes):  NEGATIVE for mutations.    Ovarian cyst, bilateral     Thyroglobulin antibody positive     per patient, who stated results were given by Dr. Coleman Velez in Rheumatology on 04/22/2019     PAST SURGICAL HISTORY:   Past Surgical History:   Procedure Laterality Date    BUNIONECTOMY Left 02/06/2018    HYSTERECTOMY  01/2015    TLHBSO    LAPAROSCOPY W/ MINI-LAPAROTOMY  2003    OOPHORECTOMY Right 3/2012    OOPHORECTOMY Left 01/2015    TOTAL VAGINAL HYSTERECTOMY  1/2015    w/Right USO    WISDOM TOOTH EXTRACTION Bilateral      FAMILY HISTORY:   Family History   Problem Relation Age of Onset    Thyroid disease Mother     Breast cancer Mother         diagnosed in her 60s; bilateral    Lung cancer Mother 71        former smoker    Skin cancer Father         type?    Bladder Cancer Father         diagnosed in his 60s    Kidney cancer Father 60    Diabetes Brother     Factor V  Leiden deficiency Brother     Allergies Brother     Asthma Brother     Breast cancer Sister 46        inflammatory breast cancer, unilateral; myRisk genetic test negative    Other Sister         dermatothyocitis    Diabetes Maternal Aunt     Diabetes Paternal Uncle     Cancer Maternal Grandmother         bone marrow cancer in her 60s- multiple myeloma?    Throat cancer Paternal Grandfather         diagnosed in his 70s    Other Sister         pre-cancer? cervical?    Cancer Paternal Cousin         unknown origin    BRCA 1/2 Paternal Cousin         +BRCA2 mutation [id number] (patient Mirian tested negative for this twice)     SOCIAL HISTORY:   Social History     Socioeconomic History    Marital status:      Spouse name: Not on file    Number of children: 2    Years of education: Not on file    Highest education level: Not on file   Occupational History    Not on file   Social Needs    Financial resource strain: Not on file    Food insecurity     Worry: Not on file     Inability: Not on file    Transportation needs     Medical: Not on file     Non-medical: Not on file   Tobacco Use    Smoking status: Never Smoker    Smokeless tobacco: Never Used   Substance and Sexual Activity    Alcohol use: Yes     Alcohol/week: 5.0 - 6.0 standard drinks     Types: 5 - 6 Glasses of wine per week     Frequency: 2-3 times a week     Comment: week    Drug use: No    Sexual activity: Yes     Partners: Male     Birth control/protection: Post-menopausal, See Surgical Hx     Comment:  since 1999,  2002   Lifestyle    Physical activity     Days per week: Not on file     Minutes per session: Not on file    Stress: Not at all   Relationships    Social connections     Talks on phone: Not on file     Gets together: Not on file     Attends Restorationist service: Not on file     Active member of club or organization: Not on file     Attends meetings of clubs or organizations: Not on file     Relationship  "status: Not on file   Other Topics Concern    Not on file   Social History Narrative    Not on file       MEDICATIONS:   Current Outpatient Medications:     AFLURIA QD 2020-21,3YR UP,,PF, 60 mcg (15 mcg x 4)/0.5 mL Syrg, ADM 0.5ML IM UTD, Disp: , Rfl:     ALPRAZolam (XANAX) 1 MG tablet, Take 1 tablet (1 mg total) by mouth 2 (two) times daily as needed., Disp: 60 tablet, Rfl: 1    aspirin (ECOTRIN) 81 MG EC tablet, Take 81 mg by mouth once daily., Disp: , Rfl:     buPROPion (WELLBUTRIN XL) 300 MG 24 hr tablet, Take 1 tablet (300 mg total) by mouth once daily., Disp: 90 tablet, Rfl: 1    cetirizine (ZYRTEC) 10 MG tablet, Take 1 tablet (10 mg total) by mouth once daily. (Patient taking differently: Take 10 mg by mouth as needed. ), Disp: 30 tablet, Rfl: 2    cyanocobalamin, vitamin B-12, 5,000 mcg Subl, Place under the tongue once a week., Disp: , Rfl:     estradioL (VIVELLE-DOT) 0.075 mg/24 hr, Place 1 patch onto the skin twice a week. Apply twice weekly, Disp: 8 patch, Rfl: 11    fluticasone (FLONASE) 50 mcg/actuation nasal spray, PLACE 2 SPRAYS IN EACH NOSTRIL ONCE DAILY, Disp: 1 Bottle, Rfl: 1    hydrOXYchloroQUINE (PLAQUENIL) 200 mg tablet, TK 2 TS PO QD, Disp: , Rfl:     testosterone (TESTIM) 50 mg/5 gram (1 %) Gel, Apply 0.5 g topically once daily., Disp: 30 g, Rfl: 3    tiZANidine (ZANAFLEX) 4 MG tablet, TAKE 1 TABLET(4 MG) BY MOUTH TWICE DAILY AS NEEDED, Disp: 60 tablet, Rfl: 1  ALLERGIES:   Review of patient's allergies indicates:   Allergen Reactions    Niacin preparations      Patient states she has flushing    Sulfa (sulfonamide antibiotics)      Tongue swelling    Iodine and iodide containing products Rash       VITAL SIGNS: Ht 5' 11" (1.803 m)   Wt 77.1 kg (170 lb)   LMP 01/01/2015 (Approximate)   BMI 23.71 kg/m²      PHYSICAL EXAMINATION:  General:  The patient is alert and oriented x 3.  Mood is pleasant.  Observation of ears, eyes and nose reveal no gross abnormalities.  No labored " breathing observed.  Gait is coordinated. Patient can toe walk and heel walk without difficulty.      right Shoulder / Upper Extremity Exam    OBSERVATION:     Swelling  none  Deformity  none   Discoloration  none   Scapular winging none   Scars   none  Atrophy  none    TENDERNESS / CREPITUS (T/C):          T/C      T/C   Clavicle   -/-  SUPRAspinatus    -/-     AC Jt.    -/-  INFRAspinatus  -/-    SC Jt.    -/-  Deltoid    -/-      G. Tuberosity  -/-  LH BICEP groove  +/-   Acromion:  -/-  Midline Neck   -/-     Scapular Spine -/-  Trapezium   -/-   SMA Scapula  -/-  GH jt. line - post  -/-     Scapulothoracic  -/-         ROM: (* = with pain)  Right shoulder   Left shoulder        AROM (PROM)   AROM (PROM)   FE    110° (115°)     170° (175°)     ER at 0°    45°  (50°)    45°  (50°)   ER at 90° ABD  NT     90°  (90°)   IR at 90°  ABD   NT       NA  (40°)      IR (spine level)   body     T10    STRENGTH: (* = with pain) RIGHT SHOULDER  LEFT SHOULDER   SCAPTION at 0  5/5*    5/5   SCAPTION at 30  5/5*    5/5    IR    5/5*    5/5   ER    5/5*    5/5   BICEPS   5/5    5/5   Deltoid    5/5    5/5     SIGNS:  Painful side       NEER   -    OARLETHS  -    FAITH   -    SPEEDS  neg     DROP ARM   neg   BELLY PRESS neg   Superior escape none    LIFT-OFF  neg   X-Body ADD    neg    MOVING VALGUS neg        STABILITY TESTING    RIGHT SHOULDER   LEFT SHOULDER     Translation     Anterior  up face     up face    Posterior  up face    up face    Sulcus   < 10mm    < 10 mm     Signs   Apprehension   neg      neg       Relocation   no change     no change      Jerk test  neg     neg    EXTREMITY NEURO-VASCULAR EXAM    Sensation grossly intact to light touch all dermatomal regions.    DTR 2+ Biceps, Triceps, BR and Negative Elizs sign   Grossly intact motor function at Elbow, Wrist and Hand   Distal pulses radial and ulnar 2+, brisk cap refill, symmetric.      NECK:  Painless FROM and spinous processes non-tender.  Negative Spurlings sign.      OTHER FINDINGS:  Mild Scapular dyskinesia  pec minor NTTP.       XRAYS:  Shoulder trauma series right,  were obtained and reviewed  No convincing fracture or dislocation is noted. The osseous structures appear well mineralized and well aligned      ASSESSMENT:   Right:    1. Adhesive capsulitis.     PLAN:      PROCEDURE NOTE:   After cortisone consent and post-injection information was given, the shoulder was prepped with betadyne and alcohol. The right subacromial space of the shoulder was injected with 2 cc 40 mg kenalog and 4 cc lidocaine and 4 cc .5% marcaine.   Bandaid was applied. Patient was reminded to rest with RICE for 48 hours post injection and to call clinic immediately for any adverse side effects as explained in clinic today.    PT for scapular stabilization: Scapular dyskinesia   Scapular stabilization - [employer] protocol, 1-3x/week x 6-8 weeks with HEP      All questions were answered, pt will contact us for questions or concerns in the interim.    F/u in 3 months.       All questions were answered, pt will contact us for questions or concerns in the interim.

## 2020-12-18 ENCOUNTER — CLINICAL SUPPORT (OUTPATIENT)
Dept: REHABILITATION | Facility: OTHER | Age: 45
End: 2020-12-18
Attending: ORTHOPAEDIC SURGERY
Payer: COMMERCIAL

## 2020-12-18 DIAGNOSIS — M25.611 DECREASED RIGHT SHOULDER RANGE OF MOTION: ICD-10-CM

## 2020-12-18 DIAGNOSIS — M25.511 CHRONIC RIGHT SHOULDER PAIN: Primary | ICD-10-CM

## 2020-12-18 DIAGNOSIS — M62.81 MUSCLE WEAKNESS OF RIGHT UPPER EXTREMITY: ICD-10-CM

## 2020-12-18 DIAGNOSIS — G89.29 CHRONIC RIGHT SHOULDER PAIN: Primary | ICD-10-CM

## 2020-12-18 PROCEDURE — 97161 PT EVAL LOW COMPLEX 20 MIN: CPT | Mod: PN

## 2020-12-18 PROCEDURE — 97140 MANUAL THERAPY 1/> REGIONS: CPT | Mod: PN

## 2020-12-18 NOTE — PATIENT INSTRUCTIONS
TOWEL ROLL PEC STRETCH        Lie down on a towel roll and rest your hands on your stomach. Support elbows with towels    Allow your shoulders to lower  shoulders toward the floor as shown.    Hold for 5-10 minutes.    Copyright © 3843-5427 [employer]

## 2020-12-18 NOTE — PLAN OF CARE
[facility]  Physical Therapy Initial Evaluation    Name: Mirian العلي  Clinic Number: [id number]    Therapy Diagnosis:   Encounter Diagnoses   Name Primary?    Decreased right shoulder range of motion     Chronic right shoulder pain Yes    Muscle weakness of right upper extremity      Physician: Christy Abdi MD    Physician Orders: PT Eval and Treat Scapular dyskinesia, Scapular stabilization - Jeanette protocol, 1-3x/week x 6-8 weeks with HEP, ROM  Medical Diagnosis: M75.00 (ICD-10-CM) - Adhesive capsulitis of shoulder, unspecified laterality  Evaluation Date: 12/18/2020  Authorization Period Expiration: 12/31/2020  Plan of Care Certification Period: 2/12/2021  Visit # / Visits authorized: 1/ 10 (based on medical necessity)    Time In: 9:47 AM  Time Out: 10:30 AM  Total Billable Time: 43 minutes    Precautions: Standard      Subjective   Date of onset: 2013  History of current condition - Mirian is a 44 yo F referred to OP PT secondary R shoulder pain and weakness. Dysfunction started in 2013 after she lifted a suitcase overhead and immediately felt neck and shoulder pain. Patient participated in OP PT from 7/19/19 to 10/18/19. States she is being seeing by rhematology. States she is now having new presentation of decreased range of motion in R UE. In August she was on vacation and had increased pain in her R shoulder. Had steroid injection at that time without benefit. Seen 2 days ago by Dr. Abdi and received a steroid injection in different location of the R shoulder. States she was in horrible pain the first night after the injection. Better last night, but she has not gotten any relief from the shot. Reported pain in R biceps       Past Medical History:   Diagnosis Date    AR (allergic rhinitis)     Asthma     Eczema     Endometriosis, mild 2002    Factor V Leiden mutation     Genetic testing 10/2020    Invitae Multi-Cancer Panel & Invitae Myelodysplastic  Syndrome/Leukemia Panel (84 genes):  NEGATIVE for mutations.    Ovarian cyst, bilateral     Thyroglobulin antibody positive     per patient, who stated results were given by Dr. Coleman Velez in Rheumatology on 04/22/2019     Mirian العلي  has a past surgical history that includes Laparoscopy w/ mini-laparotomy (2003); Total vaginal hysterectomy (1/2015); Bunionectomy (Left, 02/06/2018); [place] tooth extraction (Bilateral); Oophorectomy (Right, 3/2012); Oophorectomy (Left, 01/2015); and Hysterectomy (01/2015).    Mirian has a current medication list which includes the following prescription(s): afluria qd 2020-21(3yr up)(pf), alprazolam, aspirin, bupropion, cetirizine, cyanocobalamin (vitamin b-12), estradiol, fluticasone propionate, hydroxychloroquine, testosterone, and tizanidine.    Review of patient's allergies indicates:   Allergen Reactions    Niacin preparations      Patient states she has flushing    Sulfa (sulfonamide antibiotics)      Tongue swelling    Iodine and iodide containing products Rash        Imaging, x-ray  9/16/2020  FINDINGS:  Visualized osseous structures appear unremarkable, with no evidence of recent or healing fracture, lytic destructive process, significant glenohumeral arthritic change, or other significant abnormality observed.  No glenohumeral dislocation.  No abnormal soft tissue calcifications.    Prior Therapy: botox for a year in R UE. OP PT, chiropractor  Social History: Lives at home with family  Occupation: Works at non profit  Prior Level of Function: I with amb and ADL  Current Level of Function: I with amb. Modified ADL    Pain:  Current 3/10, worst 10/10, best 0/10   Location: right upper extremity   Description: Throbbing and Sharp  Aggravating Factors: lifting, dressing, taking off shirt,applying deodorant, reaching behind her back, and fastening her bra.  Easing Factors: relaxation, ice and exercise, muscle relaxers, and OTC pain medication    Radicular  symptoms: tingling and numbness    Sleep is disturbed. Sleeping position: side or stomach    Patients structured exercise routine:  Played tennis  Exercise routine prior to onset: walking in neighborhood      Pts goals: to get range of motion back and strategy for less pain.    Objective       Postural examination in standing:  - normal lumbar lordosis  - forward head  - forward shoulders  - protracted R scapula      Postural examination in sitting:   - normal lumbar lordosis  - forward head  - forward shoulders  - increased thoracic kyphosis        Cervical AROM    flexion 55 deg   extension 45 deg   R rotation 50 deg   L rotation 60 deg   R side bending 35 deg   L side bending 32 deg       Cervical Special Tests    Compression negative   Distraction positive   Alar Ligament Stress Test: negative   Sharp-Unruly Test negative   Spurling's:    negative     UE MMT R L   C3 Cervical side bend 5/5 5/5   C4 Shoulder Shrug 5/5  5/5   Shoulder flexion 3-/5 5/5   Shoulder abduction 3/5 5/5   Shoulder IR 5/5 5/5   Shoulder ER 4+/5 5/5   C5 Elbow flexion 5/5 5/5   C7 Elbow extension 5/5 5/5   C6 Wrist extension 5/5 5/5   Wrist flexion 5/5 5/5   Scapular protraction 5/5 5/5   Mid Traps 2/5 5/5   Lower traps 2/5 3/5       UE Special Tests    Palafox-Diomedes Positive R   Neer Impingement Positive R   Yergason's negative   Empty Can negative     Joint Mobility                      R                          L  shoulder flex           120 deg/160 deg           full  Shoulder abd            90 deg/120 deg           full  Shoulder ER                   65 deg                   full    Endurance is good.      CMS Impairment/Limitation/Restriction for [facility] Survey    Therapist reviewed FOTO scores for Mirian العلي on 12/18/2020.   FOTO documents entered into [employer] - see Media section.    Limitation Score: 55%  Category: Carrying    Current : CK = at least 40% but < 60% impaired, limited or restricted  Goal: CJ = at  "least 20% but < 40% impaired, limited or restricted       TREATMENT   Treatment Time In: 10:20 AM  Treatment Time Out: 10:30 AM  Total Treatment time separate from Evaluation time: 15 minutes    Mirian received therapeutic exercises to develop ROM, flexibility and posture for 5 minutes including:  pec stretch on towel roll x 5"      Mirian received the following manual therapy techniques: Joint mobilizations, Myofacial release and Friction Massage were applied to the: neck and R shoulder for 10 minutes, including:  R upper trap positional release  1st rib mobilization  R levator scapula trigger point release  R levator scapula stretch 3 x 30"      Home Exercises Provided and Patient Education Provided     Education provided:   - Discussed the role of the PTA on the Rehab Team. Also discussed the use of the [facility] Portal for communication.    pec stretch on towel roll    Written Home Exercises Provided: yes.  Exercises were reviewed and Mirian was able to demonstrate them prior to the end of the session.  Mirian demonstrated good  understanding of the education provided.     See EMR under Patient Instructions for exercises provided 12/18/2020.    Assessment   Mirian is a 45 y.o. female referred to outpatient Physical Therapy with a medical diagnosis of M75.00 (ICD-10-CM) - Adhesive capsulitis of shoulder, unspecified laterality. Pt presents with decreased strength and range of motion in R UE affecting performance of ADLs. R shoulder/UE pain limiting active ROM. WIll benefit from OP PT for scapular strengthening, postural reeducation, UE strengthening, manual therapy, and dry needling to maximize R shoulder strength and ROM.    Pt prognosis is Good.   Pt will benefit from skilled outpatient Physical Therapy to address the deficits stated above and in the chart below, provide pt/family education, and to maximize pt's level of independence.     Plan of care discussed with patient: Yes  Pt's spiritual, " cultural and educational needs considered and patient is agreeable to the plan of care and goals as stated below:     Anticipated Barriers for therapy: none    Medical Necessity is demonstrated by the following  History  Co-morbidities and personal factors that may impact the plan of care Co-morbidities:   none    Personal Factors:   no deficits     low   Examination  Body Structures and Functions, activity limitations and participation restrictions that may impact the plan of care Body Regions:   neck  upper extremities    Body Systems:    gross symmetry  ROM  strength  gross coordinated movement    Participation Restrictions:   none    Activity limitations:   Learning and applying knowledge  no deficits    General Tasks and Commands  no deficits    Communication  no deficits    Mobility  lifting and carrying objects    Self care  washing oneself (bathing, drying, washing hands)  caring for body parts (brushing teeth, shaving, grooming)  dressing    Domestic Life  no deficits    Interactions/Relationships  no deficits    Life Areas  no deficits    Community and Social Life  no deficits         high   Clinical Presentation evolving clinical presentation with changing clinical characteristics moderate   Decision Making/ Complexity Score: low     Goals:  Short Term Goals: 4 weeks   1. Independent with HEP.  2. Report decreased R UE pain < or =  5/10 with adls such as  lifting, dressing, taking off shirt,applying deodorant, reaching behind her back, and fastening her bra.  3. Increased MMT for B UE by 1 muscle grade to promote proper scapular stability to decrease R UE pain < or =  5/10 with adls such as  lifting, dressing, taking off shirt,applying deodorant, reaching behind her back, and fastening her bra.   4. Increased AROM R shoulder flexion to 160 deg, R shoulder abd 140 deg      Long Term Goals: 8 weeks   5. Increased R shoulder AROM abduction to 160 deg.  6. Report decreased R UE pain < or = 3/10 with adls such  as lifting, dressing, taking off shirt,applying deodorant, reaching behind her back, and fastening her bra.   7. Increased MMT for B UE to 5/5 muscle grade to promote proper scapular stability to decrease R UE pain < or = 3/10 with adls such as lifting, dressing, taking off shirt,applying deodorant, reaching behind her back, and fastening her bra.    8. Patient to achieve CJ (at least 20% < 40% impaired, limited or restricted) level on the FOTO Outcomes Measurement System.    Plan   Certification Period/Plan of care expiration: 12/18/2020 to 2/12/2021.    Outpatient Physical Therapy 3 times weekly for 2-3 weeks then 2 times a week for remaining  8 weeks to include the following interventions: Manual Therapy, Moist Heat/ Ice, Neuromuscular Re-ed, Patient Education, Therapeutic Exercise and dry needling.     Reno Borrero, PT

## 2020-12-21 ENCOUNTER — CLINICAL SUPPORT (OUTPATIENT)
Dept: REHABILITATION | Facility: OTHER | Age: 45
End: 2020-12-21
Attending: ORTHOPAEDIC SURGERY
Payer: COMMERCIAL

## 2020-12-21 DIAGNOSIS — M62.81 MUSCLE WEAKNESS OF RIGHT UPPER EXTREMITY: ICD-10-CM

## 2020-12-21 DIAGNOSIS — M25.611 DECREASED RIGHT SHOULDER RANGE OF MOTION: ICD-10-CM

## 2020-12-21 PROCEDURE — 97140 MANUAL THERAPY 1/> REGIONS: CPT | Mod: PN

## 2020-12-21 PROCEDURE — 97110 THERAPEUTIC EXERCISES: CPT | Mod: PN

## 2020-12-21 NOTE — PROGRESS NOTES
"  Physical Therapy Daily Treatment Note     Name: Mirian العلي  Clinic Number: [id number]    Therapy Diagnosis:   Encounter Diagnoses   Name Primary?    Decreased right shoulder range of motion     Muscle weakness of right upper extremity      Physician: Christy Abdi MD    Visit Date: 12/21/2020  Physician Orders: PT Eval and Treat Scapular dyskinesia, Scapular stabilization - Jeanette protocol, 1-3x/week x 6-8 weeks with HEP, ROM  Medical Diagnosis: M75.00 (ICD-10-CM) - Adhesive capsulitis of shoulder, unspecified laterality  Evaluation Date: 12/18/2020  Authorization Period Expiration: 12/31/2020  Plan of Care Certification Period: 2/12/2021  Visit # / Visits authorized: 2/ 10 (based on medical necessity)     Time In: 4:45 PM  Time Out: 5:28 AM  Total Billable Time: 43 minutes     Precautions: Standard    Subjective     Pt reports: that she is feeling better overall. She believes the shot has finally set in and she feels like the manual techniques were helpful to reduce pain.     She was compliant with home exercise program.  Response to previous treatment: better  Functional change: improved overhead motion    Pain: 3/10  Location: right upper extremity    Objective     Mirian received therapeutic exercises to develop ROM, flexibility and posture for 25 minutes including:  UBE - 2/2   HEP review   Self first rib mobilization with strap - 15 deep breaths (R cervical side bend to slack UT)  pec stretch on towel roll x 5"  Doorway pec stretch (arms abducted to ~35 degrees)- 3x30''  Pulleys scaption - 3'           Mirian received the following manual therapy techniques: Joint mobilizations, Myofacial release and Friction Massage were applied to the: neck and R shoulder for 10 minutes, including:  R upper trap positional release  1st rib mobilization  R levator scapula trigger point release  R levator scapula stretch 3 x 30"      Home Exercises Provided and Patient Education Provided      Education provided: "   - Discussed the role of the PTA on the Rehab Team. Also discussed the use of the [facility] Portal for communication.     pec stretch on towel roll     Written Home Exercises Provided: yes.  Exercises were reviewed and Mirian was able to demonstrate them prior to the end of the session.  Mirian demonstrated good  understanding of the education provided.      See EMR under Patient Instructions for exercises provided 12/18/2020.    Assessment     Pt appears to be responding well to the current plan. It is evident that she presents with postural deficits that were addressed last visit with pec stretching. We began shoulder mobility exercises today and the pt was given new print out and informed on where she can buy a set of doorway pulleys. We will progress shoulder mobility and initiate strengthening next visit.       Mirian is progressing well towards her goals.   Pt prognosis is Good.     Pt will continue to benefit from skilled outpatient physical therapy to address the deficits listed in the problem list box on initial evaluation, provide pt/family education and to maximize pt's level of independence in the home and community environment.     Pt's spiritual, cultural and educational needs considered and pt agreeable to plan of care and goals.    Anticipated barriers to physical therapy: none    Goals:  Short Term Goals: 4 weeks   1. Independent with HEP.  2. Report decreased R UE pain < or =  5/10 with adls such as  lifting, dressing, taking off shirt,applying deodorant, reaching behind her back, and fastening her bra.  3. Increased MMT for B UE by 1 muscle grade to promote proper scapular stability to decrease R UE pain < or =  5/10 with adls such as  lifting, dressing, taking off shirt,applying deodorant, reaching behind her back, and fastening her bra.   4. Increased AROM R shoulder flexion to 160 deg, R shoulder abd 140 deg        Long Term Goals: 8 weeks   5. Increased R shoulder AROM abduction to 160  deg.  6. Report decreased R UE pain < or = 3/10 with adls such as lifting, dressing, taking off shirt,applying deodorant, reaching behind her back, and fastening her bra.   7. Increased MMT for B UE to 5/5 muscle grade to promote proper scapular stability to decrease R UE pain < or = 3/10 with adls such as lifting, dressing, taking off shirt,applying deodorant, reaching behind her back, and fastening her bra.    8. Patient to achieve CJ (at least 20% < 40% impaired, limited or restricted) level on the FOTO Outcomes Measurement System.    Plan     Certification Period/Plan of care expiration: 12/18/2020 to 2/12/2021.    Progress mobility and initiate shoulder strengthening next visit.    Blade Stephenson PT

## 2020-12-23 ENCOUNTER — CLINICAL SUPPORT (OUTPATIENT)
Dept: REHABILITATION | Facility: OTHER | Age: 45
End: 2020-12-23
Attending: ORTHOPAEDIC SURGERY
Payer: COMMERCIAL

## 2020-12-23 DIAGNOSIS — M62.81 MUSCLE WEAKNESS OF RIGHT UPPER EXTREMITY: ICD-10-CM

## 2020-12-23 DIAGNOSIS — M25.611 DECREASED RIGHT SHOULDER RANGE OF MOTION: ICD-10-CM

## 2020-12-23 PROCEDURE — 97140 MANUAL THERAPY 1/> REGIONS: CPT | Mod: PN

## 2020-12-23 PROCEDURE — 97110 THERAPEUTIC EXERCISES: CPT | Mod: PN

## 2020-12-23 NOTE — PROGRESS NOTES
"  Physical Therapy Daily Treatment Note     Name: Mirian العلي  Clinic Number: [id number]    Therapy Diagnosis:   Encounter Diagnoses   Name Primary?    Decreased right shoulder range of motion     Muscle weakness of right upper extremity      Physician: Christy Abdi MD    Visit Date: 12/23/2020  Physician Orders: PT Eval and Treat Scapular dyskinesia, Scapular stabilization - Jeanette protocol, 1-3x/week x 6-8 weeks with HEP, ROM  Medical Diagnosis: M75.00 (ICD-10-CM) - Adhesive capsulitis of shoulder, unspecified laterality  Evaluation Date: 12/18/2020  Authorization Period Expiration: 12/31/2020  Plan of Care Certification Period: 2/12/2021  Visit # / Visits authorized: 2/ 10 (based on medical necessity)     Time In: 9:46  AM (late arrival)  Time Out: 10:11 AM  Total Billable Time: 24 minutes     Precautions: Standard    Subjective     Pt reports: that she cont to feel better overall. The only pain that she experienced was reaching under her washer when she felt a sudden sharp pain in her anterior shoulder. The pain subsided after the incident and there is no residual soreness. Overall she has no pain today but does have a little n/t in her hand    She was compliant with home exercise program.  Response to previous treatment: better  Functional change: improved overhead motion    Pain: 0/10  Location: right upper extremity    Objective     Mirian received therapeutic exercises to develop ROM, flexibility and posture for 14 minutes including:  UBE - 2/2 - np  Self first rib mobilization with strap - 15 deep breaths (R cervical side bend to slack UT) - reviewed  pec stretch on half foam x 5"  Shoulder protraction on half foam - x20   Doorway pec stretch (arms abducted to ~35 degrees)- 3x30''  Pulleys scaption - 3'   SL ER, Abd - x15   Standing Rows OTB - 3x10   Standing IR/ER OTB - 2x10           Mirian received the following manual therapy techniques: Joint mobilizations, Myofacial release and Friction " "Massage were applied to the: neck and R shoulder for 10 minutes, including:  R upper trap positional release  1st rib mobilization  R levator scapula trigger point release  R levator scapula stretch 3 x 30"      Home Exercises Provided and Patient Education Provided      Education provided:   - Discussed the role of the PTA on the Rehab Team. Also discussed the use of the [facility] Portal for communication.     pec stretch on towel roll     Written Home Exercises Provided: yes.  Exercises were reviewed and Mirian was able to demonstrate them prior to the end of the session.  Mirian demonstrated good  understanding of the education provided.      See EMR under Patient Instructions for exercises provided 12/18/2020.    Assessment     Pt continues to respond well to the current plan. Added serratus and RTC strengthening exercises today with good tolerance. Improved shoulder elevation demonstrated on the pulleys today and she has less soreness at end range. She tolerated shoulder strengthening well today, but it appears that she will require a slow ramp up of strengthening exercises for maximum benefit.       Mirian is progressing well towards her goals.   Pt prognosis is Good.     Pt will continue to benefit from skilled outpatient physical therapy to address the deficits listed in the problem list box on initial evaluation, provide pt/family education and to maximize pt's level of independence in the home and community environment.     Pt's spiritual, cultural and educational needs considered and pt agreeable to plan of care and goals.    Anticipated barriers to physical therapy: none    Goals:  Short Term Goals: 4 weeks   1. Independent with HEP.  2. Report decreased R UE pain < or =  5/10 with adls such as  lifting, dressing, taking off shirt,applying deodorant, reaching behind her back, and fastening her bra.  3. Increased MMT for B UE by 1 muscle grade to promote proper scapular stability to decrease R UE " pain < or =  5/10 with adls such as  lifting, dressing, taking off shirt,applying deodorant, reaching behind her back, and fastening her bra.   4. Increased AROM R shoulder flexion to 160 deg, R shoulder abd 140 deg        Long Term Goals: 8 weeks   5. Increased R shoulder AROM abduction to 160 deg.  6. Report decreased R UE pain < or = 3/10 with adls such as lifting, dressing, taking off shirt,applying deodorant, reaching behind her back, and fastening her bra.   7. Increased MMT for B UE to 5/5 muscle grade to promote proper scapular stability to decrease R UE pain < or = 3/10 with adls such as lifting, dressing, taking off shirt,applying deodorant, reaching behind her back, and fastening her bra.    8. Patient to achieve CJ (at least 20% < 40% impaired, limited or restricted) level on the FOTO Outcomes Measurement System.    Plan     Certification Period/Plan of care expiration: 12/18/2020 to 2/12/2021.    Progress mobility and initiate shoulder strengthening next visit.    Blade Stephenson, PT

## 2020-12-28 ENCOUNTER — CLINICAL SUPPORT (OUTPATIENT)
Dept: REHABILITATION | Facility: OTHER | Age: 45
End: 2020-12-28
Attending: ORTHOPAEDIC SURGERY
Payer: COMMERCIAL

## 2020-12-28 DIAGNOSIS — M62.81 MUSCLE WEAKNESS OF RIGHT UPPER EXTREMITY: ICD-10-CM

## 2020-12-28 DIAGNOSIS — M25.611 DECREASED RIGHT SHOULDER RANGE OF MOTION: ICD-10-CM

## 2020-12-28 PROCEDURE — 97140 MANUAL THERAPY 1/> REGIONS: CPT | Mod: PN

## 2020-12-28 PROCEDURE — 97110 THERAPEUTIC EXERCISES: CPT | Mod: PN

## 2020-12-29 ENCOUNTER — CLINICAL SUPPORT (OUTPATIENT)
Dept: REHABILITATION | Facility: OTHER | Age: 45
End: 2020-12-29
Attending: ORTHOPAEDIC SURGERY
Payer: COMMERCIAL

## 2020-12-29 DIAGNOSIS — M62.81 MUSCLE WEAKNESS OF RIGHT UPPER EXTREMITY: ICD-10-CM

## 2020-12-29 DIAGNOSIS — M25.611 DECREASED RIGHT SHOULDER RANGE OF MOTION: Primary | ICD-10-CM

## 2020-12-29 PROCEDURE — 97140 MANUAL THERAPY 1/> REGIONS: CPT | Mod: PN | Performed by: PHYSICAL THERAPIST

## 2020-12-29 PROCEDURE — 97110 THERAPEUTIC EXERCISES: CPT | Mod: PN | Performed by: PHYSICAL THERAPIST

## 2020-12-29 NOTE — PROGRESS NOTES
"  Physical Therapy Daily Treatment Note     Name: Mirian العلي  Clinic Number: [id number]    Therapy Diagnosis:   Encounter Diagnoses   Name Primary?    Decreased right shoulder range of motion Yes    Muscle weakness of right upper extremity      Physician: Christy Abdi MD    Visit Date: 12/29/2020  Physician Orders: PT Eval and Treat Scapular dyskinesia, Scapular stabilization - Jeanette protocol, 1-3x/week x 6-8 weeks with HEP, ROM  Medical Diagnosis: M75.00 (ICD-10-CM) - Adhesive capsulitis of shoulder, unspecified laterality  Evaluation Date: 12/18/2020  Authorization Period Expiration: 12/31/2020  Plan of Care Certification Period: 2/12/2021  Visit # / Visits authorized: 5/ 10 (based on medical necessity)     Time In: 1500   Time Out: 1540  Total Billable Time: 40 minutes     Precautions: Standard    Subjective     Pt reports: feeling much better today than yesterday, but still with stiffness and tightness to neck and shoulder.   She was compliant with home exercise program.   Response to previous treatment: did great. Doing much better.  Functional change: no change    Pain: 2/10  Location: right upper extremity    Objective     Mirian received therapeutic exercises to develop ROM, flexibility and posture for 25 minutes including:  UBE - 2/2  Following manual intervention  wall tacks x 20 reps  R UE chicken wing 2 x 10 reps   R UE standing protraction/retraction 2 x 10 reps  B UE standing protraction/retraction 2 x 10 reps  scapular retractions 20 x 5"  robbery 20 reps    Add lawnmower pull next visit    Self first rib mobilization with strap - 15 deep breaths (R cervical side bend to slack UT) - reviewed  pec stretch on half foam x 5"  Shoulder protraction on half foam - x20   Doorway pec stretch (arms abducted to ~35 degrees)- 3x30''  Pulleys scaption - 3'   SL ER, Abd - x15   Standing Rows OTB - 3x10   Standing IR/ER OTB - 2x10       Mirian received the following manual therapy techniques: dry " needling were applied to the: neck and R shoulder for 15 minutes, including:    10 min x HVLAT to C/TSP and upper ribs (mid cervical, CTJ, high dog T4, R upper ribs) and MET to R first rib    5 min x preparation and application of Ktape to R shoulder. I strip postural cue. Patient received education regarding appropriate care and removal of Kinesiotape. Patient instructed in proper removal techniques if skin irritation occurs.    00 application of TDN: Pt educated on benefits and potential side effects of dry needling. Educated pt on benefits, precautions, side effects following TDN. Educated pt to use heat following treatment sessions if pt is experiencing pain or soreness. Pt verbalized good understanding of education.  Pt signed written consent to dry needling. Pt gave verbal consent for DN    Pt received dry needling to the below listed muscles using 40 mm 60 mm needles.  R sub acromion bursa (60 mm pecking)  R biceps (40 mm pecking)  R upper trap  R levator scapula  R rhomboids    Winding performed every 5 minutes during treatment.      Home Exercises Provided and Patient Education Provided      Education provided:   - Discussed the use of heat tonight for pain reduction        Written Home Exercises Provided: yes.  Exercises were reviewed and Mirian was able to demonstrate them prior to the end of the session.  Mirian demonstrated good  understanding of the education provided.      See EMR under Patient Instructions for exercises provided 12/18/2020.    Assessment     Good tolerance to treatment today. Pt with marked joint stiffness noted at upper ribs, mid TSP, and lower CSP. Good tolerance to HVLAT mobilization, with multiple audible cavitations noted at TSP and ribs, with improved cervical motion noted following treatment. Good tolerance to therex. Pt with continued anterior humeral head positioning R>L, some difficulty with scapular retraction recruitment. Trial of Ktape for postural cuing today.        Mirian is progressing well towards her goals.   Pt prognosis is Good.     Pt will continue to benefit from skilled outpatient physical therapy to address the deficits listed in the problem list box on initial evaluation, provide pt/family education and to maximize pt's level of independence in the home and community environment.     Pt's spiritual, cultural and educational needs considered and pt agreeable to plan of care and goals.    Anticipated barriers to physical therapy: none    Goals:  Short Term Goals: 4 weeks   1. Independent with HEP. Progressing, not met  2. Report decreased R UE pain < or =  5/10 with adls such as  lifting, dressing, taking off shirt,applying deodorant, reaching behind her back, and fastening her bra. Progressing, not met  3. Increased MMT for B UE by 1 muscle grade to promote proper scapular stability to decrease R UE pain < or =  5/10 with adls such as  lifting, dressing, taking off shirt,applying deodorant, reaching behind her back, and fastening her bra. Progressing, not met  4. Increased AROM R shoulder flexion to 160 deg, R shoulder abd 140 deg. Progressing, not met        Long Term Goals: 8 weeks   5. Increased R shoulder AROM abduction to 160 deg. Progressing, not met  6. Report decreased R UE pain < or = 3/10 with adls such as lifting, dressing, taking off shirt,applying deodorant, reaching behind her back, and fastening her bra. Progressing, not met   7. Increased MMT for B UE to 5/5 muscle grade to promote proper scapular stability to decrease R UE pain < or = 3/10 with adls such as lifting, dressing, taking off shirt,applying deodorant, reaching behind her back, and fastening her bra.  Progressing, not met  8. Patient to achieve CJ (at least 20% < 40% impaired, limited or restricted) level on the FOTO Outcomes Measurement System. Progressing, not met    Plan     Certification Period/Plan of care expiration: 12/18/2020 to 2/12/2021.    Progress mobility and initiate  shoulder strengthening next visit.    Anna Lainez, PT

## 2020-12-30 ENCOUNTER — CLINICAL SUPPORT (OUTPATIENT)
Dept: REHABILITATION | Facility: OTHER | Age: 45
End: 2020-12-30
Attending: ORTHOPAEDIC SURGERY
Payer: COMMERCIAL

## 2020-12-30 DIAGNOSIS — M25.611 DECREASED RIGHT SHOULDER RANGE OF MOTION: ICD-10-CM

## 2020-12-30 DIAGNOSIS — M62.81 MUSCLE WEAKNESS OF RIGHT UPPER EXTREMITY: ICD-10-CM

## 2020-12-30 PROCEDURE — 97140 MANUAL THERAPY 1/> REGIONS: CPT | Mod: PN

## 2020-12-30 PROCEDURE — 97110 THERAPEUTIC EXERCISES: CPT | Mod: PN

## 2020-12-30 NOTE — PROGRESS NOTES
"  Physical Therapy Daily Treatment Note     Name: Mirian العلي  Clinic Number: [id number]    Therapy Diagnosis:   Encounter Diagnoses   Name Primary?    Decreased right shoulder range of motion     Muscle weakness of right upper extremity      Physician: Christy Abdi MD    Visit Date: 12/30/2020  Physician Orders: PT Eval and Treat Scapular dyskinesia, Scapular stabilization - Jeanette protocol, 1-3x/week x 6-8 weeks with HEP, ROM  Medical Diagnosis: M75.00 (ICD-10-CM) - Adhesive capsulitis of shoulder, unspecified laterality  Evaluation Date: 12/18/2020  Authorization Period Expiration: 12/31/2020  Plan of Care Certification Period: 2/12/2021  Visit # / Visits authorized: 6/ 10 (based on medical necessity)     Time In: 910  Time Out: 950  Total Billable Time: 40 minutes     Precautions: Standard    Subjective     Pt reports: Feeling better overall but woke up very stiff and painful this morning in the L upper thoracic/rib region.     She was compliant with home exercise program.   Response to previous treatment: did great. Doing much better.  Functional change: no change    Pain: 4/10  Location: right upper extremity    Objective     Mirian received therapeutic exercises to develop ROM, flexibility and posture for 32 minutes including:  UBE - 2/2  Following manual intervention  Quadruped thread the needle - x10   Thoracic rotation, Quadruped (fully rocked back for T-spine bias) - x10   Cat camel (fully rocked back for T-spine bias) - x10   Self first rib mobilization with strap - 10 deep breaths (R cervical side bend to slack UT) - reviewed  wall tacks x 20 reps  R UE chicken wing 2 x 10 reps   R UE standing protraction/retraction 2 x 10 reps + YTB  B UE standing protraction/retraction 2 x 10 reps + YTB  scapular retractions 20 x 5"  robbery 20 reps     Add lawnmower pull next visit      pec stretch on half foam x 5"  Shoulder protraction on half foam - x20   Doorway pec stretch (arms abducted to ~35 " degrees)- 3x30''  Pulleys scaption - 3'   SL ER, Abd - x15   Standing Rows OTB - 3x10   Standing IR/ER OTB - 2x10       Mirian received the following manual therapy techniques: dry needling were applied to the: neck and R shoulder for 8 minutes, including:  P/A mobs mid to upper thoracic spine Grades II-III  10 min x HVLAT to C/TSP and upper ribs (mid cervical, CTJ, high dog T4, R upper ribs) and MET to R first rib - NP    0 min x preparation and application of Ktape to R shoulder. I strip postural cue. Patient received education regarding appropriate care and removal of Kinesiotape. Patient instructed in proper removal techniques if skin irritation occurs.    00 application of TDN: Pt educated on benefits and potential side effects of dry needling. Educated pt on benefits, precautions, side effects following TDN. Educated pt to use heat following treatment sessions if pt is experiencing pain or soreness. Pt verbalized good understanding of education.  Pt signed written consent to dry needling. Pt gave verbal consent for DN    Pt received dry needling to the below listed muscles using 40 mm 60 mm needles.  R sub acromion bursa (60 mm pecking)  R biceps (40 mm pecking)  R upper trap  R levator scapula  R rhomboids    Winding performed every 5 minutes during treatment.      Home Exercises Provided and Patient Education Provided      Education provided:   - Discussed the use of heat tonight for pain reduction        Written Home Exercises Provided: yes.  Exercises were reviewed and Mirian was able to demonstrate them prior to the end of the session.  Mirian demonstrated good  understanding of the education provided.      See EMR under Patient Instructions for exercises provided 12/18/2020.    Assessment     Assessed T-spine today via quadruped thoracic rotation test. Pt with reduced motion and pain with L rotation. Addressed T-spine impairments today with manual therapy and There-ex and pt was with good relief  after the session. She returns to therapy tomorrow for th 4th time this week so we will assess level of soreness and treat accordingly.       Mirian is progressing well towards her goals.   Pt prognosis is Good.     Pt will continue to benefit from skilled outpatient physical therapy to address the deficits listed in the problem list box on initial evaluation, provide pt/family education and to maximize pt's level of independence in the home and community environment.     Pt's spiritual, cultural and educational needs considered and pt agreeable to plan of care and goals.    Anticipated barriers to physical therapy: none    Goals:  Short Term Goals: 4 weeks   1. Independent with HEP. Progressing, not met  2. Report decreased R UE pain < or =  5/10 with adls such as  lifting, dressing, taking off shirt,applying deodorant, reaching behind her back, and fastening her bra. Progressing, not met  3. Increased MMT for B UE by 1 muscle grade to promote proper scapular stability to decrease R UE pain < or =  5/10 with adls such as  lifting, dressing, taking off shirt,applying deodorant, reaching behind her back, and fastening her bra. Progressing, not met  4. Increased AROM R shoulder flexion to 160 deg, R shoulder abd 140 deg. Progressing, not met        Long Term Goals: 8 weeks   5. Increased R shoulder AROM abduction to 160 deg. Progressing, not met  6. Report decreased R UE pain < or = 3/10 with adls such as lifting, dressing, taking off shirt,applying deodorant, reaching behind her back, and fastening her bra. Progressing, not met   7. Increased MMT for B UE to 5/5 muscle grade to promote proper scapular stability to decrease R UE pain < or = 3/10 with adls such as lifting, dressing, taking off shirt,applying deodorant, reaching behind her back, and fastening her bra.  Progressing, not met  8. Patient to achieve CJ (at least 20% < 40% impaired, limited or restricted) level on the FOTO Outcomes Measurement System.  Progressing, not met    Plan     Certification Period/Plan of care expiration: 12/18/2020 to 2/12/2021.    Progress mobility and initiate shoulder strengthening next visit.    Blade Stephenson, PT

## 2020-12-31 ENCOUNTER — CLINICAL SUPPORT (OUTPATIENT)
Dept: REHABILITATION | Facility: OTHER | Age: 45
End: 2020-12-31
Attending: ORTHOPAEDIC SURGERY
Payer: COMMERCIAL

## 2020-12-31 DIAGNOSIS — M62.81 MUSCLE WEAKNESS OF RIGHT UPPER EXTREMITY: ICD-10-CM

## 2020-12-31 DIAGNOSIS — M25.611 DECREASED RIGHT SHOULDER RANGE OF MOTION: Primary | ICD-10-CM

## 2020-12-31 PROCEDURE — 97140 MANUAL THERAPY 1/> REGIONS: CPT | Mod: PN | Performed by: PHYSICAL THERAPIST

## 2020-12-31 PROCEDURE — 97110 THERAPEUTIC EXERCISES: CPT | Mod: PN | Performed by: PHYSICAL THERAPIST

## 2020-12-31 NOTE — PROGRESS NOTES
"  Physical Therapy Daily Treatment Note     Name: Mirian العلي  Clinic Number: [id number]    Therapy Diagnosis:   Encounter Diagnoses   Name Primary?    Decreased right shoulder range of motion Yes    Muscle weakness of right upper extremity      Physician: Christy Abdi MD    Visit Date: 12/31/2020  Physician Orders: PT Eval and Treat Scapular dyskinesia, Scapular stabilization - Jeanette protocol, 1-3x/week x 6-8 weeks with HEP, ROM  Medical Diagnosis: M75.00 (ICD-10-CM) - Adhesive capsulitis of shoulder, unspecified laterality  Evaluation Date: 12/18/2020  Authorization Period Expiration: 12/31/2020  Plan of Care Certification Period: 2/12/2021  Visit # / Visits authorized: 7/ 12 (based on medical necessity)     Time In: 1500  Time Out: 1545  Total Billable Time: 45 minutes     Precautions: Standard    Subjective     Pt reports: overall feeling improvement. A little stiffness to upper back this morning, but eased with cat/cow and thread-the-needle stretches. Feeling some soreness to neck this afternoon after looking down at her phone.      She was compliant with home exercise program.   Response to previous treatment: did great. Doing much better.  Functional change: no change    Pain: 4/10  Location: right upper extremity    Objective     Mirian received therapeutic exercises to develop ROM, flexibility and posture for 35 minutes including:  Exercises in bold performed today:     +5 min x still point inducer  +Low row press 5" x 20  +Inferior glide 5" x 20    UBE - 2/2  Following manual intervention  Quadruped thread the needle - x10   Thoracic rotation, Quadruped (fully rocked back for T-spine bias) - x10   Cat camel (fully rocked back for T-spine bias) - x10   Self first rib mobilization with strap - 10 deep breaths (R cervical side bend to slack UT) - reviewed  wall tacks x 20 reps  R UE chicken wing 2 x 10 reps   R UE standing protraction/retraction 2 x 10 reps + YTB  B UE standing " "protraction/retraction 2 x 10 reps + YTB  scapular retractions 20 x 5"  robbery 20 reps  OTB  + OTB 20x    Add lawnmower pull next visit      pec stretch on half foam x 5"  Shoulder protraction on half foam - x20   Doorway pec stretch (arms abducted to ~35 degrees)- 3x30''  Pulleys scaption - 3'   SL ER, Abd - x15   Standing Rows OTB - 3x10   Standing IR/ER OTB - 2x10       Mirian received the following manual therapy techniques: dry needling were applied to the: neck and R shoulder for 10 minutes, including:  P/A mobs mid to upper thoracic spine Grades II-III, MET to R first rib, up/down glides to CSP  10 min x HVLAT to C/TSP and upper ribs (mid cervical, CTJ, high dog T4, R upper ribs) and MET to R first rib - NP    0 min x preparation and application of Ktape to R shoulder. I strip postural cue. Patient received education regarding appropriate care and removal of Kinesiotape. Patient instructed in proper removal techniques if skin irritation occurs.    00 application of TDN: Pt educated on benefits and potential side effects of dry needling. Educated pt on benefits, precautions, side effects following TDN. Educated pt to use heat following treatment sessions if pt is experiencing pain or soreness. Pt verbalized good understanding of education.  Pt signed written consent to dry needling. Pt gave verbal consent for DN    Pt received dry needling to the below listed muscles using 40 mm 60 mm needles.  R sub acromion bursa (60 mm pecking)  R biceps (40 mm pecking)  R upper trap  R levator scapula  R rhomboids    Winding performed every 5 minutes during treatment.      Home Exercises Provided and Patient Education Provided      Education provided:   - Discussed the use of heat tonight for pain reduction  -12/31 added low row and inferior glide to HEP (B 5" x 20)        Written Home Exercises Provided: yes.  Exercises were reviewed and Mirian was able to demonstrate them prior to the end of the session.  " Mirian demonstrated good  understanding of the education provided.      See EMR under Patient Instructions for exercises provided 12/18/2020.    Assessment     Good tolerance to treatment today. therex limited to avoid soreness due to 4 consecutive visits this week. Initiated low row and inferior glide to improve scap recruitment and decrease anterior scapular tilt with good response, instructed in performance for HEP.       Mirian is progressing well towards her goals.   Pt prognosis is Good.     Pt will continue to benefit from skilled outpatient physical therapy to address the deficits listed in the problem list box on initial evaluation, provide pt/family education and to maximize pt's level of independence in the home and community environment.     Pt's spiritual, cultural and educational needs considered and pt agreeable to plan of care and goals.    Anticipated barriers to physical therapy: none    Goals:  Short Term Goals: 4 weeks   1. Independent with HEP. Progressing, not met  2. Report decreased R UE pain < or =  5/10 with adls such as  lifting, dressing, taking off shirt,applying deodorant, reaching behind her back, and fastening her bra. Progressing, not met  3. Increased MMT for B UE by 1 muscle grade to promote proper scapular stability to decrease R UE pain < or =  5/10 with adls such as  lifting, dressing, taking off shirt,applying deodorant, reaching behind her back, and fastening her bra. Progressing, not met  4. Increased AROM R shoulder flexion to 160 deg, R shoulder abd 140 deg. Progressing, not met        Long Term Goals: 8 weeks   5. Increased R shoulder AROM abduction to 160 deg. Progressing, not met  6. Report decreased R UE pain < or = 3/10 with adls such as lifting, dressing, taking off shirt,applying deodorant, reaching behind her back, and fastening her bra. Progressing, not met   7. Increased MMT for B UE to 5/5 muscle grade to promote proper scapular stability to decrease R UE  pain < or = 3/10 with adls such as lifting, dressing, taking off shirt,applying deodorant, reaching behind her back, and fastening her bra.  Progressing, not met  8. Patient to achieve CJ (at least 20% < 40% impaired, limited or restricted) level on the FOTO Outcomes Measurement System. Progressing, not met    Plan     Certification Period/Plan of care expiration: 12/18/2020 to 2/12/2021.    Progress mobility and initiate shoulder strengthening next visit.    Anna Lainez, PT

## 2021-01-08 ENCOUNTER — CLINICAL SUPPORT (OUTPATIENT)
Dept: REHABILITATION | Facility: OTHER | Age: 46
End: 2021-01-08
Attending: ORTHOPAEDIC SURGERY
Payer: COMMERCIAL

## 2021-01-08 DIAGNOSIS — M25.611 DECREASED RIGHT SHOULDER RANGE OF MOTION: ICD-10-CM

## 2021-01-08 DIAGNOSIS — M62.81 MUSCLE WEAKNESS OF RIGHT UPPER EXTREMITY: ICD-10-CM

## 2021-01-08 PROCEDURE — 97110 THERAPEUTIC EXERCISES: CPT | Mod: PN

## 2021-01-11 ENCOUNTER — CLINICAL SUPPORT (OUTPATIENT)
Dept: REHABILITATION | Facility: OTHER | Age: 46
End: 2021-01-11
Attending: ORTHOPAEDIC SURGERY
Payer: COMMERCIAL

## 2021-01-11 DIAGNOSIS — M25.611 DECREASED RIGHT SHOULDER RANGE OF MOTION: ICD-10-CM

## 2021-01-11 DIAGNOSIS — M62.81 MUSCLE WEAKNESS OF RIGHT UPPER EXTREMITY: ICD-10-CM

## 2021-01-11 PROCEDURE — 97110 THERAPEUTIC EXERCISES: CPT | Mod: PN

## 2021-01-13 ENCOUNTER — CLINICAL SUPPORT (OUTPATIENT)
Dept: REHABILITATION | Facility: OTHER | Age: 46
End: 2021-01-13
Attending: ORTHOPAEDIC SURGERY
Payer: COMMERCIAL

## 2021-01-13 DIAGNOSIS — M25.611 DECREASED RIGHT SHOULDER RANGE OF MOTION: ICD-10-CM

## 2021-01-13 DIAGNOSIS — M62.81 MUSCLE WEAKNESS OF RIGHT UPPER EXTREMITY: ICD-10-CM

## 2021-01-13 PROCEDURE — 97110 THERAPEUTIC EXERCISES: CPT | Mod: PN

## 2021-01-19 ENCOUNTER — CLINICAL SUPPORT (OUTPATIENT)
Dept: REHABILITATION | Facility: OTHER | Age: 46
End: 2021-01-19
Attending: ORTHOPAEDIC SURGERY
Payer: COMMERCIAL

## 2021-01-19 DIAGNOSIS — M25.611 DECREASED RIGHT SHOULDER RANGE OF MOTION: Primary | ICD-10-CM

## 2021-01-19 DIAGNOSIS — M62.81 MUSCLE WEAKNESS OF RIGHT UPPER EXTREMITY: ICD-10-CM

## 2021-01-19 PROCEDURE — 97110 THERAPEUTIC EXERCISES: CPT | Mod: PN | Performed by: PHYSICAL THERAPIST

## 2021-01-20 ENCOUNTER — CLINICAL SUPPORT (OUTPATIENT)
Dept: REHABILITATION | Facility: OTHER | Age: 46
End: 2021-01-20
Attending: ORTHOPAEDIC SURGERY
Payer: COMMERCIAL

## 2021-01-20 DIAGNOSIS — M62.81 MUSCLE WEAKNESS OF RIGHT UPPER EXTREMITY: ICD-10-CM

## 2021-01-20 DIAGNOSIS — M25.611 DECREASED RIGHT SHOULDER RANGE OF MOTION: ICD-10-CM

## 2021-01-20 PROCEDURE — 97110 THERAPEUTIC EXERCISES: CPT | Mod: PN

## 2021-01-22 ENCOUNTER — CLINICAL SUPPORT (OUTPATIENT)
Dept: REHABILITATION | Facility: OTHER | Age: 46
End: 2021-01-22
Attending: ORTHOPAEDIC SURGERY
Payer: COMMERCIAL

## 2021-01-22 DIAGNOSIS — M25.611 DECREASED RIGHT SHOULDER RANGE OF MOTION: ICD-10-CM

## 2021-01-22 DIAGNOSIS — M62.81 MUSCLE WEAKNESS OF RIGHT UPPER EXTREMITY: ICD-10-CM

## 2021-01-22 PROCEDURE — 97140 MANUAL THERAPY 1/> REGIONS: CPT | Mod: PN,CQ

## 2021-01-22 PROCEDURE — 97110 THERAPEUTIC EXERCISES: CPT | Mod: PN,CQ

## 2021-01-27 ENCOUNTER — PATIENT MESSAGE (OUTPATIENT)
Dept: REHABILITATION | Facility: OTHER | Age: 46
End: 2021-01-27

## 2021-01-27 ENCOUNTER — CLINICAL SUPPORT (OUTPATIENT)
Dept: REHABILITATION | Facility: OTHER | Age: 46
End: 2021-01-27
Attending: ORTHOPAEDIC SURGERY
Payer: COMMERCIAL

## 2021-01-27 DIAGNOSIS — M62.81 MUSCLE WEAKNESS OF RIGHT UPPER EXTREMITY: ICD-10-CM

## 2021-01-27 DIAGNOSIS — M25.611 DECREASED RIGHT SHOULDER RANGE OF MOTION: ICD-10-CM

## 2021-01-27 PROCEDURE — 97140 MANUAL THERAPY 1/> REGIONS: CPT | Mod: PN

## 2021-01-28 ENCOUNTER — PATIENT OUTREACH (OUTPATIENT)
Dept: ADMINISTRATIVE | Facility: OTHER | Age: 46
End: 2021-01-28

## 2021-01-29 ENCOUNTER — CLINICAL SUPPORT (OUTPATIENT)
Dept: REHABILITATION | Facility: OTHER | Age: 46
End: 2021-01-29
Attending: ORTHOPAEDIC SURGERY
Payer: COMMERCIAL

## 2021-01-29 DIAGNOSIS — M25.611 DECREASED RIGHT SHOULDER RANGE OF MOTION: ICD-10-CM

## 2021-01-29 DIAGNOSIS — M62.81 MUSCLE WEAKNESS OF RIGHT UPPER EXTREMITY: ICD-10-CM

## 2021-01-29 PROCEDURE — 97140 MANUAL THERAPY 1/> REGIONS: CPT | Mod: PN,CQ

## 2021-01-29 PROCEDURE — 97110 THERAPEUTIC EXERCISES: CPT | Mod: PN,CQ

## 2021-02-01 ENCOUNTER — OFFICE VISIT (OUTPATIENT)
Dept: SPORTS MEDICINE | Facility: CLINIC | Age: 46
End: 2021-02-01
Payer: COMMERCIAL

## 2021-02-01 VITALS
SYSTOLIC BLOOD PRESSURE: 109 MMHG | HEIGHT: 71 IN | BODY MASS INDEX: 23.8 KG/M2 | DIASTOLIC BLOOD PRESSURE: 79 MMHG | HEART RATE: 91 BPM | WEIGHT: 170 LBS

## 2021-02-01 DIAGNOSIS — M75.00 ADHESIVE CAPSULITIS OF SHOULDER, UNSPECIFIED LATERALITY: Primary | ICD-10-CM

## 2021-02-01 DIAGNOSIS — M25.511 PAIN IN JOINT OF RIGHT SHOULDER: ICD-10-CM

## 2021-02-01 PROCEDURE — 99999 PR PBB SHADOW E&M-EST. PATIENT-LVL III: ICD-10-PCS | Mod: PBBFAC,,, | Performed by: ORTHOPAEDIC SURGERY

## 2021-02-01 PROCEDURE — 1125F PR PAIN SEVERITY QUANTIFIED, PAIN PRESENT: ICD-10-PCS | Mod: S$GLB,,, | Performed by: ORTHOPAEDIC SURGERY

## 2021-02-01 PROCEDURE — 99214 OFFICE O/P EST MOD 30 MIN: CPT | Mod: S$GLB,,, | Performed by: ORTHOPAEDIC SURGERY

## 2021-02-01 PROCEDURE — 3008F PR BODY MASS INDEX (BMI) DOCUMENTED: ICD-10-PCS | Mod: CPTII,S$GLB,, | Performed by: ORTHOPAEDIC SURGERY

## 2021-02-01 PROCEDURE — 99999 PR PBB SHADOW E&M-EST. PATIENT-LVL III: CPT | Mod: PBBFAC,,, | Performed by: ORTHOPAEDIC SURGERY

## 2021-02-01 PROCEDURE — 1125F AMNT PAIN NOTED PAIN PRSNT: CPT | Mod: S$GLB,,, | Performed by: ORTHOPAEDIC SURGERY

## 2021-02-01 PROCEDURE — 99214 PR OFFICE/OUTPT VISIT, EST, LEVL IV, 30-39 MIN: ICD-10-PCS | Mod: S$GLB,,, | Performed by: ORTHOPAEDIC SURGERY

## 2021-02-01 PROCEDURE — 3008F BODY MASS INDEX DOCD: CPT | Mod: CPTII,S$GLB,, | Performed by: ORTHOPAEDIC SURGERY

## 2021-02-02 ENCOUNTER — TELEPHONE (OUTPATIENT)
Dept: PAIN MEDICINE | Facility: CLINIC | Age: 46
End: 2021-02-02

## 2021-02-03 ENCOUNTER — TELEPHONE (OUTPATIENT)
Dept: PAIN MEDICINE | Facility: CLINIC | Age: 46
End: 2021-02-03

## 2021-02-03 ENCOUNTER — OFFICE VISIT (OUTPATIENT)
Dept: PAIN MEDICINE | Facility: CLINIC | Age: 46
End: 2021-02-03
Attending: ANESTHESIOLOGY
Payer: COMMERCIAL

## 2021-02-03 VITALS
SYSTOLIC BLOOD PRESSURE: 111 MMHG | RESPIRATION RATE: 18 BRPM | BODY MASS INDEX: 23.8 KG/M2 | HEART RATE: 79 BPM | HEIGHT: 71 IN | WEIGHT: 170 LBS | OXYGEN SATURATION: 100 % | DIASTOLIC BLOOD PRESSURE: 73 MMHG

## 2021-02-03 DIAGNOSIS — G24.3 ISOLATED CERVICAL DYSTONIA: ICD-10-CM

## 2021-02-03 DIAGNOSIS — M62.838 MUSCLE SPASM: Primary | ICD-10-CM

## 2021-02-03 DIAGNOSIS — G24.3 ISOLATED CERVICAL DYSTONIA: Primary | ICD-10-CM

## 2021-02-03 PROCEDURE — 3008F BODY MASS INDEX DOCD: CPT | Mod: CPTII,S$GLB,, | Performed by: ANESTHESIOLOGY

## 2021-02-03 PROCEDURE — 64616 PR CHEMODENERVATION NECK MUSCLES EXC LARNYNX, UNI: ICD-10-PCS | Mod: 50,S$GLB,, | Performed by: ANESTHESIOLOGY

## 2021-02-03 PROCEDURE — 99999 PR PBB SHADOW E&M-EST. PATIENT-LVL III: ICD-10-PCS | Mod: PBBFAC,,, | Performed by: ANESTHESIOLOGY

## 2021-02-03 PROCEDURE — 95874 PR NEEDLE EMG GUIDANCE FOR CHEMODENERVATION: ICD-10-PCS | Mod: S$GLB,,, | Performed by: ANESTHESIOLOGY

## 2021-02-03 PROCEDURE — 95874 GUIDE NERV DESTR NEEDLE EMG: CPT | Mod: S$GLB,,, | Performed by: ANESTHESIOLOGY

## 2021-02-03 PROCEDURE — 99499 UNLISTED E&M SERVICE: CPT | Mod: S$GLB,,, | Performed by: ANESTHESIOLOGY

## 2021-02-03 PROCEDURE — 3008F PR BODY MASS INDEX (BMI) DOCUMENTED: ICD-10-PCS | Mod: CPTII,S$GLB,, | Performed by: ANESTHESIOLOGY

## 2021-02-03 PROCEDURE — 1125F AMNT PAIN NOTED PAIN PRSNT: CPT | Mod: S$GLB,,, | Performed by: ANESTHESIOLOGY

## 2021-02-03 PROCEDURE — 99499 NO LOS: ICD-10-PCS | Mod: S$GLB,,, | Performed by: ANESTHESIOLOGY

## 2021-02-03 PROCEDURE — 64616 CHEMODENERV MUSC NECK DYSTON: CPT | Mod: 50,S$GLB,, | Performed by: ANESTHESIOLOGY

## 2021-02-03 PROCEDURE — 99999 PR PBB SHADOW E&M-EST. PATIENT-LVL III: CPT | Mod: PBBFAC,,, | Performed by: ANESTHESIOLOGY

## 2021-02-03 PROCEDURE — 1125F PR PAIN SEVERITY QUANTIFIED, PAIN PRESENT: ICD-10-PCS | Mod: S$GLB,,, | Performed by: ANESTHESIOLOGY

## 2021-02-04 ENCOUNTER — PATIENT MESSAGE (OUTPATIENT)
Dept: PAIN MEDICINE | Facility: CLINIC | Age: 46
End: 2021-02-04

## 2021-02-10 ENCOUNTER — CLINICAL SUPPORT (OUTPATIENT)
Dept: REHABILITATION | Facility: OTHER | Age: 46
End: 2021-02-10
Attending: ORTHOPAEDIC SURGERY
Payer: COMMERCIAL

## 2021-02-10 DIAGNOSIS — M25.611 DECREASED RIGHT SHOULDER RANGE OF MOTION: Primary | ICD-10-CM

## 2021-02-10 DIAGNOSIS — M62.81 MUSCLE WEAKNESS OF RIGHT UPPER EXTREMITY: ICD-10-CM

## 2021-02-10 PROCEDURE — 97140 MANUAL THERAPY 1/> REGIONS: CPT | Mod: PN

## 2021-02-10 PROCEDURE — 97110 THERAPEUTIC EXERCISES: CPT | Mod: PN

## 2021-02-12 ENCOUNTER — CLINICAL SUPPORT (OUTPATIENT)
Dept: REHABILITATION | Facility: OTHER | Age: 46
End: 2021-02-12
Attending: ORTHOPAEDIC SURGERY
Payer: COMMERCIAL

## 2021-02-12 DIAGNOSIS — M62.81 MUSCLE WEAKNESS OF RIGHT UPPER EXTREMITY: ICD-10-CM

## 2021-02-12 DIAGNOSIS — M25.611 DECREASED RIGHT SHOULDER RANGE OF MOTION: ICD-10-CM

## 2021-02-12 PROCEDURE — 97110 THERAPEUTIC EXERCISES: CPT | Mod: PN

## 2021-02-15 ENCOUNTER — CLINICAL SUPPORT (OUTPATIENT)
Dept: REHABILITATION | Facility: OTHER | Age: 46
End: 2021-02-15
Attending: ORTHOPAEDIC SURGERY
Payer: COMMERCIAL

## 2021-02-15 DIAGNOSIS — M62.81 MUSCLE WEAKNESS OF RIGHT UPPER EXTREMITY: ICD-10-CM

## 2021-02-15 DIAGNOSIS — M25.611 DECREASED RIGHT SHOULDER RANGE OF MOTION: Primary | ICD-10-CM

## 2021-02-15 PROCEDURE — 97140 MANUAL THERAPY 1/> REGIONS: CPT | Mod: PN

## 2021-02-15 PROCEDURE — 97110 THERAPEUTIC EXERCISES: CPT | Mod: PN

## 2021-02-19 ENCOUNTER — CLINICAL SUPPORT (OUTPATIENT)
Dept: REHABILITATION | Facility: OTHER | Age: 46
End: 2021-02-19
Attending: ORTHOPAEDIC SURGERY
Payer: COMMERCIAL

## 2021-02-19 DIAGNOSIS — M25.611 DECREASED RIGHT SHOULDER RANGE OF MOTION: ICD-10-CM

## 2021-02-19 DIAGNOSIS — M62.81 MUSCLE WEAKNESS OF RIGHT UPPER EXTREMITY: ICD-10-CM

## 2021-02-19 PROCEDURE — 97140 MANUAL THERAPY 1/> REGIONS: CPT | Mod: PN,CQ

## 2021-02-19 PROCEDURE — 97110 THERAPEUTIC EXERCISES: CPT | Mod: PN,CQ

## 2021-02-22 ENCOUNTER — CLINICAL SUPPORT (OUTPATIENT)
Dept: REHABILITATION | Facility: OTHER | Age: 46
End: 2021-02-22
Attending: ORTHOPAEDIC SURGERY
Payer: COMMERCIAL

## 2021-02-22 DIAGNOSIS — M62.81 MUSCLE WEAKNESS OF RIGHT UPPER EXTREMITY: ICD-10-CM

## 2021-02-22 DIAGNOSIS — M25.611 DECREASED RIGHT SHOULDER RANGE OF MOTION: ICD-10-CM

## 2021-02-22 PROCEDURE — 97140 MANUAL THERAPY 1/> REGIONS: CPT | Mod: PN,CQ

## 2021-02-22 PROCEDURE — 97110 THERAPEUTIC EXERCISES: CPT | Mod: PN,CQ

## 2021-03-17 ENCOUNTER — PATIENT MESSAGE (OUTPATIENT)
Dept: PRIMARY CARE CLINIC | Facility: CLINIC | Age: 46
End: 2021-03-17

## 2021-03-17 DIAGNOSIS — R07.1 CHEST PAIN ON BREATHING: ICD-10-CM

## 2021-03-17 DIAGNOSIS — F41.9 ANXIETY: ICD-10-CM

## 2021-03-17 RX ORDER — ALPRAZOLAM 1 MG/1
1 TABLET ORAL 2 TIMES DAILY PRN
Qty: 21 TABLET | Refills: 0 | Status: SHIPPED | OUTPATIENT
Start: 2021-03-17 | End: 2021-08-31

## 2021-03-23 ENCOUNTER — OFFICE VISIT (OUTPATIENT)
Dept: PRIMARY CARE CLINIC | Facility: CLINIC | Age: 46
End: 2021-03-23
Attending: FAMILY MEDICINE
Payer: COMMERCIAL

## 2021-03-23 VITALS
HEIGHT: 71 IN | HEART RATE: 89 BPM | OXYGEN SATURATION: 100 % | SYSTOLIC BLOOD PRESSURE: 100 MMHG | DIASTOLIC BLOOD PRESSURE: 68 MMHG | BODY MASS INDEX: 24.25 KG/M2 | WEIGHT: 173.19 LBS

## 2021-03-23 DIAGNOSIS — F32.0 CURRENT MILD EPISODE OF MAJOR DEPRESSIVE DISORDER, UNSPECIFIED WHETHER RECURRENT: ICD-10-CM

## 2021-03-23 DIAGNOSIS — Z00.00 ANNUAL PHYSICAL EXAM: Primary | ICD-10-CM

## 2021-03-23 PROCEDURE — 3008F BODY MASS INDEX DOCD: CPT | Mod: CPTII,S$GLB,, | Performed by: FAMILY MEDICINE

## 2021-03-23 PROCEDURE — 99396 PREV VISIT EST AGE 40-64: CPT | Mod: S$GLB,,, | Performed by: FAMILY MEDICINE

## 2021-03-23 PROCEDURE — 3008F PR BODY MASS INDEX (BMI) DOCUMENTED: ICD-10-PCS | Mod: CPTII,S$GLB,, | Performed by: FAMILY MEDICINE

## 2021-03-23 PROCEDURE — 99396 PR PREVENTIVE VISIT,EST,40-64: ICD-10-PCS | Mod: S$GLB,,, | Performed by: FAMILY MEDICINE

## 2021-03-23 PROCEDURE — 1126F PR PAIN SEVERITY QUANTIFIED, NO PAIN PRESENT: ICD-10-PCS | Mod: S$GLB,,, | Performed by: FAMILY MEDICINE

## 2021-03-23 PROCEDURE — 99999 PR PBB SHADOW E&M-EST. PATIENT-LVL III: CPT | Mod: PBBFAC,,, | Performed by: FAMILY MEDICINE

## 2021-03-23 PROCEDURE — 1126F AMNT PAIN NOTED NONE PRSNT: CPT | Mod: S$GLB,,, | Performed by: FAMILY MEDICINE

## 2021-03-23 PROCEDURE — 99999 PR PBB SHADOW E&M-EST. PATIENT-LVL III: ICD-10-PCS | Mod: PBBFAC,,, | Performed by: FAMILY MEDICINE

## 2021-03-23 RX ORDER — CHOLECALCIFEROL (VITAMIN D3) 25 MCG
2000 TABLET ORAL 2 TIMES DAILY
COMMUNITY

## 2021-03-23 RX ORDER — BUPROPION HYDROCHLORIDE 150 MG/1
150 TABLET ORAL DAILY
Qty: 90 TABLET | Refills: 1 | Status: SHIPPED | OUTPATIENT
Start: 2021-03-23 | End: 2021-09-08

## 2021-03-23 RX ORDER — BUPROPION HYDROCHLORIDE 300 MG/1
300 TABLET ORAL DAILY
Qty: 90 TABLET | Refills: 1 | Status: SHIPPED | OUTPATIENT
Start: 2021-03-23 | End: 2021-10-19

## 2021-03-24 ENCOUNTER — LAB VISIT (OUTPATIENT)
Dept: LAB | Facility: HOSPITAL | Age: 46
End: 2021-03-24
Payer: COMMERCIAL

## 2021-03-24 ENCOUNTER — PATIENT MESSAGE (OUTPATIENT)
Dept: PRIMARY CARE CLINIC | Facility: CLINIC | Age: 46
End: 2021-03-24

## 2021-03-24 DIAGNOSIS — Z00.00 ANNUAL PHYSICAL EXAM: ICD-10-CM

## 2021-03-24 LAB
25(OH)D3+25(OH)D2 SERPL-MCNC: 35 NG/ML (ref 30–96)
ALBUMIN SERPL BCP-MCNC: 4.3 G/DL (ref 3.5–5.2)
ALP SERPL-CCNC: 48 U/L (ref 55–135)
ALT SERPL W/O P-5'-P-CCNC: 11 U/L (ref 10–44)
ANION GAP SERPL CALC-SCNC: 9 MMOL/L (ref 8–16)
AST SERPL-CCNC: 14 U/L (ref 10–40)
BASOPHILS # BLD AUTO: 0.04 K/UL (ref 0–0.2)
BASOPHILS NFR BLD: 1.3 % (ref 0–1.9)
BILIRUB SERPL-MCNC: 0.6 MG/DL (ref 0.1–1)
BUN SERPL-MCNC: 11 MG/DL (ref 6–20)
CALCIUM SERPL-MCNC: 9.2 MG/DL (ref 8.7–10.5)
CHLORIDE SERPL-SCNC: 104 MMOL/L (ref 95–110)
CHOLEST SERPL-MCNC: 176 MG/DL (ref 120–199)
CHOLEST/HDLC SERPL: 2.2 {RATIO} (ref 2–5)
CO2 SERPL-SCNC: 26 MMOL/L (ref 23–29)
CREAT SERPL-MCNC: 1 MG/DL (ref 0.5–1.4)
DIFFERENTIAL METHOD: ABNORMAL
EOSINOPHIL # BLD AUTO: 0 K/UL (ref 0–0.5)
EOSINOPHIL NFR BLD: 0 % (ref 0–8)
ERYTHROCYTE [DISTWIDTH] IN BLOOD BY AUTOMATED COUNT: 11.9 % (ref 11.5–14.5)
EST. GFR  (AFRICAN AMERICAN): >60 ML/MIN/1.73 M^2
EST. GFR  (NON AFRICAN AMERICAN): >60 ML/MIN/1.73 M^2
ESTIMATED AVG GLUCOSE: 88 MG/DL (ref 68–131)
GLUCOSE SERPL-MCNC: 99 MG/DL (ref 70–110)
HBA1C MFR BLD: 4.7 % (ref 4–5.6)
HCT VFR BLD AUTO: 39.6 % (ref 37–48.5)
HDLC SERPL-MCNC: 79 MG/DL (ref 40–75)
HDLC SERPL: 44.9 % (ref 20–50)
HGB BLD-MCNC: 13.3 G/DL (ref 12–16)
IMM GRANULOCYTES # BLD AUTO: 0 K/UL (ref 0–0.04)
IMM GRANULOCYTES NFR BLD AUTO: 0 % (ref 0–0.5)
LDLC SERPL CALC-MCNC: 82 MG/DL (ref 63–159)
LYMPHOCYTES # BLD AUTO: 1.3 K/UL (ref 1–4.8)
LYMPHOCYTES NFR BLD: 39.7 % (ref 18–48)
MCH RBC QN AUTO: 30.6 PG (ref 27–31)
MCHC RBC AUTO-ENTMCNC: 33.6 G/DL (ref 32–36)
MCV RBC AUTO: 91 FL (ref 82–98)
MONOCYTES # BLD AUTO: 0.4 K/UL (ref 0.3–1)
MONOCYTES NFR BLD: 11.1 % (ref 4–15)
NEUTROPHILS # BLD AUTO: 1.5 K/UL (ref 1.8–7.7)
NEUTROPHILS NFR BLD: 47.9 % (ref 38–73)
NONHDLC SERPL-MCNC: 97 MG/DL
NRBC BLD-RTO: 0 /100 WBC
PLATELET # BLD AUTO: 223 K/UL (ref 150–350)
PMV BLD AUTO: 10.7 FL (ref 9.2–12.9)
POTASSIUM SERPL-SCNC: 4.5 MMOL/L (ref 3.5–5.1)
PROT SERPL-MCNC: 6.9 G/DL (ref 6–8.4)
RBC # BLD AUTO: 4.35 M/UL (ref 4–5.4)
SODIUM SERPL-SCNC: 139 MMOL/L (ref 136–145)
TRIGL SERPL-MCNC: 75 MG/DL (ref 30–150)
TSH SERPL DL<=0.005 MIU/L-ACNC: 3.23 UIU/ML (ref 0.4–4)
WBC # BLD AUTO: 3.15 K/UL (ref 3.9–12.7)

## 2021-03-24 PROCEDURE — 82306 VITAMIN D 25 HYDROXY: CPT | Performed by: FAMILY MEDICINE

## 2021-03-24 PROCEDURE — 85025 COMPLETE CBC W/AUTO DIFF WBC: CPT | Performed by: FAMILY MEDICINE

## 2021-03-24 PROCEDURE — 83036 HEMOGLOBIN GLYCOSYLATED A1C: CPT | Performed by: FAMILY MEDICINE

## 2021-03-24 PROCEDURE — 86703 HIV-1/HIV-2 1 RESULT ANTBDY: CPT | Performed by: FAMILY MEDICINE

## 2021-03-24 PROCEDURE — 36415 COLL VENOUS BLD VENIPUNCTURE: CPT | Mod: PN | Performed by: FAMILY MEDICINE

## 2021-03-24 PROCEDURE — 80061 LIPID PANEL: CPT | Performed by: FAMILY MEDICINE

## 2021-03-24 PROCEDURE — 80053 COMPREHEN METABOLIC PANEL: CPT | Performed by: FAMILY MEDICINE

## 2021-03-24 PROCEDURE — 86803 HEPATITIS C AB TEST: CPT | Performed by: FAMILY MEDICINE

## 2021-03-24 PROCEDURE — 84443 ASSAY THYROID STIM HORMONE: CPT | Performed by: FAMILY MEDICINE

## 2021-03-25 LAB
HCV AB SERPL QL IA: NEGATIVE
HIV 1+2 AB+HIV1 P24 AG SERPL QL IA: NEGATIVE

## 2021-04-08 ENCOUNTER — HOSPITAL ENCOUNTER (OUTPATIENT)
Dept: RADIOLOGY | Facility: OTHER | Age: 46
Discharge: HOME | End: 2021-04-08
Attending: PHYSICIAN ASSISTANT
Payer: COMMERCIAL

## 2021-04-08 DIAGNOSIS — Z91.89 AT HIGH RISK FOR BREAST CANCER: Primary | ICD-10-CM

## 2021-04-08 DIAGNOSIS — Z80.3 FAMILY HISTORY OF BREAST CANCER: ICD-10-CM

## 2021-04-08 DIAGNOSIS — Z91.89 INCREASED RISK OF BREAST CANCER: ICD-10-CM

## 2021-04-08 DIAGNOSIS — Z12.39 ENCOUNTER FOR SCREENING FOR MALIGNANT NEOPLASM OF BREAST, UNSPECIFIED SCREENING MODALITY: ICD-10-CM

## 2021-04-08 DIAGNOSIS — Z12.31 BREAST CANCER SCREENING BY MAMMOGRAM: ICD-10-CM

## 2021-04-08 PROCEDURE — 77067 SCR MAMMO BI INCL CAD: CPT | Mod: 26,,, | Performed by: RADIOLOGY

## 2021-04-08 PROCEDURE — 77063 MAMMO DIGITAL SCREENING BILAT WITH TOMO: ICD-10-PCS | Mod: 26,,, | Performed by: RADIOLOGY

## 2021-04-08 PROCEDURE — 77067 MAMMO DIGITAL SCREENING BILAT WITH TOMO: ICD-10-PCS | Mod: 26,,, | Performed by: RADIOLOGY

## 2021-04-08 PROCEDURE — 77067 SCR MAMMO BI INCL CAD: CPT | Mod: TC

## 2021-04-08 PROCEDURE — 77063 BREAST TOMOSYNTHESIS BI: CPT | Mod: 26,,, | Performed by: RADIOLOGY

## 2021-05-03 ENCOUNTER — PATIENT MESSAGE (OUTPATIENT)
Dept: PAIN MEDICINE | Facility: CLINIC | Age: 46
End: 2021-05-03

## 2021-05-04 ENCOUNTER — PROCEDURE VISIT (OUTPATIENT)
Dept: PAIN MEDICINE | Facility: CLINIC | Age: 46
End: 2021-05-04
Attending: ANESTHESIOLOGY
Payer: COMMERCIAL

## 2021-05-04 ENCOUNTER — TELEPHONE (OUTPATIENT)
Dept: PAIN MEDICINE | Facility: CLINIC | Age: 46
End: 2021-05-04

## 2021-05-04 VITALS
HEIGHT: 71 IN | WEIGHT: 173.06 LBS | SYSTOLIC BLOOD PRESSURE: 123 MMHG | RESPIRATION RATE: 18 BRPM | BODY MASS INDEX: 24.23 KG/M2 | DIASTOLIC BLOOD PRESSURE: 89 MMHG | HEART RATE: 84 BPM

## 2021-05-04 DIAGNOSIS — G24.3 ISOLATED CERVICAL DYSTONIA: Primary | ICD-10-CM

## 2021-05-04 DIAGNOSIS — M62.838 MUSCLE SPASM: ICD-10-CM

## 2021-08-03 ENCOUNTER — TELEPHONE (OUTPATIENT)
Dept: PAIN MEDICINE | Facility: CLINIC | Age: 46
End: 2021-08-03

## 2021-08-04 ENCOUNTER — PATIENT MESSAGE (OUTPATIENT)
Dept: PAIN MEDICINE | Facility: CLINIC | Age: 46
End: 2021-08-04

## 2021-08-05 ENCOUNTER — PATIENT MESSAGE (OUTPATIENT)
Dept: PRIMARY CARE CLINIC | Facility: CLINIC | Age: 46
End: 2021-08-05

## 2021-08-12 ENCOUNTER — TELEPHONE (OUTPATIENT)
Dept: OBSTETRICS AND GYNECOLOGY | Facility: CLINIC | Age: 46
End: 2021-08-12

## 2021-08-16 ENCOUNTER — PATIENT MESSAGE (OUTPATIENT)
Dept: PAIN MEDICINE | Facility: CLINIC | Age: 46
End: 2021-08-16

## 2021-08-18 ENCOUNTER — TELEPHONE (OUTPATIENT)
Dept: PAIN MEDICINE | Facility: CLINIC | Age: 46
End: 2021-08-18

## 2021-08-18 ENCOUNTER — OFFICE VISIT (OUTPATIENT)
Dept: PAIN MEDICINE | Facility: CLINIC | Age: 46
End: 2021-08-18
Attending: ANESTHESIOLOGY
Payer: COMMERCIAL

## 2021-08-18 VITALS
HEIGHT: 71 IN | RESPIRATION RATE: 17 BRPM | WEIGHT: 167.56 LBS | DIASTOLIC BLOOD PRESSURE: 79 MMHG | BODY MASS INDEX: 23.46 KG/M2 | SYSTOLIC BLOOD PRESSURE: 112 MMHG | HEART RATE: 79 BPM

## 2021-08-18 DIAGNOSIS — M62.838 MUSCLE SPASM: ICD-10-CM

## 2021-08-18 DIAGNOSIS — G24.3 ISOLATED CERVICAL DYSTONIA: Primary | ICD-10-CM

## 2021-08-18 PROCEDURE — 99499 UNLISTED E&M SERVICE: CPT | Mod: S$GLB,,, | Performed by: ANESTHESIOLOGY

## 2021-08-18 PROCEDURE — 3008F PR BODY MASS INDEX (BMI) DOCUMENTED: ICD-10-PCS | Mod: CPTII,S$GLB,, | Performed by: ANESTHESIOLOGY

## 2021-08-18 PROCEDURE — 1125F AMNT PAIN NOTED PAIN PRSNT: CPT | Mod: CPTII,S$GLB,, | Performed by: ANESTHESIOLOGY

## 2021-08-18 PROCEDURE — 99499 NO LOS: ICD-10-PCS | Mod: S$GLB,,, | Performed by: ANESTHESIOLOGY

## 2021-08-18 PROCEDURE — 3008F BODY MASS INDEX DOCD: CPT | Mod: CPTII,S$GLB,, | Performed by: ANESTHESIOLOGY

## 2021-08-18 PROCEDURE — 1159F MED LIST DOCD IN RCRD: CPT | Mod: CPTII,S$GLB,, | Performed by: ANESTHESIOLOGY

## 2021-08-18 PROCEDURE — 3074F SYST BP LT 130 MM HG: CPT | Mod: CPTII,S$GLB,, | Performed by: ANESTHESIOLOGY

## 2021-08-18 PROCEDURE — 3078F PR MOST RECENT DIASTOLIC BLOOD PRESSURE < 80 MM HG: ICD-10-PCS | Mod: CPTII,S$GLB,, | Performed by: ANESTHESIOLOGY

## 2021-08-18 PROCEDURE — 1159F PR MEDICATION LIST DOCUMENTED IN MEDICAL RECORD: ICD-10-PCS | Mod: CPTII,S$GLB,, | Performed by: ANESTHESIOLOGY

## 2021-08-18 PROCEDURE — 3044F HG A1C LEVEL LT 7.0%: CPT | Mod: CPTII,S$GLB,, | Performed by: ANESTHESIOLOGY

## 2021-08-18 PROCEDURE — 3078F DIAST BP <80 MM HG: CPT | Mod: CPTII,S$GLB,, | Performed by: ANESTHESIOLOGY

## 2021-08-18 PROCEDURE — 3044F PR MOST RECENT HEMOGLOBIN A1C LEVEL <7.0%: ICD-10-PCS | Mod: CPTII,S$GLB,, | Performed by: ANESTHESIOLOGY

## 2021-08-18 PROCEDURE — 99999 PR PBB SHADOW E&M-EST. PATIENT-LVL III: ICD-10-PCS | Mod: PBBFAC,,, | Performed by: ANESTHESIOLOGY

## 2021-08-18 PROCEDURE — 1160F RVW MEDS BY RX/DR IN RCRD: CPT | Mod: CPTII,S$GLB,, | Performed by: ANESTHESIOLOGY

## 2021-08-18 PROCEDURE — 99999 PR PBB SHADOW E&M-EST. PATIENT-LVL III: CPT | Mod: PBBFAC,,, | Performed by: ANESTHESIOLOGY

## 2021-08-18 PROCEDURE — 3074F PR MOST RECENT SYSTOLIC BLOOD PRESSURE < 130 MM HG: ICD-10-PCS | Mod: CPTII,S$GLB,, | Performed by: ANESTHESIOLOGY

## 2021-08-18 PROCEDURE — 1125F PR PAIN SEVERITY QUANTIFIED, PAIN PRESENT: ICD-10-PCS | Mod: CPTII,S$GLB,, | Performed by: ANESTHESIOLOGY

## 2021-08-18 PROCEDURE — 1160F PR REVIEW ALL MEDS BY PRESCRIBER/CLIN PHARMACIST DOCUMENTED: ICD-10-PCS | Mod: CPTII,S$GLB,, | Performed by: ANESTHESIOLOGY

## 2021-08-23 ENCOUNTER — HOSPITAL ENCOUNTER (OUTPATIENT)
Dept: RADIOLOGY | Facility: OTHER | Age: 46
Discharge: HOME | End: 2021-08-23
Attending: PHYSICIAN ASSISTANT
Payer: COMMERCIAL

## 2021-08-23 ENCOUNTER — PATIENT MESSAGE (OUTPATIENT)
Dept: OBSTETRICS AND GYNECOLOGY | Facility: CLINIC | Age: 46
End: 2021-08-23

## 2021-08-23 ENCOUNTER — PATIENT MESSAGE (OUTPATIENT)
Dept: HEMATOLOGY/ONCOLOGY | Facility: CLINIC | Age: 46
End: 2021-08-23

## 2021-08-23 DIAGNOSIS — Z91.89 INCREASED RISK OF BREAST CANCER: ICD-10-CM

## 2021-08-23 DIAGNOSIS — Z80.3 FAMILY HISTORY OF BREAST CANCER: ICD-10-CM

## 2021-08-23 DIAGNOSIS — Z91.89 AT HIGH RISK FOR BREAST CANCER: ICD-10-CM

## 2021-08-23 PROCEDURE — 25500020 PHARM REV CODE 255: Performed by: PHYSICIAN ASSISTANT

## 2021-08-23 PROCEDURE — 77049 MRI BREAST W/WO CONTRAST, W/CAD, BILATERAL: ICD-10-PCS | Mod: 26,,, | Performed by: RADIOLOGY

## 2021-08-23 PROCEDURE — 77049 MRI BREAST C-+ W/CAD BI: CPT | Mod: 26,,, | Performed by: RADIOLOGY

## 2021-08-23 PROCEDURE — 77049 MRI BREAST C-+ W/CAD BI: CPT | Mod: TC

## 2021-08-23 PROCEDURE — A9577 INJ MULTIHANCE: HCPCS | Performed by: PHYSICIAN ASSISTANT

## 2021-08-23 RX ADMIN — GADOBENATE DIMEGLUMINE 15 ML: 529 INJECTION, SOLUTION INTRAVENOUS at 01:08

## 2021-09-15 ENCOUNTER — TELEPHONE (OUTPATIENT)
Dept: PAIN MEDICINE | Facility: CLINIC | Age: 46
End: 2021-09-15

## 2021-09-15 DIAGNOSIS — M25.511 CHRONIC RIGHT SHOULDER PAIN: Primary | ICD-10-CM

## 2021-09-15 DIAGNOSIS — G89.29 CHRONIC RIGHT SHOULDER PAIN: Primary | ICD-10-CM

## 2021-09-21 ENCOUNTER — TELEPHONE (OUTPATIENT)
Dept: PAIN MEDICINE | Facility: CLINIC | Age: 46
End: 2021-09-21

## 2021-09-22 ENCOUNTER — PROCEDURE VISIT (OUTPATIENT)
Dept: PAIN MEDICINE | Facility: CLINIC | Age: 46
End: 2021-09-22
Attending: ANESTHESIOLOGY
Payer: COMMERCIAL

## 2021-09-22 VITALS
RESPIRATION RATE: 17 BRPM | BODY MASS INDEX: 23.24 KG/M2 | WEIGHT: 166 LBS | HEIGHT: 71 IN | SYSTOLIC BLOOD PRESSURE: 109 MMHG | HEART RATE: 87 BPM | DIASTOLIC BLOOD PRESSURE: 80 MMHG

## 2021-09-22 DIAGNOSIS — M25.511 CHRONIC RIGHT SHOULDER PAIN: Primary | ICD-10-CM

## 2021-09-22 DIAGNOSIS — M75.51 CHRONIC BURSITIS OF RIGHT SHOULDER: ICD-10-CM

## 2021-09-22 DIAGNOSIS — G89.29 CHRONIC RIGHT SHOULDER PAIN: Primary | ICD-10-CM

## 2021-09-22 DIAGNOSIS — M62.838 MUSCLE SPASM: ICD-10-CM

## 2021-09-22 DIAGNOSIS — G24.3 ISOLATED CERVICAL DYSTONIA: ICD-10-CM

## 2021-09-22 DIAGNOSIS — M75.41 IMPINGEMENT SYNDROME OF SHOULDER REGION, RIGHT: ICD-10-CM

## 2021-09-22 PROCEDURE — 64418 PR NERVE BLOCK INJ, ANES/STEROID, SUPRASCAPULAR: ICD-10-PCS | Mod: 59,RT,S$GLB, | Performed by: ANESTHESIOLOGY

## 2021-09-22 PROCEDURE — 20611 PR DRAIN/ASP/INJECT MAJOR JOINT/BURSA W/US GUIDANCE: ICD-10-PCS | Mod: 59,RT,S$GLB, | Performed by: ANESTHESIOLOGY

## 2021-09-22 PROCEDURE — 20550 NJX 1 TENDON SHEATH/LIGAMENT: CPT | Mod: 59,RT,S$GLB, | Performed by: ANESTHESIOLOGY

## 2021-09-22 PROCEDURE — 64418 NJX AA&/STRD SPRSCAP NRV: CPT | Mod: 59,RT,S$GLB, | Performed by: ANESTHESIOLOGY

## 2021-09-22 PROCEDURE — 20611 DRAIN/INJ JOINT/BURSA W/US: CPT | Mod: 59,RT,S$GLB, | Performed by: ANESTHESIOLOGY

## 2021-09-22 PROCEDURE — 20550 PR INJECT TENDON SHEATH/LIGAMENT: ICD-10-PCS | Mod: 59,RT,S$GLB, | Performed by: ANESTHESIOLOGY

## 2021-09-22 RX ORDER — METHYLPREDNISOLONE ACETATE 40 MG/ML
40 INJECTION, SUSPENSION INTRA-ARTICULAR; INTRALESIONAL; INTRAMUSCULAR; SOFT TISSUE
Status: COMPLETED | OUTPATIENT
Start: 2021-09-22 | End: 2021-09-22

## 2021-09-22 RX ADMIN — METHYLPREDNISOLONE ACETATE 40 MG: 40 INJECTION, SUSPENSION INTRA-ARTICULAR; INTRALESIONAL; INTRAMUSCULAR; SOFT TISSUE at 09:09

## 2021-11-04 ENCOUNTER — PATIENT MESSAGE (OUTPATIENT)
Dept: OBSTETRICS AND GYNECOLOGY | Facility: CLINIC | Age: 46
End: 2021-11-04
Payer: COMMERCIAL

## 2021-11-04 DIAGNOSIS — N95.1 MENOPAUSAL SYNDROME: ICD-10-CM

## 2021-11-04 RX ORDER — TESTOSTERONE 50 MG/5G
0.5 GEL TRANSDERMAL DAILY
Qty: 30 G | Refills: 0 | Status: SHIPPED | OUTPATIENT
Start: 2021-11-04 | End: 2021-12-20 | Stop reason: SDUPTHER

## 2021-11-04 RX ORDER — TESTOSTERONE 50 MG/5G
0.5 GEL TRANSDERMAL DAILY
Qty: 30 G | Refills: 3 | Status: CANCELLED | OUTPATIENT
Start: 2021-11-04

## 2021-11-16 ENCOUNTER — PATIENT OUTREACH (OUTPATIENT)
Dept: ADMINISTRATIVE | Facility: OTHER | Age: 46
End: 2021-11-16
Payer: COMMERCIAL

## 2021-11-16 ENCOUNTER — PATIENT MESSAGE (OUTPATIENT)
Dept: PAIN MEDICINE | Facility: CLINIC | Age: 46
End: 2021-11-16
Payer: COMMERCIAL

## 2021-11-17 ENCOUNTER — OFFICE VISIT (OUTPATIENT)
Dept: PAIN MEDICINE | Facility: CLINIC | Age: 46
End: 2021-11-17
Attending: ANESTHESIOLOGY
Payer: COMMERCIAL

## 2021-11-17 ENCOUNTER — TELEPHONE (OUTPATIENT)
Dept: PAIN MEDICINE | Facility: CLINIC | Age: 46
End: 2021-11-17
Payer: COMMERCIAL

## 2021-11-17 VITALS
WEIGHT: 167.75 LBS | BODY MASS INDEX: 23.48 KG/M2 | DIASTOLIC BLOOD PRESSURE: 84 MMHG | RESPIRATION RATE: 18 BRPM | OXYGEN SATURATION: 99 % | HEART RATE: 90 BPM | SYSTOLIC BLOOD PRESSURE: 111 MMHG | HEIGHT: 71 IN

## 2021-11-17 DIAGNOSIS — M75.51 CHRONIC BURSITIS OF RIGHT SHOULDER: ICD-10-CM

## 2021-11-17 DIAGNOSIS — M25.511 ARTHRALGIA OF RIGHT ACROMIOCLAVICULAR JOINT: ICD-10-CM

## 2021-11-17 DIAGNOSIS — G89.29 CHRONIC RIGHT SHOULDER PAIN: ICD-10-CM

## 2021-11-17 DIAGNOSIS — M25.511 CHRONIC RIGHT SHOULDER PAIN: ICD-10-CM

## 2021-11-17 DIAGNOSIS — M62.838 MUSCLE SPASM: ICD-10-CM

## 2021-11-17 DIAGNOSIS — G24.3 ISOLATED CERVICAL DYSTONIA: Primary | ICD-10-CM

## 2021-11-17 PROCEDURE — 99499 NO LOS: ICD-10-PCS | Mod: S$GLB,,, | Performed by: ANESTHESIOLOGY

## 2021-11-17 PROCEDURE — 99499 UNLISTED E&M SERVICE: CPT | Mod: S$GLB,,, | Performed by: ANESTHESIOLOGY

## 2021-11-17 PROCEDURE — 64616 PR CHEMODENERVATION NECK MUSCLES EXC LARNYNX, UNI: ICD-10-PCS | Mod: RT,S$GLB,, | Performed by: ANESTHESIOLOGY

## 2021-11-17 PROCEDURE — 1159F MED LIST DOCD IN RCRD: CPT | Mod: CPTII,S$GLB,, | Performed by: ANESTHESIOLOGY

## 2021-11-17 PROCEDURE — 95874 PR NEEDLE EMG GUIDANCE FOR CHEMODENERVATION: ICD-10-PCS | Mod: S$GLB,,, | Performed by: ANESTHESIOLOGY

## 2021-11-17 PROCEDURE — 1160F PR REVIEW ALL MEDS BY PRESCRIBER/CLIN PHARMACIST DOCUMENTED: ICD-10-PCS | Mod: CPTII,S$GLB,, | Performed by: ANESTHESIOLOGY

## 2021-11-17 PROCEDURE — 64616 CHEMODENERV MUSC NECK DYSTON: CPT | Mod: RT,S$GLB,, | Performed by: ANESTHESIOLOGY

## 2021-11-17 PROCEDURE — 99999 PR PBB SHADOW E&M-EST. PATIENT-LVL III: CPT | Mod: PBBFAC,,, | Performed by: ANESTHESIOLOGY

## 2021-11-17 PROCEDURE — 3074F PR MOST RECENT SYSTOLIC BLOOD PRESSURE < 130 MM HG: ICD-10-PCS | Mod: CPTII,S$GLB,, | Performed by: ANESTHESIOLOGY

## 2021-11-17 PROCEDURE — 1159F PR MEDICATION LIST DOCUMENTED IN MEDICAL RECORD: ICD-10-PCS | Mod: CPTII,S$GLB,, | Performed by: ANESTHESIOLOGY

## 2021-11-17 PROCEDURE — 95874 GUIDE NERV DESTR NEEDLE EMG: CPT | Mod: S$GLB,,, | Performed by: ANESTHESIOLOGY

## 2021-11-17 PROCEDURE — 3008F BODY MASS INDEX DOCD: CPT | Mod: CPTII,S$GLB,, | Performed by: ANESTHESIOLOGY

## 2021-11-17 PROCEDURE — 3074F SYST BP LT 130 MM HG: CPT | Mod: CPTII,S$GLB,, | Performed by: ANESTHESIOLOGY

## 2021-11-17 PROCEDURE — 3079F PR MOST RECENT DIASTOLIC BLOOD PRESSURE 80-89 MM HG: ICD-10-PCS | Mod: CPTII,S$GLB,, | Performed by: ANESTHESIOLOGY

## 2021-11-17 PROCEDURE — 3044F HG A1C LEVEL LT 7.0%: CPT | Mod: CPTII,S$GLB,, | Performed by: ANESTHESIOLOGY

## 2021-11-17 PROCEDURE — 3008F PR BODY MASS INDEX (BMI) DOCUMENTED: ICD-10-PCS | Mod: CPTII,S$GLB,, | Performed by: ANESTHESIOLOGY

## 2021-11-17 PROCEDURE — 3079F DIAST BP 80-89 MM HG: CPT | Mod: CPTII,S$GLB,, | Performed by: ANESTHESIOLOGY

## 2021-11-17 PROCEDURE — 99999 PR PBB SHADOW E&M-EST. PATIENT-LVL III: ICD-10-PCS | Mod: PBBFAC,,, | Performed by: ANESTHESIOLOGY

## 2021-11-17 PROCEDURE — 3044F PR MOST RECENT HEMOGLOBIN A1C LEVEL <7.0%: ICD-10-PCS | Mod: CPTII,S$GLB,, | Performed by: ANESTHESIOLOGY

## 2021-11-17 PROCEDURE — 1160F RVW MEDS BY RX/DR IN RCRD: CPT | Mod: CPTII,S$GLB,, | Performed by: ANESTHESIOLOGY

## 2021-12-20 ENCOUNTER — OFFICE VISIT (OUTPATIENT)
Dept: OBSTETRICS AND GYNECOLOGY | Facility: CLINIC | Age: 46
End: 2021-12-20
Attending: OBSTETRICS & GYNECOLOGY
Payer: COMMERCIAL

## 2021-12-20 ENCOUNTER — LAB VISIT (OUTPATIENT)
Dept: LAB | Facility: OTHER | Age: 46
End: 2021-12-20
Attending: OBSTETRICS & GYNECOLOGY
Payer: COMMERCIAL

## 2021-12-20 VITALS
BODY MASS INDEX: 23.27 KG/M2 | HEIGHT: 71 IN | WEIGHT: 166.19 LBS | DIASTOLIC BLOOD PRESSURE: 84 MMHG | SYSTOLIC BLOOD PRESSURE: 123 MMHG

## 2021-12-20 DIAGNOSIS — N95.2 POSTMENOPAUSAL ATROPHIC VAGINITIS: ICD-10-CM

## 2021-12-20 DIAGNOSIS — Z12.31 ENCOUNTER FOR SCREENING MAMMOGRAM FOR HIGH-RISK PATIENT: ICD-10-CM

## 2021-12-20 DIAGNOSIS — Z01.419 ENCOUNTER FOR GYNECOLOGICAL EXAMINATION WITHOUT ABNORMAL FINDING: Primary | ICD-10-CM

## 2021-12-20 DIAGNOSIS — N95.1 MENOPAUSAL SYNDROME: ICD-10-CM

## 2021-12-20 LAB — ESTRADIOL SERPL-MCNC: 21 PG/ML

## 2021-12-20 PROCEDURE — 82670 ASSAY OF TOTAL ESTRADIOL: CPT | Performed by: OBSTETRICS & GYNECOLOGY

## 2021-12-20 PROCEDURE — 36415 COLL VENOUS BLD VENIPUNCTURE: CPT | Performed by: OBSTETRICS & GYNECOLOGY

## 2021-12-20 PROCEDURE — 84402 ASSAY OF FREE TESTOSTERONE: CPT | Performed by: OBSTETRICS & GYNECOLOGY

## 2021-12-20 PROCEDURE — 99396 PREV VISIT EST AGE 40-64: CPT | Mod: S$GLB,,, | Performed by: OBSTETRICS & GYNECOLOGY

## 2021-12-20 PROCEDURE — 99396 PR PREVENTIVE VISIT,EST,40-64: ICD-10-PCS | Mod: S$GLB,,, | Performed by: OBSTETRICS & GYNECOLOGY

## 2021-12-20 RX ORDER — ESTRADIOL 0.07 MG/D
1 FILM, EXTENDED RELEASE TRANSDERMAL
Qty: 8 PATCH | Refills: 12 | Status: SHIPPED | OUTPATIENT
Start: 2021-12-20 | End: 2022-01-03

## 2021-12-20 RX ORDER — TRAZODONE HYDROCHLORIDE 50 MG/1
50 TABLET ORAL NIGHTLY
Qty: 30 TABLET | Refills: 6 | Status: SHIPPED | OUTPATIENT
Start: 2021-12-20 | End: 2022-04-06

## 2021-12-20 RX ORDER — PRASTERONE 6.5 MG/1
6.5 INSERT VAGINAL NIGHTLY
Qty: 30 EACH | Refills: 12 | Status: SHIPPED | OUTPATIENT
Start: 2021-12-20 | End: 2023-04-05 | Stop reason: SDUPTHER

## 2021-12-23 LAB — TESTOST FREE SERPL-MCNC: 0.6 PG/ML

## 2022-01-02 ENCOUNTER — PATIENT MESSAGE (OUTPATIENT)
Dept: PRIMARY CARE CLINIC | Facility: CLINIC | Age: 47
End: 2022-01-02
Payer: COMMERCIAL

## 2022-01-04 ENCOUNTER — PATIENT MESSAGE (OUTPATIENT)
Dept: OBSTETRICS AND GYNECOLOGY | Facility: CLINIC | Age: 47
End: 2022-01-04
Payer: COMMERCIAL

## 2022-02-22 ENCOUNTER — TELEPHONE (OUTPATIENT)
Dept: PAIN MEDICINE | Facility: CLINIC | Age: 47
End: 2022-02-22
Payer: COMMERCIAL

## 2022-02-22 ENCOUNTER — OFFICE VISIT (OUTPATIENT)
Dept: PAIN MEDICINE | Facility: CLINIC | Age: 47
End: 2022-02-22
Attending: ANESTHESIOLOGY
Payer: COMMERCIAL

## 2022-02-22 VITALS
RESPIRATION RATE: 17 BRPM | HEIGHT: 71 IN | DIASTOLIC BLOOD PRESSURE: 74 MMHG | BODY MASS INDEX: 23.27 KG/M2 | SYSTOLIC BLOOD PRESSURE: 115 MMHG | WEIGHT: 166.25 LBS | HEART RATE: 90 BPM

## 2022-02-22 DIAGNOSIS — G24.3 ISOLATED CERVICAL DYSTONIA: Primary | ICD-10-CM

## 2022-02-22 DIAGNOSIS — M62.838 MUSCLE SPASM: ICD-10-CM

## 2022-02-22 PROCEDURE — 99999 PR PBB SHADOW E&M-EST. PATIENT-LVL IV: CPT | Mod: PBBFAC,,, | Performed by: ANESTHESIOLOGY

## 2022-02-22 PROCEDURE — 1159F PR MEDICATION LIST DOCUMENTED IN MEDICAL RECORD: ICD-10-PCS | Mod: CPTII,S$GLB,, | Performed by: ANESTHESIOLOGY

## 2022-02-22 PROCEDURE — 3008F BODY MASS INDEX DOCD: CPT | Mod: CPTII,S$GLB,, | Performed by: ANESTHESIOLOGY

## 2022-02-22 PROCEDURE — 1160F RVW MEDS BY RX/DR IN RCRD: CPT | Mod: CPTII,S$GLB,, | Performed by: ANESTHESIOLOGY

## 2022-02-22 PROCEDURE — 3078F DIAST BP <80 MM HG: CPT | Mod: CPTII,S$GLB,, | Performed by: ANESTHESIOLOGY

## 2022-02-22 PROCEDURE — 3074F PR MOST RECENT SYSTOLIC BLOOD PRESSURE < 130 MM HG: ICD-10-PCS | Mod: CPTII,S$GLB,, | Performed by: ANESTHESIOLOGY

## 2022-02-22 PROCEDURE — 1160F PR REVIEW ALL MEDS BY PRESCRIBER/CLIN PHARMACIST DOCUMENTED: ICD-10-PCS | Mod: CPTII,S$GLB,, | Performed by: ANESTHESIOLOGY

## 2022-02-22 PROCEDURE — 3008F PR BODY MASS INDEX (BMI) DOCUMENTED: ICD-10-PCS | Mod: CPTII,S$GLB,, | Performed by: ANESTHESIOLOGY

## 2022-02-22 PROCEDURE — 99499 NO LOS: ICD-10-PCS | Mod: S$GLB,,, | Performed by: ANESTHESIOLOGY

## 2022-02-22 PROCEDURE — 99499 UNLISTED E&M SERVICE: CPT | Mod: S$GLB,,, | Performed by: ANESTHESIOLOGY

## 2022-02-22 PROCEDURE — 3074F SYST BP LT 130 MM HG: CPT | Mod: CPTII,S$GLB,, | Performed by: ANESTHESIOLOGY

## 2022-02-22 PROCEDURE — 99999 PR PBB SHADOW E&M-EST. PATIENT-LVL IV: ICD-10-PCS | Mod: PBBFAC,,, | Performed by: ANESTHESIOLOGY

## 2022-02-22 PROCEDURE — 3078F PR MOST RECENT DIASTOLIC BLOOD PRESSURE < 80 MM HG: ICD-10-PCS | Mod: CPTII,S$GLB,, | Performed by: ANESTHESIOLOGY

## 2022-02-22 PROCEDURE — 1159F MED LIST DOCD IN RCRD: CPT | Mod: CPTII,S$GLB,, | Performed by: ANESTHESIOLOGY

## 2022-02-22 NOTE — PROGRESS NOTES
Patient Name: Mirian العلي  MRN: [id number]    INFORMED CONSENT: The procedure, risks, benefits and options were discussed with patient. There are no contraindications to the procedure. The patient expressed understanding and agreed to proceed. The personnel performing the procedure was discussed. I verify that I personally obtained Mirian's consent prior to the start of the procedure and the signed consent can be found on the patient's chart.  Her sister has dermatomyositis and she had w/u so far SHRAVAN mildly elevated Speckled 1:320 per her own history and CRP 0.5  Procedure Date: 02/22/2022    Anesthesia: Topical    Sedation: None    Pre-operative diagnosis:   1. Isolated cervical dystonia  onabotulinumtoxina injection 300 Units   2. Muscle spasm  onabotulinumtoxina injection 300 Units        We discussed with the patient the assessment and recommendations. The following is the plan we agreed on:    1.  Procedure:  Under clean technique, EMG guidance and after discussing with the patient we used 300 units Botox.  No units wasted.  We injected Right sternocleidomastoid, splenius capitis, longismus, upper trapezius, upper pectoralis, and we injected the right thoracic paraspinal.  Patient tolerated procedure well.    2.  Return as needed.  Otherwise follow-up in 3 months to repeat injection. F/U with Dr. Abdi for R shoulder pain

## 2022-04-06 ENCOUNTER — OFFICE VISIT (OUTPATIENT)
Dept: PRIMARY CARE CLINIC | Facility: CLINIC | Age: 47
End: 2022-04-06
Attending: FAMILY MEDICINE
Payer: COMMERCIAL

## 2022-04-06 ENCOUNTER — LAB VISIT (OUTPATIENT)
Dept: LAB | Facility: HOSPITAL | Age: 47
End: 2022-04-06
Attending: FAMILY MEDICINE
Payer: COMMERCIAL

## 2022-04-06 VITALS
DIASTOLIC BLOOD PRESSURE: 68 MMHG | HEIGHT: 71 IN | HEART RATE: 80 BPM | WEIGHT: 163.94 LBS | OXYGEN SATURATION: 100 % | SYSTOLIC BLOOD PRESSURE: 110 MMHG | BODY MASS INDEX: 22.95 KG/M2

## 2022-04-06 DIAGNOSIS — Z00.00 ANNUAL PHYSICAL EXAM: ICD-10-CM

## 2022-04-06 DIAGNOSIS — Z00.00 ANNUAL PHYSICAL EXAM: Primary | ICD-10-CM

## 2022-04-06 DIAGNOSIS — Z12.11 SCREEN FOR COLON CANCER: ICD-10-CM

## 2022-04-06 LAB
25(OH)D3+25(OH)D2 SERPL-MCNC: 27 NG/ML (ref 30–96)
ALBUMIN SERPL BCP-MCNC: 4.4 G/DL (ref 3.5–5.2)
ALP SERPL-CCNC: 41 U/L (ref 55–135)
ALT SERPL W/O P-5'-P-CCNC: 13 U/L (ref 10–44)
ANION GAP SERPL CALC-SCNC: 7 MMOL/L (ref 8–16)
AST SERPL-CCNC: 16 U/L (ref 10–40)
BASOPHILS # BLD AUTO: 0.04 K/UL (ref 0–0.2)
BASOPHILS NFR BLD: 1.4 % (ref 0–1.9)
BILIRUB SERPL-MCNC: 0.6 MG/DL (ref 0.1–1)
BUN SERPL-MCNC: 14 MG/DL (ref 6–20)
CALCIUM SERPL-MCNC: 9.8 MG/DL (ref 8.7–10.5)
CHLORIDE SERPL-SCNC: 100 MMOL/L (ref 95–110)
CHOLEST SERPL-MCNC: 173 MG/DL (ref 120–199)
CHOLEST/HDLC SERPL: 2.5 {RATIO} (ref 2–5)
CO2 SERPL-SCNC: 29 MMOL/L (ref 23–29)
CREAT SERPL-MCNC: 0.9 MG/DL (ref 0.5–1.4)
DIFFERENTIAL METHOD: ABNORMAL
EOSINOPHIL # BLD AUTO: 0 K/UL (ref 0–0.5)
EOSINOPHIL NFR BLD: 0 % (ref 0–8)
ERYTHROCYTE [DISTWIDTH] IN BLOOD BY AUTOMATED COUNT: 12.6 % (ref 11.5–14.5)
EST. GFR  (AFRICAN AMERICAN): >60 ML/MIN/1.73 M^2
EST. GFR  (NON AFRICAN AMERICAN): >60 ML/MIN/1.73 M^2
ESTIMATED AVG GLUCOSE: 94 MG/DL (ref 68–131)
GLUCOSE SERPL-MCNC: 87 MG/DL (ref 70–110)
HBA1C MFR BLD: 4.9 % (ref 4–5.6)
HCT VFR BLD AUTO: 39.8 % (ref 37–48.5)
HDLC SERPL-MCNC: 69 MG/DL (ref 40–75)
HDLC SERPL: 39.9 % (ref 20–50)
HGB BLD-MCNC: 13.3 G/DL (ref 12–16)
IMM GRANULOCYTES # BLD AUTO: 0.01 K/UL (ref 0–0.04)
IMM GRANULOCYTES NFR BLD AUTO: 0.4 % (ref 0–0.5)
LDLC SERPL CALC-MCNC: 92.6 MG/DL (ref 63–159)
LYMPHOCYTES # BLD AUTO: 1.1 K/UL (ref 1–4.8)
LYMPHOCYTES NFR BLD: 37.2 % (ref 18–48)
MCH RBC QN AUTO: 29 PG (ref 27–31)
MCHC RBC AUTO-ENTMCNC: 33.4 G/DL (ref 32–36)
MCV RBC AUTO: 87 FL (ref 82–98)
MONOCYTES # BLD AUTO: 0.3 K/UL (ref 0.3–1)
MONOCYTES NFR BLD: 10.3 % (ref 4–15)
NEUTROPHILS # BLD AUTO: 1.4 K/UL (ref 1.8–7.7)
NEUTROPHILS NFR BLD: 50.7 % (ref 38–73)
NONHDLC SERPL-MCNC: 104 MG/DL
NRBC BLD-RTO: 0 /100 WBC
PLATELET # BLD AUTO: 243 K/UL (ref 150–450)
PMV BLD AUTO: 10.4 FL (ref 9.2–12.9)
POTASSIUM SERPL-SCNC: 4.5 MMOL/L (ref 3.5–5.1)
PROT SERPL-MCNC: 7.1 G/DL (ref 6–8.4)
RBC # BLD AUTO: 4.59 M/UL (ref 4–5.4)
SODIUM SERPL-SCNC: 136 MMOL/L (ref 136–145)
TRIGL SERPL-MCNC: 57 MG/DL (ref 30–150)
TSH SERPL DL<=0.005 MIU/L-ACNC: 2.14 UIU/ML (ref 0.4–4)
WBC # BLD AUTO: 2.82 K/UL (ref 3.9–12.7)

## 2022-04-06 PROCEDURE — 82306 VITAMIN D 25 HYDROXY: CPT | Performed by: FAMILY MEDICINE

## 2022-04-06 PROCEDURE — 99999 PR PBB SHADOW E&M-EST. PATIENT-LVL IV: ICD-10-PCS | Mod: PBBFAC,,, | Performed by: FAMILY MEDICINE

## 2022-04-06 PROCEDURE — 99396 PR PREVENTIVE VISIT,EST,40-64: ICD-10-PCS | Mod: S$GLB,,, | Performed by: FAMILY MEDICINE

## 2022-04-06 PROCEDURE — 84443 ASSAY THYROID STIM HORMONE: CPT | Performed by: FAMILY MEDICINE

## 2022-04-06 PROCEDURE — 1159F MED LIST DOCD IN RCRD: CPT | Mod: CPTII,S$GLB,, | Performed by: FAMILY MEDICINE

## 2022-04-06 PROCEDURE — 3008F BODY MASS INDEX DOCD: CPT | Mod: CPTII,S$GLB,, | Performed by: FAMILY MEDICINE

## 2022-04-06 PROCEDURE — 99999 PR PBB SHADOW E&M-EST. PATIENT-LVL IV: CPT | Mod: PBBFAC,,, | Performed by: FAMILY MEDICINE

## 2022-04-06 PROCEDURE — 85025 COMPLETE CBC W/AUTO DIFF WBC: CPT | Performed by: FAMILY MEDICINE

## 2022-04-06 PROCEDURE — 3074F SYST BP LT 130 MM HG: CPT | Mod: CPTII,S$GLB,, | Performed by: FAMILY MEDICINE

## 2022-04-06 PROCEDURE — 1160F RVW MEDS BY RX/DR IN RCRD: CPT | Mod: CPTII,S$GLB,, | Performed by: FAMILY MEDICINE

## 2022-04-06 PROCEDURE — 3074F PR MOST RECENT SYSTOLIC BLOOD PRESSURE < 130 MM HG: ICD-10-PCS | Mod: CPTII,S$GLB,, | Performed by: FAMILY MEDICINE

## 2022-04-06 PROCEDURE — 3044F HG A1C LEVEL LT 7.0%: CPT | Mod: CPTII,S$GLB,, | Performed by: FAMILY MEDICINE

## 2022-04-06 PROCEDURE — 80061 LIPID PANEL: CPT | Performed by: FAMILY MEDICINE

## 2022-04-06 PROCEDURE — 3008F PR BODY MASS INDEX (BMI) DOCUMENTED: ICD-10-PCS | Mod: CPTII,S$GLB,, | Performed by: FAMILY MEDICINE

## 2022-04-06 PROCEDURE — 80053 COMPREHEN METABOLIC PANEL: CPT | Performed by: FAMILY MEDICINE

## 2022-04-06 PROCEDURE — 83036 HEMOGLOBIN GLYCOSYLATED A1C: CPT | Performed by: FAMILY MEDICINE

## 2022-04-06 PROCEDURE — 3078F PR MOST RECENT DIASTOLIC BLOOD PRESSURE < 80 MM HG: ICD-10-PCS | Mod: CPTII,S$GLB,, | Performed by: FAMILY MEDICINE

## 2022-04-06 PROCEDURE — 1159F PR MEDICATION LIST DOCUMENTED IN MEDICAL RECORD: ICD-10-PCS | Mod: CPTII,S$GLB,, | Performed by: FAMILY MEDICINE

## 2022-04-06 PROCEDURE — 1160F PR REVIEW ALL MEDS BY PRESCRIBER/CLIN PHARMACIST DOCUMENTED: ICD-10-PCS | Mod: CPTII,S$GLB,, | Performed by: FAMILY MEDICINE

## 2022-04-06 PROCEDURE — 99396 PREV VISIT EST AGE 40-64: CPT | Mod: S$GLB,,, | Performed by: FAMILY MEDICINE

## 2022-04-06 PROCEDURE — 36415 COLL VENOUS BLD VENIPUNCTURE: CPT | Mod: PN | Performed by: FAMILY MEDICINE

## 2022-04-06 PROCEDURE — 3044F PR MOST RECENT HEMOGLOBIN A1C LEVEL <7.0%: ICD-10-PCS | Mod: CPTII,S$GLB,, | Performed by: FAMILY MEDICINE

## 2022-04-06 PROCEDURE — 3078F DIAST BP <80 MM HG: CPT | Mod: CPTII,S$GLB,, | Performed by: FAMILY MEDICINE

## 2022-04-11 ENCOUNTER — HOSPITAL ENCOUNTER (OUTPATIENT)
Dept: RADIOLOGY | Facility: OTHER | Age: 47
Discharge: HOME | End: 2022-04-11
Attending: OBSTETRICS & GYNECOLOGY
Payer: COMMERCIAL

## 2022-04-11 DIAGNOSIS — Z12.31 ENCOUNTER FOR SCREENING MAMMOGRAM FOR HIGH-RISK PATIENT: ICD-10-CM

## 2022-04-11 PROCEDURE — 77063 BREAST TOMOSYNTHESIS BI: CPT | Mod: 26,,, | Performed by: RADIOLOGY

## 2022-04-11 PROCEDURE — 77063 BREAST TOMOSYNTHESIS BI: CPT | Mod: TC

## 2022-04-11 PROCEDURE — 77063 MAMMO DIGITAL SCREENING BILAT WITH TOMO: ICD-10-PCS | Mod: 26,,, | Performed by: RADIOLOGY

## 2022-04-11 PROCEDURE — 77067 SCR MAMMO BI INCL CAD: CPT | Mod: 26,,, | Performed by: RADIOLOGY

## 2022-04-11 PROCEDURE — 77067 MAMMO DIGITAL SCREENING BILAT WITH TOMO: ICD-10-PCS | Mod: 26,,, | Performed by: RADIOLOGY

## 2022-04-11 NOTE — PROGRESS NOTES
Subjective:       Patient ID: Mirian العلي is a 46 y.o. female.    Chief Complaint: Annual Exam and Rash     Pt presents today for annual. Labs pending but does get labs done with specialists.  Pt does have a rash. Unknown etiology but was seen by derm and given steroid ointment      Rash  Pertinent negatives include no congestion, cough, diarrhea, eye pain, fatigue, fever, shortness of breath, sore throat or vomiting.     Review of Systems   Constitutional: Negative for activity change, appetite change, chills, fatigue and fever.   HENT: Negative for nasal congestion, ear pain, sinus pressure/congestion, sore throat and trouble swallowing.    Eyes: Negative for photophobia, pain and visual disturbance.   Respiratory: Negative for apnea, cough, chest tightness, shortness of breath and wheezing.    Cardiovascular: Negative for chest pain, palpitations and leg swelling.   Gastrointestinal: Negative for abdominal distention, abdominal pain, constipation, diarrhea, nausea and vomiting.   Genitourinary: Negative.    Musculoskeletal: Negative for back pain, joint swelling and neck pain.   Integumentary:  Positive for rash.   Neurological: Negative for dizziness, tremors, weakness, numbness and headaches.   Psychiatric/Behavioral: Negative for behavioral problems, decreased concentration and sleep disturbance. The patient is not nervous/anxious.    All other systems reviewed and are negative.        Objective:      Physical Exam  Vitals reviewed.   Constitutional:       General: She is not in acute distress.     Appearance: Normal appearance. She is well-developed and normal weight. She is not ill-appearing, toxic-appearing or diaphoretic.   HENT:      Head: Normocephalic and atraumatic.      Right Ear: Tympanic membrane, ear canal and external ear normal. There is no impacted cerumen.      Left Ear: Tympanic membrane, ear canal and external ear normal. There is no impacted cerumen.      Nose: Nose normal.       Mouth/Throat:      Pharynx: No oropharyngeal exudate or posterior oropharyngeal erythema.   Eyes:      Extraocular Movements: Extraocular movements intact.      Conjunctiva/sclera: Conjunctivae normal.      Pupils: Pupils are equal, round, and reactive to light.   Neck:      Thyroid: No thyromegaly.   Cardiovascular:      Rate and Rhythm: Normal rate and regular rhythm.      Pulses: Normal pulses.      Heart sounds: Normal heart sounds. No murmur heard.  Pulmonary:      Effort: Pulmonary effort is normal. No respiratory distress.      Breath sounds: Normal breath sounds. No stridor. No rhonchi or rales.   Chest:      Chest wall: No tenderness.   Abdominal:      General: Bowel sounds are normal. There is no distension.      Palpations: Abdomen is soft. There is no mass.      Tenderness: There is no abdominal tenderness. There is no guarding or rebound.   Musculoskeletal:         General: No tenderness. Normal range of motion.      Cervical back: Normal range of motion and neck supple.      Right lower leg: No edema.      Left lower leg: No edema.   Lymphadenopathy:      Cervical: No cervical adenopathy.   Skin:     General: Skin is warm and dry.      Findings: No erythema.   Neurological:      General: No focal deficit present.      Mental Status: She is alert and oriented to person, place, and time. Mental status is at baseline.      Cranial Nerves: No cranial nerve deficit.      Sensory: No sensory deficit.      Motor: No weakness.      Coordination: Coordination normal.      Gait: Gait normal.      Deep Tendon Reflexes: Reflexes are normal and symmetric. Reflexes normal.   Psychiatric:         Mood and Affect: Mood normal.         Behavior: Behavior normal.         Thought Content: Thought content normal.         Judgment: Judgment normal.         Assessment:       Problem List Items Addressed This Visit    None     Visit Diagnoses     Annual physical exam    -  Primary    Relevant Orders    CBC Auto Differential  (Completed)    Comprehensive Metabolic Panel (Completed)    Lipid Panel (Completed)    Hemoglobin A1C (Completed)    TSH (Completed)    Vitamin D (Completed)    Screen for colon cancer        Relevant Orders    Case Request Endoscopy: COLONOSCOPY (Completed)          Plan:       Labs pending  Annual PE wnl  RTC prn symptoms    Rash unclear etiology ,will observe, is improving

## 2022-04-17 NOTE — TELEPHONE ENCOUNTER
No new care gaps identified.  Powered by [employer]. Reference number: [id number].   4/17/2022 5:28:37 AM CDT

## 2022-04-18 NOTE — TELEPHONE ENCOUNTER
Refill Routing Note   Medication(s) are not appropriate for processing by [facility] for the following reason(s):      - Drug-Drug Interaction ( buPROPion )    [facility] action(s):  Route          Medication reconciliation completed: No     Appointments  past 12m or future 3m with PCP    Date Provider   Last Visit   4/6/2022 Nora Brooks MD   Next Visit   Visit date not found Nora Brooks MD   ED visits in past 90 days: 0        Note composed:10:30 AM 04/18/2022

## 2022-04-19 DIAGNOSIS — F32.0 CURRENT MILD EPISODE OF MAJOR DEPRESSIVE DISORDER, UNSPECIFIED WHETHER RECURRENT: Primary | ICD-10-CM

## 2022-04-19 RX ORDER — BUPROPION HYDROCHLORIDE 300 MG/1
TABLET ORAL
Qty: 90 TABLET | Refills: 2 | OUTPATIENT
Start: 2022-04-19

## 2022-04-19 RX ORDER — BUPROPION HYDROCHLORIDE 300 MG/1
300 TABLET ORAL DAILY
Qty: 90 TABLET | Refills: 1 | Status: SHIPPED | OUTPATIENT
Start: 2022-04-19 | End: 2022-10-08

## 2022-04-19 NOTE — TELEPHONE ENCOUNTER
No new care gaps identified.  Powered by [employer]. Reference number: [id number].   4/19/2022 12:31:47 PM CDT

## 2022-05-23 ENCOUNTER — PATIENT MESSAGE (OUTPATIENT)
Dept: SPINE | Facility: CLINIC | Age: 47
End: 2022-05-23
Payer: COMMERCIAL

## 2022-05-24 ENCOUNTER — TELEPHONE (OUTPATIENT)
Dept: PAIN MEDICINE | Facility: CLINIC | Age: 47
End: 2022-05-24

## 2022-05-24 ENCOUNTER — OFFICE VISIT (OUTPATIENT)
Dept: PAIN MEDICINE | Facility: CLINIC | Age: 47
End: 2022-05-24
Attending: ANESTHESIOLOGY
Payer: COMMERCIAL

## 2022-05-24 VITALS
SYSTOLIC BLOOD PRESSURE: 113 MMHG | BODY MASS INDEX: 22.93 KG/M2 | WEIGHT: 163.81 LBS | DIASTOLIC BLOOD PRESSURE: 75 MMHG | RESPIRATION RATE: 18 BRPM | HEIGHT: 71 IN | HEART RATE: 95 BPM

## 2022-05-24 DIAGNOSIS — G24.3 ISOLATED CERVICAL DYSTONIA: Primary | ICD-10-CM

## 2022-05-24 DIAGNOSIS — M62.838 MUSCLE SPASM: ICD-10-CM

## 2022-05-24 PROCEDURE — 3078F DIAST BP <80 MM HG: CPT | Mod: CPTII,S$GLB,, | Performed by: ANESTHESIOLOGY

## 2022-05-24 PROCEDURE — 99499 NO LOS: ICD-10-PCS | Mod: S$GLB,,, | Performed by: ANESTHESIOLOGY

## 2022-05-24 PROCEDURE — 99999 PR PBB SHADOW E&M-EST. PATIENT-LVL IV: ICD-10-PCS | Mod: PBBFAC,,, | Performed by: ANESTHESIOLOGY

## 2022-05-24 PROCEDURE — 3008F BODY MASS INDEX DOCD: CPT | Mod: CPTII,S$GLB,, | Performed by: ANESTHESIOLOGY

## 2022-05-24 PROCEDURE — 3008F PR BODY MASS INDEX (BMI) DOCUMENTED: ICD-10-PCS | Mod: CPTII,S$GLB,, | Performed by: ANESTHESIOLOGY

## 2022-05-24 PROCEDURE — 1159F PR MEDICATION LIST DOCUMENTED IN MEDICAL RECORD: ICD-10-PCS | Mod: CPTII,S$GLB,, | Performed by: ANESTHESIOLOGY

## 2022-05-24 PROCEDURE — 99499 UNLISTED E&M SERVICE: CPT | Mod: S$GLB,,, | Performed by: ANESTHESIOLOGY

## 2022-05-24 PROCEDURE — 64616 CHEMODENERV MUSC NECK DYSTON: CPT | Mod: RT,S$GLB,, | Performed by: ANESTHESIOLOGY

## 2022-05-24 PROCEDURE — 3074F PR MOST RECENT SYSTOLIC BLOOD PRESSURE < 130 MM HG: ICD-10-PCS | Mod: CPTII,S$GLB,, | Performed by: ANESTHESIOLOGY

## 2022-05-24 PROCEDURE — 3074F SYST BP LT 130 MM HG: CPT | Mod: CPTII,S$GLB,, | Performed by: ANESTHESIOLOGY

## 2022-05-24 PROCEDURE — 3044F HG A1C LEVEL LT 7.0%: CPT | Mod: CPTII,S$GLB,, | Performed by: ANESTHESIOLOGY

## 2022-05-24 PROCEDURE — 1160F RVW MEDS BY RX/DR IN RCRD: CPT | Mod: CPTII,S$GLB,, | Performed by: ANESTHESIOLOGY

## 2022-05-24 PROCEDURE — 95874 GUIDE NERV DESTR NEEDLE EMG: CPT | Mod: S$GLB,,, | Performed by: ANESTHESIOLOGY

## 2022-05-24 PROCEDURE — 1160F PR REVIEW ALL MEDS BY PRESCRIBER/CLIN PHARMACIST DOCUMENTED: ICD-10-PCS | Mod: CPTII,S$GLB,, | Performed by: ANESTHESIOLOGY

## 2022-05-24 PROCEDURE — 1159F MED LIST DOCD IN RCRD: CPT | Mod: CPTII,S$GLB,, | Performed by: ANESTHESIOLOGY

## 2022-05-24 PROCEDURE — 64616 PR CHEMODENERVATION NECK MUSCLES EXC LARNYNX, UNI: ICD-10-PCS | Mod: RT,S$GLB,, | Performed by: ANESTHESIOLOGY

## 2022-05-24 PROCEDURE — 95874 PR NEEDLE EMG GUIDANCE FOR CHEMODENERVATION: ICD-10-PCS | Mod: S$GLB,,, | Performed by: ANESTHESIOLOGY

## 2022-05-24 PROCEDURE — 3044F PR MOST RECENT HEMOGLOBIN A1C LEVEL <7.0%: ICD-10-PCS | Mod: CPTII,S$GLB,, | Performed by: ANESTHESIOLOGY

## 2022-05-24 PROCEDURE — 99999 PR PBB SHADOW E&M-EST. PATIENT-LVL IV: CPT | Mod: PBBFAC,,, | Performed by: ANESTHESIOLOGY

## 2022-05-24 PROCEDURE — 3078F PR MOST RECENT DIASTOLIC BLOOD PRESSURE < 80 MM HG: ICD-10-PCS | Mod: CPTII,S$GLB,, | Performed by: ANESTHESIOLOGY

## 2022-05-24 NOTE — PROGRESS NOTES
Patient Name: Mirian العلي  MRN: [id number]    INFORMED CONSENT: The procedure, risks, benefits and options were discussed with patient. There are no contraindications to the procedure. The patient expressed understanding and agreed to proceed. The personnel performing the procedure was discussed. I verify that I personally obtained Mirian's consent prior to the start of the procedure and the signed consent can be found on the patient's chart.  Shoulder improved with HEP  Procedure Date: 05/24/2022    Anesthesia: Topical    Sedation: None    Pre-operative diagnosis:   1. Isolated cervical dystonia  onabotulinumtoxina injection 300 Units   2. Muscle spasm  onabotulinumtoxina injection 300 Units        We discussed with the patient the assessment and recommendations. The following is the plan we agreed on:    1.  Procedure:  Under clean technique, EMG guidance and after discussing with the patient we used 300 units Botox.  No units wasted.  We injected Right sternocleidomastoid, splenius capitis, longismus, upper trapezius, upper pectoralis, and we injected the right thoracic paraspinal.  Patient tolerated procedure well.    2.  Return as needed.  Otherwise follow-up in 3 months to repeat injection.

## 2022-06-03 ENCOUNTER — PATIENT MESSAGE (OUTPATIENT)
Dept: OBSTETRICS AND GYNECOLOGY | Facility: CLINIC | Age: 47
End: 2022-06-03
Payer: COMMERCIAL

## 2022-07-13 ENCOUNTER — PATIENT MESSAGE (OUTPATIENT)
Dept: PRIMARY CARE CLINIC | Facility: CLINIC | Age: 47
End: 2022-07-13
Payer: COMMERCIAL

## 2022-07-13 DIAGNOSIS — Z91.09 ENVIRONMENTAL ALLERGIES: Primary | ICD-10-CM

## 2022-07-18 ENCOUNTER — PATIENT MESSAGE (OUTPATIENT)
Dept: OBSTETRICS AND GYNECOLOGY | Facility: CLINIC | Age: 47
End: 2022-07-18
Payer: COMMERCIAL

## 2022-07-18 DIAGNOSIS — N95.1 MENOPAUSAL SYNDROME: ICD-10-CM

## 2022-07-18 NOTE — TELEPHONE ENCOUNTER
Ok to call [facility] and order 3 clicks daily of the current dose of Testosterone 1%. 30 grams, with 5 refills.

## 2022-07-19 NOTE — PROGRESS NOTES
ALLERGY & IMMUNOLOGY CLINIC - INITIAL CONSULTATION      HISTORY OF PRESENT ILLNESS     Patient ID: Mirian العلي is a 46 y.o. female    CC: chronic rhinitis, sinus pressure    HPI: Mirian العلي is a 46 y.o. female with a history of dust mite allergy presenting for chronic rhinitis and sinus pressure.  She was referred by Nora Brooks MD.  She was last seen in [facility] by Dr. Guthrie in 2017.    Symptoms started in childhood. Current symptoms worsened in Spring. She was expecting it to get better in Summer like it typically does, but symptoms have persisted through the Summer and may have eaten gotten worse. She says she has a discomfort in her face, pressure, feels like something in her nose, but isn't expelling any mucus. Right ear has been stopped up. She did ear candling in March, helped temporarily. She has not seen an ENT in years.  Patient endorses congestion (but not stopped up, more of a sensation of pressure), sneezing (occasional), post nasal drip, nasal pruritus, ocular pruritus (sometimes), watery eyes.  Patient denies cough, rhinorrhea. She says sometimes it feels like nose is running, but nothing comes out.  Symptoms have been historically worse in the Spring, but current symptoms have been persistent since Spring.  Symptoms occur every day.  There is no difference between indoors and outdoors. She says she spends most of her time indoors.  The patient has found triggers to include dust. She says one of her offices had mold. She noticed her face would turn red,face felt a little tight, but not painful or pruritic. Went away when she would leave the office. Hasn't happened again since removing the moldy t-shirts and ventilating the office better.  The patient denies heartburn/reflux symptoms.  The patient denies anosmia.     She is using flonase 2 SEN daily. She says she has been taking it for so long, she is unsure if it is helping. She switches between zyrtec or  allegra. Does not notice a difference. Been trying sudafed and benadryl. She says some of them medications help temporarily, but it wears off. She has some of her prn medications with her, including acetaminophen/guafenesin/phenylephrine help, especially when she has a HA. Antihistamine/nasal decongestant and phenylephrine help. She doesn't have a history of HTN. She says she tries to use these medications sparingly. She uses saline spray sometimes in the shower which helps.    She saw Dr. Guthrie in 2013 and 2017. In 2013, she had immunocaps positive to dust mite. Has since gotten a dog (March of 2020).    She developed rash in 12/2021with swollen face then swelling went down and left her face bumpy/textured with little bumps. Derm said it was rosacea and seb derm. Went away after 2 weeks. Used steroid cream. Hasn't had to use it since. It was pruritic.      REVIEW OF SYSTEMS     CONST: no F/C/NS, no unexplained weight changes  NEURO: + HAs  EYES: + watery discharge, + occasional pruritus  EARS: + sensation of fullness  NOSE: + congestion, no rhinorrhea, + post nasal drip, no anosmia  PULM: no SOB, no wheezing, no cough  GI: no heartburn, no pain, no N/V/D  DERM: no recent rashes, no skin breaks     MEDICAL HISTORY     Vaccines:   Immunization History   Administered Date(s) Administered    COVID-19, MRNA, LN-S, PF (MODERNA FULL 0.5 ML DOSE) 02/23/2021, 02/23/2021, 03/24/2021, 12/18/2021    Influenza 10/05/2015, 08/27/2016    Influenza (Flumist) - Quadrivalent - Intranasal *Preferred* (2-49 years old) 09/27/2014    Influenza - Quadrivalent 09/15/2019    Influenza - Quadrivalent - MDCK - PF 12/11/2021    Influenza - Quadrivalent - PF *Preferred* (6 months and older) 09/16/2017, 10/27/2018, 09/27/2020    Influenza - Trivalent (ADULT) 10/08/2011, 09/13/2013    Influenza - Trivalent - PF (ADULT) 10/05/2015, 08/27/2016    Influenza Split 09/13/2013    Tdap 10/07/2019       MedHx:   Patient Active Problem List    Diagnosis    Fatigue    Ovarian cyst    Breast tenderness in female    Generalized headaches    Endometriosis of pelvis    Unmedicated IUD    Factor V Leiden carrier    Rhinitis, allergic    Pelvic pain in female    S/P hysterectomy with oophorectomy    Family history of breast cancer in mother    Chest pain on breathing    Precordial pain    Hemangioma    Corneal abrasion, left    Pectus excavatum    Shingles    Abnormal laboratory test    Carpal tunnel syndrome of right wrist    Right shoulder pain    Swelling of arm    Primary insomnia    Increased risk of breast cancer    Decreased right shoulder range of motion    Muscle weakness of right upper extremity       SurgHx:   Past Surgical History:   Procedure Laterality Date    BUNIONECTOMY Left 02/06/2018    HYSTERECTOMY  01/2015    TLHBSO    LAPAROSCOPY W/ MINI-LAPAROTOMY  2003    OOPHORECTOMY Right 3/2012    OOPHORECTOMY Left 01/2015    TOTAL VAGINAL HYSTERECTOMY  1/2015    w/Right USO    WISDOM TOOTH EXTRACTION Bilateral        Medications:   Current Outpatient Medications on File Prior to Visit   Medication Sig Dispense Refill    ALPRAZolam (XANAX) 1 MG tablet Take 1 tablet (1 mg total) by mouth nightly as needed for Insomnia or Anxiety. 21 tablet 0    aspirin (ECOTRIN) 81 MG EC tablet Take 81 mg by mouth once daily.      buPROPion (WELLBUTRIN XL) 150 MG TB24 tablet TAKE 1 TABLET BY MOUTH EVERY DAY 90 tablet 3    buPROPion (WELLBUTRIN XL) 300 MG 24 hr tablet Take 1 tablet (300 mg total) by mouth once daily. 90 tablet 1    cetirizine (ZYRTEC) 10 MG tablet Take 1 tablet (10 mg total) by mouth once daily. (Patient taking differently: Take 10 mg by mouth as needed.) 30 tablet 2    cyanocobalamin, vitamin B-12, 5,000 mcg Subl Place under the tongue once a week.      estradioL (VIVELLE-DOT) 0.075 mg/24 hr APPLY 1 PATCH TOPICALLY TO THE SKIN 2 TIMES A WEEK 8 patch 12    fluticasone (FLONASE) 50 mcg/actuation nasal spray PLACE  2 SPRAYS IN EACH NOSTRIL ONCE DAILY 1 Bottle 1    hydrOXYchloroQUINE (PLAQUENIL) 200 mg tablet TK 2 TS PO QD      prasterone, dhea, (INTRAROSA) 6.5 mg Inst Place 6.5 mg vaginally every evening. 30 each 12    tiZANidine (ZANAFLEX) 4 MG tablet TAKE 1 TABLET(4 MG) BY MOUTH TWICE DAILY AS NEEDED 60 tablet 1    UNABLE TO FIND medication name: tumeric 200 mg twice a day      UNABLE TO FIND Apply 0.75 g topically every evening. Testosterone 1% 30 g 5    vitamin D (VITAMIN D3) 1000 units Tab Take 2,000 Units by mouth 2 (two) times daily.       No current facility-administered medications on file prior to visit.       H/o Asthma: She says she was given an inhaler as a teenager, but no longer needs an inhaler and has never been diagnosed with asthma  H/o Eczema: denies  H/o Rhinitis: endorses  Food Allergy: denies    Drug Allergies:   Review of patient's allergies indicates:   Allergen Reactions    Niacin preparations      Patient states she has flushing    Sulfa (sulfonamide antibiotics)      Tongue swelling    Iodine and iodide containing products Rash   Iodine contrast -- she got hives. She didn't get hives when she pretreated with benadryl.  Sulfa antibiotics -- she got it after a surgery in 2015. Had tongue swelling.    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    Infection Hx: Denies frequent infections requiring antibiotics. No history of pneumonia.    SocHx:   Social History     Tobacco Use    Smoking status: Never Smoker    Smokeless tobacco: Never Used   Substance Use Topics    Alcohol use: Yes     Alcohol/week: 5.0 - 6.0 standard drinks     Types: 5 - 6 Glasses of wine per week     Comment: week    Drug use: No       FamHx:   Family History   Problem Relation Age of Onset    Thyroid disease Mother     Breast cancer Mother         diagnosed in her 60s; bilateral    Lung cancer Mother 71        former smoker     "Skin cancer Father         type?    Bladder Cancer Father         diagnosed in his 60s    Kidney cancer Father 60    Diabetes Brother     Factor V Leiden deficiency Brother     Allergies Brother     Asthma Brother     Breast cancer Sister 46        inflammatory breast cancer, unilateral; Real genetic test negative    Other Sister         dermatothyocitis    Diabetes Maternal Aunt     Diabetes Paternal Uncle     Cancer Maternal Grandmother         bone marrow cancer in her 60s- multiple myeloma?    Throat cancer Paternal Grandfather         diagnosed in his 70s    Other Sister         pre-cancer? cervical?    Cancer Paternal Cousin         unknown origin    BRCA 1/2 Paternal Cousin         +BRCA2 mutation [id number] (patient Mirian tested negative for this twice)        PHYSICAL EXAM     VS: Ht 5' 11" (1.803 m)   Wt 75.9 kg (167 lb 5.3 oz)   LMP 01/01/2015 (Approximate)   BMI 23.34 kg/m²   GENERAL: Alert, NAD, well-appearing, cooperative  EYES: EOMI, no conjunctival injection, no discharge, no infraorbital shiners  EARS: external auditory canals normal B/L, TM normal B/L  NOSE: NT 3 + B/L, no stringing mucous, no polyps visualized  ORAL: MMM, no ulcers, no thrush  NECK: no thyromegaly, no LAD  LUNGS: CTAB, no w/r/c, no increased WOB  HEART: RRR, normal S1/S2, no m/g/r  EXTREMITIES: No LE edema  DERM: no rashes, no skin breaks  NEURO: normal speech, normal gait, no facial asymmetry     LABORATORY STUDIES       Component      Latest Ref Rng & Units 4/6/2022 3/24/2021 10/9/2019   WBC      3.90 - 12.70 K/uL 2.82 (L) 3.15 (L) 4.96   RBC      4.00 - 5.40 M/uL 4.59 4.35 4.41   Hemoglobin      12.0 - 16.0 g/dL 13.3 13.3 13.2   Hematocrit      37.0 - 48.5 % 39.8 39.6 41.8   MCV      82 - 98 fL 87 91 95   MCH      27.0 - 31.0 pg 29.0 30.6 29.9   MCHC      32.0 - 36.0 g/dL 33.4 33.6 31.6 (L)   RDW      11.5 - 14.5 % 12.6 11.9 12.1   Platelets      150 - 450 K/uL 243 223 219   MPV      9.2 - 12.9 fL 10.4 10.7 " 10.6   Immature Granulocytes      0.0 - 0.5 % 0.4 0.0 0.2   Gran # (ANC)      1.8 - 7.7 K/uL 1.4 (L) 1.5 (L) 3.2   Immature Grans (Abs)      0.00 - 0.04 K/uL 0.01 0.00 0.01   Lymph #      1.0 - 4.8 K/uL 1.1 1.3 1.3   Mono #      0.3 - 1.0 K/uL 0.3 0.4 0.4   Eos #      0.0 - 0.5 K/uL 0.0 0.0 0.0   Baso #      0.00 - 0.20 K/uL 0.04 0.04 0.04   Differential Method       Automated Automated Automated   Sodium      136 - 145 mmol/L 136     Potassium      3.5 - 5.1 mmol/L 4.5     Chloride      95 - 110 mmol/L 100     CO2      23 - 29 mmol/L 29     Glucose      70 - 110 mg/dL 87     BUN      6 - 20 mg/dL 14     Creatinine      0.5 - 1.4 mg/dL 0.9     Calcium      8.7 - 10.5 mg/dL 9.8     PROTEIN TOTAL      6.0 - 8.4 g/dL 7.1     Albumin      3.5 - 5.2 g/dL 4.4     BILIRUBIN TOTAL      0.1 - 1.0 mg/dL 0.6     Alkaline Phosphatase      55 - 135 U/L 41 (L)     AST      10 - 40 U/L 16     ALT      10 - 44 U/L 13     Hemoglobin A1C External      4.0 - 5.6 % 4.9     TSH      0.400 - 4.000 uIU/mL 2.144        ALLERGEN TESTING     Immunocaps:   Component      Latest Ref Rng & Units 1/10/2013   D. farinae      <0.35 kU/L 1.23 (H)   D. farinae Class       CLASS II   Mite Dust Pteronyssinus IgE      <0.35 kU/L 0.75 (H)   D. pteronyssinus Class       CLASS II   BAHIA GRASS      <0.35 kU/L <0.35   [place] Class       CLASS 0   Bereket Grass      <0.35 kU/L <0.35   Bereket Grass Class       CLASS 0   White Oak(Quercus alba) IgE      <0.35 kU/L <0.35   [place], Class       CLASS 0   Pecan [place] Tree      <0.35 kU/L <0.35   Pecan, Class       CLASS 0   Marshelder IgE      <0.35 kU/L <0.35   Marshelder Class       CLASS 0   Ragweed, Short, IgE      <0.35 kU/L <0.35   Ragweed, Short, Class       CLASS 0   A. tenius, IgE      <0.35 kU/L <0.35   A. tenius Class       CLASS 0   Aspergillus Fumigatus IgE      <0.35 kU/L <0.35   A. fumigatus Class       CLASS 0   IgE      0 - 100 IU/ml 109 (H)        PULMONARY FUNCTION TESTING     Date  1/10/13:  FVC:         101%ile   FEV1:         77%ile  FEV1/FVC: 63%  FEF 25-75: 44%ile  Interpretation: mild obstruction     CHART REVIEW     Reviewed pcp note, prior allergy notes, prior allergy testing results, other labs, spirometry.     ASSESSMENT & PLAN     Mirian العلي is a 46 y.o. female with     # Chronic rhinitis and sinus pressure: Main complaint is discomfort/pressure in her face and nose. She says it feels like there is mucus and it sometimes feels like nose is running, but nothing actually comes out. Also complains of post nasal drip, right ear pressure, and occasional ocular pruritus. Symptoms typically worse in Spring, but current symptoms have persisted into Summer. Immunocaps in 2013 positive to dust mite. She also is wondering about mold allergy because face was turning red in her office when it had mold in it. She is using flonase 2 SEN daily, alternating cetirizine and fexofenadine, and using multiple as needed over the counter medications (acetaminophin/guafenosin/phenylephrine, phenylephrine alone, sudafed, a different antihistamine/decongestant combo, benadryl) which she tries not to use too often. She doesn't have a history of HTN. She says the decongestants help temporarily, unsure if the flonase is helping, and doesn't necessarily think the antihistamines are helping.   -immunocaps for aeroallergens ordered (including molds)  -continue flonase 2 SEN daily, can increase to 2 SEN BID as needed  -start azelastine nasal spray 1-2 SEN BID  -of note, if immunocaps again show monosensitization to dust mite, patient thinks she would be interested in SLIT  -If symptoms don't improve, consider ENT referral and/or CT sinuses    Follow up: 6 weeks      Dulce Delvalle MD  Allergy/Immunology

## 2022-07-20 ENCOUNTER — TELEPHONE (OUTPATIENT)
Dept: PRIMARY CARE CLINIC | Facility: CLINIC | Age: 47
End: 2022-07-20
Payer: COMMERCIAL

## 2022-07-20 ENCOUNTER — PATIENT MESSAGE (OUTPATIENT)
Dept: PRIMARY CARE CLINIC | Facility: CLINIC | Age: 47
End: 2022-07-20
Payer: COMMERCIAL

## 2022-07-20 DIAGNOSIS — F41.9 ANXIETY: ICD-10-CM

## 2022-07-20 DIAGNOSIS — R07.1 CHEST PAIN ON BREATHING: ICD-10-CM

## 2022-07-20 RX ORDER — ALPRAZOLAM 1 MG/1
1 TABLET ORAL NIGHTLY PRN
Qty: 21 TABLET | Refills: 0 | Status: SHIPPED | OUTPATIENT
Start: 2022-07-20 | End: 2023-03-03 | Stop reason: SDUPTHER

## 2022-07-20 RX ORDER — ALPRAZOLAM 1 MG/1
TABLET ORAL
Qty: 21 TABLET | Refills: 0 | Status: CANCELLED | OUTPATIENT
Start: 2022-07-20

## 2022-07-20 NOTE — TELEPHONE ENCOUNTER
No new care gaps identified.  [facility] Embedded Care Gaps. Reference number: [id number]. 7/20/2022   12:06:15 PM CDT

## 2022-07-21 ENCOUNTER — OFFICE VISIT (OUTPATIENT)
Dept: ALLERGY | Facility: CLINIC | Age: 47
End: 2022-07-21
Payer: COMMERCIAL

## 2022-07-21 VITALS — WEIGHT: 167.31 LBS | BODY MASS INDEX: 23.42 KG/M2 | HEIGHT: 71 IN

## 2022-07-21 DIAGNOSIS — J31.0 CHRONIC RHINITIS: Primary | ICD-10-CM

## 2022-07-21 DIAGNOSIS — J34.89 SINUS PRESSURE: ICD-10-CM

## 2022-07-21 PROCEDURE — 3008F BODY MASS INDEX DOCD: CPT | Mod: CPTII,S$GLB,, | Performed by: STUDENT IN AN ORGANIZED HEALTH CARE EDUCATION/TRAINING PROGRAM

## 2022-07-21 PROCEDURE — 1160F RVW MEDS BY RX/DR IN RCRD: CPT | Mod: CPTII,S$GLB,, | Performed by: STUDENT IN AN ORGANIZED HEALTH CARE EDUCATION/TRAINING PROGRAM

## 2022-07-21 PROCEDURE — 1160F PR REVIEW ALL MEDS BY PRESCRIBER/CLIN PHARMACIST DOCUMENTED: ICD-10-PCS | Mod: CPTII,S$GLB,, | Performed by: STUDENT IN AN ORGANIZED HEALTH CARE EDUCATION/TRAINING PROGRAM

## 2022-07-21 PROCEDURE — 1159F MED LIST DOCD IN RCRD: CPT | Mod: CPTII,S$GLB,, | Performed by: STUDENT IN AN ORGANIZED HEALTH CARE EDUCATION/TRAINING PROGRAM

## 2022-07-21 PROCEDURE — 3008F PR BODY MASS INDEX (BMI) DOCUMENTED: ICD-10-PCS | Mod: CPTII,S$GLB,, | Performed by: STUDENT IN AN ORGANIZED HEALTH CARE EDUCATION/TRAINING PROGRAM

## 2022-07-21 PROCEDURE — 99204 OFFICE O/P NEW MOD 45 MIN: CPT | Mod: S$GLB,,, | Performed by: STUDENT IN AN ORGANIZED HEALTH CARE EDUCATION/TRAINING PROGRAM

## 2022-07-21 PROCEDURE — 1159F PR MEDICATION LIST DOCUMENTED IN MEDICAL RECORD: ICD-10-PCS | Mod: CPTII,S$GLB,, | Performed by: STUDENT IN AN ORGANIZED HEALTH CARE EDUCATION/TRAINING PROGRAM

## 2022-07-21 PROCEDURE — 99204 PR OFFICE/OUTPT VISIT, NEW, LEVL IV, 45-59 MIN: ICD-10-PCS | Mod: S$GLB,,, | Performed by: STUDENT IN AN ORGANIZED HEALTH CARE EDUCATION/TRAINING PROGRAM

## 2022-07-21 PROCEDURE — 99999 PR PBB SHADOW E&M-EST. PATIENT-LVL IV: CPT | Mod: PBBFAC,,, | Performed by: STUDENT IN AN ORGANIZED HEALTH CARE EDUCATION/TRAINING PROGRAM

## 2022-07-21 PROCEDURE — 99999 PR PBB SHADOW E&M-EST. PATIENT-LVL IV: ICD-10-PCS | Mod: PBBFAC,,, | Performed by: STUDENT IN AN ORGANIZED HEALTH CARE EDUCATION/TRAINING PROGRAM

## 2022-07-21 PROCEDURE — 3044F HG A1C LEVEL LT 7.0%: CPT | Mod: CPTII,S$GLB,, | Performed by: STUDENT IN AN ORGANIZED HEALTH CARE EDUCATION/TRAINING PROGRAM

## 2022-07-21 PROCEDURE — 3044F PR MOST RECENT HEMOGLOBIN A1C LEVEL <7.0%: ICD-10-PCS | Mod: CPTII,S$GLB,, | Performed by: STUDENT IN AN ORGANIZED HEALTH CARE EDUCATION/TRAINING PROGRAM

## 2022-07-21 RX ORDER — AZELASTINE 1 MG/ML
SPRAY, METERED NASAL
Qty: 30 ML | Refills: 11 | Status: SHIPPED | OUTPATIENT
Start: 2022-07-21 | End: 2023-09-21

## 2022-07-22 ENCOUNTER — LAB VISIT (OUTPATIENT)
Dept: LAB | Facility: OTHER | Age: 47
End: 2022-07-22
Attending: STUDENT IN AN ORGANIZED HEALTH CARE EDUCATION/TRAINING PROGRAM
Payer: COMMERCIAL

## 2022-07-22 DIAGNOSIS — J31.0 CHRONIC RHINITIS: ICD-10-CM

## 2022-07-22 PROCEDURE — 86003 ALLG SPEC IGE CRUDE XTRC EA: CPT | Mod: 59 | Performed by: STUDENT IN AN ORGANIZED HEALTH CARE EDUCATION/TRAINING PROGRAM

## 2022-07-22 PROCEDURE — 36415 COLL VENOUS BLD VENIPUNCTURE: CPT | Performed by: STUDENT IN AN ORGANIZED HEALTH CARE EDUCATION/TRAINING PROGRAM

## 2022-07-22 PROCEDURE — 86003 ALLG SPEC IGE CRUDE XTRC EA: CPT | Performed by: STUDENT IN AN ORGANIZED HEALTH CARE EDUCATION/TRAINING PROGRAM

## 2022-07-25 ENCOUNTER — PATIENT MESSAGE (OUTPATIENT)
Dept: ALLERGY | Facility: CLINIC | Age: 47
End: 2022-07-25
Payer: COMMERCIAL

## 2022-07-25 LAB
A ALTERNATA IGE QN: <0.1 KU/L
A FUMIGATUS IGE QN: <0.1 KU/L
ALLERGEN BOXELDER MAPLE TREE IGE: <0.1 KU/L
ALLERGEN MAPLE (BOX ELDER) CLASS: NORMAL
ALLERGEN WHITE ASH TREE IGE: <0.1 KU/L
BAHIA GRASS IGE QN: <0.1 KU/L
BERMUDA GRASS IGE QN: <0.1 KU/L
C ALBICANS IGE QN: <0.1 KU/L
C HERBARUM IGE QN: <0.1 KU/L
C LUNATA IGE QN: <0.1 KU/L
CAT DANDER IGE QN: <0.1 KU/L
CEDAR IGE QN: <0.1 KU/L
COTTONWOOD IGE QN: <0.1 KU/L
D FARINAE IGE QN: <0.1 KU/L
D PTERONYSS IGE QN: <0.1 KU/L
DEPRECATED A ALTERNATA IGE RAST QL: NORMAL
DEPRECATED A FUMIGATUS IGE RAST QL: NORMAL
DEPRECATED BAHIA GRASS IGE RAST QL: NORMAL
DEPRECATED BERMUDA GRASS IGE RAST QL: NORMAL
DEPRECATED C ALBICANS IGE RAST QL: NORMAL
DEPRECATED C HERBARUM IGE RAST QL: NORMAL
DEPRECATED C LUNATA IGE RAST QL: NORMAL
DEPRECATED CAT DANDER IGE RAST QL: NORMAL
DEPRECATED CEDAR IGE RAST QL: NORMAL
DEPRECATED COTTONWOOD IGE RAST QL: NORMAL
DEPRECATED D FARINAE IGE RAST QL: NORMAL
DEPRECATED D PTERONYSS IGE RAST QL: NORMAL
DEPRECATED DOG DANDER IGE RAST QL: NORMAL
DEPRECATED ELDER IGE RAST QL: NORMAL
DEPRECATED ENGL PLANTAIN IGE RAST QL: NORMAL
DEPRECATED JOHNSON GRASS IGE RAST QL: NORMAL
DEPRECATED P NOTATUM IGE RAST QL: NORMAL
DEPRECATED PECAN/HICK TREE IGE RAST QL: NORMAL
DEPRECATED SILVER BIRCH IGE RAST QL: NORMAL
DEPRECATED TIMOTHY IGE RAST QL: NORMAL
DEPRECATED WEST RAGWEED IGE RAST QL: NORMAL
DEPRECATED WHITE OAK IGE RAST QL: NORMAL
DOG DANDER IGE QN: <0.1 KU/L
ELDER IGE QN: <0.1 KU/L
ENGL PLANTAIN IGE QN: <0.1 KU/L
JOHNSON GRASS IGE QN: <0.1 KU/L
P NOTATUM IGE QN: <0.1 KU/L
PECAN/HICK TREE IGE QN: <0.1 KU/L
SILVER BIRCH IGE QN: <0.1 KU/L
TIMOTHY IGE QN: <0.1 KU/L
WEST RAGWEED IGE QN: <0.1 KU/L
WHITE ASH CLASS: NORMAL
WHITE OAK IGE QN: <0.1 KU/L

## 2022-07-26 ENCOUNTER — PATIENT MESSAGE (OUTPATIENT)
Dept: PRIMARY CARE CLINIC | Facility: CLINIC | Age: 47
End: 2022-07-26
Payer: COMMERCIAL

## 2022-08-17 ENCOUNTER — PATIENT MESSAGE (OUTPATIENT)
Dept: HEMATOLOGY/ONCOLOGY | Facility: CLINIC | Age: 47
End: 2022-08-17
Payer: COMMERCIAL

## 2022-08-17 ENCOUNTER — TELEPHONE (OUTPATIENT)
Dept: PAIN MEDICINE | Facility: CLINIC | Age: 47
End: 2022-08-17
Payer: COMMERCIAL

## 2022-08-17 DIAGNOSIS — Z80.3 FAMILY HISTORY OF BREAST CANCER: ICD-10-CM

## 2022-08-17 DIAGNOSIS — Z91.89 AT HIGH RISK FOR BREAST CANCER: Primary | ICD-10-CM

## 2022-08-17 DIAGNOSIS — Z91.89 INCREASED RISK OF BREAST CANCER: ICD-10-CM

## 2022-08-17 RX ORDER — DIAZEPAM 5 MG/1
TABLET ORAL
Qty: 2 TABLET | Refills: 0 | Status: SHIPPED | OUTPATIENT
Start: 2022-08-17 | End: 2022-09-26

## 2022-08-17 NOTE — TELEPHONE ENCOUNTER
Staff contacted pt regarding rescheduling Botox  appointment on 08/24/22 with . staff left pt a message informing her to contact office at [phone] to rescheduled and until then appointment will be canceled.

## 2022-08-19 ENCOUNTER — TELEPHONE (OUTPATIENT)
Dept: PAIN MEDICINE | Facility: CLINIC | Age: 47
End: 2022-08-19
Payer: COMMERCIAL

## 2022-08-19 NOTE — TELEPHONE ENCOUNTER
----- Message from Yesenia Heck sent at 8/19/2022  9:51 AM CDT -----  Regarding: Requesting to reschedule appt  Contact: RENAE MONK [[id number]]  Name of Who is Calling:RENAE MONK [[id number]]          What is the request in detail:   Pt states she is requesting to reschedule her Botox Procedure, Please advise she is requesting a call back in regards         Can the clinic reply by [facility]:No           [facility][phone]

## 2022-08-19 NOTE — TELEPHONE ENCOUNTER
Staff spoke with patient ans r/s Botox appt to 9/13/22 @ 7:30 am with . pt verbalized understanding.

## 2022-08-30 ENCOUNTER — TELEPHONE (OUTPATIENT)
Dept: PAIN MEDICINE | Facility: CLINIC | Age: 47
End: 2022-08-30
Payer: COMMERCIAL

## 2022-08-30 DIAGNOSIS — G24.3 ISOLATED CERVICAL DYSTONIA: Primary | ICD-10-CM

## 2022-08-30 NOTE — PROGRESS NOTES
ALLERGY & IMMUNOLOGY CLINIC - FOLLOW UP     HISTORY OF PRESENT ILLNESS     Patient ID: Mirian العلي is a 46 y.o. female    CC: chronic rhinitis    HPI: Mirian العلي is a 46 y.o. female with a history of dust mite allergy following up for chronic rhinitis.    Symptoms improved with flonase and azelastine 1 SEN BID each. Some post nasal drip and crustiness in eyes over night. The post nasal drip drives her crazy. She is otherwise happy with control. She is unsure about heartburn. She continues to notice she reacts to the dust when she is cleaning. Sometimes takes benadryl after vacuuming.     She says she has been getting a sensation in her chest that might be heartburn.     REVIEW OF SYSTEMS     CONST: no F/C  PULM: no SOB, no wheezing, no cough  DERM: no recent rashes, no skin breaks     MEDICAL HISTORY     Vaccines:   Immunization History   Administered Date(s) Administered    COVID-19, MRNA, LN-S, PF (MODERNA FULL 0.5 ML DOSE) 02/23/2021, 02/23/2021, 03/24/2021, 12/18/2021    Influenza 10/05/2015, 08/27/2016    Influenza (Flumist) - Quadrivalent - Intranasal *Preferred* (2-49 years old) 09/27/2014    Influenza - Quadrivalent 09/15/2019    Influenza - Quadrivalent - MDCK - PF 12/11/2021    Influenza - Quadrivalent - PF *Preferred* (6 months and older) 09/16/2017, 10/27/2018, 09/27/2020    Influenza - Trivalent (ADULT) 10/08/2011, 09/13/2013    Influenza - Trivalent - PF (ADULT) 10/05/2015, 08/27/2016    Influenza Split 09/13/2013    Tdap 10/07/2019       MedHx:   Patient Active Problem List   Diagnosis    Fatigue    Ovarian cyst    Breast tenderness in female    Generalized headaches    Endometriosis of pelvis    Unmedicated IUD    Factor V Leiden carrier    Rhinitis, allergic    Pelvic pain in female    S/P hysterectomy with oophorectomy    Family history of breast cancer in mother    Chest pain on breathing    Precordial pain    Hemangioma    Corneal abrasion, left    Pectus excavatum     Shingles    Abnormal laboratory test    Carpal tunnel syndrome of right wrist    Right shoulder pain    Swelling of arm    Primary insomnia    Increased risk of breast cancer    Decreased right shoulder range of motion    Muscle weakness of right upper extremity       SurgHx:   Past Surgical History:   Procedure Laterality Date    BUNIONECTOMY Left 02/06/2018    HYSTERECTOMY  01/2015    TLHBSO    LAPAROSCOPY W/ MINI-LAPAROTOMY  2003    OOPHORECTOMY Right 3/2012    OOPHORECTOMY Left 01/2015    TOTAL VAGINAL HYSTERECTOMY  1/2015    w/Right USO    WISDOM TOOTH EXTRACTION Bilateral        Medications:   Current Outpatient Medications on File Prior to Visit   Medication Sig Dispense Refill    aspirin (ECOTRIN) 81 MG EC tablet Take 81 mg by mouth once daily.      azelastine (ASTELIN) 137 mcg (0.1 %) nasal spray 1-2 sprays each nostril twice daily 30 mL 11    buPROPion (WELLBUTRIN XL) 150 MG TB24 tablet TAKE 1 TABLET BY MOUTH EVERY DAY 90 tablet 3    buPROPion (WELLBUTRIN XL) 300 MG 24 hr tablet Take 1 tablet (300 mg total) by mouth once daily. 90 tablet 1    cyanocobalamin, vitamin B-12, 5,000 mcg Subl Place under the tongue once a week.      estradioL (VIVELLE-DOT) 0.075 mg/24 hr APPLY 1 PATCH TOPICALLY TO THE SKIN 2 TIMES A WEEK 8 patch 12    fluticasone (FLONASE) 50 mcg/actuation nasal spray PLACE 2 SPRAYS IN EACH NOSTRIL ONCE DAILY 1 Bottle 1    hydrOXYchloroQUINE (PLAQUENIL) 200 mg tablet TK 2 TS PO QD      prasterone, dhea, (INTRAROSA) 6.5 mg Inst Place 6.5 mg vaginally every evening. 30 each 12    tiZANidine (ZANAFLEX) 4 MG tablet TAKE 1 TABLET(4 MG) BY MOUTH TWICE DAILY AS NEEDED 60 tablet 1    UNABLE TO FIND medication name: tumeric 200 mg twice a day      UNABLE TO FIND Apply 0.75 g topically every evening. Testosterone 1% 30 g 5    vitamin D (VITAMIN D3) 1000 units Tab Take 2,000 Units by mouth 2 (two) times daily.      ALPRAZolam (XANAX) 1 MG tablet Take 1 tablet (1 mg total) by mouth nightly as needed for  "Insomnia or Anxiety. 21 tablet 0    cetirizine (ZYRTEC) 10 MG tablet Take 1 tablet (10 mg total) by mouth once daily. (Patient not taking: Reported on 8/31/2022) 30 tablet 2    diazePAM (VALIUM) 5 MG tablet 1-2 PO 30 minutes prior to MRI (Patient not taking: Reported on 8/31/2022) 2 tablet 0     No current facility-administered medications on file prior to visit.     Drug Allergies:   Review of patient's allergies indicates:   Allergen Reactions    Niacin preparations      Patient states she has flushing    Sulfa (sulfonamide antibiotics)      Tongue swelling    Iodine and iodide containing products Rash     SocHx:   Social History     Tobacco Use    Smoking status: Never    Smokeless tobacco: Never   Substance Use Topics    Alcohol use: Yes     Alcohol/week: 5.0 - 6.0 standard drinks     Types: 5 - 6 Glasses of wine per week     Comment: week    Drug use: No     Additional History Obtained at Initial Visit:  H/o Asthma: She says she was given an inhaler as a teenager, but no longer needs an inhaler and has never been diagnosed with asthma  H/o Eczema: denies  H/o Rhinitis: endorses  Food Allergy: denies  Drug Allergies:   Iodine contrast -- she got hives. She didn't get hives when she pretreated with benadryl.  Sulfa antibiotics -- she got it after a surgery in 2015. Had tongue swelling.  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  Infection Hx: Denies frequent infections requiring antibiotics. No history of pneumonia.     PHYSICAL EXAM     VS: Ht 5' 10.98" (1.803 m)   Wt 75.2 kg (165 lb 12.6 oz)   LMP 01/01/2015 (Approximate)   BMI 23.13 kg/m²   GENERAL: Alert, NAD, well-appearing, cooperative  EYES: EOMI, no conjunctival injection, no discharge, no infraorbital shiners  EARS: external auditory canals normal B/L, TM normal B/L  NOSE: NT 3 + B/L, no stringing mucous, no polyps visualized  ORAL: MMM, no ulcers, no " thrush  LUNGS: CTAB, no w/r/c, no increased WOB  HEART: RRR, normal S1/S2, no m/g/r  DERM: no rashes, no skin breaks  NEURO: normal speech, normal gait, no facial asymmetry     LABORATORY STUDIES       Component      Latest Ref Rng & Units 4/6/2022 3/24/2021 10/9/2019   WBC      3.90 - 12.70 K/uL 2.82 (L) 3.15 (L) 4.96   RBC      4.00 - 5.40 M/uL 4.59 4.35 4.41   Hemoglobin      12.0 - 16.0 g/dL 13.3 13.3 13.2   Hematocrit      37.0 - 48.5 % 39.8 39.6 41.8   MCV      82 - 98 fL 87 91 95   MCH      27.0 - 31.0 pg 29.0 30.6 29.9   MCHC      32.0 - 36.0 g/dL 33.4 33.6 31.6 (L)   RDW      11.5 - 14.5 % 12.6 11.9 12.1   Platelets      150 - 450 K/uL 243 223 219   MPV      9.2 - 12.9 fL 10.4 10.7 10.6   Immature Granulocytes      0.0 - 0.5 % 0.4 0.0 0.2   Gran # (ANC)      1.8 - 7.7 K/uL 1.4 (L) 1.5 (L) 3.2   Immature Grans (Abs)      0.00 - 0.04 K/uL 0.01 0.00 0.01   Lymph #      1.0 - 4.8 K/uL 1.1 1.3 1.3   Mono #      0.3 - 1.0 K/uL 0.3 0.4 0.4   Eos #      0.0 - 0.5 K/uL 0.0 0.0 0.0   Baso #      0.00 - 0.20 K/uL 0.04 0.04 0.04   Differential Method       Automated Automated Automated   Sodium      136 - 145 mmol/L 136     Potassium      3.5 - 5.1 mmol/L 4.5     Chloride      95 - 110 mmol/L 100     CO2      23 - 29 mmol/L 29     Glucose      70 - 110 mg/dL 87     BUN      6 - 20 mg/dL 14     Creatinine      0.5 - 1.4 mg/dL 0.9     Calcium      8.7 - 10.5 mg/dL 9.8     PROTEIN TOTAL      6.0 - 8.4 g/dL 7.1     Albumin      3.5 - 5.2 g/dL 4.4     BILIRUBIN TOTAL      0.1 - 1.0 mg/dL 0.6     Alkaline Phosphatase      55 - 135 U/L 41 (L)     AST      10 - 40 U/L 16     ALT      10 - 44 U/L 13     Hemoglobin A1C External      4.0 - 5.6 % 4.9     TSH      0.400 - 4.000 uIU/mL 2.144        ALLERGEN TESTING     Immunocaps:   Component      Latest Ref Rng & Units 7/22/2022 1/10/2013   D. farinae      <0.10 kU/L <0.10 1.23 (H)   D. farinae Class       CLASS 0 CLASS II   Mite Dust Pteronyssinus IgE      <0.10 kU/L <0.10 0.75  (H)   D. pteronyssinus Class       CLASS 0 CLASS II   BAHIA GRASS      <0.10 kU/L <0.10 <0.35   [place] Class       CLASS 0 CLASS 0   Bereket Grass      <0.10 kU/L <0.10 <0.35   Bereket Grass Class       CLASS 0 CLASS 0   [place](Quercus alba) IgE      <0.10 kU/L <0.10 <0.35   [place], Class       CLASS 0 CLASS 0   Pecan [place] Tree      <0.10 kU/L <0.10 <0.35   Pecan, Class       CLASS 0 CLASS 0   [place]er IgE      <0.10 kU/L <0.10 <0.35   Marshelder Class       CLASS 0 CLASS 0   Ragweed, Short, IgE      <0.35 kU/L  <0.35   Ragweed, Short, Class        CLASS 0   A. tenius, IgE      <0.35 kU/L  <0.35   A. tenius Class        CLASS 0   Aspergillus Fumigatus IgE      <0.10 kU/L <0.10 <0.35   A. fumigatus Class       CLASS 0 CLASS 0   BERMUDA GRASS      <0.10 kU/L <0.10    [place] Grass Class       CLASS 0    Grainger IgE      <0.10 kU/L <0.10    Grainger Class       CLASS 0    Plantain      <0.10 kU/L <0.10    English Plantain Class       CLASS 0    Ragweed, Western IgE      <0.10 kU/L <0.10    Ragweed, Western, Class       CLASS 0    Alternaria alternata      <0.10 kU/L <0.10    Altern. alternata Class       CLASS 0    Cat Dander      <0.10 kU/L <0.10    Cat Epithelium Class       CLASS 0    Dog Dander, IgE      <0.10 kU/L <0.10    Dog Dander Class       CLASS 0    WHITE KIKE TREE      <0.10 kU/L <0.10    White Kike Class       CLASS 0    Allergen Maple ([place]) IgE      <0.10 kU/L <0.10    Allergen Maple ([place]) Class       CLASS 0    [place] IgE      <0.10 kU/L <0.10    [place] Class       CLASS 0    [place]ch IgE      <0.10 kU/L <0.10    Silver Birch Class       CLASS 0    CARLOS GRASS      <0.10 kU/L <0.10    Carlos Grass Class       CLASS 0    Allergen Candida albicans IgE      <0.10 kU/L <0.10    Allergen Candida albicans Class       CLASS 0    Cladosporium, IgE      <0.10 kU/L <0.10    Cladosporium Class       CLASS 0    Curvularia lunata      <0.10 kU/L <0.10    Curvularia Lunata Class       CLASS 0     Penicillium, IgE      <0.10 kU/L <0.10    Penicillium Class       CLASS 0    IgE      0 - 100 IU/ml  109 (H)        PULMONARY FUNCTION TESTING     Date 1/10/13:  FVC:         101%ile   FEV1:         77%ile  FEV1/FVC: 63%  FEF 25-75: 44%ile  Interpretation: mild obstruction     CHART REVIEW     Reviewed labs.     ASSESSMENT & PLAN     Mirian العلي is a 46 y.o. female with     # Chronic rhinitis and sinus pressure: She reports improvement in symptoms on flonase and azelastine, including sinus pressure, however still dealing with post nasal drip. Immunocaps in 2013 positive to dust mite, and she does notice dust as a trigger; however, repeat immunocaps negative for evidence of allergy. She thinks she would be interested in SLIT for dust mite, so will do skin testing.  -will bring back for SPT, counseled on which medications to hold  -continue flonase 1 SEN BID  -continue azelastine nasal spray 1 SEN BID    # Possible reflux: could be contributing to post nasal drip  -trial famotidine 20 mg BID    Follow up: 1+ weeks for SPT      Dulce Delvalle MD  Allergy/Immunology

## 2022-08-31 ENCOUNTER — OFFICE VISIT (OUTPATIENT)
Dept: ALLERGY | Facility: CLINIC | Age: 47
End: 2022-08-31
Payer: COMMERCIAL

## 2022-08-31 VITALS — WEIGHT: 165.81 LBS | BODY MASS INDEX: 23.21 KG/M2 | HEIGHT: 71 IN

## 2022-08-31 DIAGNOSIS — J31.0 CHRONIC RHINITIS: Primary | ICD-10-CM

## 2022-08-31 DIAGNOSIS — R12 HEARTBURN: ICD-10-CM

## 2022-08-31 PROCEDURE — 3008F BODY MASS INDEX DOCD: CPT | Mod: CPTII,S$GLB,, | Performed by: STUDENT IN AN ORGANIZED HEALTH CARE EDUCATION/TRAINING PROGRAM

## 2022-08-31 PROCEDURE — 99999 PR PBB SHADOW E&M-EST. PATIENT-LVL IV: ICD-10-PCS | Mod: PBBFAC,,, | Performed by: STUDENT IN AN ORGANIZED HEALTH CARE EDUCATION/TRAINING PROGRAM

## 2022-08-31 PROCEDURE — 3044F PR MOST RECENT HEMOGLOBIN A1C LEVEL <7.0%: ICD-10-PCS | Mod: CPTII,S$GLB,, | Performed by: STUDENT IN AN ORGANIZED HEALTH CARE EDUCATION/TRAINING PROGRAM

## 2022-08-31 PROCEDURE — 1160F RVW MEDS BY RX/DR IN RCRD: CPT | Mod: CPTII,S$GLB,, | Performed by: STUDENT IN AN ORGANIZED HEALTH CARE EDUCATION/TRAINING PROGRAM

## 2022-08-31 PROCEDURE — 3044F HG A1C LEVEL LT 7.0%: CPT | Mod: CPTII,S$GLB,, | Performed by: STUDENT IN AN ORGANIZED HEALTH CARE EDUCATION/TRAINING PROGRAM

## 2022-08-31 PROCEDURE — 1159F PR MEDICATION LIST DOCUMENTED IN MEDICAL RECORD: ICD-10-PCS | Mod: CPTII,S$GLB,, | Performed by: STUDENT IN AN ORGANIZED HEALTH CARE EDUCATION/TRAINING PROGRAM

## 2022-08-31 PROCEDURE — 99999 PR PBB SHADOW E&M-EST. PATIENT-LVL IV: CPT | Mod: PBBFAC,,, | Performed by: STUDENT IN AN ORGANIZED HEALTH CARE EDUCATION/TRAINING PROGRAM

## 2022-08-31 PROCEDURE — 1159F MED LIST DOCD IN RCRD: CPT | Mod: CPTII,S$GLB,, | Performed by: STUDENT IN AN ORGANIZED HEALTH CARE EDUCATION/TRAINING PROGRAM

## 2022-08-31 PROCEDURE — 99214 PR OFFICE/OUTPT VISIT, EST, LEVL IV, 30-39 MIN: ICD-10-PCS | Mod: S$GLB,,, | Performed by: STUDENT IN AN ORGANIZED HEALTH CARE EDUCATION/TRAINING PROGRAM

## 2022-08-31 PROCEDURE — 99214 OFFICE O/P EST MOD 30 MIN: CPT | Mod: S$GLB,,, | Performed by: STUDENT IN AN ORGANIZED HEALTH CARE EDUCATION/TRAINING PROGRAM

## 2022-08-31 PROCEDURE — 3008F PR BODY MASS INDEX (BMI) DOCUMENTED: ICD-10-PCS | Mod: CPTII,S$GLB,, | Performed by: STUDENT IN AN ORGANIZED HEALTH CARE EDUCATION/TRAINING PROGRAM

## 2022-08-31 PROCEDURE — 1160F PR REVIEW ALL MEDS BY PRESCRIBER/CLIN PHARMACIST DOCUMENTED: ICD-10-PCS | Mod: CPTII,S$GLB,, | Performed by: STUDENT IN AN ORGANIZED HEALTH CARE EDUCATION/TRAINING PROGRAM

## 2022-09-01 ENCOUNTER — PATIENT MESSAGE (OUTPATIENT)
Dept: PAIN MEDICINE | Facility: CLINIC | Age: 47
End: 2022-09-01
Payer: COMMERCIAL

## 2022-09-01 NOTE — PATIENT INSTRUCTIONS
At your next visit, we will do allergy skin testing. Some medications can block the results of skin testing. Here is a list of medications to hold prior to the skin testing:     -Please stop any oral antihistamines for 1 week prior. This includes zyrtec (cetirizine), xyzal (levocetirizine), claritin (loratadine), allegra (fexofenadine), atarax (hydroxyzine), and benadryl (diphenhydramine). Please note that benadryl is often in over the counter sleep aids.     -Please stop pepcid (famotidine) for 3 days prior.     -Please stop allergy eye drops for 3 days prior.     -Please stop astelin (azelastine nasal spray) for 3 days prior.  You can continue to use flonase (fluticasone nasal spray) during this time.

## 2022-09-06 ENCOUNTER — HOSPITAL ENCOUNTER (OUTPATIENT)
Dept: RADIOLOGY | Facility: OTHER | Age: 47
Discharge: HOME | End: 2022-09-06
Attending: PHYSICIAN ASSISTANT
Payer: COMMERCIAL

## 2022-09-06 DIAGNOSIS — Z91.89 AT HIGH RISK FOR BREAST CANCER: ICD-10-CM

## 2022-09-06 DIAGNOSIS — Z80.3 FAMILY HISTORY OF BREAST CANCER: ICD-10-CM

## 2022-09-06 DIAGNOSIS — Z91.89 INCREASED RISK OF BREAST CANCER: ICD-10-CM

## 2022-09-06 PROCEDURE — A9577 INJ MULTIHANCE: HCPCS | Performed by: PHYSICIAN ASSISTANT

## 2022-09-06 PROCEDURE — 77049 MRI BREAST W/WO CONTRAST, W/CAD, BILATERAL: ICD-10-PCS | Mod: 26,,, | Performed by: RADIOLOGY

## 2022-09-06 PROCEDURE — 77049 MRI BREAST C-+ W/CAD BI: CPT | Mod: 26,,, | Performed by: RADIOLOGY

## 2022-09-06 PROCEDURE — 25500020 PHARM REV CODE 255: Performed by: PHYSICIAN ASSISTANT

## 2022-09-06 PROCEDURE — 77049 MRI BREAST C-+ W/CAD BI: CPT | Mod: TC

## 2022-09-06 RX ADMIN — GADOBENATE DIMEGLUMINE 13 ML: 529 INJECTION, SOLUTION INTRAVENOUS at 04:09

## 2022-09-06 NOTE — PROGRESS NOTES
ALLERGY & IMMUNOLOGY CLINIC - FOLLOW UP     HISTORY OF PRESENT ILLNESS     Patient ID: Mirian لاعلي is a 46 y.o. female    CC: chronic rhinitis, SPT    HPI: Mirian العلي is a 46 y.o. female with chronic rhinitis, here for SPT.    She got contrast yesterday for her breast MRI, made her itchy. Has had some rashes on her hands after cleaning, which she attributes to dust. No urticaria. No GI upset. No respiratory issues. She feels increased post nasal drip and hoarseness since stoppping antihistamine. A little itchy since stopping antihistamines.  She has not tried pepcid yet due to the skin testing.     REVIEW OF SYSTEMS     CONST: no F/C  PULM: no SOB, no wheezing, no cough  GI: no N/V/D     MEDICAL HISTORY     Vaccines:   Immunization History   Administered Date(s) Administered    COVID-19, MRNA, LN-S, PF (MODERNA FULL 0.5 ML DOSE) 02/23/2021, 02/23/2021, 03/24/2021, 12/18/2021    Influenza 10/05/2015, 08/27/2016    Influenza (Flumist) - Quadrivalent - Intranasal *Preferred* (2-49 years old) 09/27/2014    Influenza - Quadrivalent 09/15/2019    Influenza - Quadrivalent - MDCK - PF 12/11/2021    Influenza - Quadrivalent - PF *Preferred* (6 months and older) 09/16/2017, 10/27/2018, 09/27/2020    Influenza - Trivalent (ADULT) 10/08/2011, 09/13/2013    Influenza - Trivalent - PF (ADULT) 10/05/2015, 08/27/2016    Influenza Split 09/13/2013    Tdap 10/07/2019       MedHx:   Patient Active Problem List   Diagnosis    Fatigue    Ovarian cyst    Breast tenderness in female    Generalized headaches    Endometriosis of pelvis    Unmedicated IUD    Factor V Leiden carrier    Rhinitis, allergic    Pelvic pain in female    S/P hysterectomy with oophorectomy    Family history of breast cancer in mother    Chest pain on breathing    Precordial pain    Hemangioma    Corneal abrasion, left    Pectus excavatum    Shingles    Abnormal laboratory test    Carpal tunnel syndrome of right wrist    Right shoulder pain     Swelling of arm    Primary insomnia    Increased risk of breast cancer    Decreased right shoulder range of motion    Muscle weakness of right upper extremity       SurgHx:   Past Surgical History:   Procedure Laterality Date    BUNIONECTOMY Left 02/06/2018    HYSTERECTOMY  01/2015    TLHBSO    LAPAROSCOPY W/ MINI-LAPAROTOMY  2003    OOPHORECTOMY Right 3/2012    OOPHORECTOMY Left 01/2015    TOTAL VAGINAL HYSTERECTOMY  1/2015    w/Right USO    WISDOM TOOTH EXTRACTION Bilateral        Medications:   Current Outpatient Medications on File Prior to Visit   Medication Sig Dispense Refill    ALPRAZolam (XANAX) 1 MG tablet Take 1 tablet (1 mg total) by mouth nightly as needed for Insomnia or Anxiety. 21 tablet 0    aspirin (ECOTRIN) 81 MG EC tablet Take 81 mg by mouth once daily.      buPROPion (WELLBUTRIN XL) 150 MG TB24 tablet TAKE 1 TABLET BY MOUTH EVERY DAY 90 tablet 3    buPROPion (WELLBUTRIN XL) 300 MG 24 hr tablet Take 1 tablet (300 mg total) by mouth once daily. 90 tablet 1    cyanocobalamin, vitamin B-12, 5,000 mcg Subl Place under the tongue once a week.      estradioL (VIVELLE-DOT) 0.075 mg/24 hr APPLY 1 PATCH TOPICALLY TO THE SKIN 2 TIMES A WEEK 8 patch 12    fluticasone (FLONASE) 50 mcg/actuation nasal spray PLACE 2 SPRAYS IN EACH NOSTRIL ONCE DAILY 1 Bottle 1    hydrOXYchloroQUINE (PLAQUENIL) 200 mg tablet TK 2 TS PO QD      prasterone, dhea, (INTRAROSA) 6.5 mg Inst Place 6.5 mg vaginally every evening. 30 each 12    tiZANidine (ZANAFLEX) 4 MG tablet TAKE 1 TABLET(4 MG) BY MOUTH TWICE DAILY AS NEEDED 60 tablet 1    vitamin D (VITAMIN D3) 1000 units Tab Take 2,000 Units by mouth 2 (two) times daily.      azelastine (ASTELIN) 137 mcg (0.1 %) nasal spray 1-2 sprays each nostril twice daily (Patient not taking: Reported on 9/7/2022) 30 mL 11    cetirizine (ZYRTEC) 10 MG tablet Take 1 tablet (10 mg total) by mouth once daily. (Patient not taking: No sig reported) 30 tablet 2    diazePAM (VALIUM) 5 MG tablet 1-2 PO  "30 minutes prior to MRI (Patient not taking: No sig reported) 2 tablet 0    UNABLE TO FIND medication name: tumeric 200 mg twice a day      UNABLE TO FIND Apply 0.75 g topically every evening. Testosterone 1% 30 g 5     Current Facility-Administered Medications on File Prior to Visit   Medication Dose Route Frequency Provider Last Rate Last Admin    [COMPLETED] gadobenate dimeglumine (MULTIHANCE) injection 13 mL  13 mL Intravenous ONCE PRN Florinda Prado PA-C   13 mL at 09/06/22 8051     Drug Allergies:   Review of patient's allergies indicates:   Allergen Reactions    Niacin preparations      Patient states she has flushing    Sulfa (sulfonamide antibiotics)      Tongue swelling    Iodine and iodide containing products Rash     SocHx:   Social History     Tobacco Use    Smoking status: Never    Smokeless tobacco: Never   Substance Use Topics    Alcohol use: Yes     Alcohol/week: 5.0 - 6.0 standard drinks     Types: 5 - 6 Glasses of wine per week     Comment: week    Drug use: No     Additional History Obtained at Initial Visit:  H/o Asthma: She says she was given an inhaler as a teenager, but no longer needs an inhaler and has never been diagnosed with asthma  H/o Eczema: denies  H/o Rhinitis: endorses  Food Allergy: denies  Drug Allergies:   Iodine contrast -- she got hives. She didn't get hives when she pretreated with benadryl.  Sulfa antibiotics -- she got it after a surgery in 2015. Had tongue swelling.  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  Infection Hx: Denies frequent infections requiring antibiotics. No history of pneumonia.     PHYSICAL EXAM     VS: Ht 5' 10.98" (1.803 m)   Wt 74.7 kg (164 lb 10.9 oz)   LMP 01/01/2015 (Approximate)   BMI 22.98 kg/m²   GENERAL: Alert, NAD, well-appearing, cooperative  EYES: EOMI, no conjunctival injection, no discharge, no infraorbital shiners  NOSE: NT 3 + B/L, no stringing " mucous, no polyps visualized  ORAL: MMM, no ulcers, no thrush  LUNGS: CTAB, no w/r/c, no increased WOB  HEART: RRR, normal S1/S2, no m/g/r  DERM: no rashes, no skin breaks  NEURO: normal speech, normal gait, no facial asymmetry     LABORATORY STUDIES       Component      Latest Ref Rng & Units 4/6/2022 3/24/2021 10/9/2019   WBC      3.90 - 12.70 K/uL 2.82 (L) 3.15 (L) 4.96   RBC      4.00 - 5.40 M/uL 4.59 4.35 4.41   Hemoglobin      12.0 - 16.0 g/dL 13.3 13.3 13.2   Hematocrit      37.0 - 48.5 % 39.8 39.6 41.8   MCV      82 - 98 fL 87 91 95   MCH      27.0 - 31.0 pg 29.0 30.6 29.9   MCHC      32.0 - 36.0 g/dL 33.4 33.6 31.6 (L)   RDW      11.5 - 14.5 % 12.6 11.9 12.1   Platelets      150 - 450 K/uL 243 223 219   MPV      9.2 - 12.9 fL 10.4 10.7 10.6   Immature Granulocytes      0.0 - 0.5 % 0.4 0.0 0.2   Gran # (ANC)      1.8 - 7.7 K/uL 1.4 (L) 1.5 (L) 3.2   Immature Grans (Abs)      0.00 - 0.04 K/uL 0.01 0.00 0.01   Lymph #      1.0 - 4.8 K/uL 1.1 1.3 1.3   Mono #      0.3 - 1.0 K/uL 0.3 0.4 0.4   Eos #      0.0 - 0.5 K/uL 0.0 0.0 0.0   Baso #      0.00 - 0.20 K/uL 0.04 0.04 0.04   Differential Method       Automated Automated Automated   Sodium      136 - 145 mmol/L 136     Potassium      3.5 - 5.1 mmol/L 4.5     Chloride      95 - 110 mmol/L 100     CO2      23 - 29 mmol/L 29     Glucose      70 - 110 mg/dL 87     BUN      6 - 20 mg/dL 14     Creatinine      0.5 - 1.4 mg/dL 0.9     Calcium      8.7 - 10.5 mg/dL 9.8     PROTEIN TOTAL      6.0 - 8.4 g/dL 7.1     Albumin      3.5 - 5.2 g/dL 4.4     BILIRUBIN TOTAL      0.1 - 1.0 mg/dL 0.6     Alkaline Phosphatase      55 - 135 U/L 41 (L)     AST      10 - 40 U/L 16     ALT      10 - 44 U/L 13     Hemoglobin A1C External      4.0 - 5.6 % 4.9     TSH      0.400 - 4.000 uIU/mL 2.144        ALLERGEN TESTING     Skin Prick:  Date: 9/7/22  Verbal informed consent obtained after reviewing the risks, benefits and details of the procedure.    Inhalant Skin Testing  Results:    Indoor Allergens    1. Cat: Negative  2. Cockroach: Negative  3. Dog: Negative  4. Dust Mite (DF): 3+  5. Dust Mite (DP): 3+     Trees  6. Kike, white: Negative   7. Birch, mixed: Negative  8. Box Elder: Negative   9. Cedar, Red: Negative  10. [place], Eastern: Negative  11. [place], Bald: Negative   12. Elm, American: Negative  13. Hackberry: Negative  14. Maple, Red: Negative  15. Steele, Red: Negative  16. Oak, Mixed: Negative   17. Pecan: Negative  18. Pine White: Negative   19. Sweetgum: Negative  20. [place], American: Negative  21. [place], Black: Negative   22. [place], Black: Negative    Weed Pollen  23. Lambs Quarters: Negative  24. Noel Elder, Rough: Negative  25. Mugwort: Negative  26. Pigweed, Rough: Negative  27. Plantain, English: Negative  28. Ragweed, Mixed: Negative  29. Russian Thistle: Negative  30. Fernanda/Dock, Mixed: Negative    Grass Pollen  31. Bahia: Negative  32. Bermuda: Negative  33. Pedrito: Negative  34. Bereket: Negative    Mold Spores  35. Acremonium: Negative  36. Alternaria alternata: Negative  37. Aspergillus fumigatus: Negative  38. Aurebasidum pullulams: Negative  39. Bipolaris sorokiniana: Negative  40.       Botrytis cinerea: Negative  41.  Candida albicans: Negative  42. Chaetomium globosum: Negative  43. Cladosporium cladosporioides: Negative  44. Drechslera specifera: Negative  45. Epicoccum nigrum: Negative  46. Fusarium spp: Negative  47. Gliocladium: Negative  48. Helminthosporium: Negative  49. Mucor spp: Negative  50. Neurospora spp: Negative  51. Penicillum spp: Negative  52. Phoma herbarum: Negative  53. Rhizopus spp: Negative  54. Rhodotorula rubra: Negative  55. Smuts, Mixed: Negative  56. Stemphtllium: Negative  57. Trichoderma: Negative  58. Thichophyton rubrum: Negative    Controls  59. Saline: Negative  60. Histamine: 3+    Rating   Prick  Negative   No reaction  1+     Erythema only   2+   Erythema, w/wheal < 3mm  3+     Erythema w/wheal  >3mm  4+   Erythema, wheal w/pseudopods      Interpretation: Appropriate positive and negative controls. Positives were to dust mite. All other allergens tested were negative.      Immunocaps:   Component      Latest Ref Rng & Units 7/22/2022 1/10/2013   D. farinae      <0.10 kU/L <0.10 1.23 (H)   D. farinae Class       CLASS 0 CLASS II   Mite Dust Pteronyssinus IgE      <0.10 kU/L <0.10 0.75 (H)   D. pteronyssinus Class       CLASS 0 CLASS II   BAHIA GRASS      <0.10 kU/L <0.10 <0.35   Bahia Class       CLASS 0 CLASS 0   Bereket Grass      <0.10 kU/L <0.10 <0.35   Bereket Grass Class       CLASS 0 CLASS 0   Orofino(Quercus alba) IgE      <0.10 kU/L <0.10 <0.35   [place], Class       CLASS 0 CLASS 0   Pecan [place] Tree      <0.10 kU/L <0.10 <0.35   Pecan, Class       CLASS 0 CLASS 0   Marshelder IgE      <0.10 kU/L <0.10 <0.35   Marshelder Class       CLASS 0 CLASS 0   Ragweed, Short, IgE      <0.35 kU/L  <0.35   Ragweed, Short, Class        CLASS 0   A. tenius, IgE      <0.35 kU/L  <0.35   A. tenius Class        CLASS 0   Aspergillus Fumigatus IgE      <0.10 kU/L <0.10 <0.35   A. fumigatus Class       CLASS 0 CLASS 0   BERMUDA GRASS      <0.10 kU/L <0.10    Bermuda Grass Class       CLASS 0    [place] IgE      <0.10 kU/L <0.10    [place] Class       CLASS 0    Plantain      <0.10 kU/L <0.10    English Plantain Class       CLASS 0    Ragweed, Western IgE      <0.10 kU/L <0.10    Ragweed, Western, Class       CLASS 0    Alternaria alternata      <0.10 kU/L <0.10    Altern. alternata Class       CLASS 0    Cat Dander      <0.10 kU/L <0.10    Cat Epithelium Class       CLASS 0    Dog Dander, IgE      <0.10 kU/L <0.10    Dog Dander Class       CLASS 0    WHITE KIKE TREE      <0.10 kU/L <0.10    White Kike Class       CLASS 0    Allergen Maple (Box Elder) IgE      <0.10 kU/L <0.10    Allergen Maple (Burns) Class       CLASS 0    [place] IgE      <0.10 kU/L <0.10    [place] Class       CLASS 0    Silver Birch IgE       <0.10 kU/L <0.10    Silver Birch Class       CLASS 0    CARLOS GRASS      <0.10 kU/L <0.10    Carlos Grass Class       CLASS 0    Allergen Candida albicans IgE      <0.10 kU/L <0.10    Allergen Candida albicans Class       CLASS 0    Cladosporium, IgE      <0.10 kU/L <0.10    Cladosporium Class       CLASS 0    Curvularia lunata      <0.10 kU/L <0.10    Curvularia Lunata Class       CLASS 0    Penicillium, IgE      <0.10 kU/L <0.10    Penicillium Class       CLASS 0    IgE      0 - 100 IU/ml  109 (H)        PULMONARY FUNCTION TESTING     Date 1/10/13:  FVC:         101%ile   FEV1:         77%ile  FEV1/FVC: 63%  FEF 25-75: 44%ile  Interpretation: mild obstruction     ASSESSMENT & PLAN     Mirian العلي is a 46 y.o. female with     # Allergic rhinitis: She reports improvement in symptoms on flonase and azelastine, including sinus pressure, however still dealing with post nasal drip. Immunocaps in 2013 positive to dust mite, and she does notice dust as a trigger; however, repeat immunocaps negative for evidence of allergy. She was interested in SLIT for dust mite, so we did skin testing today, which was positive for dust mite. We further discussed SLIT (odactra) and its risks and benefits today. She would like to move forward with it.  -Odactra and epipen ordered to [facility]  -at next visit, will give first dose of odactra and discuss how/when to use epi  -continue flonase 1 SEN BID  -continue azelastine nasal spray 1 SEN BID  -continue cetirizine     # Possible reflux: could be contributing to post nasal drip. She has not started famotidine yet due to the skin testing.  -trial famotidine 20 mg BID     Follow up: 4-6 weeks for first odactra dose      Dulce Delvalle MD  Allergy/Immunology

## 2022-09-07 ENCOUNTER — OFFICE VISIT (OUTPATIENT)
Dept: ALLERGY | Facility: CLINIC | Age: 47
End: 2022-09-07
Payer: COMMERCIAL

## 2022-09-07 VITALS — WEIGHT: 164.69 LBS | HEIGHT: 71 IN | BODY MASS INDEX: 23.06 KG/M2

## 2022-09-07 DIAGNOSIS — R12 HEARTBURN: ICD-10-CM

## 2022-09-07 DIAGNOSIS — J30.9 ALLERGIC RHINITIS, UNSPECIFIED SEASONALITY, UNSPECIFIED TRIGGER: Primary | ICD-10-CM

## 2022-09-07 DIAGNOSIS — Z91.09 ALLERGY TO AMERICAN HOUSE DUST MITE: ICD-10-CM

## 2022-09-07 PROCEDURE — 3044F PR MOST RECENT HEMOGLOBIN A1C LEVEL <7.0%: ICD-10-PCS | Mod: CPTII,S$GLB,, | Performed by: STUDENT IN AN ORGANIZED HEALTH CARE EDUCATION/TRAINING PROGRAM

## 2022-09-07 PROCEDURE — 99213 OFFICE O/P EST LOW 20 MIN: CPT | Mod: 25,S$GLB,, | Performed by: STUDENT IN AN ORGANIZED HEALTH CARE EDUCATION/TRAINING PROGRAM

## 2022-09-07 PROCEDURE — 3008F BODY MASS INDEX DOCD: CPT | Mod: CPTII,S$GLB,, | Performed by: STUDENT IN AN ORGANIZED HEALTH CARE EDUCATION/TRAINING PROGRAM

## 2022-09-07 PROCEDURE — 99999 PR PBB SHADOW E&M-EST. PATIENT-LVL III: CPT | Mod: PBBFAC,,, | Performed by: STUDENT IN AN ORGANIZED HEALTH CARE EDUCATION/TRAINING PROGRAM

## 2022-09-07 PROCEDURE — 99999 PR PBB SHADOW E&M-EST. PATIENT-LVL III: ICD-10-PCS | Mod: PBBFAC,,, | Performed by: STUDENT IN AN ORGANIZED HEALTH CARE EDUCATION/TRAINING PROGRAM

## 2022-09-07 PROCEDURE — 1160F RVW MEDS BY RX/DR IN RCRD: CPT | Mod: CPTII,S$GLB,, | Performed by: STUDENT IN AN ORGANIZED HEALTH CARE EDUCATION/TRAINING PROGRAM

## 2022-09-07 PROCEDURE — 95004 PR ALLERGY SKIN TESTS,ALLERGENS: ICD-10-PCS | Mod: S$GLB,,, | Performed by: STUDENT IN AN ORGANIZED HEALTH CARE EDUCATION/TRAINING PROGRAM

## 2022-09-07 PROCEDURE — 1160F PR REVIEW ALL MEDS BY PRESCRIBER/CLIN PHARMACIST DOCUMENTED: ICD-10-PCS | Mod: CPTII,S$GLB,, | Performed by: STUDENT IN AN ORGANIZED HEALTH CARE EDUCATION/TRAINING PROGRAM

## 2022-09-07 PROCEDURE — 1159F PR MEDICATION LIST DOCUMENTED IN MEDICAL RECORD: ICD-10-PCS | Mod: CPTII,S$GLB,, | Performed by: STUDENT IN AN ORGANIZED HEALTH CARE EDUCATION/TRAINING PROGRAM

## 2022-09-07 PROCEDURE — 3044F HG A1C LEVEL LT 7.0%: CPT | Mod: CPTII,S$GLB,, | Performed by: STUDENT IN AN ORGANIZED HEALTH CARE EDUCATION/TRAINING PROGRAM

## 2022-09-07 PROCEDURE — 95004 PERQ TESTS W/ALRGNC XTRCS: CPT | Mod: S$GLB,,, | Performed by: STUDENT IN AN ORGANIZED HEALTH CARE EDUCATION/TRAINING PROGRAM

## 2022-09-07 PROCEDURE — 99213 PR OFFICE/OUTPT VISIT, EST, LEVL III, 20-29 MIN: ICD-10-PCS | Mod: 25,S$GLB,, | Performed by: STUDENT IN AN ORGANIZED HEALTH CARE EDUCATION/TRAINING PROGRAM

## 2022-09-07 PROCEDURE — 1159F MED LIST DOCD IN RCRD: CPT | Mod: CPTII,S$GLB,, | Performed by: STUDENT IN AN ORGANIZED HEALTH CARE EDUCATION/TRAINING PROGRAM

## 2022-09-07 PROCEDURE — 3008F PR BODY MASS INDEX (BMI) DOCUMENTED: ICD-10-PCS | Mod: CPTII,S$GLB,, | Performed by: STUDENT IN AN ORGANIZED HEALTH CARE EDUCATION/TRAINING PROGRAM

## 2022-09-07 RX ORDER — DERMATOPHAGOIDES PTERONYSSINUS AND DERMATOPHAGOIDES FARINAE 6; 6 [ARB'U]/1; [ARB'U]/1
1 TABLET SUBLINGUAL NIGHTLY
Qty: 30 TABLET | Refills: 11 | Status: SHIPPED | OUTPATIENT
Start: 2022-09-07 | End: 2023-06-21 | Stop reason: SDUPTHER

## 2022-09-07 RX ORDER — EPINEPHRINE 0.3 MG/.3ML
1 INJECTION SUBCUTANEOUS
Qty: 2 EACH | Refills: 0 | Status: SHIPPED | OUTPATIENT
Start: 2022-09-07

## 2022-09-08 ENCOUNTER — PATIENT MESSAGE (OUTPATIENT)
Dept: ALLERGY | Facility: CLINIC | Age: 47
End: 2022-09-08
Payer: COMMERCIAL

## 2022-09-13 ENCOUNTER — OFFICE VISIT (OUTPATIENT)
Dept: PAIN MEDICINE | Facility: CLINIC | Age: 47
End: 2022-09-13
Attending: ANESTHESIOLOGY
Payer: COMMERCIAL

## 2022-09-13 ENCOUNTER — TELEPHONE (OUTPATIENT)
Dept: PAIN MEDICINE | Facility: CLINIC | Age: 47
End: 2022-09-13
Payer: COMMERCIAL

## 2022-09-13 VITALS
HEIGHT: 71 IN | WEIGHT: 166.25 LBS | RESPIRATION RATE: 17 BRPM | DIASTOLIC BLOOD PRESSURE: 73 MMHG | BODY MASS INDEX: 23.27 KG/M2 | SYSTOLIC BLOOD PRESSURE: 116 MMHG | HEART RATE: 78 BPM

## 2022-09-13 DIAGNOSIS — M62.838 MUSCLE SPASM: ICD-10-CM

## 2022-09-13 DIAGNOSIS — G24.3 ISOLATED CERVICAL DYSTONIA: Primary | ICD-10-CM

## 2022-09-13 DIAGNOSIS — R29.898 TMJ CLICK: ICD-10-CM

## 2022-09-13 PROCEDURE — 3008F PR BODY MASS INDEX (BMI) DOCUMENTED: ICD-10-PCS | Mod: CPTII,S$GLB,, | Performed by: ANESTHESIOLOGY

## 2022-09-13 PROCEDURE — 3044F PR MOST RECENT HEMOGLOBIN A1C LEVEL <7.0%: ICD-10-PCS | Mod: CPTII,S$GLB,, | Performed by: ANESTHESIOLOGY

## 2022-09-13 PROCEDURE — 3074F SYST BP LT 130 MM HG: CPT | Mod: CPTII,S$GLB,, | Performed by: ANESTHESIOLOGY

## 2022-09-13 PROCEDURE — 3078F PR MOST RECENT DIASTOLIC BLOOD PRESSURE < 80 MM HG: ICD-10-PCS | Mod: CPTII,S$GLB,, | Performed by: ANESTHESIOLOGY

## 2022-09-13 PROCEDURE — 1160F PR REVIEW ALL MEDS BY PRESCRIBER/CLIN PHARMACIST DOCUMENTED: ICD-10-PCS | Mod: CPTII,S$GLB,, | Performed by: ANESTHESIOLOGY

## 2022-09-13 PROCEDURE — 1159F MED LIST DOCD IN RCRD: CPT | Mod: CPTII,S$GLB,, | Performed by: ANESTHESIOLOGY

## 2022-09-13 PROCEDURE — 99999 PR PBB SHADOW E&M-EST. PATIENT-LVL IV: CPT | Mod: PBBFAC,,, | Performed by: ANESTHESIOLOGY

## 2022-09-13 PROCEDURE — 3008F BODY MASS INDEX DOCD: CPT | Mod: CPTII,S$GLB,, | Performed by: ANESTHESIOLOGY

## 2022-09-13 PROCEDURE — 99999 PR PBB SHADOW E&M-EST. PATIENT-LVL IV: ICD-10-PCS | Mod: PBBFAC,,, | Performed by: ANESTHESIOLOGY

## 2022-09-13 PROCEDURE — 3074F PR MOST RECENT SYSTOLIC BLOOD PRESSURE < 130 MM HG: ICD-10-PCS | Mod: CPTII,S$GLB,, | Performed by: ANESTHESIOLOGY

## 2022-09-13 PROCEDURE — 1160F RVW MEDS BY RX/DR IN RCRD: CPT | Mod: CPTII,S$GLB,, | Performed by: ANESTHESIOLOGY

## 2022-09-13 PROCEDURE — 1159F PR MEDICATION LIST DOCUMENTED IN MEDICAL RECORD: ICD-10-PCS | Mod: CPTII,S$GLB,, | Performed by: ANESTHESIOLOGY

## 2022-09-13 PROCEDURE — 99212 OFFICE O/P EST SF 10 MIN: CPT | Mod: 25,S$GLB,, | Performed by: ANESTHESIOLOGY

## 2022-09-13 PROCEDURE — 3044F HG A1C LEVEL LT 7.0%: CPT | Mod: CPTII,S$GLB,, | Performed by: ANESTHESIOLOGY

## 2022-09-13 PROCEDURE — 3078F DIAST BP <80 MM HG: CPT | Mod: CPTII,S$GLB,, | Performed by: ANESTHESIOLOGY

## 2022-09-13 PROCEDURE — 99212 PR OFFICE/OUTPT VISIT, EST, LEVL II, 10-19 MIN: ICD-10-PCS | Mod: 25,S$GLB,, | Performed by: ANESTHESIOLOGY

## 2022-09-13 NOTE — PROGRESS NOTES
Patient Name: Mirian العلي  MRN: [id number]    Patient presents for repeat Botox injection. She notes R-sided masseter muscle weakness described by inability to close her jaw. She has hx of TMJ, though this problem started not too long ago.       INFORMED CONSENT: The procedure, risks, benefits and options were discussed with patient. There are no contraindications to the procedure. The patient expressed understanding and agreed to proceed. The personnel performing the procedure was discussed. I verify that I personally obtained Mirian's consent prior to the start of the procedure and the signed consent can be found on the patient's chart.  Shoulder improved with HEP  Procedure Date: 05/24/2022     Anesthesia: Topical     Sedation: None     Pre-operative diagnosis:   1. Isolated cervical dystonia  onabotulinumtoxina injection 300 Units      2. TMJ click        3. Muscle spasm  onabotulinumtoxina injection 300 Units             We discussed with the patient the assessment and recommendations. The following is the plan we agreed on:     1.  Procedure:  Under clean technique, EMG guidance and after discussing with the patient we used 300 units Botox.  No units wasted.  We injected splenius capitis, longismus, upper trapezius.  Patient tolerated procedure well.     2.  Return as needed.  Otherwise follow-up in 3 months to repeat injection.     3. Recommend evaluation by [facility] or orthodontist for jaw problem    Shawn Simms MD Anesthesia resident

## 2022-09-15 ENCOUNTER — SPECIALTY PHARMACY (OUTPATIENT)
Dept: PHARMACY | Facility: CLINIC | Age: 47
End: 2022-09-15
Payer: COMMERCIAL

## 2022-09-15 NOTE — TELEPHONE ENCOUNTER
Patient authorized Epipen copay card. $35.52 copay after card. Will reach out to patient for initial consult and send for first-dose appt.

## 2022-09-15 NOTE — TELEPHONE ENCOUNTER
PA for Odactra APPROVED; $35 copay with e-Voucher. Will reach out to patient to get authorization for Epipen copay card.

## 2022-09-15 NOTE — TELEPHONE ENCOUNTER
Benefits investigation     Payor: MAYLIN  Annual Deductible Amount: $2000  Annual Out of Packet (OOP) Maximum: $6000  Estimated Copay: $35  Network Status: Yes     Pending for initial

## 2022-09-16 ENCOUNTER — OFFICE VISIT (OUTPATIENT)
Dept: OBSTETRICS AND GYNECOLOGY | Facility: CLINIC | Age: 47
End: 2022-09-16
Payer: COMMERCIAL

## 2022-09-16 ENCOUNTER — SPECIALTY PHARMACY (OUTPATIENT)
Dept: PHARMACY | Facility: CLINIC | Age: 47
End: 2022-09-16
Payer: COMMERCIAL

## 2022-09-16 VITALS
SYSTOLIC BLOOD PRESSURE: 112 MMHG | WEIGHT: 166.19 LBS | DIASTOLIC BLOOD PRESSURE: 74 MMHG | HEIGHT: 70 IN | BODY MASS INDEX: 23.79 KG/M2

## 2022-09-16 DIAGNOSIS — Z12.39 BREAST CANCER SCREENING, HIGH RISK PATIENT: ICD-10-CM

## 2022-09-16 DIAGNOSIS — Z91.89 INCREASED RISK OF BREAST CANCER: ICD-10-CM

## 2022-09-16 DIAGNOSIS — J30.89 NON-SEASONAL ALLERGIC RHINITIS DUE TO OTHER ALLERGIC TRIGGER: Primary | ICD-10-CM

## 2022-09-16 DIAGNOSIS — Z80.3 FAMILY HISTORY OF BREAST CANCER: ICD-10-CM

## 2022-09-16 DIAGNOSIS — Z12.31 SCREENING MAMMOGRAM, ENCOUNTER FOR: Primary | ICD-10-CM

## 2022-09-16 PROCEDURE — 99999 PR PBB SHADOW E&M-EST. PATIENT-LVL IV: CPT | Mod: PBBFAC,,, | Performed by: PHYSICIAN ASSISTANT

## 2022-09-16 PROCEDURE — 3074F SYST BP LT 130 MM HG: CPT | Mod: CPTII,S$GLB,, | Performed by: PHYSICIAN ASSISTANT

## 2022-09-16 PROCEDURE — 1160F PR REVIEW ALL MEDS BY PRESCRIBER/CLIN PHARMACIST DOCUMENTED: ICD-10-PCS | Mod: CPTII,S$GLB,, | Performed by: PHYSICIAN ASSISTANT

## 2022-09-16 PROCEDURE — 3044F PR MOST RECENT HEMOGLOBIN A1C LEVEL <7.0%: ICD-10-PCS | Mod: CPTII,S$GLB,, | Performed by: PHYSICIAN ASSISTANT

## 2022-09-16 PROCEDURE — 1160F RVW MEDS BY RX/DR IN RCRD: CPT | Mod: CPTII,S$GLB,, | Performed by: PHYSICIAN ASSISTANT

## 2022-09-16 PROCEDURE — 3078F PR MOST RECENT DIASTOLIC BLOOD PRESSURE < 80 MM HG: ICD-10-PCS | Mod: CPTII,S$GLB,, | Performed by: PHYSICIAN ASSISTANT

## 2022-09-16 PROCEDURE — 3008F PR BODY MASS INDEX (BMI) DOCUMENTED: ICD-10-PCS | Mod: CPTII,S$GLB,, | Performed by: PHYSICIAN ASSISTANT

## 2022-09-16 PROCEDURE — 99999 PR PBB SHADOW E&M-EST. PATIENT-LVL IV: ICD-10-PCS | Mod: PBBFAC,,, | Performed by: PHYSICIAN ASSISTANT

## 2022-09-16 PROCEDURE — 3008F BODY MASS INDEX DOCD: CPT | Mod: CPTII,S$GLB,, | Performed by: PHYSICIAN ASSISTANT

## 2022-09-16 PROCEDURE — 3044F HG A1C LEVEL LT 7.0%: CPT | Mod: CPTII,S$GLB,, | Performed by: PHYSICIAN ASSISTANT

## 2022-09-16 PROCEDURE — 1159F MED LIST DOCD IN RCRD: CPT | Mod: CPTII,S$GLB,, | Performed by: PHYSICIAN ASSISTANT

## 2022-09-16 PROCEDURE — 1159F PR MEDICATION LIST DOCUMENTED IN MEDICAL RECORD: ICD-10-PCS | Mod: CPTII,S$GLB,, | Performed by: PHYSICIAN ASSISTANT

## 2022-09-16 PROCEDURE — 99213 OFFICE O/P EST LOW 20 MIN: CPT | Mod: S$GLB,,, | Performed by: PHYSICIAN ASSISTANT

## 2022-09-16 PROCEDURE — 99213 PR OFFICE/OUTPT VISIT, EST, LEVL III, 20-29 MIN: ICD-10-PCS | Mod: S$GLB,,, | Performed by: PHYSICIAN ASSISTANT

## 2022-09-16 PROCEDURE — 3078F DIAST BP <80 MM HG: CPT | Mod: CPTII,S$GLB,, | Performed by: PHYSICIAN ASSISTANT

## 2022-09-16 PROCEDURE — 3074F PR MOST RECENT SYSTOLIC BLOOD PRESSURE < 130 MM HG: ICD-10-PCS | Mod: CPTII,S$GLB,, | Performed by: PHYSICIAN ASSISTANT

## 2022-09-16 NOTE — PROGRESS NOTES
History:       HPI:   Mirian العلي presents for follow up of increased risk of breast cancer. Previously calculated her Tyrer-Cuzick score to be 22.3%. She was last seen in [facility] on 11/9/2020. No significant changes in her personal or family medical history since last seen about 2 years ago.  Denies breast mass, changes in skin or nipple, or drainage. Breasts due feel heavy but denies pain. No recent weight changes or increase in caffeine. Did increase testosterone slightly in the last 6 months.    BREAST IMAGING  Mammogram: 4/11/2022 - negative  MRI: 9/6/22 - negative    Ilene model 5 year risk: 2.7% (average for a female her age is 0.9%)    Past Medical   Past Medical History:   Diagnosis Date    Angio-edema     AR (allergic rhinitis)     Eczema     Endometriosis, mild 2002    Factor V Leiden mutation     Genetic testing 10/2020    Invitae Multi-Cancer Panel & Invitae Myelodysplastic Syndrome/Leukemia Panel (84 genes):  NEGATIVE for mutations.    Ovarian cyst, bilateral     Thyroglobulin antibody positive     per patient, who stated results were given by Dr. Coleman Velez in Rheumatology on 04/22/2019     Patient Active Problem List   Diagnosis    Fatigue    Ovarian cyst    Breast tenderness in female    Generalized headaches    Endometriosis of pelvis    Unmedicated IUD    Factor V Leiden carrier    Rhinitis, allergic    Pelvic pain in female    S/P hysterectomy with oophorectomy    Family history of breast cancer in mother    Chest pain on breathing    Precordial pain    Hemangioma    Corneal abrasion, left    Pectus excavatum    Shingles    Abnormal laboratory test    Carpal tunnel syndrome of right wrist    Right shoulder pain    Swelling of arm    Primary insomnia    Increased risk of breast cancer    Decreased right shoulder range of motion    Muscle weakness of right upper extremity       Social History   Social History     Tobacco Use    Smoking status: Never    Smokeless  tobacco: Never   Substance Use Topics    Alcohol use: Yes     Alcohol/week: 5.0 - 6.0 standard drinks     Types: 5 - 6 Glasses of wine per week     Comment: week    Drug use: No       Family History  Family History   Problem Relation Age of Onset    Thyroid disease Mother     Breast cancer Mother         diagnosed in her 60s; bilateral    Lung cancer Mother 71        former smoker    Skin cancer Father         type?    Bladder Cancer Father         diagnosed in his 60s    Kidney cancer Father 60    Diabetes Brother     Factor V Leiden deficiency Brother     Allergies Brother     Asthma Brother     Breast cancer Sister 46        inflammatory breast cancer, unilateral; myRisk genetic test negative    Other Sister         dermatothyocitis    Diabetes Maternal Aunt     Diabetes Paternal Uncle     Cancer Maternal Grandmother         bone marrow cancer in her 60s- multiple myeloma?    Throat cancer Paternal Grandfather         diagnosed in his 70s    Other Sister         pre-cancer? cervical?    Cancer Paternal Cousin         unknown origin    BRCA 1/2 Paternal Cousin         +BRCA2 mutation [id number] (patient Mirian tested negative for this twice)       Medications    Current Outpatient Medications:     allerg xt,D.farinae-D.pteronys (ODACTRA) 12 SQ-HDM Subl, Place 1 tablet under the tongue every evening., Disp: 30 tablet, Rfl: 11    ALPRAZolam (XANAX) 1 MG tablet, Take 1 tablet (1 mg total) by mouth nightly as needed for Insomnia or Anxiety., Disp: 21 tablet, Rfl: 0    aspirin (ECOTRIN) 81 MG EC tablet, Take 81 mg by mouth once daily., Disp: , Rfl:     azelastine (ASTELIN) 137 mcg (0.1 %) nasal spray, 1-2 sprays each nostril twice daily, Disp: 30 mL, Rfl: 11    buPROPion (WELLBUTRIN XL) 150 MG TB24 tablet, TAKE 1 TABLET BY MOUTH EVERY DAY, Disp: 90 tablet, Rfl: 3    buPROPion (WELLBUTRIN XL) 300 MG 24 hr tablet, Take 1 tablet (300 mg total) by mouth once daily., Disp: 90 tablet, Rfl: 1    cetirizine (ZYRTEC) 10 MG  tablet, Take 1 tablet (10 mg total) by mouth once daily., Disp: 30 tablet, Rfl: 2    cyanocobalamin, vitamin B-12, 5,000 mcg Subl, Place under the tongue once a week., Disp: , Rfl:     diazePAM (VALIUM) 5 MG tablet, 1-2 PO 30 minutes prior to MRI, Disp: 2 tablet, Rfl: 0    EPINEPHrine (EPIPEN 2-AVILA) 0.3 mg/0.3 mL AtIn, Inject 0.3 mLs (0.3 mg total) into the muscle as needed (Anaphylaxis)., Disp: 2 each, Rfl: 0    estradioL (VIVELLE-DOT) 0.075 mg/24 hr, APPLY 1 PATCH TOPICALLY TO THE SKIN 2 TIMES A WEEK, Disp: 8 patch, Rfl: 12    fluticasone (FLONASE) 50 mcg/actuation nasal spray, PLACE 2 SPRAYS IN EACH NOSTRIL ONCE DAILY, Disp: 1 Bottle, Rfl: 1    hydrOXYchloroQUINE (PLAQUENIL) 200 mg tablet, TK 2 TS PO QD, Disp: , Rfl:     prasterone, dhea, (INTRAROSA) 6.5 mg Inst, Place 6.5 mg vaginally every evening., Disp: 30 each, Rfl: 12    tiZANidine (ZANAFLEX) 4 MG tablet, TAKE 1 TABLET(4 MG) BY MOUTH TWICE DAILY AS NEEDED, Disp: 60 tablet, Rfl: 1    UNABLE TO FIND, medication name: tumeric 200 mg twice a day, Disp: , Rfl:     UNABLE TO FIND, Apply 0.75 g topically every evening. Testosterone 1%, Disp: 30 g, Rfl: 5    vitamin D (VITAMIN D3) 1000 units Tab, Take 2,000 Units by mouth 2 (two) times daily., Disp: , Rfl:     Allergies  Review of patient's allergies indicates:   Allergen Reactions    Niacin preparations      Patient states she has flushing    Sulfa (sulfonamide antibiotics)      Tongue swelling    Iodine and iodide containing products Rash       Review of Systems  General: No fever, chills, or weight loss.  Chest: No chest pain, shortness of breath, or palpitations.  Breast: No pain, masses, or nipple discharge.  Vulva: No pain, lesions, or itching.  Vagina: No relaxation, itching, discharge, or lesions.  Abdomen: No pain, nausea, vomiting, diarrhea, or constipation.  Urinary: No incontinence, nocturia, frequency, or dysuria.  Extremities:  No leg cramps, edema, or calf pain.  Neurologic: No headaches, dizziness,  "or visual changes.    Objective:     Vitals:    09/16/22 1346   BP: 112/74   Weight: 75.4 kg (166 lb 3.2 oz)   Height: 5' 10" (1.778 m)     Body mass index is 23.85 kg/m².    GENERAL:  Well-developed, well-nourished female in no acute distress. Vital signs reviewed.   SKIN:  Warm, dry without rashes, purpura or petechiae.  EYES:  EOMs intact.  Conjunctivae normal.  HEAD:  Normocephalic.  EARS/NOSE/MOUTH/THROAT:  Hearing intact.   NECK:  Supple with good range of motion; no masses  PSYCHIATRIC:  Normal affect and mood.  Alert and Oriented x 3.   BREASTS:Symmetrical, no skin changes or visible lesions.  No palpable masses, nipple discharge bilaterally.      Assessment:     Increased risk of breast cancer: This is a 46 y.o. female who presents for follow up of increased risk of breast cancer based on an elevated Tyrer Cuzick score. Reviewed risk and benefits of chemoprevention. She declines chemoprevention.     Plan:   Continue annual screening mammograms and breast MRI, alternating imaging every 6 months until age 77.    Screening mammogram due 4/2023  Breast MRI in 10/2023  Two physical exams per year, one can be with her OB/GYN.      Follow up in 1 year for CBE.     I spent a total of 25 minutes on the day of the visit.This includes face to face time and non-face to face time preparing to see the patient (eg, review of tests), obtaining and/or reviewing separately obtained history, documenting clinical information in the electronic or other health record, independently interpreting results and communicating results to the patient/family/caregiver, or care coordinator.          "

## 2022-09-19 NOTE — TELEPHONE ENCOUNTER
Specialty Pharmacy - Initial Clinical Assessment    Specialty Medication Orders Linked to Encounter      Flowsheet Row Most Recent Value   Medication #1 allerg xt,D.farinae-D.pteronys (ODACTRA) 12 SQ-HDM Subl (Order#[id number], Rx#[id number])          Patient Diagnosis   J30.89 - Non-seasonal allergic rhinitis due to other allergic trigger    Subjective    Mirian العلي is a 46 y.o. female, who is followed by the specialty pharmacy service for management and education.    Recent Encounters       Date Type Provider Description    09/16/2022 Specialty Pharmacy Drew Collazo PharmD Initial Clinical Assessment    09/15/2022 Specialty Pharmacy Drew Collazo PharmD Referral Authorization          Clinical call attempts since last clinical assessment   9/16/2022  7:42 PM - Specialty Pharmacy - Clinical Assessment by Drew Collazo PharmD     Current Outpatient Medications   Medication Sig    allerg xt,D.farinae-D.pteronys (ODACTRA) 12 SQ-HDM Subl Place 1 tablet under the tongue every evening.    ALPRAZolam (XANAX) 1 MG tablet Take 1 tablet (1 mg total) by mouth nightly as needed for Insomnia or Anxiety.    aspirin (ECOTRIN) 81 MG EC tablet Take 81 mg by mouth once daily.    azelastine (ASTELIN) 137 mcg (0.1 %) nasal spray 1-2 sprays each nostril twice daily    buPROPion (WELLBUTRIN XL) 150 MG TB24 tablet TAKE 1 TABLET BY MOUTH EVERY DAY    buPROPion (WELLBUTRIN XL) 300 MG 24 hr tablet Take 1 tablet (300 mg total) by mouth once daily.    cetirizine (ZYRTEC) 10 MG tablet Take 1 tablet (10 mg total) by mouth once daily.    cyanocobalamin, vitamin B-12, 5,000 mcg Subl Place under the tongue once a week.    diazePAM (VALIUM) 5 MG tablet 1-2 PO 30 minutes prior to MRI    EPINEPHrine (EPIPEN 2-AVILA) 0.3 mg/0.3 mL AtIn Inject 0.3 mLs (0.3 mg total) into the muscle as needed (Anaphylaxis).    estradioL (VIVELLE-DOT) 0.075 mg/24 hr APPLY 1 PATCH TOPICALLY TO THE SKIN 2 TIMES A WEEK    fluticasone (FLONASE) 50 mcg/actuation  nasal spray PLACE 2 SPRAYS IN EACH NOSTRIL ONCE DAILY    hydrOXYchloroQUINE (PLAQUENIL) 200 mg tablet TK 2 TS PO QD    prasterone, dhea, (INTRAROSA) 6.5 mg Inst Place 6.5 mg vaginally every evening.    tiZANidine (ZANAFLEX) 4 MG tablet TAKE 1 TABLET(4 MG) BY MOUTH TWICE DAILY AS NEEDED    UNABLE TO FIND medication name: tumeric 200 mg twice a day    UNABLE TO FIND Apply 0.75 g topically every evening. Testosterone 1%    vitamin D (VITAMIN D3) 1000 units Tab Take 2,000 Units by mouth 2 (two) times daily.   Last reviewed on 9/16/2022  2:18 PM by ALESSIA CouchC    Review of patient's allergies indicates:   Allergen Reactions    Niacin preparations      Patient states she has flushing    Sulfa (sulfonamide antibiotics)      Tongue swelling    Iodine and iodide containing products Rash   Last reviewed on  9/16/2022 2:18 PM by Florinda Prado    Drug Interactions    Drug interactions evaluated: yes  Clinically relevant drug interactions identified: no  Provided the patient with educational material regarding drug interactions: not applicable         Adverse Effects    *All other systems reviewed and are negative       Assessment Questions - Documented Responses      Flowsheet Row Most Recent Value   Assessment    Medication Reconciliation completed for patient Yes   During the past 4 weeks, has patient missed any activities due to condition or medication? No   During the past 4 weeks, did patient have any of the following urgent care visits? None   Goals of Therapy Status Discussed (new start)   Status of the patients ability to self-administer: Is Able   All education points have been covered with patient? Yes, supplemental printed education provided   Welcome packet contents reviewed and discussed with patient? Yes   Assesment completed? Yes   Plan Therapy being initiated   Do you need to open a clinical intervention (i-vent)? No   Do you want to schedule first shipment? Yes   Medication #1 Assessment Info   "  Patient status New medication, New to [facility]   Is this medication appropriate for the patient? Yes   Is this medication effective? Not yet started          Refill Questions - Documented Responses      Flowsheet Row Most Recent Value   Patient Availability and HIPAA Verification    Does patient want to proceed with activity? Yes   HIPAA/medical authority confirmed? Yes   Relationship to patient of person spoken to? Self   Refill Screening Questions    When does the patient need to receive the medication? 09/21/22   Refill Delivery Questions    How will the patient receive the medication?    When does the patient need to receive the medication? 09/21/22   Shipping Address Prescription   Address in [facility] confirmed and updated if neccessary? Yes   Expected Copay ($) 70.52   Is the patient able to afford the medication copay? Yes   Payment Method CC on file   Days supply of Refill 30   Supplies needed? No supplies needed   Refill activity completed? Yes   Refill activity plan Refill scheduled   Shipment/Pickup Date: 09/21/22            Objective    She has a past medical history of Angio-edema, AR (allergic rhinitis), Eczema, Endometriosis, mild (2002), Factor V Leiden mutation, Genetic testing (10/2020), Ovarian cyst, bilateral, and Thyroglobulin antibody positive.      BP Readings from Last 4 Encounters:   09/16/22 112/74   09/13/22 116/73   05/24/22 113/75   04/06/22 110/68     Ht Readings from Last 4 Encounters:   09/16/22 5' 10" (1.778 m)   09/13/22 5' 10.98" (1.803 m)   09/07/22 5' 10.98" (1.803 m)   08/31/22 5' 10.98" (1.803 m)     Wt Readings from Last 4 Encounters:   09/16/22 75.4 kg (166 lb 3.2 oz)   09/13/22 75.4 kg (166 lb 3.6 oz)   09/07/22 74.7 kg (164 lb 10.9 oz)   08/31/22 75.2 kg (165 lb 12.6 oz)       The goals of prescribed drug therapy management include:  Supporting patient to meet the prescriber's medical treatment objectives  Improving or maintaining quality of life  Maintaining " optimal therapy adherence  Minimizing and managing side effects      Goals of Therapy Status: Discussed (new start)    Assessment/Plan  Patient plans to start therapy on 09/21/22      Indication, dosage, appropriateness, effectiveness, safety and convenience of her specialty medication(s) were reviewed today.     Patient Education   Patient received education on the following:   Expectations and possible outcomes of therapy  Proper use, timely administration, and missed dose management  Duration of therapy  Side effects, including prevention, minimization, and management  Contraindications and safety precautions  New or changed medications, including prescribe and over the counter medications and supplements  Reviews recommended vaccinations, as appropriate  Storage, safe handling, and disposal        Tasks added this encounter   10/14/2022 - Refill Call (Auto Added)   Tasks due within next 3 months   No tasks due.     Drew Collazo, PharmD  [facility]  [address] [facility] [address]  Phone: [phone]  Fax: [phone]

## 2022-09-21 ENCOUNTER — TELEPHONE (OUTPATIENT)
Dept: ALLERGY | Facility: CLINIC | Age: 47
End: 2022-09-21
Payer: COMMERCIAL

## 2022-09-21 ENCOUNTER — PATIENT MESSAGE (OUTPATIENT)
Dept: ALLERGY | Facility: CLINIC | Age: 47
End: 2022-09-21
Payer: COMMERCIAL

## 2022-09-21 NOTE — TELEPHONE ENCOUNTER
Received Odactra and Epi pen at [facility] from [facility]. Called pt and rescheduled sooner appt to start Odactra on 9/26/22 at 3:00.

## 2022-09-24 NOTE — PROGRESS NOTES
ALLERGY & IMMUNOLOGY CLINIC - FOLLOW UP     HISTORY OF PRESENT ILLNESS     Patient ID: Mirian العلي is a 46 y.o. female    CC: allergic rhinitis, first odactra dose    HPI: Mirian العلي is a 46 y.o. female with allergic rhinitis, here for her first odactra dose.    Skin has been a little itchy, taking an additional cetirizine can help. No respiratory symptoms. Nasal symptom are a little better, she think the nasal sprays are helping. Eyes were crusty this morning. No Gi symptoms.     REVIEW OF SYSTEMS     CONST: no F/C  PULM: no SOB, no wheezing, no cough  GI: no N/V/D     MEDICAL HISTORY     Vaccines:   Immunization History   Administered Date(s) Administered    COVID-19, MRNA, LN-S, PF (MODERNA FULL 0.5 ML DOSE) 02/23/2021, 02/23/2021, 03/24/2021, 12/18/2021    Influenza 10/05/2015, 08/27/2016    Influenza (Flumist) - Quadrivalent - Intranasal *Preferred* (2-49 years old) 09/27/2014    Influenza - Quadrivalent 09/15/2019    Influenza - Quadrivalent - MDCK - PF 12/11/2021    Influenza - Quadrivalent - PF *Preferred* (6 months and older) 09/16/2017, 10/27/2018, 09/27/2020    Influenza - Trivalent (ADULT) 10/08/2011, 09/13/2013    Influenza - Trivalent - PF (ADULT) 10/05/2015, 08/27/2016    Influenza Split 09/13/2013    Tdap 10/07/2019       MedHx:   Patient Active Problem List   Diagnosis    Fatigue    Ovarian cyst    Breast tenderness in female    Generalized headaches    Endometriosis of pelvis    Unmedicated IUD    Factor V Leiden carrier    Rhinitis, allergic    Pelvic pain in female    S/P hysterectomy with oophorectomy    Family history of breast cancer in mother    Chest pain on breathing    Precordial pain    Hemangioma    Corneal abrasion, left    Pectus excavatum    Shingles    Abnormal laboratory test    Carpal tunnel syndrome of right wrist    Right shoulder pain    Swelling of arm    Primary insomnia    Increased risk of breast cancer    Decreased right shoulder range of motion     Muscle weakness of right upper extremity       SurgHx:   Past Surgical History:   Procedure Laterality Date    BUNIONECTOMY Left 02/06/2018    HYSTERECTOMY  01/2015    TLHBSO    LAPAROSCOPY W/ MINI-LAPAROTOMY  2003    OOPHORECTOMY Right 3/2012    OOPHORECTOMY Left 01/2015    TOTAL VAGINAL HYSTERECTOMY  1/2015    w/Right USO    WISDOM TOOTH EXTRACTION Bilateral        Medications:   Current Outpatient Medications on File Prior to Visit   Medication Sig Dispense Refill    allerg xt,D.farinae-Elieceronys (ODACTRA) 12 SQ-HDM Subl Place 1 tablet under the tongue every evening. 30 tablet 11    ALPRAZolam (XANAX) 1 MG tablet Take 1 tablet (1 mg total) by mouth nightly as needed for Insomnia or Anxiety. 21 tablet 0    aspirin (ECOTRIN) 81 MG EC tablet Take 81 mg by mouth once daily.      azelastine (ASTELIN) 137 mcg (0.1 %) nasal spray 1-2 sprays each nostril twice daily 30 mL 11    buPROPion (WELLBUTRIN XL) 150 MG TB24 tablet TAKE 1 TABLET BY MOUTH EVERY DAY 90 tablet 3    buPROPion (WELLBUTRIN XL) 300 MG 24 hr tablet Take 1 tablet (300 mg total) by mouth once daily. 90 tablet 1    cetirizine (ZYRTEC) 10 MG tablet Take 1 tablet (10 mg total) by mouth once daily. 30 tablet 2    cyanocobalamin, vitamin B-12, 5,000 mcg Subl Place under the tongue once a week.      estradioL (VIVELLE-DOT) 0.075 mg/24 hr APPLY 1 PATCH TOPICALLY TO THE SKIN 2 TIMES A WEEK 8 patch 12    fluticasone (FLONASE) 50 mcg/actuation nasal spray PLACE 2 SPRAYS IN EACH NOSTRIL ONCE DAILY 1 Bottle 1    hydrOXYchloroQUINE (PLAQUENIL) 200 mg tablet TK 2 TS PO QD      prasterone, dhea, (INTRAROSA) 6.5 mg Inst Place 6.5 mg vaginally every evening. 30 each 12    tiZANidine (ZANAFLEX) 4 MG tablet TAKE 1 TABLET(4 MG) BY MOUTH TWICE DAILY AS NEEDED 60 tablet 1    UNABLE TO FIND medication name: tumeric 200 mg twice a day      UNABLE TO FIND Apply 0.75 g topically every evening. Testosterone 1% 30 g 5    vitamin D (VITAMIN D3) 1000 units Tab Take 2,000 Units by mouth  "2 (two) times daily.      EPINEPHrine (EPIPEN 2-AVILA) 0.3 mg/0.3 mL AtIn Inject 0.3 mLs (0.3 mg total) into the muscle as needed (Anaphylaxis). (Patient not taking: Reported on 9/26/2022) 2 each 0    [DISCONTINUED] diazePAM (VALIUM) 5 MG tablet 1-2 PO 30 minutes prior to MRI 2 tablet 0     No current facility-administered medications on file prior to visit.     Drug Allergies:   Review of patient's allergies indicates:   Allergen Reactions    Niacin preparations      Patient states she has flushing    Sulfa (sulfonamide antibiotics)      Tongue swelling    Iodine and iodide containing products Rash     SocHx:   Social History     Tobacco Use    Smoking status: Never    Smokeless tobacco: Never   Substance Use Topics    Alcohol use: Yes     Alcohol/week: 5.0 - 6.0 standard drinks     Types: 5 - 6 Glasses of wine per week     Comment: week    Drug use: No     Additional History Obtained at Initial Visit:  H/o Asthma: She says she was given an inhaler as a teenager, but no longer needs an inhaler and has never been diagnosed with asthma  H/o Eczema: denies  H/o Rhinitis: endorses  Food Allergy: denies  Drug Allergies:   Iodine contrast -- she got hives. She didn't get hives when she pretreated with benadryl.  Sulfa antibiotics -- she got it after a surgery in 2015. Had tongue swelling.  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  Infection Hx: Denies frequent infections requiring antibiotics. No history of pneumonia.     PHYSICAL EXAM     VS: Ht 5' 10" (1.778 m)   Wt 74.8 kg (164 lb 14.5 oz)   LMP 01/01/2015 (Approximate)   BMI 23.66 kg/m²   GENERAL: Alert, NAD, well-appearing, cooperative  EYES: EOMI, no conjunctival injection, no discharge, no infraorbital shiners  NOSE: NT 3 + B/L, no stringing mucous, no polyps visualized  ORAL: MMM, no ulcers, no thrush  LUNGS: CTAB, no w/r/c, no increased WOB  HEART: RRR, normal S1/S2, no " m/g/r  DERM: no rashes, no skin breaks  NEURO: normal speech, normal gait, no facial asymmetry     LABORATORY STUDIES       Component      Latest Ref Rng & Units 4/6/2022 3/24/2021 10/9/2019   WBC      3.90 - 12.70 K/uL 2.82 (L) 3.15 (L) 4.96   RBC      4.00 - 5.40 M/uL 4.59 4.35 4.41   Hemoglobin      12.0 - 16.0 g/dL 13.3 13.3 13.2   Hematocrit      37.0 - 48.5 % 39.8 39.6 41.8   MCV      82 - 98 fL 87 91 95   MCH      27.0 - 31.0 pg 29.0 30.6 29.9   MCHC      32.0 - 36.0 g/dL 33.4 33.6 31.6 (L)   RDW      11.5 - 14.5 % 12.6 11.9 12.1   Platelets      150 - 450 K/uL 243 223 219   MPV      9.2 - 12.9 fL 10.4 10.7 10.6   Immature Granulocytes      0.0 - 0.5 % 0.4 0.0 0.2   Gran # (ANC)      1.8 - 7.7 K/uL 1.4 (L) 1.5 (L) 3.2   Immature Grans (Abs)      0.00 - 0.04 K/uL 0.01 0.00 0.01   Lymph #      1.0 - 4.8 K/uL 1.1 1.3 1.3   Mono #      0.3 - 1.0 K/uL 0.3 0.4 0.4   Eos #      0.0 - 0.5 K/uL 0.0 0.0 0.0   Baso #      0.00 - 0.20 K/uL 0.04 0.04 0.04   Differential Method       Automated Automated Automated   Sodium      136 - 145 mmol/L 136     Potassium      3.5 - 5.1 mmol/L 4.5     Chloride      95 - 110 mmol/L 100     CO2      23 - 29 mmol/L 29     Glucose      70 - 110 mg/dL 87     BUN      6 - 20 mg/dL 14     Creatinine      0.5 - 1.4 mg/dL 0.9     Calcium      8.7 - 10.5 mg/dL 9.8     PROTEIN TOTAL      6.0 - 8.4 g/dL 7.1     Albumin      3.5 - 5.2 g/dL 4.4     BILIRUBIN TOTAL      0.1 - 1.0 mg/dL 0.6     Alkaline Phosphatase      55 - 135 U/L 41 (L)     AST      10 - 40 U/L 16     ALT      10 - 44 U/L 13     Hemoglobin A1C External      4.0 - 5.6 % 4.9     TSH      0.400 - 4.000 uIU/mL 2.144        ALLERGEN TESTING     Skin Prick:  Inhalant Skin Testing Results 9/7/22:  Interpretation: Appropriate positive and negative controls. Positives were to dust mite. All other allergens tested were negative.    Immunocaps:   Component      Latest Ref Rng & Units 7/22/2022 1/10/2013   D. farinae      <0.10 kU/L <0.10  1.23 (H)   D. farinae Class       CLASS 0 CLASS II   Mite Dust Pteronyssinus IgE      <0.10 kU/L <0.10 0.75 (H)   D. pteronyssinus Class       CLASS 0 CLASS II   BAHIA GRASS      <0.10 kU/L <0.10 <0.35   Bahia Class       CLASS 0 CLASS 0   Bereket Grass      <0.10 kU/L <0.10 <0.35   Bereket Grass Class       CLASS 0 CLASS 0   Phoenix(Quercus alba) IgE      <0.10 kU/L <0.10 <0.35   [place], Class       CLASS 0 CLASS 0   Pecan Rosedale Tree      <0.10 kU/L <0.10 <0.35   Pecan, Class       CLASS 0 CLASS 0   Marshelder IgE      <0.10 kU/L <0.10 <0.35   Marshelder Class       CLASS 0 CLASS 0   Ragweed, Short, IgE      <0.35 kU/L  <0.35   Ragweed, Short, Class        CLASS 0   A. tenius, IgE      <0.35 kU/L  <0.35   A. tenius Class        CLASS 0   Aspergillus Fumigatus IgE      <0.10 kU/L <0.10 <0.35   A. fumigatus Class       CLASS 0 CLASS 0   BERMUDA GRASS      <0.10 kU/L <0.10    Bermuda Grass Class       CLASS 0    [place] IgE      <0.10 kU/L <0.10    [place] Class       CLASS 0    Plantain      <0.10 kU/L <0.10    English Plantain Class       CLASS 0    Ragweed, Western IgE      <0.10 kU/L <0.10    Ragweed, Western, Class       CLASS 0    Alternaria alternata      <0.10 kU/L <0.10    Altern. alternata Class       CLASS 0    Cat Dander      <0.10 kU/L <0.10    Cat Epithelium Class       CLASS 0    Dog Dander, IgE      <0.10 kU/L <0.10    Dog Dander Class       CLASS 0    WHITE KIKE TREE      <0.10 kU/L <0.10    White Kike Class       CLASS 0    Allergen Maple ([place] Elder) IgE      <0.10 kU/L <0.10    Allergen Maple (Bisbee) Class       CLASS 0    [place] IgE      <0.10 kU/L <0.10    [place] Class       CLASS 0    Silver Birch IgE      <0.10 kU/L <0.10    Silver Birch Class       CLASS 0    CARLOS GRASS      <0.10 kU/L <0.10    Carlos Grass Class       CLASS 0    Allergen Candida albicans IgE      <0.10 kU/L <0.10    Allergen Candida albicans Class       CLASS 0    Cladosporium, IgE      <0.10 kU/L <0.10     Cladosporium Class       CLASS 0    Curvularia lunata      <0.10 kU/L <0.10    Curvularia Lunata Class       CLASS 0    Penicillium, IgE      <0.10 kU/L <0.10    Penicillium Class       CLASS 0    IgE      0 - 100 IU/ml  109 (H)        PULMONARY FUNCTION TESTING     Date 1/10/13:  FVC:         101%ile   FEV1:         77%ile  FEV1/FVC: 63%  FEF 25-75: 44%ile  Interpretation: mild obstruction     ASSESSMENT & PLAN     Mirian العلي is a 46 y.o. female with     # Allergic rhinitis: She reports improvement in symptoms on flonase and azelastine, including sinus pressure, however still dealing with post nasal drip. SPT positive to dust mite, and she does notice dust as a trigger. She is here today for first dose of odactra.  -first dose of odactra given today. Patient observed for 30 minutes without reaction.  -continue odactra 1 sublingual tablet nightly  -patient given epipen. Counseled on how and when to use it  -continue flonase 1 SEN BID  -continue azelastine nasal spray 1 SEN BID  -continue cetirizine     # Generalized pruritus: helped by additional cetirizine  -continue cetirizine 10 mg daily, + additional 10 mg prn    Follow up: 6 weeks      Dulce Delvalle MD  Allergy/Immunology

## 2022-09-26 ENCOUNTER — OFFICE VISIT (OUTPATIENT)
Dept: ALLERGY | Facility: CLINIC | Age: 47
End: 2022-09-26
Payer: COMMERCIAL

## 2022-09-26 VITALS — HEIGHT: 70 IN | WEIGHT: 164.88 LBS | BODY MASS INDEX: 23.6 KG/M2

## 2022-09-26 DIAGNOSIS — L29.9 GENERALIZED PRURITUS: ICD-10-CM

## 2022-09-26 DIAGNOSIS — J30.9 ALLERGIC RHINITIS, UNSPECIFIED SEASONALITY, UNSPECIFIED TRIGGER: Primary | ICD-10-CM

## 2022-09-26 PROCEDURE — 99214 OFFICE O/P EST MOD 30 MIN: CPT | Mod: S$GLB,,, | Performed by: STUDENT IN AN ORGANIZED HEALTH CARE EDUCATION/TRAINING PROGRAM

## 2022-09-26 PROCEDURE — 99214 PR OFFICE/OUTPT VISIT, EST, LEVL IV, 30-39 MIN: ICD-10-PCS | Mod: S$GLB,,, | Performed by: STUDENT IN AN ORGANIZED HEALTH CARE EDUCATION/TRAINING PROGRAM

## 2022-09-26 PROCEDURE — 3008F PR BODY MASS INDEX (BMI) DOCUMENTED: ICD-10-PCS | Mod: CPTII,S$GLB,, | Performed by: STUDENT IN AN ORGANIZED HEALTH CARE EDUCATION/TRAINING PROGRAM

## 2022-09-26 PROCEDURE — 1159F PR MEDICATION LIST DOCUMENTED IN MEDICAL RECORD: ICD-10-PCS | Mod: CPTII,S$GLB,, | Performed by: STUDENT IN AN ORGANIZED HEALTH CARE EDUCATION/TRAINING PROGRAM

## 2022-09-26 PROCEDURE — 99999 PR PBB SHADOW E&M-EST. PATIENT-LVL III: CPT | Mod: PBBFAC,,, | Performed by: STUDENT IN AN ORGANIZED HEALTH CARE EDUCATION/TRAINING PROGRAM

## 2022-09-26 PROCEDURE — 3044F HG A1C LEVEL LT 7.0%: CPT | Mod: CPTII,S$GLB,, | Performed by: STUDENT IN AN ORGANIZED HEALTH CARE EDUCATION/TRAINING PROGRAM

## 2022-09-26 PROCEDURE — 1159F MED LIST DOCD IN RCRD: CPT | Mod: CPTII,S$GLB,, | Performed by: STUDENT IN AN ORGANIZED HEALTH CARE EDUCATION/TRAINING PROGRAM

## 2022-09-26 PROCEDURE — 3044F PR MOST RECENT HEMOGLOBIN A1C LEVEL <7.0%: ICD-10-PCS | Mod: CPTII,S$GLB,, | Performed by: STUDENT IN AN ORGANIZED HEALTH CARE EDUCATION/TRAINING PROGRAM

## 2022-09-26 PROCEDURE — 1160F RVW MEDS BY RX/DR IN RCRD: CPT | Mod: CPTII,S$GLB,, | Performed by: STUDENT IN AN ORGANIZED HEALTH CARE EDUCATION/TRAINING PROGRAM

## 2022-09-26 PROCEDURE — 3008F BODY MASS INDEX DOCD: CPT | Mod: CPTII,S$GLB,, | Performed by: STUDENT IN AN ORGANIZED HEALTH CARE EDUCATION/TRAINING PROGRAM

## 2022-09-26 PROCEDURE — 99999 PR PBB SHADOW E&M-EST. PATIENT-LVL III: ICD-10-PCS | Mod: PBBFAC,,, | Performed by: STUDENT IN AN ORGANIZED HEALTH CARE EDUCATION/TRAINING PROGRAM

## 2022-09-26 PROCEDURE — 1160F PR REVIEW ALL MEDS BY PRESCRIBER/CLIN PHARMACIST DOCUMENTED: ICD-10-PCS | Mod: CPTII,S$GLB,, | Performed by: STUDENT IN AN ORGANIZED HEALTH CARE EDUCATION/TRAINING PROGRAM

## 2022-10-08 DIAGNOSIS — F32.0 CURRENT MILD EPISODE OF MAJOR DEPRESSIVE DISORDER, UNSPECIFIED WHETHER RECURRENT: ICD-10-CM

## 2022-10-08 RX ORDER — BUPROPION HYDROCHLORIDE 300 MG/1
TABLET ORAL
Qty: 90 TABLET | Refills: 1 | Status: SHIPPED | OUTPATIENT
Start: 2022-10-08 | End: 2023-03-03 | Stop reason: SDUPTHER

## 2022-10-08 NOTE — TELEPHONE ENCOUNTER
Refill Decision Note   Mirian العلي  is requesting a refill authorization.  Brief Assessment and Rationale for Refill:  Approve     Medication Therapy Plan:       Medication Reconciliation Completed: No   Comments:     No Care Gaps recommended.     Note composed:2:37 PM 10/08/2022

## 2022-10-08 NOTE — TELEPHONE ENCOUNTER
No new care gaps identified.  [facility] Embedded Care Gaps. Reference number: [id number]. 10/08/2022   5:28:05 AM WILFREDOT

## 2022-10-14 ENCOUNTER — SPECIALTY PHARMACY (OUTPATIENT)
Dept: PHARMACY | Facility: CLINIC | Age: 47
End: 2022-10-14
Payer: COMMERCIAL

## 2022-10-14 NOTE — TELEPHONE ENCOUNTER
Specialty Pharmacy - Refill Coordination    Specialty Medication Orders Linked to Encounter      Flowsheet Row Most Recent Value   Medication #1 allerg xt,D.farinae-D.pteronys (ODACTRA) 12 SQ-HDM Subl (Order#[id number], Rx#[id number])            Refill Questions - Documented Responses      Flowsheet Row Most Recent Value   Patient Availability and HIPAA Verification    Does patient want to proceed with activity? Yes   HIPAA/medical authority confirmed? Yes   Relationship to patient of person spoken to? Self   Refill Screening Questions    Changes to allergies? No   Changes to medications? No   New conditions since last clinic visit? No   Unplanned office visit, urgent care, ED, or hospital admission in the last 4 weeks? No   How does patient/caregiver feel medication is working? Fair   Financial problems or insurance changes? No   How many doses of your specialty medications were missed in the last 4 weeks? 0   Would patient like to speak to a pharmacist? No   When does the patient need to receive the medication? 10/21/22   Refill Delivery Questions    How will the patient receive the medication? [facility]   When does the patient need to receive the medication? 10/21/22   Shipping Address Home   Address in [facility] confirmed and updated if neccessary? Yes   Expected Copay ($) 105   Is the patient able to afford the medication copay? Yes   Payment Method CC on file   Days supply of Refill 90   Supplies needed? No supplies needed   Refill activity completed? Yes   Refill activity plan Refill scheduled   Shipment/Pickup Date: 10/17/22            Current Outpatient Medications   Medication Sig    allerg xt,D.farinae-D.pteronys (ODACTRA) 12 SQ-HDM Subl Place 1 tablet under the tongue every evening.    ALPRAZolam (XANAX) 1 MG tablet Take 1 tablet (1 mg total) by mouth nightly as needed for Insomnia or Anxiety.    aspirin (ECOTRIN) 81 MG EC tablet Take 81 mg by mouth once daily.    azelastine (ASTELIN) 137 mcg (0.1 %)  nasal spray 1-2 sprays each nostril twice daily    buPROPion (WELLBUTRIN XL) 150 MG TB24 tablet TAKE 1 TABLET BY MOUTH EVERY DAY    buPROPion (WELLBUTRIN XL) 300 MG 24 hr tablet TAKE 1 TABLET(300 MG) BY MOUTH EVERY DAY    cetirizine (ZYRTEC) 10 MG tablet Take 1 tablet (10 mg total) by mouth once daily.    cyanocobalamin, vitamin B-12, 5,000 mcg Subl Place under the tongue once a week.    EPINEPHrine (EPIPEN 2-AVILA) 0.3 mg/0.3 mL AtIn Inject 0.3 mLs (0.3 mg total) into the muscle as needed (Anaphylaxis). (Patient not taking: Reported on 9/26/2022)    estradioL (VIVELLE-DOT) 0.075 mg/24 hr APPLY 1 PATCH TOPICALLY TO THE SKIN 2 TIMES A WEEK    fluticasone (FLONASE) 50 mcg/actuation nasal spray PLACE 2 SPRAYS IN EACH NOSTRIL ONCE DAILY    hydrOXYchloroQUINE (PLAQUENIL) 200 mg tablet TK 2 TS PO QD    prasterone, dhea, (INTRAROSA) 6.5 mg Inst Place 6.5 mg vaginally every evening.    tiZANidine (ZANAFLEX) 4 MG tablet TAKE 1 TABLET(4 MG) BY MOUTH TWICE DAILY AS NEEDED    UNABLE TO FIND medication name: tumeric 200 mg twice a day    UNABLE TO FIND Apply 0.75 g topically every evening. Testosterone 1%    vitamin D (VITAMIN D3) 1000 units Tab Take 2,000 Units by mouth 2 (two) times daily.   Last reviewed on 9/26/2022  3:32 PM by Dulce Delvalle MD    Review of patient's allergies indicates:   Allergen Reactions    Niacin preparations      Patient states she has flushing    Sulfa (sulfonamide antibiotics)      Tongue swelling    Iodine and iodide containing products Rash    Last reviewed on  9/26/2022 3:32 PM by Dulce Delvalle      Tasks added this encounter   1/5/2023 - Refill Call (Auto Added)   Tasks due within next 3 months   No tasks due.     Anabel Rosario  [facility] - Specialty Pharmacy  [address] [place] [address]  Phone: [phone]  Fax: [phone]

## 2022-10-16 DIAGNOSIS — Z13.9 SCREENING PROCEDURE: Primary | ICD-10-CM

## 2022-11-06 NOTE — PROGRESS NOTES
ALLERGY & IMMUNOLOGY CLINIC - FOLLOW UP     HISTORY OF PRESENT ILLNESS     Patient ID: Mirian العلي is a 47 y.o. female    CC: allergic rhinitic    HPI: Mirian العلي is a 47 y.o. female with allergic rhinitis (started odactra 9/26/22).    She is still dealing with post nasal drip. Also with worsening sinus pressure. She has been meaning to increase her pepcid to BID. She has added a prn antihistamine-decongestant, which she uses most days. She has not had any local symptoms with the odactra. She was having worsening sleep. Benadryl helped with that.    She is still using flonase 1 SEN BID, and azelastine 1 SEN BID. In the past, increasing the dose dried her out, but willing to try it again. She uses saline spray sometimes.     Pruritus controlled with antihistamines.     No fevers. No shortness of breath or wheezing, but sometimes the drip gets thick and causes hoarseness.     She has not gotten rashes from touching dust like she used to.      MEDICAL HISTORY     Vaccines:   Immunization History   Administered Date(s) Administered    COVID-19, MRNA, LN-S, PF (MODERNA FULL 0.5 ML DOSE) 02/23/2021, 02/23/2021, 03/24/2021, 12/18/2021    Influenza 10/05/2015, 08/27/2016    Influenza (Flumist) - Quadrivalent - Intranasal *Preferred* (2-49 years old) 09/27/2014    Influenza - Quadrivalent 09/15/2019    Influenza - Quadrivalent - MDCK - PF 12/11/2021    Influenza - Quadrivalent - PF *Preferred* (6 months and older) 09/16/2017, 10/27/2018, 09/27/2020    Influenza - Trivalent (ADULT) 10/08/2011, 09/13/2013    Influenza - Trivalent - PF (ADULT) 10/05/2015, 08/27/2016    Influenza Split 09/13/2013    Tdap 10/07/2019     MedHx:   Patient Active Problem List   Diagnosis    Fatigue    Ovarian cyst    Breast tenderness in female    Generalized headaches    Endometriosis of pelvis    Unmedicated IUD    Factor V Leiden carrier    Rhinitis, allergic    Pelvic pain in female    S/P hysterectomy with oophorectomy     Family history of breast cancer in mother    Chest pain on breathing    Precordial pain    Hemangioma    Corneal abrasion, left    Pectus excavatum    Shingles    Abnormal laboratory test    Carpal tunnel syndrome of right wrist    Right shoulder pain    Swelling of arm    Primary insomnia    Increased risk of breast cancer    Decreased right shoulder range of motion    Muscle weakness of right upper extremity     SurgHx:   Past Surgical History:   Procedure Laterality Date    BUNIONECTOMY Left 02/06/2018    HYSTERECTOMY  01/2015    TLHBSO    LAPAROSCOPY W/ MINI-LAPAROTOMY  2003    OOPHORECTOMY Right 3/2012    OOPHORECTOMY Left 01/2015    TOTAL VAGINAL HYSTERECTOMY  1/2015    w/Right USO    WISDOM TOOTH EXTRACTION Bilateral      Medications:   Current Outpatient Medications on File Prior to Visit   Medication Sig Dispense Refill    allerg xt,D.farinae-DGermaniapteronys (ODACTRA) 12 SQ-HDM Subl Place 1 tablet under the tongue every evening. 30 tablet 11    ALPRAZolam (XANAX) 1 MG tablet Take 1 tablet (1 mg total) by mouth nightly as needed for Insomnia or Anxiety. 21 tablet 0    aspirin (ECOTRIN) 81 MG EC tablet Take 81 mg by mouth once daily.      azelastine (ASTELIN) 137 mcg (0.1 %) nasal spray 1-2 sprays each nostril twice daily 30 mL 11    buPROPion (WELLBUTRIN XL) 150 MG TB24 tablet TAKE 1 TABLET BY MOUTH EVERY DAY 90 tablet 3    buPROPion (WELLBUTRIN XL) 300 MG 24 hr tablet TAKE 1 TABLET(300 MG) BY MOUTH EVERY DAY 90 tablet 1    cetirizine (ZYRTEC) 10 MG tablet Take 1 tablet (10 mg total) by mouth once daily. 30 tablet 2    cyanocobalamin, vitamin B-12, 5,000 mcg Subl Place under the tongue once a week.      EPINEPHrine (EPIPEN 2-AVILA) 0.3 mg/0.3 mL AtIn Inject 0.3 mLs (0.3 mg total) into the muscle as needed (Anaphylaxis). 2 each 0    estradioL (VIVELLE-DOT) 0.075 mg/24 hr APPLY 1 PATCH TOPICALLY TO THE SKIN 2 TIMES A WEEK 8 patch 12    fluticasone (FLONASE) 50 mcg/actuation nasal spray PLACE 2 SPRAYS IN EACH NOSTRIL  "ONCE DAILY 1 Bottle 1    hydrOXYchloroQUINE (PLAQUENIL) 200 mg tablet TK 2 TS PO QD      prasterone, dhea, (INTRAROSA) 6.5 mg Inst Place 6.5 mg vaginally every evening. 30 each 12    tiZANidine (ZANAFLEX) 4 MG tablet TAKE 1 TABLET(4 MG) BY MOUTH TWICE DAILY AS NEEDED 60 tablet 1    UNABLE TO FIND medication name: tumeric 200 mg twice a day      UNABLE TO FIND Apply 0.75 g topically every evening. Testosterone 1% 30 g 5    vitamin D (VITAMIN D3) 1000 units Tab Take 2,000 Units by mouth 2 (two) times daily.       No current facility-administered medications on file prior to visit.     Drug Allergies:   Review of patient's allergies indicates:   Allergen Reactions    Niacin preparations      Patient states she has flushing    Sulfa (sulfonamide antibiotics)      Tongue swelling    Iodine and iodide containing products Rash     SocHx:   Social History     Tobacco Use    Smoking status: Never    Smokeless tobacco: Never   Substance Use Topics    Alcohol use: Yes     Alcohol/week: 5.0 - 6.0 standard drinks     Types: 5 - 6 Glasses of wine per week     Comment: week    Drug use: No     Additional History Obtained at Initial Visit:  H/o Asthma: She says she was given an inhaler as a teenager, but no longer needs an inhaler and has never been diagnosed with asthma  H/o Eczema: denies  H/o Rhinitis: endorses  Food Allergy: denies  Drug Allergies:   Iodine contrast -- she got hives. She didn't get hives when she pretreated with benadryl.  Sulfa antibiotics -- she got it after a surgery in 2015. Had tongue swelling.  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  Infection Hx: Denies frequent infections requiring antibiotics. No history of pneumonia.     PHYSICAL EXAM     VS: Ht 5' 10" (1.778 m)   Wt 76.6 kg (168 lb 14 oz)   LMP 01/01/2015 (Approximate)   BMI 24.23 kg/m²   GENERAL: Alert, NAD, well-appearing, cooperative  EYES: EOMI, no " conjunctival injection, no discharge, no infraorbital shiners  NOSE: NT 3 + B/L, no stringing mucous, no polyps visualized  ORAL: MMM, no ulcers, no thrush  LUNGS: CTAB, no w/r/c, no increased WOB  HEART: RRR, normal S1/S2, no m/g/r  DERM: no rashes, no skin breaks  NEURO: normal speech, normal gait, no facial asymmetry     LABORATORY STUDIES       Component      Latest Ref Rng & Units 4/6/2022 3/24/2021 10/9/2019   WBC      3.90 - 12.70 K/uL 2.82 (L) 3.15 (L) 4.96   RBC      4.00 - 5.40 M/uL 4.59 4.35 4.41   Hemoglobin      12.0 - 16.0 g/dL 13.3 13.3 13.2   Hematocrit      37.0 - 48.5 % 39.8 39.6 41.8   MCV      82 - 98 fL 87 91 95   MCH      27.0 - 31.0 pg 29.0 30.6 29.9   MCHC      32.0 - 36.0 g/dL 33.4 33.6 31.6 (L)   RDW      11.5 - 14.5 % 12.6 11.9 12.1   Platelets      150 - 450 K/uL 243 223 219   MPV      9.2 - 12.9 fL 10.4 10.7 10.6   Immature Granulocytes      0.0 - 0.5 % 0.4 0.0 0.2   Gran # (ANC)      1.8 - 7.7 K/uL 1.4 (L) 1.5 (L) 3.2   Immature Grans (Abs)      0.00 - 0.04 K/uL 0.01 0.00 0.01   Lymph #      1.0 - 4.8 K/uL 1.1 1.3 1.3   Mono #      0.3 - 1.0 K/uL 0.3 0.4 0.4   Eos #      0.0 - 0.5 K/uL 0.0 0.0 0.0   Baso #      0.00 - 0.20 K/uL 0.04 0.04 0.04   Differential Method       Automated Automated Automated   Sodium      136 - 145 mmol/L 136     Potassium      3.5 - 5.1 mmol/L 4.5     Chloride      95 - 110 mmol/L 100     CO2      23 - 29 mmol/L 29     Glucose      70 - 110 mg/dL 87     BUN      6 - 20 mg/dL 14     Creatinine      0.5 - 1.4 mg/dL 0.9     Calcium      8.7 - 10.5 mg/dL 9.8     PROTEIN TOTAL      6.0 - 8.4 g/dL 7.1     Albumin      3.5 - 5.2 g/dL 4.4     BILIRUBIN TOTAL      0.1 - 1.0 mg/dL 0.6     Alkaline Phosphatase      55 - 135 U/L 41 (L)     AST      10 - 40 U/L 16     ALT      10 - 44 U/L 13     Hemoglobin A1C External      4.0 - 5.6 % 4.9     TSH      0.400 - 4.000 uIU/mL 2.144        ALLERGEN TESTING     Skin Prick:  Inhalant Skin Testing Results 9/7/22:  Interpretation:  Appropriate positive and negative controls. Positives were to dust mite. All other allergens tested were negative.    Immunocaps:   Component      Latest Ref Rng & Units 7/22/2022 1/10/2013   D. farinae      <0.10 kU/L <0.10 1.23 (H)   D. farinae Class       CLASS 0 CLASS II   Mite Dust Pteronyssinus IgE      <0.10 kU/L <0.10 0.75 (H)   D. pteronyssinus Class       CLASS 0 CLASS II   BAHIA GRASS      <0.10 kU/L <0.10 <0.35   [place] Class       CLASS 0 CLASS 0   Bereket Grass      <0.10 kU/L <0.10 <0.35   Bereket Grass Class       CLASS 0 CLASS 0   [place](Quercus alba) IgE      <0.10 kU/L <0.10 <0.35   [place], Class       CLASS 0 CLASS 0   Pecan [place] Tree      <0.10 kU/L <0.10 <0.35   Pecan, Class       CLASS 0 CLASS 0   Marshelder IgE      <0.10 kU/L <0.10 <0.35   Marshelder Class       CLASS 0 CLASS 0   Ragweed, Short, IgE      <0.35 kU/L  <0.35   Ragweed, Short, Class        CLASS 0   A. tenius, IgE      <0.35 kU/L  <0.35   A. tenius Class        CLASS 0   Aspergillus Fumigatus IgE      <0.10 kU/L <0.10 <0.35   A. fumigatus Class       CLASS 0 CLASS 0   BERMUDA GRASS      <0.10 kU/L <0.10    [place] Grass Class       CLASS 0    [place] IgE      <0.10 kU/L <0.10    [place] Class       CLASS 0    Plantain      <0.10 kU/L <0.10    English Plantain Class       CLASS 0    Ragweed, Western IgE      <0.10 kU/L <0.10    Ragweed, Western, Class       CLASS 0    Alternaria alternata      <0.10 kU/L <0.10    Altern. alternata Class       CLASS 0    Cat Dander      <0.10 kU/L <0.10    Cat Epithelium Class       CLASS 0    Dog Dander, IgE      <0.10 kU/L <0.10    Dog Dander Class       CLASS 0    WHITE KIKE TREE      <0.10 kU/L <0.10    White Kike Class       CLASS 0    Allergen Maple ([place]) IgE      <0.10 kU/L <0.10    Allergen Maple ([place]) Class       CLASS 0    Potter IgE      <0.10 kU/L <0.10    Potter Class       CLASS 0    Silver Birch IgE      <0.10 kU/L <0.10    Silver Birch Class       CLASS 0    CARLOS  GRASS      <0.10 kU/L <0.10    [place] Grass Class       CLASS 0    Allergen Candida albicans IgE      <0.10 kU/L <0.10    Allergen Candida albicans Class       CLASS 0    Cladosporium, IgE      <0.10 kU/L <0.10    Cladosporium Class       CLASS 0    Curvularia lunata      <0.10 kU/L <0.10    Curvularia Lunata Class       CLASS 0    Penicillium, IgE      <0.10 kU/L <0.10    Penicillium Class       CLASS 0    IgE      0 - 100 IU/ml  109 (H)        PULMONARY FUNCTION TESTING     Date 1/10/13:  FVC:         101%ile   FEV1:         77%ile  FEV1/FVC: 63%  FEF 25-75: 44%ile  Interpretation: mild obstruction     ASSESSMENT & PLAN     Mirian العلي is a 47 y.o. female with     # Allergic rhinitis to dust mite: Started odactra 9/26/22, but she hasn't noticed improvement yet. Still with post nasal drip, worsening sinus pressure. Flonase and azelastine do help.   -continue odactra 1 sublingual tablet nightly for now. Will follow up in 4 months (she will have been on it for nearly 6 months at that point). If she is still not noticing improvement at that point, will consider discontinuing it.  -She has epipen.  -increase flonase from 1 to 2 SEN BID.  -increase azelastine nasal spray from 1 to 2 SEN BID.  -continue cetirizine.  -continue prn saline nasal spray.    # Generalized pruritus: helped by additional cetirizine  -continue cetirizine 10 mg daily, plus additional 10 mg prn    Follow up: 4 months      Dulce Delvalle MD  Allergy/Immunology

## 2022-11-07 ENCOUNTER — OFFICE VISIT (OUTPATIENT)
Dept: ALLERGY | Facility: CLINIC | Age: 47
End: 2022-11-07
Payer: COMMERCIAL

## 2022-11-07 VITALS — BODY MASS INDEX: 24.18 KG/M2 | HEIGHT: 70 IN | WEIGHT: 168.88 LBS

## 2022-11-07 DIAGNOSIS — J30.9 ALLERGIC RHINITIS, UNSPECIFIED SEASONALITY, UNSPECIFIED TRIGGER: Primary | ICD-10-CM

## 2022-11-07 DIAGNOSIS — Z91.09 ALLERGY TO AMERICAN HOUSE DUST MITE: ICD-10-CM

## 2022-11-07 DIAGNOSIS — L29.9 GENERALIZED PRURITUS: ICD-10-CM

## 2022-11-07 PROCEDURE — 1160F RVW MEDS BY RX/DR IN RCRD: CPT | Mod: CPTII,S$GLB,, | Performed by: STUDENT IN AN ORGANIZED HEALTH CARE EDUCATION/TRAINING PROGRAM

## 2022-11-07 PROCEDURE — 99214 PR OFFICE/OUTPT VISIT, EST, LEVL IV, 30-39 MIN: ICD-10-PCS | Mod: S$GLB,,, | Performed by: STUDENT IN AN ORGANIZED HEALTH CARE EDUCATION/TRAINING PROGRAM

## 2022-11-07 PROCEDURE — 1159F MED LIST DOCD IN RCRD: CPT | Mod: CPTII,S$GLB,, | Performed by: STUDENT IN AN ORGANIZED HEALTH CARE EDUCATION/TRAINING PROGRAM

## 2022-11-07 PROCEDURE — 3044F HG A1C LEVEL LT 7.0%: CPT | Mod: CPTII,S$GLB,, | Performed by: STUDENT IN AN ORGANIZED HEALTH CARE EDUCATION/TRAINING PROGRAM

## 2022-11-07 PROCEDURE — 3044F PR MOST RECENT HEMOGLOBIN A1C LEVEL <7.0%: ICD-10-PCS | Mod: CPTII,S$GLB,, | Performed by: STUDENT IN AN ORGANIZED HEALTH CARE EDUCATION/TRAINING PROGRAM

## 2022-11-07 PROCEDURE — 99999 PR PBB SHADOW E&M-EST. PATIENT-LVL III: CPT | Mod: PBBFAC,,, | Performed by: STUDENT IN AN ORGANIZED HEALTH CARE EDUCATION/TRAINING PROGRAM

## 2022-11-07 PROCEDURE — 99214 OFFICE O/P EST MOD 30 MIN: CPT | Mod: S$GLB,,, | Performed by: STUDENT IN AN ORGANIZED HEALTH CARE EDUCATION/TRAINING PROGRAM

## 2022-11-07 PROCEDURE — 1160F PR REVIEW ALL MEDS BY PRESCRIBER/CLIN PHARMACIST DOCUMENTED: ICD-10-PCS | Mod: CPTII,S$GLB,, | Performed by: STUDENT IN AN ORGANIZED HEALTH CARE EDUCATION/TRAINING PROGRAM

## 2022-11-07 PROCEDURE — 1159F PR MEDICATION LIST DOCUMENTED IN MEDICAL RECORD: ICD-10-PCS | Mod: CPTII,S$GLB,, | Performed by: STUDENT IN AN ORGANIZED HEALTH CARE EDUCATION/TRAINING PROGRAM

## 2022-11-07 PROCEDURE — 99999 PR PBB SHADOW E&M-EST. PATIENT-LVL III: ICD-10-PCS | Mod: PBBFAC,,, | Performed by: STUDENT IN AN ORGANIZED HEALTH CARE EDUCATION/TRAINING PROGRAM

## 2022-11-07 PROCEDURE — 3008F BODY MASS INDEX DOCD: CPT | Mod: CPTII,S$GLB,, | Performed by: STUDENT IN AN ORGANIZED HEALTH CARE EDUCATION/TRAINING PROGRAM

## 2022-11-07 PROCEDURE — 3008F PR BODY MASS INDEX (BMI) DOCUMENTED: ICD-10-PCS | Mod: CPTII,S$GLB,, | Performed by: STUDENT IN AN ORGANIZED HEALTH CARE EDUCATION/TRAINING PROGRAM

## 2022-12-12 ENCOUNTER — TELEPHONE (OUTPATIENT)
Dept: PAIN MEDICINE | Facility: CLINIC | Age: 47
End: 2022-12-12
Payer: COMMERCIAL

## 2022-12-12 NOTE — TELEPHONE ENCOUNTER
Staff spoke with pt and confirmed appt on 12/13/22 @ 7:30 am with  in the office on the 9th floor [address].

## 2022-12-13 ENCOUNTER — TELEPHONE (OUTPATIENT)
Dept: PAIN MEDICINE | Facility: CLINIC | Age: 47
End: 2022-12-13

## 2022-12-13 ENCOUNTER — OFFICE VISIT (OUTPATIENT)
Dept: PAIN MEDICINE | Facility: CLINIC | Age: 47
End: 2022-12-13
Attending: ANESTHESIOLOGY
Payer: COMMERCIAL

## 2022-12-13 VITALS
BODY MASS INDEX: 23.99 KG/M2 | WEIGHT: 167.56 LBS | DIASTOLIC BLOOD PRESSURE: 73 MMHG | SYSTOLIC BLOOD PRESSURE: 101 MMHG | HEIGHT: 70 IN | HEART RATE: 84 BPM | RESPIRATION RATE: 18 BRPM

## 2022-12-13 DIAGNOSIS — G24.3 ISOLATED CERVICAL DYSTONIA: Primary | ICD-10-CM

## 2022-12-13 DIAGNOSIS — M62.838 MUSCLE SPASM: ICD-10-CM

## 2022-12-13 PROCEDURE — 95874 PR NEEDLE EMG GUIDANCE FOR CHEMODENERVATION: ICD-10-PCS | Mod: S$GLB,,, | Performed by: ANESTHESIOLOGY

## 2022-12-13 PROCEDURE — 3074F SYST BP LT 130 MM HG: CPT | Mod: CPTII,S$GLB,, | Performed by: ANESTHESIOLOGY

## 2022-12-13 PROCEDURE — 3008F BODY MASS INDEX DOCD: CPT | Mod: CPTII,S$GLB,, | Performed by: ANESTHESIOLOGY

## 2022-12-13 PROCEDURE — 1159F PR MEDICATION LIST DOCUMENTED IN MEDICAL RECORD: ICD-10-PCS | Mod: CPTII,S$GLB,, | Performed by: ANESTHESIOLOGY

## 2022-12-13 PROCEDURE — 3008F PR BODY MASS INDEX (BMI) DOCUMENTED: ICD-10-PCS | Mod: CPTII,S$GLB,, | Performed by: ANESTHESIOLOGY

## 2022-12-13 PROCEDURE — 99499 UNLISTED E&M SERVICE: CPT | Mod: S$GLB,,, | Performed by: ANESTHESIOLOGY

## 2022-12-13 PROCEDURE — 95874 GUIDE NERV DESTR NEEDLE EMG: CPT | Mod: S$GLB,,, | Performed by: ANESTHESIOLOGY

## 2022-12-13 PROCEDURE — 1160F RVW MEDS BY RX/DR IN RCRD: CPT | Mod: CPTII,S$GLB,, | Performed by: ANESTHESIOLOGY

## 2022-12-13 PROCEDURE — 1159F MED LIST DOCD IN RCRD: CPT | Mod: CPTII,S$GLB,, | Performed by: ANESTHESIOLOGY

## 2022-12-13 PROCEDURE — 64616 CHEMODENERV MUSC NECK DYSTON: CPT | Mod: RT,S$GLB,, | Performed by: ANESTHESIOLOGY

## 2022-12-13 PROCEDURE — 99999 PR PBB SHADOW E&M-EST. PATIENT-LVL IV: ICD-10-PCS | Mod: PBBFAC,,, | Performed by: ANESTHESIOLOGY

## 2022-12-13 PROCEDURE — 1160F PR REVIEW ALL MEDS BY PRESCRIBER/CLIN PHARMACIST DOCUMENTED: ICD-10-PCS | Mod: CPTII,S$GLB,, | Performed by: ANESTHESIOLOGY

## 2022-12-13 PROCEDURE — 3074F PR MOST RECENT SYSTOLIC BLOOD PRESSURE < 130 MM HG: ICD-10-PCS | Mod: CPTII,S$GLB,, | Performed by: ANESTHESIOLOGY

## 2022-12-13 PROCEDURE — 3044F PR MOST RECENT HEMOGLOBIN A1C LEVEL <7.0%: ICD-10-PCS | Mod: CPTII,S$GLB,, | Performed by: ANESTHESIOLOGY

## 2022-12-13 PROCEDURE — 99499 NO LOS: ICD-10-PCS | Mod: S$GLB,,, | Performed by: ANESTHESIOLOGY

## 2022-12-13 PROCEDURE — 3044F HG A1C LEVEL LT 7.0%: CPT | Mod: CPTII,S$GLB,, | Performed by: ANESTHESIOLOGY

## 2022-12-13 PROCEDURE — 99999 PR PBB SHADOW E&M-EST. PATIENT-LVL IV: CPT | Mod: PBBFAC,,, | Performed by: ANESTHESIOLOGY

## 2022-12-13 PROCEDURE — 3078F PR MOST RECENT DIASTOLIC BLOOD PRESSURE < 80 MM HG: ICD-10-PCS | Mod: CPTII,S$GLB,, | Performed by: ANESTHESIOLOGY

## 2022-12-13 PROCEDURE — 64616 PR CHEMODENERVATION NECK MUSCLES EXC LARNYNX, UNI: ICD-10-PCS | Mod: RT,S$GLB,, | Performed by: ANESTHESIOLOGY

## 2022-12-13 PROCEDURE — 3078F DIAST BP <80 MM HG: CPT | Mod: CPTII,S$GLB,, | Performed by: ANESTHESIOLOGY

## 2022-12-13 NOTE — PROGRESS NOTES
Patient Name: Mirian العلي  MRN: [id number]    Patient presents for repeat Botox injection. No new symptoms, no side effects reported      INFORMED CONSENT: The procedure, risks, benefits and options were discussed with patient. There are no contraindications to the procedure. The patient expressed understanding and agreed to proceed. The personnel performing the procedure was discussed. I verify that I personally obtained Mirian's consent prior to the start of the procedure and the signed consent can be found on the patient's chart.  Shoulder improved with HEP  Procedure Date: 05/24/2022     Anesthesia: Topical     Sedation: None     Pre-operative diagnosis:   1. Isolated cervical dystonia  onabotulinumtoxina injection 300 Units    Prior authorization Order      2. Muscle spasm  onabotulinumtoxina injection 300 Units    Prior authorization Order               We discussed with the patient the assessment and recommendations. The following is the plan we agreed on:     1.  Procedure:  Under clean technique, EMG guidance and after discussing with the patient we used 300 units Botox.  No units wasted.  We injected R splenius capitis, longismus, upper pectoral, subscapularis and B upper trapezius.  Patient tolerated procedure well.     2.  Return as needed.  Otherwise follow-up in 3 months to repeat injection.

## 2023-01-05 ENCOUNTER — PATIENT MESSAGE (OUTPATIENT)
Dept: PHARMACY | Facility: CLINIC | Age: 48
End: 2023-01-05
Payer: COMMERCIAL

## 2023-01-05 ENCOUNTER — SPECIALTY PHARMACY (OUTPATIENT)
Dept: PHARMACY | Facility: CLINIC | Age: 48
End: 2023-01-05
Payer: COMMERCIAL

## 2023-01-05 NOTE — TELEPHONE ENCOUNTER
Specialty Pharmacy - Refill Coordination    Specialty Medication Orders Linked to Encounter      Flowsheet Row Most Recent Value   Medication #1 allerg xt,D.farinae-D.pteronys (ODACTRA) 12 SQ-HDM Subl (Order#[id number], Rx#[id number])            Refill Questions - Documented Responses      Flowsheet Row Most Recent Value   Patient Availability and HIPAA Verification    Does patient want to proceed with activity? Yes   HIPAA/medical authority confirmed? Yes   Relationship to patient of person spoken to? Self   Refill Screening Questions    Changes to allergies? No   Changes to medications? No   New conditions since last clinic visit? No   Unplanned office visit, urgent care, ED, or hospital admission in the last 4 weeks? No   How does patient/caregiver feel medication is working? Good   Financial problems or insurance changes? No   How many doses of your specialty medications were missed in the last 4 weeks? 0   Would patient like to speak to a pharmacist? No   When does the patient need to receive the medication? 01/12/23   Refill Delivery Questions    How will the patient receive the medication? [facility]   When does the patient need to receive the medication? 01/12/23   Shipping Address Home   Address in [facility] confirmed and updated if neccessary? Yes   Expected Copay ($) 105   Is the patient able to afford the medication copay? Yes   Payment Method CC on file   Days supply of Refill 90   Supplies needed? No supplies needed   Refill activity completed? Yes   Refill activity plan Refill scheduled   Shipment/Pickup Date: 01/09/23            Current Outpatient Medications   Medication Sig    allerg xt,D.farinae-D.pteronys (ODACTRA) 12 SQ-HDM Subl Place 1 tablet under the tongue every evening.    ALPRAZolam (XANAX) 1 MG tablet Take 1 tablet (1 mg total) by mouth nightly as needed for Insomnia or Anxiety.    aspirin (ECOTRIN) 81 MG EC tablet Take 81 mg by mouth once daily.    azelastine (ASTELIN) 137 mcg (0.1 %)  nasal spray 1-2 sprays each nostril twice daily    buPROPion (WELLBUTRIN XL) 150 MG TB24 tablet TAKE 1 TABLET BY MOUTH EVERY DAY    buPROPion (WELLBUTRIN XL) 300 MG 24 hr tablet TAKE 1 TABLET(300 MG) BY MOUTH EVERY DAY    cetirizine (ZYRTEC) 10 MG tablet Take 1 tablet (10 mg total) by mouth once daily.    cyanocobalamin, vitamin B-12, 5,000 mcg Subl Place under the tongue once a week.    EPINEPHrine (EPIPEN 2-AVILA) 0.3 mg/0.3 mL AtIn Inject 0.3 mLs (0.3 mg total) into the muscle as needed (Anaphylaxis).    estradioL (VIVELLE-DOT) 0.075 mg/24 hr APPLY 1 PATCH TOPICALLY TO THE SKIN 2 TIMES A WEEK    fluticasone (FLONASE) 50 mcg/actuation nasal spray PLACE 2 SPRAYS IN EACH NOSTRIL ONCE DAILY    hydrOXYchloroQUINE (PLAQUENIL) 200 mg tablet TK 2 TS PO QD    prasterone, dhea, (INTRAROSA) 6.5 mg Inst Place 6.5 mg vaginally every evening.    tiZANidine (ZANAFLEX) 4 MG tablet TAKE 1 TABLET(4 MG) BY MOUTH TWICE DAILY AS NEEDED    UNABLE TO FIND medication name: tumeric 200 mg twice a day    UNABLE TO FIND Apply 0.75 g topically every evening. Testosterone 1%    vitamin D (VITAMIN D3) 1000 units Tab Take 2,000 Units by mouth 2 (two) times daily.   Last reviewed on 12/13/2022  9:04 AM by Fatemeh Mcclendon MD    Review of patient's allergies indicates:   Allergen Reactions    Niacin preparations      Patient states she has flushing    Sulfa (sulfonamide antibiotics)      Tongue swelling    Iodine and iodide containing products Rash    Last reviewed on  12/13/2022 9:04 AM by Fatemeh Mcclendon      Tasks added this encounter   3/29/2023 - Refill Call (Auto Added)   Tasks due within next 3 months   No tasks due.     Mike Garcia [facility]  [address] [place] [id number]  Phone: [phone]  Fax: [phone]

## 2023-01-12 NOTE — TELEPHONE ENCOUNTER
Contact pt to discuss the clear blisters on the pt's face. Informed pt that Dr. Brooks is currenlty out of the office and she should be seen to discuss the rash.  Pt states that she has been in contact with her dermatologist recently an is being treated for the rash with a prescription cream and steroid. Pt declined seeing another dermatologist and states that she will f/u with her derm. Pt also states that she is seeing her rhem provider soon and will follow up with her. Pt declined sooner appt with a provider.    no concerns

## 2023-01-17 ENCOUNTER — PATIENT MESSAGE (OUTPATIENT)
Dept: OBSTETRICS AND GYNECOLOGY | Facility: CLINIC | Age: 48
End: 2023-01-17
Payer: COMMERCIAL

## 2023-01-17 DIAGNOSIS — N95.1 MENOPAUSAL SYNDROME: ICD-10-CM

## 2023-01-17 RX ORDER — ESTRADIOL 0.07 MG/D
FILM, EXTENDED RELEASE TRANSDERMAL
Qty: 8 PATCH | Refills: 1 | Status: SHIPPED | OUTPATIENT
Start: 2023-01-17 | End: 2023-04-03 | Stop reason: SDUPTHER

## 2023-02-03 ENCOUNTER — PATIENT MESSAGE (OUTPATIENT)
Dept: OBSTETRICS AND GYNECOLOGY | Facility: CLINIC | Age: 48
End: 2023-02-03
Payer: COMMERCIAL

## 2023-02-03 DIAGNOSIS — N95.1 MENOPAUSAL SYNDROME: ICD-10-CM

## 2023-02-03 NOTE — TELEPHONE ENCOUNTER
----- Message from Benigno Patel sent at 2/3/2023  9:44 AM CST -----  Regarding: Refill  Who Called: [facility]          Refill or New Rx: Refill        RX Name and Strength testosterone (TESTIM) 50 mg/5 gram (1 %) Gel         Is this a 30 day or 90 day RX:        Preferred Pharmacy with phone number: [facility]  - [place]  [address] [facility].          Local or Mail Order: Local          Would the patient rather a call back or a response via [facility]? No        Best Call Back Number: [phone]        Additional Information:

## 2023-03-02 ENCOUNTER — PATIENT MESSAGE (OUTPATIENT)
Dept: ALLERGY | Facility: CLINIC | Age: 48
End: 2023-03-02
Payer: COMMERCIAL

## 2023-03-03 ENCOUNTER — PATIENT MESSAGE (OUTPATIENT)
Dept: PRIMARY CARE CLINIC | Facility: CLINIC | Age: 48
End: 2023-03-03
Payer: COMMERCIAL

## 2023-03-03 ENCOUNTER — TELEPHONE (OUTPATIENT)
Dept: ALLERGY | Facility: CLINIC | Age: 48
End: 2023-03-03
Payer: COMMERCIAL

## 2023-03-03 DIAGNOSIS — F41.9 ANXIETY: ICD-10-CM

## 2023-03-03 DIAGNOSIS — F32.0 CURRENT MILD EPISODE OF MAJOR DEPRESSIVE DISORDER, UNSPECIFIED WHETHER RECURRENT: ICD-10-CM

## 2023-03-03 DIAGNOSIS — R07.1 CHEST PAIN ON BREATHING: ICD-10-CM

## 2023-03-03 RX ORDER — BUPROPION HYDROCHLORIDE 300 MG/1
300 TABLET ORAL DAILY
Qty: 90 TABLET | Refills: 0 | Status: SHIPPED | OUTPATIENT
Start: 2023-03-03 | End: 2023-04-02

## 2023-03-03 RX ORDER — BUPROPION HYDROCHLORIDE 150 MG/1
150 TABLET ORAL DAILY
Qty: 90 TABLET | Refills: 0 | Status: SHIPPED | OUTPATIENT
Start: 2023-03-03 | End: 2023-07-06

## 2023-03-03 NOTE — TELEPHONE ENCOUNTER
Care Due:                  Date            Visit Type   Department     Provider  --------------------------------------------------------------------------------                                MYCHART                              ANNUAL                              CHECKUP/PHY  [facility] PRIMARY  Last Visit: 04-      S            CHAIM Brooks  Next Visit: None Scheduled  None         None Found                                                            Last  Test          Frequency    Reason                     Performed    Due Date  --------------------------------------------------------------------------------    Office Visit  12 months..  buPROPion................  04- 04-    [facility] Embedded Care Gaps. Reference number: [id number]. 3/03/2023   5:57:30 AM CST

## 2023-03-03 NOTE — TELEPHONE ENCOUNTER
Provider Staff:     Action is required for this patient.   Please see care gap opportunities below in Care Due Message: Due for annual wellness visit with PCP.   Thanks!  [facility]     Appointments      Date Provider   Last Visit   4/6/2022 Nora Brooks MD   Next Visit   Visit date not found Nora Brooks MD     Note composed:12:34 PM 03/03/2023         Refill Decision Note   Mirian العلي  is requesting a refill authorization.  Brief Assessment and Rationale for Refill:  Approve     Medication Therapy Plan:  Due for annual wellness visit with PCP    Requires appointment : Yes   Alert overridden per protocol: Yes    Comments: Care Gap specifics identified in this encounter below this note  Note composed:12:34 PM 03/03/2023

## 2023-03-03 NOTE — TELEPHONE ENCOUNTER
No new care gaps identified.  [facility] Embedded Care Gaps. Reference number: [id number]. 3/03/2023   10:29:16 AM CST

## 2023-03-06 RX ORDER — ALPRAZOLAM 1 MG/1
1 TABLET ORAL NIGHTLY PRN
Qty: 21 TABLET | Refills: 0 | Status: SHIPPED | OUTPATIENT
Start: 2023-03-06 | End: 2023-09-21

## 2023-03-14 ENCOUNTER — OFFICE VISIT (OUTPATIENT)
Dept: PAIN MEDICINE | Facility: CLINIC | Age: 48
End: 2023-03-14
Attending: ANESTHESIOLOGY
Payer: COMMERCIAL

## 2023-03-14 VITALS
DIASTOLIC BLOOD PRESSURE: 71 MMHG | WEIGHT: 172 LBS | RESPIRATION RATE: 18 BRPM | SYSTOLIC BLOOD PRESSURE: 115 MMHG | HEART RATE: 80 BPM | HEIGHT: 70 IN | BODY MASS INDEX: 24.62 KG/M2

## 2023-03-14 DIAGNOSIS — G24.3 ISOLATED CERVICAL DYSTONIA: Primary | ICD-10-CM

## 2023-03-14 DIAGNOSIS — M62.838 MUSCLE SPASM: ICD-10-CM

## 2023-03-14 PROCEDURE — 3008F BODY MASS INDEX DOCD: CPT | Mod: CPTII,S$GLB,, | Performed by: ANESTHESIOLOGY

## 2023-03-14 PROCEDURE — 3008F PR BODY MASS INDEX (BMI) DOCUMENTED: ICD-10-PCS | Mod: CPTII,S$GLB,, | Performed by: ANESTHESIOLOGY

## 2023-03-14 PROCEDURE — 99499 UNLISTED E&M SERVICE: CPT | Mod: S$GLB,,, | Performed by: ANESTHESIOLOGY

## 2023-03-14 PROCEDURE — 3078F PR MOST RECENT DIASTOLIC BLOOD PRESSURE < 80 MM HG: ICD-10-PCS | Mod: CPTII,S$GLB,, | Performed by: ANESTHESIOLOGY

## 2023-03-14 PROCEDURE — 99499 NO LOS: ICD-10-PCS | Mod: S$GLB,,, | Performed by: ANESTHESIOLOGY

## 2023-03-14 PROCEDURE — 1159F MED LIST DOCD IN RCRD: CPT | Mod: CPTII,S$GLB,, | Performed by: ANESTHESIOLOGY

## 2023-03-14 PROCEDURE — 3074F PR MOST RECENT SYSTOLIC BLOOD PRESSURE < 130 MM HG: ICD-10-PCS | Mod: CPTII,S$GLB,, | Performed by: ANESTHESIOLOGY

## 2023-03-14 PROCEDURE — 99999 PR PBB SHADOW E&M-EST. PATIENT-LVL IV: ICD-10-PCS | Mod: PBBFAC,,, | Performed by: ANESTHESIOLOGY

## 2023-03-14 PROCEDURE — 1160F RVW MEDS BY RX/DR IN RCRD: CPT | Mod: CPTII,S$GLB,, | Performed by: ANESTHESIOLOGY

## 2023-03-14 PROCEDURE — 3078F DIAST BP <80 MM HG: CPT | Mod: CPTII,S$GLB,, | Performed by: ANESTHESIOLOGY

## 2023-03-14 PROCEDURE — 1159F PR MEDICATION LIST DOCUMENTED IN MEDICAL RECORD: ICD-10-PCS | Mod: CPTII,S$GLB,, | Performed by: ANESTHESIOLOGY

## 2023-03-14 PROCEDURE — 99999 PR PBB SHADOW E&M-EST. PATIENT-LVL IV: CPT | Mod: PBBFAC,,, | Performed by: ANESTHESIOLOGY

## 2023-03-14 PROCEDURE — 1160F PR REVIEW ALL MEDS BY PRESCRIBER/CLIN PHARMACIST DOCUMENTED: ICD-10-PCS | Mod: CPTII,S$GLB,, | Performed by: ANESTHESIOLOGY

## 2023-03-14 PROCEDURE — 3074F SYST BP LT 130 MM HG: CPT | Mod: CPTII,S$GLB,, | Performed by: ANESTHESIOLOGY

## 2023-03-14 NOTE — PROGRESS NOTES
Patient Name: Mirian العلي  MRN: [id number]    Patient presents for repeat Botox injection. No new symptoms, no side effects reported from last injection.       INFORMED CONSENT: The procedure, risks, benefits and options were discussed with patient. There are no contraindications to the procedure. The patient expressed understanding and agreed to proceed. The personnel performing the procedure was discussed. I verify that I personally obtained Mirian's consent prior to the start of the procedure and the signed consent can be found on the patient's chart.  Shoulder improved with HEP    Procedure Date: 03/14/23     Anesthesia: Topical     Sedation: None     Pre-operative diagnosis:   1. Isolated cervical dystonia  onabotulinumtoxina injection 300 Units    Prior authorization Order    DISCONTINUED: onabotulinumtoxina injection 100 Units    CANCELED: Prior authorization Order      2. Muscle spasm  onabotulinumtoxina injection 300 Units    Prior authorization Order    DISCONTINUED: onabotulinumtoxina injection 100 Units    CANCELED: Prior authorization Order            We discussed with the patient the assessment and recommendations. The following is the plan we agreed on:     1.  Procedure:  Under clean technique, EMG guidance and after discussing with the patient we used 300 units Botox.  No units wasted.  We injected Right splenius capitis, Right longismus,  and Right upper trapezius.  Patient tolerated procedure well.     2.  Return as needed.  Otherwise follow-up in 3 months to repeat injection with 400 units Botox.       Danny Lowe MD [facility] Fellow      I have personally taken the history and examined this patient and agree with the resident's note as stated above.

## 2023-03-17 ENCOUNTER — OFFICE VISIT (OUTPATIENT)
Dept: ALLERGY | Facility: CLINIC | Age: 48
End: 2023-03-17
Payer: COMMERCIAL

## 2023-03-17 VITALS
HEART RATE: 87 BPM | DIASTOLIC BLOOD PRESSURE: 72 MMHG | BODY MASS INDEX: 25.05 KG/M2 | WEIGHT: 174.63 LBS | SYSTOLIC BLOOD PRESSURE: 106 MMHG

## 2023-03-17 DIAGNOSIS — J30.9 ALLERGIC RHINITIS, UNSPECIFIED SEASONALITY, UNSPECIFIED TRIGGER: Primary | ICD-10-CM

## 2023-03-17 DIAGNOSIS — Z91.09 ALLERGY TO AMERICAN HOUSE DUST MITE: ICD-10-CM

## 2023-03-17 DIAGNOSIS — Z51.6 ALLERGY DESENSITIZATION THERAPY: ICD-10-CM

## 2023-03-17 DIAGNOSIS — L29.9 GENERALIZED PRURITUS: ICD-10-CM

## 2023-03-17 PROCEDURE — 99214 OFFICE O/P EST MOD 30 MIN: CPT | Mod: S$GLB,,, | Performed by: STUDENT IN AN ORGANIZED HEALTH CARE EDUCATION/TRAINING PROGRAM

## 2023-03-17 PROCEDURE — 3074F PR MOST RECENT SYSTOLIC BLOOD PRESSURE < 130 MM HG: ICD-10-PCS | Mod: CPTII,S$GLB,, | Performed by: STUDENT IN AN ORGANIZED HEALTH CARE EDUCATION/TRAINING PROGRAM

## 2023-03-17 PROCEDURE — 1160F PR REVIEW ALL MEDS BY PRESCRIBER/CLIN PHARMACIST DOCUMENTED: ICD-10-PCS | Mod: CPTII,S$GLB,, | Performed by: STUDENT IN AN ORGANIZED HEALTH CARE EDUCATION/TRAINING PROGRAM

## 2023-03-17 PROCEDURE — 1160F RVW MEDS BY RX/DR IN RCRD: CPT | Mod: CPTII,S$GLB,, | Performed by: STUDENT IN AN ORGANIZED HEALTH CARE EDUCATION/TRAINING PROGRAM

## 2023-03-17 PROCEDURE — 3078F PR MOST RECENT DIASTOLIC BLOOD PRESSURE < 80 MM HG: ICD-10-PCS | Mod: CPTII,S$GLB,, | Performed by: STUDENT IN AN ORGANIZED HEALTH CARE EDUCATION/TRAINING PROGRAM

## 2023-03-17 PROCEDURE — 1159F MED LIST DOCD IN RCRD: CPT | Mod: CPTII,S$GLB,, | Performed by: STUDENT IN AN ORGANIZED HEALTH CARE EDUCATION/TRAINING PROGRAM

## 2023-03-17 PROCEDURE — 3008F BODY MASS INDEX DOCD: CPT | Mod: CPTII,S$GLB,, | Performed by: STUDENT IN AN ORGANIZED HEALTH CARE EDUCATION/TRAINING PROGRAM

## 2023-03-17 PROCEDURE — 99214 PR OFFICE/OUTPT VISIT, EST, LEVL IV, 30-39 MIN: ICD-10-PCS | Mod: S$GLB,,, | Performed by: STUDENT IN AN ORGANIZED HEALTH CARE EDUCATION/TRAINING PROGRAM

## 2023-03-17 PROCEDURE — 3078F DIAST BP <80 MM HG: CPT | Mod: CPTII,S$GLB,, | Performed by: STUDENT IN AN ORGANIZED HEALTH CARE EDUCATION/TRAINING PROGRAM

## 2023-03-17 PROCEDURE — 99999 PR PBB SHADOW E&M-EST. PATIENT-LVL IV: ICD-10-PCS | Mod: PBBFAC,,, | Performed by: STUDENT IN AN ORGANIZED HEALTH CARE EDUCATION/TRAINING PROGRAM

## 2023-03-17 PROCEDURE — 99999 PR PBB SHADOW E&M-EST. PATIENT-LVL IV: CPT | Mod: PBBFAC,,, | Performed by: STUDENT IN AN ORGANIZED HEALTH CARE EDUCATION/TRAINING PROGRAM

## 2023-03-17 PROCEDURE — 3074F SYST BP LT 130 MM HG: CPT | Mod: CPTII,S$GLB,, | Performed by: STUDENT IN AN ORGANIZED HEALTH CARE EDUCATION/TRAINING PROGRAM

## 2023-03-17 PROCEDURE — 1159F PR MEDICATION LIST DOCUMENTED IN MEDICAL RECORD: ICD-10-PCS | Mod: CPTII,S$GLB,, | Performed by: STUDENT IN AN ORGANIZED HEALTH CARE EDUCATION/TRAINING PROGRAM

## 2023-03-17 PROCEDURE — 3008F PR BODY MASS INDEX (BMI) DOCUMENTED: ICD-10-PCS | Mod: CPTII,S$GLB,, | Performed by: STUDENT IN AN ORGANIZED HEALTH CARE EDUCATION/TRAINING PROGRAM

## 2023-03-17 NOTE — PROGRESS NOTES
ALLERGY & IMMUNOLOGY CLINIC - FOLLOW UP     HISTORY OF PRESENT ILLNESS     Patient ID: Mirian العلي is a 47 y.o. female    CC: allergic rhinitic     HPI: Mirian العلي is a 47 y.o. female with allergic rhinitis (started odactra 9/26/22).    She has been noticing improvement from the odactra.  She says this week has been bad.  Still struggling with dust in house. On Saturday, they removed curtains, and stirring the dust up caused lots of symptoms. She thinks it would have been worse without the odactra. Still with some leftover rhinitis. She says its definitely better on the odactra. She says doing something like this without the odactra would have made it so she couldn't function afterward.    Taking cetirizine 10 mg once to twice daily.  She is still using flonase and azelastine.  She is tolerating the odactra well.     She says her air purifier is covered in dust. It is a HEPA air purifier. She cleans it and changes out the filter when necessary. She has a HEPA filter for her AC.      MEDICAL HISTORY     Vaccines:   Immunization History   Administered Date(s) Administered    COVID-19, MRNA, LN-S, PF (MODERNA FULL 0.5 ML DOSE) 02/23/2021, 02/23/2021, 03/24/2021, 12/18/2021    Influenza 10/05/2015, 08/27/2016    Influenza (Flumist) - Quadrivalent - Intranasal *Preferred* (2-49 years old) 09/27/2014    Influenza - Quadrivalent 09/15/2019    Influenza - Quadrivalent - MDCK - PF 12/11/2021    Influenza - Quadrivalent - PF *Preferred* (6 months and older) 09/16/2017, 10/27/2018, 09/27/2020    Influenza - Trivalent (ADULT) 10/08/2011, 09/13/2013    Influenza - Trivalent - PF (ADULT) 10/05/2015, 08/27/2016    Influenza Split 09/13/2013    Tdap 10/07/2019     MedHx:   Patient Active Problem List   Diagnosis    Fatigue    Ovarian cyst    Breast tenderness in female    Generalized headaches    Endometriosis of pelvis    Unmedicated IUD    Factor V Leiden carrier    Rhinitis, allergic    Pelvic pain in  female    S/P hysterectomy with oophorectomy    Family history of breast cancer in mother    Chest pain on breathing    Precordial pain    Hemangioma    Corneal abrasion, left    Pectus excavatum    Shingles    Abnormal laboratory test    Carpal tunnel syndrome of right wrist    Right shoulder pain    Swelling of arm    Primary insomnia    Increased risk of breast cancer    Decreased right shoulder range of motion    Muscle weakness of right upper extremity    Isolated cervical dystonia     SurgHx:   Past Surgical History:   Procedure Laterality Date    BUNIONECTOMY Left 02/06/2018    HYSTERECTOMY  01/2015    TLHBSO    LAPAROSCOPY W/ MINI-LAPAROTOMY  2003    OOPHORECTOMY Right 3/2012    OOPHORECTOMY Left 01/2015    TOTAL VAGINAL HYSTERECTOMY  1/2015    w/Right USO    WISDOM TOOTH EXTRACTION Bilateral      Medications:   Current Outpatient Medications on File Prior to Visit   Medication Sig Dispense Refill    allerg xt,D.farinae-Sh[place]onda (ODACTRA) 12 SQ-HDM Subl Place 1 tablet under the tongue every evening. 30 tablet 11    ALPRAZolam (XANAX) 1 MG tablet Take 1 tablet (1 mg total) by mouth nightly as needed for Insomnia or Anxiety. 21 tablet 0    aspirin (ECOTRIN) 81 MG EC tablet Take 81 mg by mouth once daily.      azelastine (ASTELIN) 137 mcg (0.1 %) nasal spray 1-2 sprays each nostril twice daily 30 mL 11    buPROPion (WELLBUTRIN XL) 150 MG TB24 tablet Take 1 tablet (150 mg total) by mouth once daily. With bupropion 300mg for a total daily dose of 450mg 90 tablet 0    buPROPion (WELLBUTRIN XL) 300 MG 24 hr tablet Take 1 tablet (300 mg total) by mouth once daily. With bupropion 150mg for a total daily dose of 450mg 90 tablet 0    cetirizine (ZYRTEC) 10 MG tablet Take 1 tablet (10 mg total) by mouth once daily. 30 tablet 2    cyanocobalamin, vitamin B-12, 5,000 mcg Subl Place under the tongue once a week.      EPINEPHrine (EPIPEN 2-AVILA) 0.3 mg/0.3 mL AtIn Inject 0.3 mLs (0.3 mg total) into the muscle as needed  (Anaphylaxis). 2 each 0    estradioL (VIVELLE-DOT) 0.075 mg/24 hr APPLY 1 PATCH TOPICALLY TO THE SKIN 2 TIMES A WEEK 8 patch 1    fluticasone (FLONASE) 50 mcg/actuation nasal spray PLACE 2 SPRAYS IN EACH NOSTRIL ONCE DAILY 1 Bottle 1    hydrOXYchloroQUINE (PLAQUENIL) 200 mg tablet TK 2 TS PO QD      prasterone, dhea, (INTRAROSA) 6.5 mg Inst Place 6.5 mg vaginally every evening. 30 each 12    tiZANidine (ZANAFLEX) 4 MG tablet TAKE 1 TABLET(4 MG) BY MOUTH TWICE DAILY AS NEEDED 60 tablet 1    UNABLE TO FIND medication name: tumeric 200 mg twice a day      UNABLE TO FIND Apply 0.75 g topically every evening. Testosterone 1% 30 g 5    vitamin D (VITAMIN D3) 1000 units Tab Take 2,000 Units by mouth 2 (two) times daily.       No current facility-administered medications on file prior to visit.     Drug Allergies:   Review of patient's allergies indicates:   Allergen Reactions    Niacin preparations      Patient states she has flushing    Sulfa (sulfonamide antibiotics)      Tongue swelling    Iodine and iodide containing products Rash     SocHx:   Social History     Tobacco Use    Smoking status: Never    Smokeless tobacco: Never   Substance Use Topics    Alcohol use: Yes     Alcohol/week: 5.0 - 6.0 standard drinks     Types: 5 - 6 Glasses of wine per week     Comment: week    Drug use: No     Additional History Obtained at Initial Visit:  H/o Asthma: She says she was given an inhaler as a teenager, but no longer needs an inhaler and has never been diagnosed with asthma  H/o Eczema: denies  H/o Rhinitis: endorses  Food Allergy: denies  Drug Allergies:   Iodine contrast -- she got hives. She didn't get hives when she pretreated with benadryl.  Sulfa antibiotics -- she got it after a surgery in 2015. Had tongue swelling.  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  Infection Hx: Denies frequent infections requiring antibiotics. No history  of pneumonia.     PHYSICAL EXAM     VS: /72   Pulse 87   Wt 79.2 kg (174 lb 9.7 oz)   LMP 01/01/2015 (Approximate)   BMI 25.05 kg/m²   GENERAL: Alert, NAD, well-appearing, cooperative  EYES: EOMI, no conjunctival injection, no discharge, no infraorbital shiners  NOSE: NT 2-3 + B/L, no stringing mucous, no polyps visualized  ORAL: MMM, no ulcers, no thrush  LUNGS: CTAB, no w/r/c, no increased WOB  HEART: RRR, normal S1/S2, no m/g/r  DERM: no rashes, no skin breaks  NEURO: normal speech, normal gait, no facial asymmetry     LABORATORY STUDIES     Component      Latest Ref Rng & Units 4/6/2022 3/24/2021 10/9/2019   WBC      3.90 - 12.70 K/uL 2.82 (L) 3.15 (L) 4.96   RBC      4.00 - 5.40 M/uL 4.59 4.35 4.41   Hemoglobin      12.0 - 16.0 g/dL 13.3 13.3 13.2   Hematocrit      37.0 - 48.5 % 39.8 39.6 41.8   MCV      82 - 98 fL 87 91 95   MCH      27.0 - 31.0 pg 29.0 30.6 29.9   MCHC      32.0 - 36.0 g/dL 33.4 33.6 31.6 (L)   RDW      11.5 - 14.5 % 12.6 11.9 12.1   Platelets      150 - 450 K/uL 243 223 219   MPV      9.2 - 12.9 fL 10.4 10.7 10.6   Immature Granulocytes      0.0 - 0.5 % 0.4 0.0 0.2   Gran # (ANC)      1.8 - 7.7 K/uL 1.4 (L) 1.5 (L) 3.2   Immature Grans (Abs)      0.00 - 0.04 K/uL 0.01 0.00 0.01   Lymph #      1.0 - 4.8 K/uL 1.1 1.3 1.3   Mono #      0.3 - 1.0 K/uL 0.3 0.4 0.4   Eos #      0.0 - 0.5 K/uL 0.0 0.0 0.0   Baso #      0.00 - 0.20 K/uL 0.04 0.04 0.04   Differential Method       Automated Automated Automated   Sodium      136 - 145 mmol/L 136     Potassium      3.5 - 5.1 mmol/L 4.5     Chloride      95 - 110 mmol/L 100     CO2      23 - 29 mmol/L 29     Glucose      70 - 110 mg/dL 87     BUN      6 - 20 mg/dL 14     Creatinine      0.5 - 1.4 mg/dL 0.9     Calcium      8.7 - 10.5 mg/dL 9.8     PROTEIN TOTAL      6.0 - 8.4 g/dL 7.1     Albumin      3.5 - 5.2 g/dL 4.4     BILIRUBIN TOTAL      0.1 - 1.0 mg/dL 0.6     Alkaline Phosphatase      55 - 135 U/L 41 (L)     AST      10 - 40 U/L 16      ALT      10 - 44 U/L 13     Hemoglobin A1C External      4.0 - 5.6 % 4.9     TSH      0.400 - 4.000 uIU/mL 2.144        ALLERGEN TESTING     Skin Prick:  Inhalant Skin Testing Results 9/7/22:  Interpretation: Appropriate positive and negative controls. Positives were to dust mite. All other allergens tested were negative.    Immunocaps:   Component      Latest Ref Rng & Units 7/22/2022 1/10/2013   D. farinae      <0.10 kU/L <0.10 1.23 (H)   D. farinae Class       CLASS 0 CLASS II   Mite Dust Pteronyssinus IgE      <0.10 kU/L <0.10 0.75 (H)   D. pteronyssinus Class       CLASS 0 CLASS II   BAHIA GRASS      <0.10 kU/L <0.10 <0.35   [place] Class       CLASS 0 CLASS 0   Bereket Grass      <0.10 kU/L <0.10 <0.35   Bereket Grass Class       CLASS 0 CLASS 0   [place](Quercus alba) IgE      <0.10 kU/L <0.10 <0.35   [place], Class       CLASS 0 CLASS 0   Pecan [place] Tree      <0.10 kU/L <0.10 <0.35   Pecan, Class       CLASS 0 CLASS 0   [place]er IgE      <0.10 kU/L <0.10 <0.35   [place]helder Class       CLASS 0 CLASS 0   Ragweed, Short, IgE      <0.35 kU/L  <0.35   Ragweed, Short, Class        CLASS 0   A. tenius, IgE      <0.35 kU/L  <0.35   A. tenius Class        CLASS 0   Aspergillus Fumigatus IgE      <0.10 kU/L <0.10 <0.35   A. fumigatus Class       CLASS 0 CLASS 0   BERMUDA GRASS      <0.10 kU/L <0.10    [place] Grass Class       CLASS 0    [place] IgE      <0.10 kU/L <0.10    [place] Class       CLASS 0    Plantain      <0.10 kU/L <0.10    English Plantain Class       CLASS 0    Ragweed, Western IgE      <0.10 kU/L <0.10    Ragweed, Western, Class       CLASS 0    Alternaria alternata      <0.10 kU/L <0.10    Altern. alternata Class       CLASS 0    Cat Dander      <0.10 kU/L <0.10    Cat Epithelium Class       CLASS 0    Dog Dander, IgE      <0.10 kU/L <0.10    Dog Dander Class       CLASS 0    WHITE KIKE TREE      <0.10 kU/L <0.10    White Kike Class       CLASS 0    Allergen Maple ([place]) IgE      <0.10 kU/L <0.10     Allergen Maple ([place]) Class       CLASS 0    [place] IgE      <0.10 kU/L <0.10    [place] Class       CLASS 0    [place]ch IgE      <0.10 kU/L <0.10    [place]ch Class       CLASS 0    CARLOS GRASS      <0.10 kU/L <0.10    Carlos Grass Class       CLASS 0    Allergen Candida albicans IgE      <0.10 kU/L <0.10    Allergen Candida albicans Class       CLASS 0    Cladosporium, IgE      <0.10 kU/L <0.10    Cladosporium Class       CLASS 0    Curvularia lunata      <0.10 kU/L <0.10    Curvularia Lunata Class       CLASS 0    Penicillium, IgE      <0.10 kU/L <0.10    Penicillium Class       CLASS 0    IgE      0 - 100 IU/ml  109 (H)      PULMONARY FUNCTION TESTING     Date 1/10/13:  FVC:         101%ile   FEV1:         77%ile  FEV1/FVC: 63%  FEF 25-75: 44%ile  Interpretation: mild obstruction     ASSESSMENT & PLAN     Mirian العلي is a 47 y.o. female with     # Allergic rhinitis due to dust mite: Started odactra 9/26/22, and she is now noticing improvement, but still hoping to get better control. Flonase and azelastine also help. She is still noticing dust as a trigger, and doing something to stir up dust still causes significant worsening in symptoms, but it no longer makes it so she can't function afterward.  -continue odactra 1 sublingual tablet nightly. Will follow up in 6 months (she will have been on odactra for a year at that point). If she is still finding symptoms aren't well enough controlled, could consider switching from SLIT to SCIT.  -She has epipen.  -continue flonase 1 to 2 SEN BID.  -continue azelastine nasal spray 1 to 2 SEN BID.  -continue cetirizine.  -continue prn saline nasal spray.    # Generalized pruritus: helped by additional cetirizine  -continue cetirizine 10 mg daily, plus additional 10 mg prn.    Follow up: 6 months      Dulce Delvalle MD  Allergy/Immunology

## 2023-03-21 ENCOUNTER — PATIENT OUTREACH (OUTPATIENT)
Dept: ADMINISTRATIVE | Facility: HOSPITAL | Age: 48
End: 2023-03-21
Payer: COMMERCIAL

## 2023-03-29 ENCOUNTER — SPECIALTY PHARMACY (OUTPATIENT)
Dept: PHARMACY | Facility: CLINIC | Age: 48
End: 2023-03-29
Payer: COMMERCIAL

## 2023-03-29 NOTE — TELEPHONE ENCOUNTER
Specialty Pharmacy - Refill Coordination    Specialty Medication Orders Linked to Encounter      Flowsheet Row Most Recent Value   Medication #1 allerg xt,D.farinae-D.pteronys (ODACTRA) 12 SQ-HDM Subl (Order#[id number], Rx#[id number])            Refill Questions - Documented Responses      Flowsheet Row Most Recent Value   Patient Availability and HIPAA Verification    Does patient want to proceed with activity? Yes   HIPAA/medical authority confirmed? Yes   Relationship to patient of person spoken to? Self   Refill Screening Questions    Changes to allergies? No   Changes to medications? No   New conditions since last clinic visit? No   Unplanned office visit, urgent care, ED, or hospital admission in the last 4 weeks? No   How does patient/caregiver feel medication is working? Good   Financial problems or insurance changes? No   How many doses of your specialty medications were missed in the last 4 weeks? 0   Would patient like to speak to a pharmacist? No   When does the patient need to receive the medication? 04/05/23   Refill Delivery Questions    How will the patient receive the medication? MEDRx   When does the patient need to receive the medication? 04/05/23   Address in [facility] confirmed and updated if neccessary? Yes   Expected Copay ($) 105   Is the patient able to afford the medication copay? Yes   Payment Method CC on file   Days supply of Refill 90   Supplies needed? No supplies needed   Refill activity completed? Yes   Refill activity plan Refill scheduled   Shipment/Pickup Date: 03/31/23            Current Outpatient Medications   Medication Sig    allerg xt,D.farinae-D.pteronys (ODACTRA) 12 SQ-HDM Subl Place 1 tablet under the tongue every evening.    ALPRAZolam (XANAX) 1 MG tablet Take 1 tablet (1 mg total) by mouth nightly as needed for Insomnia or Anxiety.    aspirin (ECOTRIN) 81 MG EC tablet Take 81 mg by mouth once daily.    azelastine (ASTELIN) 137 mcg (0.1 %) nasal spray 1-2 sprays  each nostril twice daily    buPROPion (WELLBUTRIN XL) 150 MG TB24 tablet Take 1 tablet (150 mg total) by mouth once daily. With bupropion 300mg for a total daily dose of 450mg    buPROPion (WELLBUTRIN XL) 300 MG 24 hr tablet Take 1 tablet (300 mg total) by mouth once daily. With bupropion 150mg for a total daily dose of 450mg    cetirizine (ZYRTEC) 10 MG tablet Take 1 tablet (10 mg total) by mouth once daily.    cyanocobalamin, vitamin B-12, 5,000 mcg Subl Place under the tongue once a week.    EPINEPHrine (EPIPEN 2-AVILA) 0.3 mg/0.3 mL AtIn Inject 0.3 mLs (0.3 mg total) into the muscle as needed (Anaphylaxis).    estradioL (VIVELLE-DOT) 0.075 mg/24 hr APPLY 1 PATCH TOPICALLY TO THE SKIN 2 TIMES A WEEK    fluticasone (FLONASE) 50 mcg/actuation nasal spray PLACE 2 SPRAYS IN EACH NOSTRIL ONCE DAILY    hydrOXYchloroQUINE (PLAQUENIL) 200 mg tablet TK 2 TS PO QD    prasterone, dhea, (INTRAROSA) 6.5 mg Inst Place 6.5 mg vaginally every evening.    tiZANidine (ZANAFLEX) 4 MG tablet TAKE 1 TABLET(4 MG) BY MOUTH TWICE DAILY AS NEEDED    UNABLE TO FIND medication name: tumeric 200 mg twice a day    UNABLE TO FIND Apply 0.75 g topically every evening. Testosterone 1%    vitamin D (VITAMIN D3) 1000 units Tab Take 2,000 Units by mouth 2 (two) times daily.   Last reviewed on 3/17/2023  1:49 PM by Dulce Delvalle MD    Review of patient's allergies indicates:   Allergen Reactions    Niacin preparations      Patient states she has flushing    Sulfa (sulfonamide antibiotics)      Tongue swelling    Iodine and iodide containing products Rash    Last reviewed on  3/17/2023 1:49 PM by Dulce Delvalle      Tasks added this encounter   6/20/2023 - Refill Call (Auto Added)   Tasks due within next 3 months   No tasks due.     [facility]  [address] [address]  Phone: [phone]  Fax: [phone]

## 2023-04-01 ENCOUNTER — PATIENT MESSAGE (OUTPATIENT)
Dept: OBSTETRICS AND GYNECOLOGY | Facility: CLINIC | Age: 48
End: 2023-04-01
Payer: COMMERCIAL

## 2023-04-02 DIAGNOSIS — F32.0 CURRENT MILD EPISODE OF MAJOR DEPRESSIVE DISORDER, UNSPECIFIED WHETHER RECURRENT: ICD-10-CM

## 2023-04-02 RX ORDER — BUPROPION HYDROCHLORIDE 300 MG/1
TABLET ORAL
Qty: 90 TABLET | Refills: 0 | Status: SHIPPED | OUTPATIENT
Start: 2023-04-02 | End: 2023-10-08

## 2023-04-02 NOTE — TELEPHONE ENCOUNTER
No new care gaps identified.  [facility] Embedded Care Gaps. Reference number: [id number]. 4/02/2023   5:30:02 AM CDT

## 2023-04-03 DIAGNOSIS — N95.1 MENOPAUSAL SYNDROME: ICD-10-CM

## 2023-04-03 RX ORDER — ESTRADIOL 0.07 MG/D
FILM, EXTENDED RELEASE TRANSDERMAL
Qty: 8 PATCH | Refills: 1 | Status: SHIPPED | OUTPATIENT
Start: 2023-04-03 | End: 2023-04-03 | Stop reason: SDUPTHER

## 2023-04-03 RX ORDER — ESTRADIOL 0.07 MG/D
FILM, EXTENDED RELEASE TRANSDERMAL
Qty: 8 PATCH | Refills: 1 | Status: SHIPPED | OUTPATIENT
Start: 2023-04-03 | End: 2023-04-05 | Stop reason: DRUGHIGH

## 2023-04-03 NOTE — TELEPHONE ENCOUNTER
Refill Decision Note   Mirian العلي  is requesting a refill authorization.  Brief Assessment and Rationale for Refill:  Approve     Medication Therapy Plan:       Medication Reconciliation Completed: No   Comments:     No Care Gaps recommended.     Note composed:7:36 PM 04/02/2023

## 2023-04-04 ENCOUNTER — OFFICE VISIT (OUTPATIENT)
Dept: PRIMARY CARE CLINIC | Facility: CLINIC | Age: 48
End: 2023-04-04
Attending: FAMILY MEDICINE
Payer: COMMERCIAL

## 2023-04-04 VITALS
DIASTOLIC BLOOD PRESSURE: 64 MMHG | BODY MASS INDEX: 24.94 KG/M2 | HEIGHT: 70 IN | WEIGHT: 174.19 LBS | HEART RATE: 89 BPM | OXYGEN SATURATION: 98 % | SYSTOLIC BLOOD PRESSURE: 112 MMHG

## 2023-04-04 DIAGNOSIS — Z00.00 ANNUAL PHYSICAL EXAM: Primary | ICD-10-CM

## 2023-04-04 DIAGNOSIS — D68.51 FACTOR V LEIDEN CARRIER: ICD-10-CM

## 2023-04-04 DIAGNOSIS — F32.0 CURRENT MILD EPISODE OF MAJOR DEPRESSIVE DISORDER, UNSPECIFIED WHETHER RECURRENT: ICD-10-CM

## 2023-04-04 DIAGNOSIS — Z79.899 DRUG-INDUCED IMMUNODEFICIENCY: ICD-10-CM

## 2023-04-04 DIAGNOSIS — M06.4 INFLAMMATORY POLYARTHROPATHY: ICD-10-CM

## 2023-04-04 DIAGNOSIS — D84.821 DRUG-INDUCED IMMUNODEFICIENCY: ICD-10-CM

## 2023-04-04 PROCEDURE — 1160F RVW MEDS BY RX/DR IN RCRD: CPT | Mod: CPTII,S$GLB,, | Performed by: FAMILY MEDICINE

## 2023-04-04 PROCEDURE — 1159F PR MEDICATION LIST DOCUMENTED IN MEDICAL RECORD: ICD-10-PCS | Mod: CPTII,S$GLB,, | Performed by: FAMILY MEDICINE

## 2023-04-04 PROCEDURE — 1159F MED LIST DOCD IN RCRD: CPT | Mod: CPTII,S$GLB,, | Performed by: FAMILY MEDICINE

## 2023-04-04 PROCEDURE — 3044F HG A1C LEVEL LT 7.0%: CPT | Mod: CPTII,S$GLB,, | Performed by: FAMILY MEDICINE

## 2023-04-04 PROCEDURE — 3074F PR MOST RECENT SYSTOLIC BLOOD PRESSURE < 130 MM HG: ICD-10-PCS | Mod: CPTII,S$GLB,, | Performed by: FAMILY MEDICINE

## 2023-04-04 PROCEDURE — 99999 PR PBB SHADOW E&M-EST. PATIENT-LVL IV: CPT | Mod: PBBFAC,,, | Performed by: FAMILY MEDICINE

## 2023-04-04 PROCEDURE — 3044F PR MOST RECENT HEMOGLOBIN A1C LEVEL <7.0%: ICD-10-PCS | Mod: CPTII,S$GLB,, | Performed by: FAMILY MEDICINE

## 2023-04-04 PROCEDURE — 99396 PREV VISIT EST AGE 40-64: CPT | Mod: S$GLB,,, | Performed by: FAMILY MEDICINE

## 2023-04-04 PROCEDURE — 1160F PR REVIEW ALL MEDS BY PRESCRIBER/CLIN PHARMACIST DOCUMENTED: ICD-10-PCS | Mod: CPTII,S$GLB,, | Performed by: FAMILY MEDICINE

## 2023-04-04 PROCEDURE — 3078F PR MOST RECENT DIASTOLIC BLOOD PRESSURE < 80 MM HG: ICD-10-PCS | Mod: CPTII,S$GLB,, | Performed by: FAMILY MEDICINE

## 2023-04-04 PROCEDURE — 3008F PR BODY MASS INDEX (BMI) DOCUMENTED: ICD-10-PCS | Mod: CPTII,S$GLB,, | Performed by: FAMILY MEDICINE

## 2023-04-04 PROCEDURE — 99999 PR PBB SHADOW E&M-EST. PATIENT-LVL IV: ICD-10-PCS | Mod: PBBFAC,,, | Performed by: FAMILY MEDICINE

## 2023-04-04 PROCEDURE — 99396 PR PREVENTIVE VISIT,EST,40-64: ICD-10-PCS | Mod: S$GLB,,, | Performed by: FAMILY MEDICINE

## 2023-04-04 PROCEDURE — 3008F BODY MASS INDEX DOCD: CPT | Mod: CPTII,S$GLB,, | Performed by: FAMILY MEDICINE

## 2023-04-04 PROCEDURE — 3078F DIAST BP <80 MM HG: CPT | Mod: CPTII,S$GLB,, | Performed by: FAMILY MEDICINE

## 2023-04-04 PROCEDURE — 3074F SYST BP LT 130 MM HG: CPT | Mod: CPTII,S$GLB,, | Performed by: FAMILY MEDICINE

## 2023-04-05 ENCOUNTER — OFFICE VISIT (OUTPATIENT)
Dept: OBSTETRICS AND GYNECOLOGY | Facility: CLINIC | Age: 48
End: 2023-04-05
Attending: OBSTETRICS & GYNECOLOGY
Payer: COMMERCIAL

## 2023-04-05 ENCOUNTER — LAB VISIT (OUTPATIENT)
Dept: LAB | Facility: HOSPITAL | Age: 48
End: 2023-04-05
Payer: COMMERCIAL

## 2023-04-05 VITALS
BODY MASS INDEX: 24.94 KG/M2 | HEART RATE: 80 BPM | SYSTOLIC BLOOD PRESSURE: 124 MMHG | HEIGHT: 70 IN | DIASTOLIC BLOOD PRESSURE: 80 MMHG | WEIGHT: 174.19 LBS

## 2023-04-05 DIAGNOSIS — Z13.820 SCREENING FOR OSTEOPOROSIS: ICD-10-CM

## 2023-04-05 DIAGNOSIS — N95.2 POSTMENOPAUSAL ATROPHIC VAGINITIS: ICD-10-CM

## 2023-04-05 DIAGNOSIS — N95.1 MENOPAUSAL SYNDROME: ICD-10-CM

## 2023-04-05 DIAGNOSIS — Z00.00 ANNUAL PHYSICAL EXAM: ICD-10-CM

## 2023-04-05 DIAGNOSIS — Z01.419 ENCOUNTER FOR GYNECOLOGICAL EXAMINATION WITHOUT ABNORMAL FINDING: Primary | ICD-10-CM

## 2023-04-05 LAB
25(OH)D3+25(OH)D2 SERPL-MCNC: 37 NG/ML (ref 30–96)
ALBUMIN SERPL BCP-MCNC: 4.2 G/DL (ref 3.5–5.2)
ALP SERPL-CCNC: 38 U/L (ref 55–135)
ALT SERPL W/O P-5'-P-CCNC: 13 U/L (ref 10–44)
ANION GAP SERPL CALC-SCNC: 10 MMOL/L (ref 8–16)
AST SERPL-CCNC: 18 U/L (ref 10–40)
BASOPHILS # BLD AUTO: 0.04 K/UL (ref 0–0.2)
BASOPHILS NFR BLD: 1.3 % (ref 0–1.9)
BILIRUB SERPL-MCNC: 0.4 MG/DL (ref 0.1–1)
BUN SERPL-MCNC: 10 MG/DL (ref 6–20)
CALCIUM SERPL-MCNC: 9.2 MG/DL (ref 8.7–10.5)
CHLORIDE SERPL-SCNC: 104 MMOL/L (ref 95–110)
CHOLEST SERPL-MCNC: 179 MG/DL (ref 120–199)
CHOLEST/HDLC SERPL: 2.4 {RATIO} (ref 2–5)
CO2 SERPL-SCNC: 26 MMOL/L (ref 23–29)
CREAT SERPL-MCNC: 0.9 MG/DL (ref 0.5–1.4)
DIFFERENTIAL METHOD: ABNORMAL
EOSINOPHIL # BLD AUTO: 0 K/UL (ref 0–0.5)
EOSINOPHIL NFR BLD: 0 % (ref 0–8)
ERYTHROCYTE [DISTWIDTH] IN BLOOD BY AUTOMATED COUNT: 12.1 % (ref 11.5–14.5)
EST. GFR  (NO RACE VARIABLE): >60 ML/MIN/1.73 M^2
ESTIMATED AVG GLUCOSE: 94 MG/DL (ref 68–131)
GLUCOSE SERPL-MCNC: 92 MG/DL (ref 70–110)
HBA1C MFR BLD: 4.9 % (ref 4–5.6)
HCT VFR BLD AUTO: 37.6 % (ref 37–48.5)
HDLC SERPL-MCNC: 75 MG/DL (ref 40–75)
HDLC SERPL: 41.9 % (ref 20–50)
HGB BLD-MCNC: 12.4 G/DL (ref 12–16)
IMM GRANULOCYTES # BLD AUTO: 0 K/UL (ref 0–0.04)
IMM GRANULOCYTES NFR BLD AUTO: 0 % (ref 0–0.5)
LDLC SERPL CALC-MCNC: 94.6 MG/DL (ref 63–159)
LYMPHOCYTES # BLD AUTO: 1.4 K/UL (ref 1–4.8)
LYMPHOCYTES NFR BLD: 45.2 % (ref 18–48)
MCH RBC QN AUTO: 29.4 PG (ref 27–31)
MCHC RBC AUTO-ENTMCNC: 33 G/DL (ref 32–36)
MCV RBC AUTO: 89 FL (ref 82–98)
MONOCYTES # BLD AUTO: 0.3 K/UL (ref 0.3–1)
MONOCYTES NFR BLD: 8.6 % (ref 4–15)
NEUTROPHILS # BLD AUTO: 1.4 K/UL (ref 1.8–7.7)
NEUTROPHILS NFR BLD: 44.9 % (ref 38–73)
NONHDLC SERPL-MCNC: 104 MG/DL
NRBC BLD-RTO: 0 /100 WBC
PLATELET # BLD AUTO: 215 K/UL (ref 150–450)
PMV BLD AUTO: 10.6 FL (ref 9.2–12.9)
POTASSIUM SERPL-SCNC: 4 MMOL/L (ref 3.5–5.1)
PROT SERPL-MCNC: 6.7 G/DL (ref 6–8.4)
RBC # BLD AUTO: 4.22 M/UL (ref 4–5.4)
SODIUM SERPL-SCNC: 140 MMOL/L (ref 136–145)
TRIGL SERPL-MCNC: 47 MG/DL (ref 30–150)
TSH SERPL DL<=0.005 MIU/L-ACNC: 2.41 UIU/ML (ref 0.4–4)
WBC # BLD AUTO: 3.01 K/UL (ref 3.9–12.7)

## 2023-04-05 PROCEDURE — 1160F RVW MEDS BY RX/DR IN RCRD: CPT | Mod: CPTII,S$GLB,, | Performed by: OBSTETRICS & GYNECOLOGY

## 2023-04-05 PROCEDURE — 3079F DIAST BP 80-89 MM HG: CPT | Mod: CPTII,S$GLB,, | Performed by: OBSTETRICS & GYNECOLOGY

## 2023-04-05 PROCEDURE — 83036 HEMOGLOBIN GLYCOSYLATED A1C: CPT | Performed by: FAMILY MEDICINE

## 2023-04-05 PROCEDURE — 80053 COMPREHEN METABOLIC PANEL: CPT | Performed by: FAMILY MEDICINE

## 2023-04-05 PROCEDURE — 3008F PR BODY MASS INDEX (BMI) DOCUMENTED: ICD-10-PCS | Mod: CPTII,S$GLB,, | Performed by: OBSTETRICS & GYNECOLOGY

## 2023-04-05 PROCEDURE — 80061 LIPID PANEL: CPT | Performed by: FAMILY MEDICINE

## 2023-04-05 PROCEDURE — 1159F MED LIST DOCD IN RCRD: CPT | Mod: CPTII,S$GLB,, | Performed by: OBSTETRICS & GYNECOLOGY

## 2023-04-05 PROCEDURE — 99396 PR PREVENTIVE VISIT,EST,40-64: ICD-10-PCS | Mod: S$GLB,,, | Performed by: OBSTETRICS & GYNECOLOGY

## 2023-04-05 PROCEDURE — 3074F PR MOST RECENT SYSTOLIC BLOOD PRESSURE < 130 MM HG: ICD-10-PCS | Mod: CPTII,S$GLB,, | Performed by: OBSTETRICS & GYNECOLOGY

## 2023-04-05 PROCEDURE — 3074F SYST BP LT 130 MM HG: CPT | Mod: CPTII,S$GLB,, | Performed by: OBSTETRICS & GYNECOLOGY

## 2023-04-05 PROCEDURE — 3079F PR MOST RECENT DIASTOLIC BLOOD PRESSURE 80-89 MM HG: ICD-10-PCS | Mod: CPTII,S$GLB,, | Performed by: OBSTETRICS & GYNECOLOGY

## 2023-04-05 PROCEDURE — 99999 PR PBB SHADOW E&M-EST. PATIENT-LVL III: CPT | Mod: PBBFAC,,, | Performed by: OBSTETRICS & GYNECOLOGY

## 2023-04-05 PROCEDURE — 1159F PR MEDICATION LIST DOCUMENTED IN MEDICAL RECORD: ICD-10-PCS | Mod: CPTII,S$GLB,, | Performed by: OBSTETRICS & GYNECOLOGY

## 2023-04-05 PROCEDURE — 36415 COLL VENOUS BLD VENIPUNCTURE: CPT | Mod: PN | Performed by: FAMILY MEDICINE

## 2023-04-05 PROCEDURE — 84443 ASSAY THYROID STIM HORMONE: CPT | Performed by: FAMILY MEDICINE

## 2023-04-05 PROCEDURE — 85025 COMPLETE CBC W/AUTO DIFF WBC: CPT | Performed by: FAMILY MEDICINE

## 2023-04-05 PROCEDURE — 82306 VITAMIN D 25 HYDROXY: CPT | Performed by: FAMILY MEDICINE

## 2023-04-05 PROCEDURE — 1160F PR REVIEW ALL MEDS BY PRESCRIBER/CLIN PHARMACIST DOCUMENTED: ICD-10-PCS | Mod: CPTII,S$GLB,, | Performed by: OBSTETRICS & GYNECOLOGY

## 2023-04-05 PROCEDURE — 99999 PR PBB SHADOW E&M-EST. PATIENT-LVL III: ICD-10-PCS | Mod: PBBFAC,,, | Performed by: OBSTETRICS & GYNECOLOGY

## 2023-04-05 PROCEDURE — 3008F BODY MASS INDEX DOCD: CPT | Mod: CPTII,S$GLB,, | Performed by: OBSTETRICS & GYNECOLOGY

## 2023-04-05 PROCEDURE — 99396 PREV VISIT EST AGE 40-64: CPT | Mod: S$GLB,,, | Performed by: OBSTETRICS & GYNECOLOGY

## 2023-04-05 RX ORDER — PRASTERONE 6.5 MG/1
6.5 INSERT VAGINAL NIGHTLY
Qty: 30 EACH | Refills: 12 | Status: SHIPPED | OUTPATIENT
Start: 2023-04-05

## 2023-04-05 RX ORDER — DIPHENHYDRAMINE HCL 25 MG
25 TABLET ORAL NIGHTLY PRN
COMMUNITY

## 2023-04-05 RX ORDER — DIPHENHYDRAMINE HCL 50 MG
50 CAPSULE ORAL EVERY 6 HOURS PRN
COMMUNITY

## 2023-04-05 RX ORDER — ESTRADIOL 0.1 MG/D
1 FILM, EXTENDED RELEASE TRANSDERMAL
Qty: 8 PATCH | Refills: 11 | Status: SHIPPED | OUTPATIENT
Start: 2023-04-06 | End: 2023-10-04 | Stop reason: SDUPTHER

## 2023-04-05 NOTE — PROGRESS NOTES
Subjective:       Patient ID: Mirian العلي is a 47 y.o. female.    Chief Complaint:  Annual Exam and Gynecologic Exam      History of Present Illness  HPI  Mirian العلي is a 47 y.o. female  here for her annual GYN exam.     She is menopausal since age 39 due to TVH BSO. SHe is doing well on her vivelle dot patches but recently has had recurrence of ocasional hot flashes. She also has urinary frequency, denies dysuria.   She exercises regularly and eats a healthy diet.   denies vaginal itching or irritation.  Denies vaginal discharge.  She is sexually active. She has had1 partner for 23 years .   History of abnormal pap: No  Last Pap:  had hysterectomy  Last MMG: normal-2022-routine follow-up in 12 months(alternates with MRI)  Last MRI: 2022: Normal  Last Colonoscopy:  Scheduled for   denies domestic violence. She does feel safe at home.     Past Medical History:   Diagnosis Date    Angio-edema     AR (allergic rhinitis)     Eczema     Endometriosis, mild     Factor V Leiden mutation     Genetic testing 10/2020    Invitae Multi-Cancer Panel & Invitae Myelodysplastic Syndrome/Leukemia Panel (84 genes):  NEGATIVE for mutations.    Ovarian cyst, bilateral     Thyroglobulin antibody positive     per patient, who stated results were given by Dr. Coleman Velez in Rheumatology on 2019     Past Surgical History:   Procedure Laterality Date    BUNIONECTOMY Left 2018    HYSTERECTOMY  2015    TLHBSO    LAPAROSCOPY W/ MINI-LAPAROTOMY  2003    OOPHORECTOMY Right 2012    OOPHORECTOMY Left 2015    TOTAL VAGINAL HYSTERECTOMY  2015    w/Right USO    WISDOM TOOTH EXTRACTION Bilateral      Social History     Socioeconomic History    Marital status:     Number of children: 2   Tobacco Use    Smoking status: Never     Passive exposure: Never    Smokeless tobacco: Never   Substance and Sexual Activity    Alcohol use: Yes     Alcohol/week: 5.0 - 6.0  "standard drinks     Types: 5 - 6 Glasses of wine per week     Comment: week    Drug use: No    Sexual activity: Yes     Partners: Male     Birth control/protection: Post-menopausal, See Surgical Hx     Comment:  since ,       Family History   Problem Relation Age of Onset    Thyroid disease Mother     Breast cancer Mother         diagnosed in her 60s; bilateral    Lung cancer Mother 71        former smoker    Skin cancer Father         type?    Bladder Cancer Father         diagnosed in his 60s    Kidney cancer Father 60    Diabetes Brother     Factor V Leiden deficiency Brother     Allergies Brother     Asthma Brother     Breast cancer Sister 46        inflammatory breast cancer, unilateral; myRisk genetic test negative    Other Sister         dermatothyocitis    Diabetes Maternal Aunt     Diabetes Paternal Uncle     Cancer Maternal Grandmother         bone marrow cancer in her 60s- multiple myeloma?    Throat cancer Paternal Grandfather         diagnosed in his 70s    Other Sister         pre-cancer? cervical?    Cancer Paternal Cousin         unknown origin    BRCA 1/2 Paternal Cousin         +BRCA2 mutation 886delGT (patient Mirian tested negative for this twice)     OB History          4    Para   2    Term   2            AB   2    Living   2         SAB   2    IAB        Ectopic        Multiple        Live Births   2           Obstetric Comments   Menarche age 14. LMP age 39 (postsurgical).  First child born age 30.  History of abnormal PAP smear: NO.  History of abnormal mammogram: NO.  History of sexually transmitted disease:  NO                     /80   Pulse 80   Ht 5' 10" (1.778 m)   Wt 79 kg (174 lb 3.2 oz)   LMP 2015 (Approximate)   BMI 25.00 kg/m²         GYN & OB History    Date of Last Pap: No result found    OB History    Para Term  AB Living   4 2 2   2 2   SAB IAB Ectopic Multiple Live Births   2       2      # Outcome Date GA " Lbr Elan/2nd Weight Sex Delivery Anes PTL Lv   4 SAB            3 SAB            2 Term      Vag-Spont   FERMÍN   1 Term      Vag-Spont   FERMÍN      Obstetric Comments   Menarche age 14. LMP age 39 (postsurgical).   First child born age 30.   History of abnormal PAP smear: NO.   History of abnormal mammogram: NO.   History of sexually transmitted disease:  NO             Review of Systems  Review of Systems   Constitutional:  Negative for activity change, appetite change, fatigue and unexpected weight change.   HENT: Negative.     Eyes:  Negative for visual disturbance.   Respiratory:  Negative for shortness of breath and wheezing.    Cardiovascular:  Negative for chest pain, palpitations and leg swelling.   Gastrointestinal:  Negative for abdominal pain, bloating and blood in stool.   Endocrine: Negative for diabetes and hair loss.   Genitourinary:  Positive for hot flashes and vaginal dryness. Negative for decreased libido and dyspareunia.   Musculoskeletal:  Negative for back pain and joint swelling.   Integumentary:  Negative for acne, hair changes and nipple discharge.   Neurological:  Negative for headaches.   Hematological:  Does not bruise/bleed easily.   Psychiatric/Behavioral:  Negative for depression and sleep disturbance. The patient is not nervous/anxious.    Breast: Negative for mastodynia and nipple discharge        Objective:      Physical Exam:   Constitutional: She is oriented to person, place, and time. She appears well-developed and well-nourished.    HENT:   Head: Normocephalic and atraumatic.    Eyes: Pupils are equal, round, and reactive to light. EOM are normal.     Cardiovascular:  Normal rate and regular rhythm.             Pulmonary/Chest: Effort normal and breath sounds normal.   BREASTS:  no mass, no tenderness, no deformity and no retraction. Right breast exhibits no inverted nipple, no mass, no nipple discharge, no skin change, no tenderness, no bleeding and no swelling. Left breast exhibits  no inverted nipple, no mass, no nipple discharge, no skin change, no tenderness, no bleeding and no swelling. Breasts are symmetrical.              Abdominal: Soft. Bowel sounds are normal.     Genitourinary:    Genitourinary Comments: PELVIC: Normal external female genitalia without lesions. Normal hair distribution. Adequate perineal body, normal urethral meatus. Vagina Dry and moderately rugated without lesions or discharge. No significant cystocele or rectocele. Bimanual exam shows uterus and cervix to be surgically absent. Adnexa without masses or tenderness.  RECTAL: Deferred                 Musculoskeletal: Normal range of motion and moves all extremeties.       Neurological: She is alert and oriented to person, place, and time.    Skin: Skin is warm and dry.    Psychiatric: She has a normal mood and affect.            Assessment:        1. Encounter for gynecological examination without abnormal finding    2. Menopausal syndrome    3. Screening for osteoporosis    4. Postmenopausal atrophic vaginitis                Plan:      Problem List Items Addressed This Visit    None  Visit Diagnoses       Encounter for gynecological examination without abnormal finding    -  Primary    Menopausal syndrome        Relevant Medications    estradioL (VIVELLE-DOT) 0.1 mg/24 hr PTSW (Start on 4/6/2023)    UNABLE TO FIND(Testosterone 1% 3 clicks (0.75g) daily    Screening for osteoporosis        Relevant Orders    DXA Bone Density Axial Skeleton 1 or more sites    Postmenopausal atrophic vaginitis        Relevant Medications    prasterone, dhea, (INTRAROSA) 6.5 mg Inst             Follow up in about 1 year (around 4/5/2024).

## 2023-04-12 ENCOUNTER — HOSPITAL ENCOUNTER (OUTPATIENT)
Dept: RADIOLOGY | Facility: HOSPITAL | Age: 48
Discharge: HOME | End: 2023-04-12
Attending: PHYSICIAN ASSISTANT
Payer: COMMERCIAL

## 2023-04-12 DIAGNOSIS — Z12.31 SCREENING MAMMOGRAM, ENCOUNTER FOR: ICD-10-CM

## 2023-04-12 PROCEDURE — 77067 MAMMO DIGITAL SCREENING BILAT WITH TOMO: ICD-10-PCS | Mod: 26,,, | Performed by: RADIOLOGY

## 2023-04-12 PROCEDURE — 77067 SCR MAMMO BI INCL CAD: CPT | Mod: 26,,, | Performed by: RADIOLOGY

## 2023-04-12 PROCEDURE — 77067 SCR MAMMO BI INCL CAD: CPT | Mod: TC

## 2023-04-12 PROCEDURE — 77063 BREAST TOMOSYNTHESIS BI: CPT | Mod: 26,,, | Performed by: RADIOLOGY

## 2023-04-12 PROCEDURE — 77063 MAMMO DIGITAL SCREENING BILAT WITH TOMO: ICD-10-PCS | Mod: 26,,, | Performed by: RADIOLOGY

## 2023-04-24 ENCOUNTER — HOSPITAL ENCOUNTER (OUTPATIENT)
Dept: RADIOLOGY | Facility: OTHER | Age: 48
Discharge: HOME | End: 2023-04-24
Attending: OBSTETRICS & GYNECOLOGY
Payer: COMMERCIAL

## 2023-04-24 DIAGNOSIS — Z13.820 SCREENING FOR OSTEOPOROSIS: ICD-10-CM

## 2023-04-24 PROCEDURE — 77080 DXA BONE DENSITY AXIAL: CPT | Mod: TC

## 2023-04-24 PROCEDURE — 77080 DXA BONE DENSITY AXIAL SKELETON 1 OR MORE SITES: ICD-10-PCS | Mod: 26,,, | Performed by: RADIOLOGY

## 2023-04-24 PROCEDURE — 77080 DXA BONE DENSITY AXIAL: CPT | Mod: 26,,, | Performed by: RADIOLOGY

## 2023-05-17 PROBLEM — D84.821 DRUG-INDUCED IMMUNODEFICIENCY: Status: ACTIVE | Noted: 2023-05-17

## 2023-05-17 PROBLEM — Z79.899 DRUG-INDUCED IMMUNODEFICIENCY: Status: ACTIVE | Noted: 2023-05-17

## 2023-05-17 PROBLEM — F32.0 CURRENT MILD EPISODE OF MAJOR DEPRESSIVE DISORDER, UNSPECIFIED WHETHER RECURRENT: Status: ACTIVE | Noted: 2023-05-17

## 2023-05-17 PROBLEM — M06.4 INFLAMMATORY POLYARTHROPATHY: Status: ACTIVE | Noted: 2023-05-17

## 2023-05-17 NOTE — PROGRESS NOTES
Subjective:       Patient ID: Mirian العلي is a 47 y.o. female.    Chief Complaint: Annual Exam     Pt presents today for annual. Labs pending but does get labs done with specialists.  Is taking OTC med for insomnia  Cscope scheduled      Rash  Pertinent negatives include no congestion, cough, diarrhea, eye pain, fatigue, fever, shortness of breath, sore throat or vomiting.   Review of Systems   Constitutional:  Negative for activity change, appetite change, chills, fatigue and fever.   HENT:  Negative for nasal congestion, ear pain, sinus pressure/congestion, sore throat and trouble swallowing.    Eyes:  Negative for photophobia, pain and visual disturbance.   Respiratory:  Negative for apnea, cough, chest tightness, shortness of breath and wheezing.    Cardiovascular:  Negative for chest pain, palpitations and leg swelling.   Gastrointestinal:  Negative for abdominal distention, abdominal pain, constipation, diarrhea, nausea and vomiting.   Genitourinary: Negative.    Musculoskeletal:  Negative for back pain, joint swelling and neck pain.   Integumentary:  Positive for rash.   Neurological:  Negative for dizziness, tremors, weakness, numbness and headaches.   Psychiatric/Behavioral:  Negative for behavioral problems, decreased concentration and sleep disturbance. The patient is not nervous/anxious.    All other systems reviewed and are negative.      Objective:      Physical Exam  Vitals reviewed.   Constitutional:       General: She is not in acute distress.     Appearance: Normal appearance. She is well-developed and normal weight. She is not ill-appearing, toxic-appearing or diaphoretic.   HENT:      Head: Normocephalic and atraumatic.      Right Ear: Tympanic membrane, ear canal and external ear normal. There is no impacted cerumen.      Left Ear: Tympanic membrane, ear canal and external ear normal. There is no impacted cerumen.      Nose: Nose normal.      Mouth/Throat:      Pharynx: No  oropharyngeal exudate or posterior oropharyngeal erythema.   Eyes:      Extraocular Movements: Extraocular movements intact.      Conjunctiva/sclera: Conjunctivae normal.      Pupils: Pupils are equal, round, and reactive to light.   Neck:      Thyroid: No thyromegaly.   Cardiovascular:      Rate and Rhythm: Normal rate and regular rhythm.      Pulses: Normal pulses.      Heart sounds: Normal heart sounds. No murmur heard.  Pulmonary:      Effort: Pulmonary effort is normal. No respiratory distress.      Breath sounds: Normal breath sounds. No stridor. No rhonchi or rales.   Chest:      Chest wall: No tenderness.   Abdominal:      General: Bowel sounds are normal. There is no distension.      Palpations: Abdomen is soft. There is no mass.      Tenderness: There is no abdominal tenderness. There is no guarding or rebound.   Musculoskeletal:         General: No tenderness. Normal range of motion.      Cervical back: Normal range of motion and neck supple.      Right lower leg: No edema.      Left lower leg: No edema.   Lymphadenopathy:      Cervical: No cervical adenopathy.   Skin:     General: Skin is warm and dry.      Findings: No erythema.   Neurological:      General: No focal deficit present.      Mental Status: She is alert and oriented to person, place, and time. Mental status is at baseline.      Cranial Nerves: No cranial nerve deficit.      Sensory: No sensory deficit.      Motor: No weakness.      Coordination: Coordination normal.      Gait: Gait normal.      Deep Tendon Reflexes: Reflexes are normal and symmetric. Reflexes normal.   Psychiatric:         Mood and Affect: Mood normal.         Behavior: Behavior normal.         Thought Content: Thought content normal.         Judgment: Judgment normal.       Assessment:       Problem List Items Addressed This Visit       Current mild episode of major depressive disorder, unspecified whether recurrent    Drug-induced immunodeficiency    Factor V Leiden  carrier    Inflammatory polyarthropathy     Other Visit Diagnoses       Annual physical exam    -  Primary    Relevant Orders    CBC Auto Differential (Completed)    Comprehensive Metabolic Panel (Completed)    Lipid Panel (Completed)    Hemoglobin A1C (Completed)    TSH (Completed)    Vitamin D (Completed)            Plan:       Labs pending  Annual PE wnl  MDD stable on med  Factor V leiden followed by heme  Infl polyarthropathy: followed by rheum  Drug induced immunodef followed by heme and rheum  RTC prn symptoms

## 2023-06-15 ENCOUNTER — OFFICE VISIT (OUTPATIENT)
Dept: PAIN MEDICINE | Facility: CLINIC | Age: 48
End: 2023-06-15
Attending: ANESTHESIOLOGY
Payer: COMMERCIAL

## 2023-06-15 VITALS
DIASTOLIC BLOOD PRESSURE: 73 MMHG | WEIGHT: 179.25 LBS | BODY MASS INDEX: 25.66 KG/M2 | SYSTOLIC BLOOD PRESSURE: 107 MMHG | HEART RATE: 92 BPM | HEIGHT: 70 IN

## 2023-06-15 DIAGNOSIS — M62.838 MUSCLE SPASM: ICD-10-CM

## 2023-06-15 DIAGNOSIS — G24.3 ISOLATED CERVICAL DYSTONIA: Primary | ICD-10-CM

## 2023-06-15 PROCEDURE — 99999 PR PBB SHADOW E&M-EST. PATIENT-LVL IV: CPT | Mod: PBBFAC,,, | Performed by: ANESTHESIOLOGY

## 2023-06-15 PROCEDURE — 95874 PR NEEDLE EMG GUIDANCE FOR CHEMODENERVATION: ICD-10-PCS | Mod: S$GLB,,, | Performed by: ANESTHESIOLOGY

## 2023-06-15 PROCEDURE — 64616 CHEMODENERV MUSC NECK DYSTON: CPT | Mod: RT,S$GLB,, | Performed by: ANESTHESIOLOGY

## 2023-06-15 PROCEDURE — 99999 PR PBB SHADOW E&M-EST. PATIENT-LVL IV: ICD-10-PCS | Mod: PBBFAC,,, | Performed by: ANESTHESIOLOGY

## 2023-06-15 PROCEDURE — 99499 NO LOS: ICD-10-PCS | Mod: S$GLB,,, | Performed by: ANESTHESIOLOGY

## 2023-06-15 PROCEDURE — 95874 GUIDE NERV DESTR NEEDLE EMG: CPT | Mod: S$GLB,,, | Performed by: ANESTHESIOLOGY

## 2023-06-15 PROCEDURE — 99499 UNLISTED E&M SERVICE: CPT | Mod: S$GLB,,, | Performed by: ANESTHESIOLOGY

## 2023-06-15 PROCEDURE — 64616 PR CHEMODENERVATION NECK MUSCLES EXC LARNYNX, UNI: ICD-10-PCS | Mod: RT,S$GLB,, | Performed by: ANESTHESIOLOGY

## 2023-06-15 NOTE — PROGRESS NOTES
Patient Name: Mirian العلي  MRN: [id number]    Patient presents for repeat Botox injection. No new symptoms, no side effects reported from last injection.       INFORMED CONSENT: The procedure, risks, benefits and options were discussed with patient. There are no contraindications to the procedure. The patient expressed understanding and agreed to proceed. The personnel performing the procedure was discussed. I verify that I personally obtained Mirian's consent prior to the start of the procedure and the signed consent can be found on the patient's chart.  Shoulder improved with HEP    Procedure Date: 06/15/2023     Anesthesia: Topical     Sedation: None     Pre-operative diagnosis:   1. Isolated cervical dystonia  onabotulinumtoxina injection 400 Units    Prior authorization Order      2. Muscle spasm  onabotulinumtoxina injection 400 Units    Prior authorization Order              We discussed with the patient the assessment and recommendations. The following is the plan we agreed on:     1.  Procedure:  Under clean technique, EMG guidance and after discussing with the patient we used 400 units Botox.  No units wasted.  We injected Right splenius capitis, Right longismus,  Right upper trapezius, Right levator scapula, and right upper pectoral muscles.  Patient tolerated procedure well.     2.  Return as needed.  Otherwise follow-up in 3 months to repeat injection with 400 units Botox.     I performed a history, review of systems, and physical exam with the patient. The assessment and plan were discussed and agreed upon with Dr. Mcclendon, the attending of record, before sharing with the patient. The face to face encounter time, including answering all patient questions, was 20 minutes.     Leonidas Stone M.D.  PGY-3  [employer] [facility]    I have personally taken the history and examined this patient and agree with the resident's note as stated above.

## 2023-06-20 ENCOUNTER — SPECIALTY PHARMACY (OUTPATIENT)
Dept: PHARMACY | Facility: CLINIC | Age: 48
End: 2023-06-20
Payer: COMMERCIAL

## 2023-06-20 NOTE — TELEPHONE ENCOUNTER
Outgoing call to pt regarding Odactra. Pt was not sure how many she had left on hand, she was not home. She requested a call tomorrow (6/21) to get count & set up refill if less than 10 days. Will follow up.

## 2023-06-21 DIAGNOSIS — Z91.09 ALLERGY TO AMERICAN HOUSE DUST MITE: ICD-10-CM

## 2023-06-21 DIAGNOSIS — J30.9 ALLERGIC RHINITIS, UNSPECIFIED SEASONALITY, UNSPECIFIED TRIGGER: ICD-10-CM

## 2023-06-21 RX ORDER — DERMATOPHAGOIDES PTERONYSSINUS AND DERMATOPHAGOIDES FARINAE 6; 6 [ARB'U]/1; [ARB'U]/1
1 TABLET SUBLINGUAL NIGHTLY
Qty: 90 TABLET | Refills: 3 | Status: ACTIVE | OUTPATIENT
Start: 2023-06-21

## 2023-06-21 NOTE — TELEPHONE ENCOUNTER
Outgoing call regarding pt odactra  fills. Pt stated that she has 11 on hand and would like a 90 day supply. The current prescription only has enough for 60 day supply. Refill request sent to [facility]. Will follow back up.

## 2023-06-23 ENCOUNTER — PATIENT MESSAGE (OUTPATIENT)
Dept: PHARMACY | Facility: CLINIC | Age: 48
End: 2023-06-23
Payer: COMMERCIAL

## 2023-06-23 NOTE — TELEPHONE ENCOUNTER
Specialty Pharmacy - Refill Coordination    Specialty Medication Orders Linked to Encounter      Flowsheet Row Most Recent Value   Medication #1 allerg xt,D.farinae-D.pteronys (ODACTRA) 12 SQ-HDM Subl (Order#[id number], Rx#[id number])            Refill Questions - Documented Responses      Flowsheet Row Most Recent Value   Patient Availability and HIPAA Verification    Does patient want to proceed with activity? Yes   HIPAA/medical authority confirmed? Yes   Relationship to patient of person spoken to? Self   Refill Screening Questions    Changes to allergies? No   Changes to medications? No   New conditions since last clinic visit? No   Unplanned office visit, urgent care, ED, or hospital admission in the last 4 weeks? No   How does patient/caregiver feel medication is working? Good   Financial problems or insurance changes? No   How many doses of your specialty medications were missed in the last 4 weeks? 0   Would patient like to speak to a pharmacist? No   When does the patient need to receive the medication? 07/02/23   Refill Delivery Questions    How will the patient receive the medication? [facility]   When does the patient need to receive the medication? 07/02/23   Shipping Address Home   Address in [facility] confirmed and updated if neccessary? Yes   Expected Copay ($) 75   Is the patient able to afford the medication copay? Yes   Payment Method CC on file   Days supply of Refill 90   Supplies needed? No supplies needed   Refill activity completed? Yes   Refill activity plan Refill scheduled   Shipment/Pickup Date: 06/28/23            Current Outpatient Medications   Medication Sig    allerg xt,D.farinae-D.pteronys (ODACTRA) 12 SQ-HDM Subl Place 1 tablet under the tongue every evening.    ALPRAZolam (XANAX) 1 MG tablet Take 1 tablet (1 mg total) by mouth nightly as needed for Insomnia or Anxiety.    aspirin (ECOTRIN) 81 MG EC tablet Take 81 mg by mouth once daily.    azelastine (ASTELIN) 137 mcg (0.1 %)  nasal spray 1-2 sprays each nostril twice daily    buPROPion (WELLBUTRIN XL) 150 MG TB24 tablet Take 1 tablet (150 mg total) by mouth once daily. With bupropion 300mg for a total daily dose of 450mg    buPROPion (WELLBUTRIN XL) 300 MG 24 hr tablet TAKE 1 TABLET BY MOUTH EVERY DAY. TAKE WITH 150MG TABLETS    cetirizine (ZYRTEC) 10 MG tablet Take 1 tablet (10 mg total) by mouth once daily.    cyanocobalamin, vitamin B-12, 5,000 mcg Subl Place under the tongue once a week.    diphenhydrAMINE (BENADRYL) 50 MG capsule Take 50 mg by mouth every 6 (six) hours as needed for Itching.    diphenhydrAMINE (SOMINEX) 25 mg tablet Take 25 mg by mouth nightly as needed for Insomnia.    EPINEPHrine (EPIPEN 2-AVILA) 0.3 mg/0.3 mL AtIn Inject 0.3 mLs (0.3 mg total) into the muscle as needed (Anaphylaxis).    estradioL (VIVELLE-DOT) 0.1 mg/24 hr PTSW Place 1 patch onto the skin twice a week.    fluticasone (FLONASE) 50 mcg/actuation nasal spray PLACE 2 SPRAYS IN EACH NOSTRIL ONCE DAILY    hydrOXYchloroQUINE (PLAQUENIL) 200 mg tablet TK 2 TS PO QD    prasterone, dhea, (INTRAROSA) 6.5 mg Inst Place 6.5 mg vaginally every evening.    tiZANidine (ZANAFLEX) 4 MG tablet TAKE 1 TABLET(4 MG) BY MOUTH TWICE DAILY AS NEEDED    UNABLE TO FIND medication name: tumeric 200 mg twice a day    UNABLE TO FIND Apply 0.75 g topically every evening. Testosterone 1%    vitamin D (VITAMIN D3) 1000 units Tab Take 2,000 Units by mouth 2 (two) times daily.   Last reviewed on 6/15/2023  3:49 PM by Fatemeh Mcclendon MD    Review of patient's allergies indicates:   Allergen Reactions    Niacin preparations      Patient states she has flushing    Sulfa (sulfonamide antibiotics)      Tongue swelling    Iodine and iodide containing products Rash    Last reviewed on  6/15/2023 3:49 PM by Fatemeh Mcclendon      Tasks added this encounter   No tasks added.   Tasks due within next 3 months   No tasks due.     Melia Jorgensen, PharmD  [facility]  [address]  [facility]  [facility] [address]  Phone: [phone]  Fax: [phone]

## 2023-07-06 RX ORDER — BUPROPION HYDROCHLORIDE 150 MG/1
TABLET ORAL
Qty: 90 TABLET | Refills: 3 | Status: SHIPPED | OUTPATIENT
Start: 2023-07-06

## 2023-07-06 NOTE — TELEPHONE ENCOUNTER
No care due was identified.  [facility] Embedded Care Due Messages. Reference number: [id number].   7/05/2023 10:30:59 PM CDT

## 2023-07-06 NOTE — TELEPHONE ENCOUNTER
Refill Decision Note      Refill Decision Note   Mirian العلي  is requesting a refill authorization.  Brief Assessment and Rationale for Refill:  Approve     Medication Therapy Plan:         Pharmacist review requested: Yes   Extended chart review required: Yes   Comments:     Note composed:10:20 AM 07/06/2023             Appointments     Last Visit   4/4/2023 Nora Brooks MD   Next Visit   Visit date not found Nora Brooks MD

## 2023-07-06 NOTE — TELEPHONE ENCOUNTER
Refill Routing Note   Medication(s) are not appropriate for processing by [facility] for the following reason(s):      Drug-drug interaction    [facility] action(s):  Defer None identified   Medication Therapy Plan: buPROPion    Pharmacist review requested: Yes     Appointments  past 12m or future 3m with PCP    Date Provider   Last Visit   4/4/2023 Nora Brooks MD   Next Visit   Visit date not found Nora Brooks MD   ED visits in past 90 days: 0        Note composed:12:03 AM 07/06/2023

## 2023-07-25 ENCOUNTER — PATIENT MESSAGE (OUTPATIENT)
Dept: ALLERGY | Facility: CLINIC | Age: 48
End: 2023-07-25
Payer: COMMERCIAL

## 2023-08-01 ENCOUNTER — CLINICAL SUPPORT (OUTPATIENT)
Dept: ENDOSCOPY | Facility: HOSPITAL | Age: 48
End: 2023-08-01
Payer: COMMERCIAL

## 2023-08-01 ENCOUNTER — PATIENT MESSAGE (OUTPATIENT)
Dept: ENDOSCOPY | Facility: HOSPITAL | Age: 48
End: 2023-08-01

## 2023-08-01 ENCOUNTER — TELEPHONE (OUTPATIENT)
Dept: ENDOSCOPY | Facility: HOSPITAL | Age: 48
End: 2023-08-01

## 2023-08-01 VITALS — HEIGHT: 70 IN | WEIGHT: 179 LBS | BODY MASS INDEX: 25.62 KG/M2

## 2023-08-01 DIAGNOSIS — Z13.9 SCREENING PROCEDURE: ICD-10-CM

## 2023-08-01 RX ORDER — SODIUM, POTASSIUM,MAG SULFATES 17.5-3.13G
1 SOLUTION, RECONSTITUTED, ORAL ORAL DAILY
Qty: 1 KIT | Refills: 0 | Status: SHIPPED | OUTPATIENT
Start: 2023-08-01 | End: 2023-08-03

## 2023-08-01 NOTE — TELEPHONE ENCOUNTER
Spoke to patient to schedule procedure(s) Colonoscopy       Physician to perform procedure(s) Dr. BERNIE Pham  Date of Procedure (s) 10/26/23  Arrival Time 6:30 AM  Time of Procedure(s) 7:30 AM   Location of Procedure(s) [place] 4th Floor  Type of Rx Prep sent to patient: Suprep  Instructions provided to patient via [facility]    Patient was informed on the following information and verbalized understanding. Screening questionnaire reviewed with patient and complete. If procedure requires anesthesia, a responsible adult needs to be present to accompany the patient home, patient cannot drive after receiving anesthesia. Appointment details are tentative, especially check-in time. Patient will receive a prep-op call 4 days prior to confirm check-in time for procedure. If applicable the patient should contact their pharmacy to verify Rx for procedure prep is ready for pick-up. Patient was advised to call the scheduling department at [phone] if pharmacy states no Rx is available. Patient was advised to call the endoscopy scheduling department if any questions or concerns arise.      [facility] Scheduling Department

## 2023-08-16 ENCOUNTER — PATIENT MESSAGE (OUTPATIENT)
Dept: OBSTETRICS AND GYNECOLOGY | Facility: CLINIC | Age: 48
End: 2023-08-16
Payer: COMMERCIAL

## 2023-08-16 DIAGNOSIS — Z12.39 BREAST CANCER SCREENING, HIGH RISK PATIENT: Primary | ICD-10-CM

## 2023-08-17 RX ORDER — DIAZEPAM 5 MG/1
TABLET ORAL
Qty: 2 TABLET | Refills: 0 | Status: SHIPPED | OUTPATIENT
Start: 2023-08-17 | End: 2023-08-30 | Stop reason: SDUPTHER

## 2023-08-28 ENCOUNTER — HOSPITAL ENCOUNTER (OUTPATIENT)
Dept: RADIOLOGY | Facility: OTHER | Age: 48
Discharge: HOME | End: 2023-08-28
Attending: PHYSICIAN ASSISTANT
Payer: COMMERCIAL

## 2023-08-28 DIAGNOSIS — Z12.39 BREAST CANCER SCREENING, HIGH RISK PATIENT: ICD-10-CM

## 2023-08-28 DIAGNOSIS — Z80.3 FAMILY HISTORY OF BREAST CANCER: ICD-10-CM

## 2023-08-28 DIAGNOSIS — Z91.89 INCREASED RISK OF BREAST CANCER: ICD-10-CM

## 2023-08-28 PROCEDURE — 77049 MRI BREAST C-+ W/CAD BI: CPT | Mod: 26,,, | Performed by: RADIOLOGY

## 2023-08-28 PROCEDURE — 77049 MRI BREAST C-+ W/CAD BI: CPT | Mod: TC

## 2023-08-28 PROCEDURE — A9577 INJ MULTIHANCE: HCPCS | Performed by: PHYSICIAN ASSISTANT

## 2023-08-28 PROCEDURE — 25500020 PHARM REV CODE 255: Performed by: PHYSICIAN ASSISTANT

## 2023-08-28 PROCEDURE — 77049 MRI BREAST W/WO CONTRAST, W/CAD, BILATERAL: ICD-10-PCS | Mod: 26,,, | Performed by: RADIOLOGY

## 2023-08-28 RX ADMIN — GADOBENATE DIMEGLUMINE 16 ML: 529 INJECTION, SOLUTION INTRAVENOUS at 03:08

## 2023-08-29 ENCOUNTER — PATIENT MESSAGE (OUTPATIENT)
Dept: OBSTETRICS AND GYNECOLOGY | Facility: CLINIC | Age: 48
End: 2023-08-29
Payer: COMMERCIAL

## 2023-08-29 DIAGNOSIS — Z12.39 BREAST CANCER SCREENING, HIGH RISK PATIENT: ICD-10-CM

## 2023-08-30 ENCOUNTER — TELEPHONE (OUTPATIENT)
Dept: RADIOLOGY | Facility: HOSPITAL | Age: 48
End: 2023-08-30
Payer: COMMERCIAL

## 2023-08-30 DIAGNOSIS — M79.18 MYOFASCIAL PAIN: ICD-10-CM

## 2023-08-30 RX ORDER — TIZANIDINE 4 MG/1
TABLET ORAL
Qty: 60 TABLET | Refills: 1 | Status: SHIPPED | OUTPATIENT
Start: 2023-08-30 | End: 2023-12-21 | Stop reason: SDUPTHER

## 2023-08-30 RX ORDER — DIAZEPAM 5 MG/1
TABLET ORAL
Qty: 2 TABLET | Refills: 0 | Status: SHIPPED | OUTPATIENT
Start: 2023-08-30 | End: 2023-10-04 | Stop reason: ALTCHOICE

## 2023-08-30 NOTE — TELEPHONE ENCOUNTER
Spoke with patient. Reviewed MRI breast biopsy procedure and reviewed instructions for MRI breast biopsy. Patient expressed understanding and all questions were answered. Provided patient with my phone number to call for any further concerns or questions.   Patient scheduled MRI breast biopsy for 9/11/2023.

## 2023-09-01 ENCOUNTER — DOCUMENTATION ONLY (OUTPATIENT)
Dept: ALLERGY | Facility: CLINIC | Age: 48
End: 2023-09-01
Payer: COMMERCIAL

## 2023-09-11 ENCOUNTER — HOSPITAL ENCOUNTER (OUTPATIENT)
Dept: RADIOLOGY | Facility: HOSPITAL | Age: 48
Discharge: HOME | End: 2023-09-11
Attending: PHYSICIAN ASSISTANT
Payer: COMMERCIAL

## 2023-09-11 DIAGNOSIS — R92.8 ABNORMAL FINDING ON BREAST IMAGING: ICD-10-CM

## 2023-09-11 PROCEDURE — 27200939 MRI BREAST BIOPSY WITH IMAGING 1ST SITE

## 2023-09-11 PROCEDURE — 25500020 PHARM REV CODE 255: Performed by: PHYSICIAN ASSISTANT

## 2023-09-11 PROCEDURE — 77065 DX MAMMO INCL CAD UNI: CPT | Mod: 26,RT,, | Performed by: RADIOLOGY

## 2023-09-11 PROCEDURE — 77065 MAMMO DIGITAL DIAGNOSTIC RIGHT: ICD-10-PCS | Mod: 26,RT,, | Performed by: RADIOLOGY

## 2023-09-11 PROCEDURE — A4648 IMPLANTABLE TISSUE MARKER: HCPCS

## 2023-09-11 PROCEDURE — 88342 IMHCHEM/IMCYTCHM 1ST ANTB: CPT | Mod: 26,,, | Performed by: PATHOLOGY

## 2023-09-11 PROCEDURE — 88342 IMHCHEM/IMCYTCHM 1ST ANTB: CPT | Performed by: PATHOLOGY

## 2023-09-11 PROCEDURE — 19085 BX BREAST 1ST LESION MR IMAG: CPT | Mod: RT,,, | Performed by: RADIOLOGY

## 2023-09-11 PROCEDURE — 88305 TISSUE EXAM BY PATHOLOGIST: CPT | Performed by: PATHOLOGY

## 2023-09-11 PROCEDURE — A9577 INJ MULTIHANCE: HCPCS | Performed by: PHYSICIAN ASSISTANT

## 2023-09-11 PROCEDURE — 88305 TISSUE EXAM BY PATHOLOGIST: ICD-10-PCS | Mod: 26,,, | Performed by: PATHOLOGY

## 2023-09-11 PROCEDURE — 77065 DX MAMMO INCL CAD UNI: CPT | Mod: TC,RT

## 2023-09-11 PROCEDURE — 88342 CHG IMMUNOCYTOCHEMISTRY: ICD-10-PCS | Mod: 26,,, | Performed by: PATHOLOGY

## 2023-09-11 PROCEDURE — 25000003 PHARM REV CODE 250: Performed by: PHYSICIAN ASSISTANT

## 2023-09-11 PROCEDURE — 88305 TISSUE EXAM BY PATHOLOGIST: CPT | Mod: 26,,, | Performed by: PATHOLOGY

## 2023-09-11 PROCEDURE — 19085 MRI BREAST BIOPSY WITH IMAGING 1ST SITE: ICD-10-PCS | Mod: RT,,, | Performed by: RADIOLOGY

## 2023-09-11 RX ORDER — LIDOCAINE HYDROCHLORIDE 10 MG/ML
3 INJECTION INFILTRATION; PERINEURAL ONCE
Status: COMPLETED | OUTPATIENT
Start: 2023-09-11 | End: 2023-09-11

## 2023-09-11 RX ORDER — LIDOCAINE HYDROCHLORIDE AND EPINEPHRINE 20; 10 MG/ML; UG/ML
20 INJECTION, SOLUTION INFILTRATION; PERINEURAL ONCE
Status: COMPLETED | OUTPATIENT
Start: 2023-09-11 | End: 2023-09-11

## 2023-09-11 RX ADMIN — LIDOCAINE HYDROCHLORIDE 3 ML: 10 INJECTION, SOLUTION INFILTRATION; PERINEURAL at 08:09

## 2023-09-11 RX ADMIN — LIDOCAINE HYDROCHLORIDE,EPINEPHRINE BITARTRATE 20 ML: 20; .01 INJECTION, SOLUTION INFILTRATION; PERINEURAL at 08:09

## 2023-09-11 RX ADMIN — GADOBENATE DIMEGLUMINE 17 ML: 529 INJECTION, SOLUTION INTRAVENOUS at 09:09

## 2023-09-13 LAB
FINAL PATHOLOGIC DIAGNOSIS: NORMAL
GROSS: NORMAL
Lab: NORMAL

## 2023-09-14 ENCOUNTER — TELEPHONE (OUTPATIENT)
Dept: SURGERY | Facility: CLINIC | Age: 48
End: 2023-09-14
Payer: COMMERCIAL

## 2023-09-14 NOTE — TELEPHONE ENCOUNTER
Patient called with results of breast biopsy from 9/11/2023,   no atypia/benign, all questions answered, pt advised to follow up in 6 months with repeat imaging per core biopsy protocol.  reviewed biopsy results and results are benign and concordant. Patient verbalized understanding.     ----- Message from Angela Hall MD sent at 9/13/2023  3:29 PM CDT -----  Concordant and benign.

## 2023-09-19 ENCOUNTER — PATIENT MESSAGE (OUTPATIENT)
Dept: PHARMACY | Facility: CLINIC | Age: 48
End: 2023-09-19
Payer: COMMERCIAL

## 2023-09-19 NOTE — TELEPHONE ENCOUNTER
Message done through [facility].    Ranjith Bernardo RN  09/19/23 [id number]

## 2023-09-21 ENCOUNTER — OFFICE VISIT (OUTPATIENT)
Dept: PAIN MEDICINE | Facility: CLINIC | Age: 48
End: 2023-09-21
Attending: ANESTHESIOLOGY
Payer: COMMERCIAL

## 2023-09-21 ENCOUNTER — TELEPHONE (OUTPATIENT)
Dept: PAIN MEDICINE | Facility: CLINIC | Age: 48
End: 2023-09-21

## 2023-09-21 VITALS
DIASTOLIC BLOOD PRESSURE: 81 MMHG | WEIGHT: 177.5 LBS | TEMPERATURE: 98 F | HEIGHT: 70 IN | BODY MASS INDEX: 25.41 KG/M2 | OXYGEN SATURATION: 100 % | SYSTOLIC BLOOD PRESSURE: 107 MMHG | RESPIRATION RATE: 18 BRPM | HEART RATE: 85 BPM

## 2023-09-21 DIAGNOSIS — Z12.11 COLON CANCER SCREENING: Primary | ICD-10-CM

## 2023-09-21 DIAGNOSIS — G24.3 ISOLATED CERVICAL DYSTONIA: Primary | ICD-10-CM

## 2023-09-21 DIAGNOSIS — M62.838 MUSCLE SPASM: ICD-10-CM

## 2023-09-21 PROCEDURE — 3074F SYST BP LT 130 MM HG: CPT | Mod: CPTII,S$GLB,, | Performed by: ANESTHESIOLOGY

## 2023-09-21 PROCEDURE — 3074F PR MOST RECENT SYSTOLIC BLOOD PRESSURE < 130 MM HG: ICD-10-PCS | Mod: CPTII,S$GLB,, | Performed by: ANESTHESIOLOGY

## 2023-09-21 PROCEDURE — 3008F BODY MASS INDEX DOCD: CPT | Mod: CPTII,S$GLB,, | Performed by: ANESTHESIOLOGY

## 2023-09-21 PROCEDURE — 3044F HG A1C LEVEL LT 7.0%: CPT | Mod: CPTII,S$GLB,, | Performed by: ANESTHESIOLOGY

## 2023-09-21 PROCEDURE — 1159F MED LIST DOCD IN RCRD: CPT | Mod: CPTII,S$GLB,, | Performed by: ANESTHESIOLOGY

## 2023-09-21 PROCEDURE — 99999 PR PBB SHADOW E&M-EST. PATIENT-LVL V: CPT | Mod: PBBFAC,,, | Performed by: ANESTHESIOLOGY

## 2023-09-21 PROCEDURE — 3079F PR MOST RECENT DIASTOLIC BLOOD PRESSURE 80-89 MM HG: ICD-10-PCS | Mod: CPTII,S$GLB,, | Performed by: ANESTHESIOLOGY

## 2023-09-21 PROCEDURE — 99212 PR OFFICE/OUTPT VISIT, EST, LEVL II, 10-19 MIN: ICD-10-PCS | Mod: 25,S$GLB,, | Performed by: ANESTHESIOLOGY

## 2023-09-21 PROCEDURE — 3008F PR BODY MASS INDEX (BMI) DOCUMENTED: ICD-10-PCS | Mod: CPTII,S$GLB,, | Performed by: ANESTHESIOLOGY

## 2023-09-21 PROCEDURE — 99999 PR PBB SHADOW E&M-EST. PATIENT-LVL V: ICD-10-PCS | Mod: PBBFAC,,, | Performed by: ANESTHESIOLOGY

## 2023-09-21 PROCEDURE — 99212 OFFICE O/P EST SF 10 MIN: CPT | Mod: 25,S$GLB,, | Performed by: ANESTHESIOLOGY

## 2023-09-21 PROCEDURE — 3079F DIAST BP 80-89 MM HG: CPT | Mod: CPTII,S$GLB,, | Performed by: ANESTHESIOLOGY

## 2023-09-21 PROCEDURE — 3044F PR MOST RECENT HEMOGLOBIN A1C LEVEL <7.0%: ICD-10-PCS | Mod: CPTII,S$GLB,, | Performed by: ANESTHESIOLOGY

## 2023-09-21 PROCEDURE — 1159F PR MEDICATION LIST DOCUMENTED IN MEDICAL RECORD: ICD-10-PCS | Mod: CPTII,S$GLB,, | Performed by: ANESTHESIOLOGY

## 2023-09-21 PROCEDURE — 1160F PR REVIEW ALL MEDS BY PRESCRIBER/CLIN PHARMACIST DOCUMENTED: ICD-10-PCS | Mod: CPTII,S$GLB,, | Performed by: ANESTHESIOLOGY

## 2023-09-21 PROCEDURE — 1160F RVW MEDS BY RX/DR IN RCRD: CPT | Mod: CPTII,S$GLB,, | Performed by: ANESTHESIOLOGY

## 2023-09-21 RX ORDER — AZELASTINE 1 MG/ML
SPRAY, METERED NASAL
Qty: 30 ML | Refills: 11 | Status: SHIPPED | OUTPATIENT
Start: 2023-09-21

## 2023-09-21 NOTE — PROGRESS NOTES
Patient Name: Mirian العلي  MRN: [id number]    Patient presents for repeat Botox injection. No new symptoms, no side effects reported from last injection.       INFORMED CONSENT: The procedure, risks, benefits and options were discussed with patient. There are no contraindications to the procedure. The patient expressed understanding and agreed to proceed. The personnel performing the procedure was discussed. I verify that I personally obtained Mirian's consent prior to the start of the procedure and the signed consent can be found on the patient's chart.  Shoulder improved with HEP    Procedure Date: 09/21/2023     Anesthesia: Topical     Sedation: None     Pre-operative diagnosis:   1. Isolated cervical dystonia  onabotulinumtoxina injection 400 Units    Prior authorization Order      2. Muscle spasm  onabotulinumtoxina injection 400 Units    Prior authorization Order                We discussed with the patient the assessment and recommendations. The following is the plan we agreed on:     1.  Procedure:  Under clean technique, EMG guidance and after discussing with the patient we used 400 units Botox.  No units wasted.  We injected Right splenius capitis, Right longismus,  Right upper trapezius, Right levator scapula, and right upper pectoral muscles.  Patient tolerated procedure well.     2.  Return as needed.  Otherwise follow-up in 3 months to repeat injection with 400 units Botox.     I performed a history, review of systems, and physical exam with the patient. The assessment and plan were discussed and agreed upon with Dr. Mcclendon, the attending of record, before sharing with the patient. The face to face encounter time, including answering all patient questions, was 20 minutes.

## 2023-09-22 ENCOUNTER — OFFICE VISIT (OUTPATIENT)
Dept: ALLERGY | Facility: CLINIC | Age: 48
End: 2023-09-22
Payer: COMMERCIAL

## 2023-09-22 VITALS
OXYGEN SATURATION: 100 % | HEART RATE: 79 BPM | WEIGHT: 179.44 LBS | BODY MASS INDEX: 25.75 KG/M2 | SYSTOLIC BLOOD PRESSURE: 123 MMHG | DIASTOLIC BLOOD PRESSURE: 85 MMHG

## 2023-09-22 DIAGNOSIS — Z51.6 ALLERGY DESENSITIZATION THERAPY: ICD-10-CM

## 2023-09-22 DIAGNOSIS — J30.9 ALLERGIC RHINITIS, UNSPECIFIED SEASONALITY, UNSPECIFIED TRIGGER: Primary | ICD-10-CM

## 2023-09-22 DIAGNOSIS — R12 HEARTBURN: ICD-10-CM

## 2023-09-22 DIAGNOSIS — Z91.09 ALLERGY TO AMERICAN HOUSE DUST MITE: ICD-10-CM

## 2023-09-22 DIAGNOSIS — L29.9 GENERALIZED PRURITUS: ICD-10-CM

## 2023-09-22 PROCEDURE — 3074F SYST BP LT 130 MM HG: CPT | Mod: CPTII,S$GLB,, | Performed by: STUDENT IN AN ORGANIZED HEALTH CARE EDUCATION/TRAINING PROGRAM

## 2023-09-22 PROCEDURE — 3008F PR BODY MASS INDEX (BMI) DOCUMENTED: ICD-10-PCS | Mod: CPTII,S$GLB,, | Performed by: STUDENT IN AN ORGANIZED HEALTH CARE EDUCATION/TRAINING PROGRAM

## 2023-09-22 PROCEDURE — 1160F PR REVIEW ALL MEDS BY PRESCRIBER/CLIN PHARMACIST DOCUMENTED: ICD-10-PCS | Mod: CPTII,S$GLB,, | Performed by: STUDENT IN AN ORGANIZED HEALTH CARE EDUCATION/TRAINING PROGRAM

## 2023-09-22 PROCEDURE — 99214 PR OFFICE/OUTPT VISIT, EST, LEVL IV, 30-39 MIN: ICD-10-PCS | Mod: S$GLB,,, | Performed by: STUDENT IN AN ORGANIZED HEALTH CARE EDUCATION/TRAINING PROGRAM

## 2023-09-22 PROCEDURE — 99999 PR PBB SHADOW E&M-EST. PATIENT-LVL III: ICD-10-PCS | Mod: PBBFAC,,, | Performed by: STUDENT IN AN ORGANIZED HEALTH CARE EDUCATION/TRAINING PROGRAM

## 2023-09-22 PROCEDURE — 3008F BODY MASS INDEX DOCD: CPT | Mod: CPTII,S$GLB,, | Performed by: STUDENT IN AN ORGANIZED HEALTH CARE EDUCATION/TRAINING PROGRAM

## 2023-09-22 PROCEDURE — 1160F RVW MEDS BY RX/DR IN RCRD: CPT | Mod: CPTII,S$GLB,, | Performed by: STUDENT IN AN ORGANIZED HEALTH CARE EDUCATION/TRAINING PROGRAM

## 2023-09-22 PROCEDURE — 99999 PR PBB SHADOW E&M-EST. PATIENT-LVL III: CPT | Mod: PBBFAC,,, | Performed by: STUDENT IN AN ORGANIZED HEALTH CARE EDUCATION/TRAINING PROGRAM

## 2023-09-22 PROCEDURE — 3079F DIAST BP 80-89 MM HG: CPT | Mod: CPTII,S$GLB,, | Performed by: STUDENT IN AN ORGANIZED HEALTH CARE EDUCATION/TRAINING PROGRAM

## 2023-09-22 PROCEDURE — 1159F MED LIST DOCD IN RCRD: CPT | Mod: CPTII,S$GLB,, | Performed by: STUDENT IN AN ORGANIZED HEALTH CARE EDUCATION/TRAINING PROGRAM

## 2023-09-22 PROCEDURE — 1159F PR MEDICATION LIST DOCUMENTED IN MEDICAL RECORD: ICD-10-PCS | Mod: CPTII,S$GLB,, | Performed by: STUDENT IN AN ORGANIZED HEALTH CARE EDUCATION/TRAINING PROGRAM

## 2023-09-22 PROCEDURE — 3044F PR MOST RECENT HEMOGLOBIN A1C LEVEL <7.0%: ICD-10-PCS | Mod: CPTII,S$GLB,, | Performed by: STUDENT IN AN ORGANIZED HEALTH CARE EDUCATION/TRAINING PROGRAM

## 2023-09-22 PROCEDURE — 3044F HG A1C LEVEL LT 7.0%: CPT | Mod: CPTII,S$GLB,, | Performed by: STUDENT IN AN ORGANIZED HEALTH CARE EDUCATION/TRAINING PROGRAM

## 2023-09-22 PROCEDURE — 3074F PR MOST RECENT SYSTOLIC BLOOD PRESSURE < 130 MM HG: ICD-10-PCS | Mod: CPTII,S$GLB,, | Performed by: STUDENT IN AN ORGANIZED HEALTH CARE EDUCATION/TRAINING PROGRAM

## 2023-09-22 PROCEDURE — 3079F PR MOST RECENT DIASTOLIC BLOOD PRESSURE 80-89 MM HG: ICD-10-PCS | Mod: CPTII,S$GLB,, | Performed by: STUDENT IN AN ORGANIZED HEALTH CARE EDUCATION/TRAINING PROGRAM

## 2023-09-22 PROCEDURE — 99214 OFFICE O/P EST MOD 30 MIN: CPT | Mod: S$GLB,,, | Performed by: STUDENT IN AN ORGANIZED HEALTH CARE EDUCATION/TRAINING PROGRAM

## 2023-09-22 RX ORDER — MONTELUKAST SODIUM 10 MG/1
10 TABLET ORAL NIGHTLY
Qty: 30 TABLET | Refills: 11 | Status: SHIPPED | OUTPATIENT
Start: 2023-09-22

## 2023-09-22 NOTE — PROGRESS NOTES
"ALLERGY & IMMUNOLOGY CLINIC - FOLLOW UP     HISTORY OF PRESENT ILLNESS     Patient ID: Mirian العلي is a 47 y.o. female    CC: allergic rhinitic     HPI: Mirian العلي is a 47 y.o. female following up for allergic rhinitis (started odactra 9/26/22).    She says it is hard to say how much her symptoms have improved. She thinks her nose is drier, but she still has the post nasal drip which drives her "crazy."   She also gets sinus pressure.  When she went on a vacation to [place], she felt better.  She is tolerating the nightly odactra well. No issues with it.  She is using flanse and azelastine 2 SEN BID each.   She continues to take the zyrtec daily. She will sometimes take it BID.   She still has the saline nasal spray and rinses.   She endorses reflux. She tried taking pepcid in the past, but doesn't recall if it helped. She thinks she will try it again.    She says the pruritus of her body has been a little better, but now its more on her face and neck.  She says the skin of her neck has been a itchy with a different texture (like chicken skin).  She hasn't changed any skin products.      MEDICAL HISTORY     Vaccines:   Immunization History   Administered Date(s) Administered    COVID-19, MRNA, LN-S, PF (MODERNA FULL 0.5 ML DOSE) 02/23/2021, 02/23/2021, 03/24/2021, 12/18/2021    COVID-19, mRNA, LNP-S, bivalent booster, PF (PFIZER OMICRON) 08/11/2023    Influenza 10/05/2015, 08/27/2016    Influenza (Flumist) - Quadrivalent - Intranasal *Preferred* (2-49 years old) 09/27/2014    Influenza - Quadrivalent 09/15/2019    Influenza - Quadrivalent - MDCK - PF 12/11/2021, 10/30/2022    Influenza - Quadrivalent - PF *Preferred* (6 months and older) 09/16/2017, 10/27/2018, 09/27/2020, 08/11/2023    Influenza - Trivalent (ADULT) 10/08/2011, 09/13/2013    Tdap 10/07/2019     MedHx:   Patient Active Problem List   Diagnosis    Fatigue    Ovarian cyst    Breast tenderness in female    Generalized headaches    " Endometriosis of pelvis    Unmedicated IUD    Factor V Leiden carrier    Rhinitis, allergic    Pelvic pain in female    S/P hysterectomy with oophorectomy    Family history of breast cancer in mother    Chest pain on breathing    Precordial pain    Hemangioma    Corneal abrasion, left    Pectus excavatum    Shingles    Abnormal laboratory test    Carpal tunnel syndrome of right wrist    Right shoulder pain    Swelling of arm    Primary insomnia    Increased risk of breast cancer    Decreased right shoulder range of motion    Muscle weakness of right upper extremity    Isolated cervical dystonia    Inflammatory polyarthropathy    Drug-induced immunodeficiency    Current mild episode of major depressive disorder, unspecified whether recurrent     SurgHx:   Past Surgical History:   Procedure Laterality Date    BUNIONECTOMY Left 02/06/2018    HYSTERECTOMY  01/2015    TLHBSO    LAPAROSCOPY W/ MINI-LAPAROTOMY  2003    OOPHORECTOMY Right 03/2012    OOPHORECTOMY Left 01/2015    TOTAL VAGINAL HYSTERECTOMY  01/2015    w/Right USO    WISDOM TOOTH EXTRACTION Bilateral      Medications:   Current Outpatient Medications on File Prior to Visit   Medication Sig Dispense Refill    allerg xt,D.farinae-D.pteronys (ODACTRA) 12 SQ-HDM Subl Place 1 tablet under the tongue every evening. 90 tablet 3    aspirin (ECOTRIN) 81 MG EC tablet Take 81 mg by mouth once daily.      azelastine (ASTELIN) 137 mcg (0.1 %) nasal spray USE 1 TO 2 SPRAYS IN EACH NOSTRIL TWICE DAILY 30 mL 11    buPROPion (WELLBUTRIN XL) 150 MG TB24 tablet TAKE 1 TABLET BY MOUTH EVERY DAY. TAKE WITH 300MG TABLETS 90 tablet 3    buPROPion (WELLBUTRIN XL) 300 MG 24 hr tablet TAKE 1 TABLET BY MOUTH EVERY DAY. TAKE WITH 150MG TABLETS 90 tablet 0    cetirizine (ZYRTEC) 10 MG tablet Take 1 tablet (10 mg total) by mouth once daily. 30 tablet 2    cyanocobalamin, vitamin B-12, 5,000 mcg Subl Place under the tongue once a week.      diazePAM (VALIUM) 5 MG tablet 1-2 PO 30-60 minutes  prior to breast MRI 2 tablet 0    diphenhydrAMINE (BENADRYL) 50 MG capsule Take 50 mg by mouth every 6 (six) hours as needed for Itching.      diphenhydrAMINE (SOMINEX) 25 mg tablet Take 25 mg by mouth nightly as needed for Insomnia.      EPINEPHrine (EPIPEN 2-AVILA) 0.3 mg/0.3 mL AtIn Inject 0.3 mLs (0.3 mg total) into the muscle as needed (Anaphylaxis). 2 each 0    estradioL (VIVELLE-DOT) 0.1 mg/24 hr PTSW Place 1 patch onto the skin twice a week. 8 patch 11    fluticasone (FLONASE) 50 mcg/actuation nasal spray PLACE 2 SPRAYS IN EACH NOSTRIL ONCE DAILY 1 Bottle 1    hydrOXYchloroQUINE (PLAQUENIL) 200 mg tablet TK 2 TS PO QD      prasterone, dhea, (INTRAROSA) 6.5 mg Inst Place 6.5 mg vaginally every evening. 30 each 12    tiZANidine (ZANAFLEX) 4 MG tablet TAKE 1 TABLET(4 MG) BY MOUTH TWICE DAILY AS NEEDED 60 tablet 1    UNABLE TO FIND medication name: tumeric 200 mg twice a day      UNABLE TO FIND Apply 0.75 g topically every evening. Testosterone 1% 30 g 5    vitamin D (VITAMIN D3) 1000 units Tab Take 2,000 Units by mouth 2 (two) times daily.      ALPRAZolam (XANAX) 1 MG tablet Take 1 tablet (1 mg total) by mouth nightly as needed for Insomnia or Anxiety. 21 tablet 0     Current Facility-Administered Medications on File Prior to Visit   Medication Dose Route Frequency Provider Last Rate Last Admin    [COMPLETED] onabotulinumtoxina injection 400 Units  400 Units Intramuscular 1 time in Clinic/HOD Fatemeh Mcclendon MD   400 Units at 09/21/23 3688     Drug Allergies:   Review of patient's allergies indicates:   Allergen Reactions    Niacin preparations      Patient states she has flushing    Sulfa (sulfonamide antibiotics)      Tongue swelling    Iodine and iodide containing products Rash     SocHx:   Social History     Tobacco Use    Smoking status: Never     Passive exposure: Never    Smokeless tobacco: Never   Substance Use Topics    Alcohol use: Yes     Comment: week    Drug use: Never     Additional History Obtained  at Initial Visit:  H/o Asthma: She says she was given an inhaler as a teenager, but no longer needs an inhaler and has never been diagnosed with asthma  H/o Eczema: denies  H/o Rhinitis: endorses  Food Allergy: denies  Drug Allergies:   Iodine contrast -- she got hives. She didn't get hives when she pretreated with benadryl.  Sulfa antibiotics -- she got it after a surgery in 2015. Had tongue swelling.  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  Infection Hx: Denies frequent infections requiring antibiotics. No history of pneumonia.     PHYSICAL EXAM     VS: /85 (BP Location: Right arm, Patient Position: Sitting, BP Method: Medium (Automatic))   Pulse 79   Wt 81.4 kg (179 lb 7.3 oz)   LMP 01/01/2015 (Approximate)   SpO2 100%   BMI 25.75 kg/m²   GENERAL: Alert, NAD, well-appearing  EYES: EOMI, no conjunctival injection, no discharge, no infraorbital shiners  NOSE: NT 2-3 + B/L, no stringing mucous, no polyps visualized  ORAL: MMM, no ulcers, no thrush  LUNGS: CTAB, no w/r/c, no increased WOB  HEART: RRR, normal S1/S2, no m/g/r  DERM: no rashes  NEURO: normal speech, normal gait, no facial asymmetry     LABORATORY STUDIES     Component      Latest Ref [facility] 4/5/2023   WBC      3.90 - 12.70 K/uL 3.01 (L)    RBC      4.00 - 5.40 M/uL 4.22    Hemoglobin      12.0 - 16.0 g/dL 12.4    Hematocrit      37.0 - 48.5 % 37.6    MCV      82 - 98 fL 89    MCH      27.0 - 31.0 pg 29.4    MCHC      32.0 - 36.0 g/dL 33.0    RDW      11.5 - 14.5 % 12.1    Platelets      150 - 450 K/uL 215    MPV      9.2 - 12.9 fL 10.6    Immature Granulocytes      0.0 - 0.5 % 0.0    Gran # (ANC)      1.8 - 7.7 K/uL 1.4 (L)    Immature Grans (Abs)      0.00 - 0.04 K/uL 0.00    Lymph #      1.0 - 4.8 K/uL 1.4    Mono #      0.3 - 1.0 K/uL 0.3    Eos #      0.0 - 0.5 K/uL 0.0    Baso #      0.00 - 0.20 K/uL 0.04    Differential Method Automated    Sodium       136 - 145 mmol/L 140    Potassium      3.5 - 5.1 mmol/L 4.0    Chloride      95 - 110 mmol/L 104    CO2      23 - 29 mmol/L 26    Glucose      70 - 110 mg/dL 92    BUN      6 - 20 mg/dL 10    Creatinine      0.5 - 1.4 mg/dL 0.9    Calcium      8.7 - 10.5 mg/dL 9.2    PROTEIN TOTAL      6.0 - 8.4 g/dL 6.7    Albumin      3.5 - 5.2 g/dL 4.2    BILIRUBIN TOTAL      0.1 - 1.0 mg/dL 0.4    Alkaline Phosphatase      55 - 135 U/L 38 (L)    AST      10 - 40 U/L 18    ALT      10 - 44 U/L 13    Hemoglobin A1C External      4.0 - 5.6 % 4.9    TSH      0.400 - 4.000 uIU/mL 2.407        ALLERGEN TESTING     Skin Prick:  Inhalant Skin Testing Results 9/7/22:  Interpretation: Appropriate positive and negative controls. Positives were to dust mite. All other allergens tested were negative.    Immunocaps:   Component      Latest Ref Rng & Units 7/22/2022 1/10/2013   D. farinae      <0.10 kU/L <0.10 1.23 (H)   D. farinae Class       CLASS 0 CLASS II   Mite Dust Pteronyssinus IgE      <0.10 kU/L <0.10 0.75 (H)   D. pteronyssinus Class       CLASS 0 CLASS II   BAHIA GRASS      <0.10 kU/L <0.10 <0.35   [place] Class       CLASS 0 CLASS 0   Bereket Grass      <0.10 kU/L <0.10 <0.35   Bereket Grass Class       CLASS 0 CLASS 0   Mart(Quercus alba) IgE      <0.10 kU/L <0.10 <0.35   [place], Class       CLASS 0 CLASS 0   Pecan [place] Tree      <0.10 kU/L <0.10 <0.35   Pecan, Class       CLASS 0 CLASS 0   Marshelder IgE      <0.10 kU/L <0.10 <0.35   Marshelder Class       CLASS 0 CLASS 0   Ragweed, Short, IgE      <0.35 kU/L  <0.35   Ragweed, Short, Class        CLASS 0   A. tenius, IgE      <0.35 kU/L  <0.35   A. tenius Class        CLASS 0   Aspergillus Fumigatus IgE      <0.10 kU/L <0.10 <0.35   A. fumigatus Class       CLASS 0 CLASS 0   BERMUDA GRASS      <0.10 kU/L <0.10    [place] Grass Class       CLASS 0    [place] IgE      <0.10 kU/L <0.10    [place] Class       CLASS 0    Plantain      <0.10 kU/L <0.10    English Plantain Class        CLASS 0    Ragweed, Western IgE      <0.10 kU/L <0.10    Ragweed, Western, Class       CLASS 0    Alternaria alternata      <0.10 kU/L <0.10    Altern. alternata Class       CLASS 0    Cat Dander      <0.10 kU/L <0.10    Cat Epithelium Class       CLASS 0    Dog Dander, IgE      <0.10 kU/L <0.10    Dog Dander Class       CLASS 0    WHITE KIKE TREE      <0.10 kU/L <0.10    White Kike Class       CLASS 0    Allergen Maple ([place]) IgE      <0.10 kU/L <0.10    Allergen Maple ([place]) Class       CLASS 0    [place] IgE      <0.10 kU/L <0.10    [place] Class       CLASS 0    [place]ch IgE      <0.10 kU/L <0.10    Silver Birch Class       CLASS 0    CARLOS GRASS      <0.10 kU/L <0.10    Carlos Grass Class       CLASS 0    Allergen Candida albicans IgE      <0.10 kU/L <0.10    Allergen Candida albicans Class       CLASS 0    Cladosporium, IgE      <0.10 kU/L <0.10    Cladosporium Class       CLASS 0    Curvularia lunata      <0.10 kU/L <0.10    Curvularia Lunata Class       CLASS 0    Penicillium, IgE      <0.10 kU/L <0.10    Penicillium Class       CLASS 0    IgE      0 - 100 IU/ml  109 (H)      PULMONARY FUNCTION TESTING     Date 1/10/13:  FVC:         101%ile   FEV1:         77%ile  FEV1/FVC: 63%  FEF 25-75: 44%ile  Interpretation: mild obstruction     CHART REVIEW     Reviewed labs.      ASSESSMENT & PLAN     Mirian العلي is a 47 y.o. female with     # Chronic allergic rhinitis, allergy to dust mite: Started sublingual immunotherapy with odactra 9/26/22, and she has noticed some but not significant improvement. Flonase and azelastine also help somewhat. But she is still bothered by symptoms, mostly post nasal drip.  -discussed option of switching from sublingual to subcutaneous immunotherapy, but patient would like to remain on SLIT for now.  -continue odactra 1 sublingual tablet nightly (she has epipen).  -continue flonase 1 to 2 SEN BID.  -continue azelastine nasal spray 1 to 2 SEN  BID.  -continue prn saline nasal spray and rinses (she knows to use distilled, not tap water).  -continue cetirizine 10 mg once to twice daily.  -start montelukast 10 mg nightly. Counseled on potential for neuropsychiatric side effects.    # Heartburn/reflux: patient will try pepcid again to determine if this also helps with her post nasal drip.    # Generalized pruritus: helped by cetirizine  -continue cetirizine 10 mg daily, plus additional 10 mg prn.      Follow up: 6 months or sooner if needed    I spent a total of 30 minutes on the day of the visit.  This includes face to face time and non-face to face time preparing to see the patient (eg, review of tests), obtaining and/or reviewing separately obtained history, documenting clinical information in the electronic or other health record.    Dulce Delvalle MD  Allergy/Immunology

## 2023-10-04 ENCOUNTER — LAB VISIT (OUTPATIENT)
Dept: LAB | Facility: OTHER | Age: 48
End: 2023-10-04
Attending: OBSTETRICS & GYNECOLOGY
Payer: COMMERCIAL

## 2023-10-04 ENCOUNTER — OFFICE VISIT (OUTPATIENT)
Dept: OBSTETRICS AND GYNECOLOGY | Facility: CLINIC | Age: 48
End: 2023-10-04
Attending: OBSTETRICS & GYNECOLOGY
Payer: COMMERCIAL

## 2023-10-04 VITALS
WEIGHT: 177 LBS | DIASTOLIC BLOOD PRESSURE: 70 MMHG | HEIGHT: 70 IN | SYSTOLIC BLOOD PRESSURE: 104 MMHG | BODY MASS INDEX: 25.34 KG/M2

## 2023-10-04 DIAGNOSIS — N95.1 MENOPAUSAL SYNDROME: ICD-10-CM

## 2023-10-04 DIAGNOSIS — S20.01XA TRAUMATIC ECCHYMOSIS OF RIGHT FEMALE BREAST, INITIAL ENCOUNTER: ICD-10-CM

## 2023-10-04 DIAGNOSIS — Z01.419 ENCOUNTER FOR GYNECOLOGICAL EXAMINATION WITHOUT ABNORMAL FINDING: Primary | ICD-10-CM

## 2023-10-04 LAB — TESTOST SERPL-MCNC: 31 NG/DL (ref 5–73)

## 2023-10-04 PROCEDURE — 99396 PR PREVENTIVE VISIT,EST,40-64: ICD-10-PCS | Mod: S$GLB,,, | Performed by: OBSTETRICS & GYNECOLOGY

## 2023-10-04 PROCEDURE — 3044F PR MOST RECENT HEMOGLOBIN A1C LEVEL <7.0%: ICD-10-PCS | Mod: CPTII,S$GLB,, | Performed by: OBSTETRICS & GYNECOLOGY

## 2023-10-04 PROCEDURE — 1159F PR MEDICATION LIST DOCUMENTED IN MEDICAL RECORD: ICD-10-PCS | Mod: CPTII,S$GLB,, | Performed by: OBSTETRICS & GYNECOLOGY

## 2023-10-04 PROCEDURE — 3074F PR MOST RECENT SYSTOLIC BLOOD PRESSURE < 130 MM HG: ICD-10-PCS | Mod: CPTII,S$GLB,, | Performed by: OBSTETRICS & GYNECOLOGY

## 2023-10-04 PROCEDURE — 84403 ASSAY OF TOTAL TESTOSTERONE: CPT | Performed by: OBSTETRICS & GYNECOLOGY

## 2023-10-04 PROCEDURE — 99396 PREV VISIT EST AGE 40-64: CPT | Mod: S$GLB,,, | Performed by: OBSTETRICS & GYNECOLOGY

## 2023-10-04 PROCEDURE — 99999 PR PBB SHADOW E&M-EST. PATIENT-LVL IV: ICD-10-PCS | Mod: PBBFAC,,, | Performed by: OBSTETRICS & GYNECOLOGY

## 2023-10-04 PROCEDURE — 3044F HG A1C LEVEL LT 7.0%: CPT | Mod: CPTII,S$GLB,, | Performed by: OBSTETRICS & GYNECOLOGY

## 2023-10-04 PROCEDURE — 99999 PR PBB SHADOW E&M-EST. PATIENT-LVL IV: CPT | Mod: PBBFAC,,, | Performed by: OBSTETRICS & GYNECOLOGY

## 2023-10-04 PROCEDURE — 1160F RVW MEDS BY RX/DR IN RCRD: CPT | Mod: CPTII,S$GLB,, | Performed by: OBSTETRICS & GYNECOLOGY

## 2023-10-04 PROCEDURE — 3078F DIAST BP <80 MM HG: CPT | Mod: CPTII,S$GLB,, | Performed by: OBSTETRICS & GYNECOLOGY

## 2023-10-04 PROCEDURE — 1159F MED LIST DOCD IN RCRD: CPT | Mod: CPTII,S$GLB,, | Performed by: OBSTETRICS & GYNECOLOGY

## 2023-10-04 PROCEDURE — 36415 COLL VENOUS BLD VENIPUNCTURE: CPT | Performed by: OBSTETRICS & GYNECOLOGY

## 2023-10-04 PROCEDURE — 3078F PR MOST RECENT DIASTOLIC BLOOD PRESSURE < 80 MM HG: ICD-10-PCS | Mod: CPTII,S$GLB,, | Performed by: OBSTETRICS & GYNECOLOGY

## 2023-10-04 PROCEDURE — 3008F BODY MASS INDEX DOCD: CPT | Mod: CPTII,S$GLB,, | Performed by: OBSTETRICS & GYNECOLOGY

## 2023-10-04 PROCEDURE — 1160F PR REVIEW ALL MEDS BY PRESCRIBER/CLIN PHARMACIST DOCUMENTED: ICD-10-PCS | Mod: CPTII,S$GLB,, | Performed by: OBSTETRICS & GYNECOLOGY

## 2023-10-04 PROCEDURE — 3074F SYST BP LT 130 MM HG: CPT | Mod: CPTII,S$GLB,, | Performed by: OBSTETRICS & GYNECOLOGY

## 2023-10-04 PROCEDURE — 84402 ASSAY OF FREE TESTOSTERONE: CPT | Performed by: OBSTETRICS & GYNECOLOGY

## 2023-10-04 PROCEDURE — 3008F PR BODY MASS INDEX (BMI) DOCUMENTED: ICD-10-PCS | Mod: CPTII,S$GLB,, | Performed by: OBSTETRICS & GYNECOLOGY

## 2023-10-04 RX ORDER — ESTRADIOL 0.1 MG/D
1 FILM, EXTENDED RELEASE TRANSDERMAL
Qty: 8 PATCH | Refills: 11 | Status: SHIPPED | OUTPATIENT
Start: 2023-10-05 | End: 2024-10-04

## 2023-10-04 NOTE — PROGRESS NOTES
Subjective:       Patient ID: Mirian العلي is a 47 y.o. female.    Chief Complaint:  Annual Exam and Gynecologic Exam      History of Present Illness  Gynecologic Exam  Pertinent negatives include no abdominal pain, back pain or headaches.     Mirian العلي is a 47 y.o. female  here for her annual GYN exam.  She is menopausal since age 39 with hysterectomy with BSO for endometriosis. Currently on HRT AND DOING WELL, WANTS TO CONTINUE.  She does report bruising and a Lump in the right breast following her breast biopsy done 3 weeks ago.   Current Hormone regimen:  Vivelle Dot 0.1 twice weekly  Apply 0.75 g topically every evening. Testosterone 1%,    denies vaginal itching or irritation.  Denies vaginal discharge.  She is sexually active. She has had1 partner for 24 years .   History of abnormal pap: No  Last Pap:;  had hysterectomy    Last MMG: (MRI done 2023: BI-RADS Category:   Overall: 4 - Suspicious RIght ,  Biopsy done 2023:   Last Colonoscopy:  scheduled  denies domestic violence. She does feel safe at home.     Past Medical History:   Diagnosis Date    Angio-edema     AR (allergic rhinitis)     BRCA1 negative     BRCA2 negative     Eczema     Endometriosis, mild     Factor V Leiden mutation     Genetic testing 10/2020    Invitae Multi-Cancer Panel & Invitae Myelodysplastic Syndrome/Leukemia Panel (84 genes):  NEGATIVE for mutations.    Ovarian cyst, bilateral     Thyroglobulin antibody positive     per patient, who stated results were given by Dr. Coleman Velez in Rheumatology on 2019    Urticaria      Past Surgical History:   Procedure Laterality Date    BUNIONECTOMY Left 2018    HYSTERECTOMY  2015    TLHBSO    LAPAROSCOPY W/ MINI-LAPAROTOMY  2003    OOPHORECTOMY Right 2012    OOPHORECTOMY Left 2015    TOTAL VAGINAL HYSTERECTOMY  2015    w/Right USO    WISDOM TOOTH EXTRACTION Bilateral      Social History     Socioeconomic History     Marital status:     Number of children: 2   Tobacco Use    Smoking status: Never     Passive exposure: Never    Smokeless tobacco: Never   Substance and Sexual Activity    Alcohol use: Yes     Comment: week    Drug use: Never    Sexual activity: Yes     Partners: Male     Birth control/protection: Post-menopausal, Other-see comments     Comment:  since ,       Social Determinants of Health     Stress: No Stress Concern Present (10/7/2019)    [facility] [facility] - Occupational Stress Questionnaire     Feeling of Stress : Not at all     Family History   Problem Relation Age of Onset    Thyroid disease Mother     Breast cancer Mother         diagnosed in her 60s; bilateral    Lung cancer Mother 71        former smoker    Cancer Mother         Breast cancers over age 65, lung cancer    Skin cancer Father         type?    Bladder Cancer Father         diagnosed in his 60s    Kidney cancer Father 60    Cancer Father         Skin, kidney, bladder    Breast cancer Sister 46        inflammatory breast cancer, unilateral; [facility] genetic test negative    Other Sister         dermatothyocitis    Cancer Sister         Inflammatory breast cancer    Other Sister         pre-cancer? cervical?    Diabetes Brother     Factor V Leiden deficiency Brother     Allergies Brother     Asthma Brother     Diabetes Brother         Type 2    Diabetes Brother         Type 2    Diabetes Maternal Aunt     Diabetes Paternal Uncle     Cancer Maternal Grandmother         bone marrow cancer in her 60s- multiple myeloma?    Throat cancer Paternal Grandfather         diagnosed in his 70s    Cancer Paternal Cousin         unknown origin    BRCA 1/2 Paternal Cousin         +BRCA2 mutation 886delGT (patient Mirian tested negative for this twice)     OB History          4    Para   2    Term   2            AB   2    Living   2         SAB   2    IAB        Ectopic        Multiple        Live  "Births   2           Obstetric Comments   Menarche age 14. LMP age 39 (postsurgical).  First child born age 30.  History of abnormal PAP smear: NO.  History of abnormal mammogram: NO.  History of sexually transmitted disease:  NO                     /70   Ht 5' 10" (1.778 m)   Wt 80.3 kg (177 lb)   LMP 2015 (Approximate)   BMI 25.40 kg/m²         GYN & OB History    Date of Last Pap: No result found    OB History    Para Term  AB Living   4 2 2   2 2   SAB IAB Ectopic Multiple Live Births   2       2      # Outcome Date GA Lbr Elan/2nd Weight Sex Delivery Anes PTL Lv   4 SAB            3 SAB            2 Term      Vag-Spont   FERMÍN   1 Term      Vag-Spont   FERMÍN      Obstetric Comments   Menarche age 14. LMP age 39 (postsurgical).   First child born age 30.   History of abnormal PAP smear: NO.   History of abnormal mammogram: NO.   History of sexually transmitted disease:  NO             Review of Systems  Review of Systems   Constitutional:  Negative for activity change, appetite change, fatigue and unexpected weight change.   HENT: Negative.     Eyes:  Negative for visual disturbance.   Respiratory:  Negative for shortness of breath and wheezing.    Cardiovascular:  Negative for chest pain, palpitations and leg swelling.   Gastrointestinal:  Negative for abdominal pain, bloating and blood in stool.   Endocrine: Negative for diabetes and hair loss.   Genitourinary:  Negative for decreased libido, dyspareunia, hot flashes and vaginal dryness.   Musculoskeletal:  Negative for back pain and joint swelling.   Integumentary:  Negative for acne, hair changes and nipple discharge.   Neurological:  Negative for headaches.   Hematological:  Does not bruise/bleed easily.   Psychiatric/Behavioral:  Negative for depression and sleep disturbance. The patient is not nervous/anxious.    Breast: Negative for mastodynia and nipple discharge          Objective:      Physical Exam:   Constitutional: She is " oriented to person, place, and time. She appears well-developed and well-nourished.    HENT:   Head: Normocephalic and atraumatic.    Eyes: Pupils are equal, round, and reactive to light. EOM are normal.     Cardiovascular:  Normal rate and regular rhythm.             Pulmonary/Chest: Effort normal and breath sounds normal.   Right breast with ecchymosis and a 4cm firm mass beneath and deep to areola.    Left breast without masses             Abdominal: Soft. Bowel sounds are normal.     Genitourinary:    Pelvic exam was performed with patient supine.      Genitourinary Comments: PELVIC: Normal external female genitalia without lesions. Normal hair distribution. Adequate perineal body, normal urethral meatus. Vagina Moist and well rugated without lesions or discharge. No significant cystocele or rectocele. Bimanual exam shows uterus and cervix to be surgically absent. Adnexa without masses or tenderness.  RECTAL: Deferred                 Musculoskeletal: Normal range of motion and moves all extremeties.       Neurological: She is alert and oriented to person, place, and time.    Skin: Skin is warm and dry.    Psychiatric: She has a normal mood and affect.              Assessment:        1. Encounter for gynecological examination without abnormal finding    2. Menopausal syndrome    3. Traumatic ecchymosis of right female breast, initial encounter                Plan:      Problem List Items Addressed This Visit    None  Visit Diagnoses       Encounter for gynecological examination without abnormal finding    -  Primary  COUNSELING:  The patient was counseled today on regular weight bearing exercise. Patient was counseled today on the new ACS guidelines for cervical cytology screening as well as the current recommendations for breast cancer screening. Counseling session lasted approximately 10 minutes, and all her questions were answered. She was advised to see her primary care physician for all other health  maintenance.   FOLLOW-UP with me for next routine visit.       Menopausal syndrome        Relevant Medications    estradioL (VIVELLE-DOT) 0.1 mg/24 hr PTSW (Start on 10/5/2023)    UNABLE TO FIND (Testosterone 1%  (0.75g daily)    Other Relevant Orders    Testosterone, Free    Testosterone    Traumatic ecchymosis of right female breast, initial encounter       Advised to call if not resolved within another 3 weeks, will refer to Breast surgery for further evaluation            Follow up in about 1 year (around 10/4/2024), or if symptoms worsen or fail to improve.

## 2023-10-05 ENCOUNTER — PATIENT MESSAGE (OUTPATIENT)
Dept: OBSTETRICS AND GYNECOLOGY | Facility: CLINIC | Age: 48
End: 2023-10-05
Payer: COMMERCIAL

## 2023-10-05 DIAGNOSIS — Z12.31 SCREENING MAMMOGRAM, ENCOUNTER FOR: Primary | ICD-10-CM

## 2023-10-08 DIAGNOSIS — F32.0 CURRENT MILD EPISODE OF MAJOR DEPRESSIVE DISORDER, UNSPECIFIED WHETHER RECURRENT: ICD-10-CM

## 2023-10-08 RX ORDER — BUPROPION HYDROCHLORIDE 300 MG/1
TABLET ORAL
Qty: 90 TABLET | Refills: 1 | Status: SHIPPED | OUTPATIENT
Start: 2023-10-08

## 2023-10-08 NOTE — TELEPHONE ENCOUNTER
Refill Decision Note   Mirian Perezsey  is requesting a refill authorization.  Brief Assessment and Rationale for Refill:  Approve     Medication Therapy Plan:         Comments:     Note composed:3:59 PM 10/08/2023

## 2023-10-08 NOTE — TELEPHONE ENCOUNTER
No care due was identified.  [facility] Embedded Care Due Messages. Reference number: [id number].   10/08/2023 12:16:43 PM CDT

## 2023-10-09 LAB — TESTOST FREE SERPL-MCNC: 0.5 PG/ML

## 2023-10-11 ENCOUNTER — PATIENT MESSAGE (OUTPATIENT)
Dept: OBSTETRICS AND GYNECOLOGY | Facility: CLINIC | Age: 48
End: 2023-10-11
Payer: COMMERCIAL

## 2023-10-11 DIAGNOSIS — N95.1 MENOPAUSAL SYNDROME: ICD-10-CM

## 2023-10-24 ENCOUNTER — PATIENT MESSAGE (OUTPATIENT)
Dept: OBSTETRICS AND GYNECOLOGY | Facility: CLINIC | Age: 48
End: 2023-10-24
Payer: COMMERCIAL

## 2023-10-25 DIAGNOSIS — N64.89 BREAST HEMATOMA: Primary | ICD-10-CM

## 2023-10-25 RX ORDER — SODIUM CHLORIDE 0.9 % (FLUSH) 0.9 %
10 SYRINGE (ML) INJECTION
Status: CANCELLED | OUTPATIENT
Start: 2023-10-25

## 2023-10-25 RX ORDER — SODIUM CHLORIDE 9 MG/ML
INJECTION, SOLUTION INTRAVENOUS CONTINUOUS
Status: CANCELLED | OUTPATIENT
Start: 2023-10-25

## 2023-10-26 ENCOUNTER — HOSPITAL ENCOUNTER (OUTPATIENT)
Facility: HOSPITAL | Age: 48
Discharge: HOME | End: 2023-10-26
Attending: INTERNAL MEDICINE | Admitting: INTERNAL MEDICINE
Payer: COMMERCIAL

## 2023-10-26 ENCOUNTER — ANESTHESIA EVENT (OUTPATIENT)
Dept: ENDOSCOPY | Facility: HOSPITAL | Age: 48
End: 2023-10-26
Payer: COMMERCIAL

## 2023-10-26 ENCOUNTER — ANESTHESIA (OUTPATIENT)
Dept: ENDOSCOPY | Facility: HOSPITAL | Age: 48
End: 2023-10-26
Payer: COMMERCIAL

## 2023-10-26 VITALS
TEMPERATURE: 98 F | HEART RATE: 68 BPM | DIASTOLIC BLOOD PRESSURE: 70 MMHG | SYSTOLIC BLOOD PRESSURE: 100 MMHG | WEIGHT: 170 LBS | BODY MASS INDEX: 23.8 KG/M2 | RESPIRATION RATE: 15 BRPM | HEIGHT: 71 IN | OXYGEN SATURATION: 100 %

## 2023-10-26 DIAGNOSIS — Z12.11 SCREENING FOR COLON CANCER: ICD-10-CM

## 2023-10-26 PROCEDURE — 37000009 HC ANESTHESIA EA ADD 15 MINS: Performed by: INTERNAL MEDICINE

## 2023-10-26 PROCEDURE — 25000003 PHARM REV CODE 250: Performed by: NURSE ANESTHETIST, CERTIFIED REGISTERED

## 2023-10-26 PROCEDURE — G0121 COLON CA SCRN NOT HI RSK IND: HCPCS | Mod: ,,, | Performed by: INTERNAL MEDICINE

## 2023-10-26 PROCEDURE — 25000003 PHARM REV CODE 250: Performed by: INTERNAL MEDICINE

## 2023-10-26 PROCEDURE — 63600175 PHARM REV CODE 636 W HCPCS: Performed by: NURSE ANESTHETIST, CERTIFIED REGISTERED

## 2023-10-26 PROCEDURE — 37000008 HC ANESTHESIA 1ST 15 MINUTES: Performed by: INTERNAL MEDICINE

## 2023-10-26 PROCEDURE — G0121 COLON CA SCRN NOT HI RSK IND: HCPCS | Performed by: INTERNAL MEDICINE

## 2023-10-26 PROCEDURE — E9220 PRA ENDO ANESTHESIA: HCPCS | Mod: ,,, | Performed by: NURSE ANESTHETIST, CERTIFIED REGISTERED

## 2023-10-26 PROCEDURE — G0121 COLON CA SCRN NOT HI RSK IND: ICD-10-PCS | Mod: ,,, | Performed by: INTERNAL MEDICINE

## 2023-10-26 PROCEDURE — E9220 PRA ENDO ANESTHESIA: ICD-10-PCS | Mod: ,,, | Performed by: NURSE ANESTHETIST, CERTIFIED REGISTERED

## 2023-10-26 RX ORDER — LIDOCAINE HYDROCHLORIDE 20 MG/ML
INJECTION INTRAVENOUS
Status: DISCONTINUED | OUTPATIENT
Start: 2023-10-26 | End: 2023-10-26

## 2023-10-26 RX ORDER — PROPOFOL 10 MG/ML
VIAL (ML) INTRAVENOUS CONTINUOUS PRN
Status: DISCONTINUED | OUTPATIENT
Start: 2023-10-26 | End: 2023-10-26

## 2023-10-26 RX ORDER — SODIUM CHLORIDE 9 MG/ML
INJECTION, SOLUTION INTRAVENOUS CONTINUOUS
Status: DISCONTINUED | OUTPATIENT
Start: 2023-10-26 | End: 2023-10-26 | Stop reason: HOSPADM

## 2023-10-26 RX ORDER — PROPOFOL 10 MG/ML
VIAL (ML) INTRAVENOUS
Status: DISCONTINUED | OUTPATIENT
Start: 2023-10-26 | End: 2023-10-26

## 2023-10-26 RX ADMIN — PROPOFOL 175 MCG/KG/MIN: 10 INJECTION, EMULSION INTRAVENOUS at 07:10

## 2023-10-26 RX ADMIN — PROPOFOL 20 MG: 10 INJECTION, EMULSION INTRAVENOUS at 07:10

## 2023-10-26 RX ADMIN — SODIUM CHLORIDE: 0.9 INJECTION, SOLUTION INTRAVENOUS at 07:10

## 2023-10-26 RX ADMIN — LIDOCAINE HYDROCHLORIDE 80 MG: 20 INJECTION INTRAVENOUS at 07:10

## 2023-10-26 RX ADMIN — PROPOFOL 70 MG: 10 INJECTION, EMULSION INTRAVENOUS at 07:10

## 2023-10-26 NOTE — ANESTHESIA POSTPROCEDURE EVALUATION
Anesthesia Post Evaluation    Patient: Mirian العلي    Procedure(s) Performed: Procedure(s) (LRB):  COLONOSCOPY (N/A)    Final Anesthesia Type: general      Patient location during evaluation: GI PACU  Patient participation: Yes- Able to Participate  Level of consciousness: awake and alert  Post-procedure vital signs: reviewed and stable  Pain management: adequate  Airway patency: patent    PONV status at discharge: No PONV  Anesthetic complications: no      Cardiovascular status: stable  Respiratory status: unassisted and spontaneous ventilation  Hydration status: euvolemic  Follow-up not needed.          Vitals Value Taken Time   /70 10/26/23 0822   Temp 36.6 °C (97.9 °F) 10/26/23 0752   Pulse 68 10/26/23 0822   Resp 15 10/26/23 0822   SpO2 100 % 10/26/23 0822         Event Time   Out of Recovery 08:27:26         Pain/Ki Score: Ki Score: 9 (10/26/2023  7:52 AM)

## 2023-10-26 NOTE — PROVATION PATIENT INSTRUCTIONS
Discharge Summary/Instructions after an Endoscopic Procedure  Patient Name: Mirian العلي  Patient MRN: [id number]  Patient YOB: 1975 Thursday, October 26, 2023  Ishmael Pham MD  Dear patient,  As a result of recent federal legislation (The Federal Cures Act), you may   receive lab or pathology results from your procedure in your [facility]   account before your physician is able to contact you. Your physician or   their representative will relay the results to you with their   recommendations at their soonest availability.  Thank you,  RESTRICTIONS:  During your procedure today, you received medications for sedation.  These   medications may affect your judgment, balance and coordination.  Therefore,   for 24 hours, you have the following restrictions:   - DO NOT drive a car, operate machinery, make legal/financial decisions,   sign important papers or drink alcohol.    ACTIVITY:  Today: no heavy lifting, straining or running due to procedural   sedation/anesthesia.  The following day: return to full activity including work.  DIET:  Eat and drink normally unless instructed otherwise.     TREATMENT FOR COMMON SIDE EFFECTS:  - Mild abdominal pain, nausea, belching, bloating or excessive gas:  rest,   eat lightly and use a heating pad.  - Sore Throat: treat with throat lozenges and/or gargle with warm salt   water.  - Because air was used during the procedure, expelling large amounts of air   from your rectum or belching is normal.  - If a bowel prep was taken, you may not have a bowel movement for 1-3 days.    This is normal.  SYMPTOMS TO WATCH FOR AND REPORT TO YOUR PHYSICIAN:  1. Abdominal pain or bloating, other than gas cramps.  2. Chest pain.  3. Back pain.  4. Signs of infection such as: chills or fever occurring within 24 hours   after the procedure.  5. Rectal bleeding, which would show as bright red, maroon, or black stools.   (A tablespoon of blood from the rectum is not serious, especially  if   hemorrhoids are present.)  6. Vomiting.  7. Weakness or dizziness.  GO DIRECTLY TO THE NEAREST EMERGENCY ROOM IF YOU HAVE ANY OF THE FOLLOWING:      Difficulty breathing              Chills and/or fever over 101 F   Persistent vomiting and/or vomiting blood   Severe abdominal pain   Severe chest pain   Black, tarry stools   Bleeding- more than one tablespoon   Any other symptom or condition that you feel may need urgent attention  Your doctor recommends these additional instructions:  If any biopsies were taken, your doctors clinic will contact you in 1 to 2   weeks with any results.  - Discharge patient to home (ambulatory).   - Patient has a contact number available for emergencies.  The signs and   symptoms of potential delayed complications were discussed with the   patient.  Return to normal activities tomorrow.  Written discharge   instructions were provided to the patient.   - Resume previous diet.   - Continue present medications.   - Return to primary care physician as previously scheduled.   - Repeat colonoscopy in 10 years for screening purposes.  For questions, problems or results please call your physician - Ishmael Pham MD at Work:  [phone].  [facility], EMERGENCY ROOM PHONE NUMBER: [phone]  IF A COMPLICATION OR EMERGENCY SITUATION ARISES AND YOU ARE UNABLE TO REACH   YOUR PHYSICIAN - GO DIRECTLY TO THE EMERGENCY ROOM.  Ishmael Pham MD  10/26/2023 7:48:46 AM  This report has been verified and signed electronically.  Dear patient,  As a result of recent federal legislation (The Federal Cures Act), you may   receive lab or pathology results from your procedure in your [facility]   account before your physician is able to contact you. Your physician or   their representative will relay the results to you with their   recommendations at their soonest availability.  Thank you,  PROVATION

## 2023-10-26 NOTE — H&P
Short Stay Endoscopy History and Physical    PCP - Nora Brooks MD  Referring Physician - Nora Brooks MD  [address] [address]    Procedure - colonoscopy  ASA - per anesthesia  Mallampati - per anesthesia  History of Anesthesia problems - no  Family history Anesthesia problems -  no   Plan of anesthesia - General    HPI:  This is a 47 y.o. female here for evaluation of: screening    Reflux - no  Dysphagia - no  Abdominal pain - no  Diarrhea - no    ROS:  Constitutional: No fevers, chills, No weight loss  CV: No chest pain  Pulm: No cough, No shortness of breath  GI: see HPI    Medical History:  has a past medical history of Angio-edema, AR (allergic rhinitis), BRCA1 negative, BRCA2 negative, Eczema, Endometriosis, mild (2002), Factor V Leiden mutation, Genetic testing (10/2020), Ovarian cyst, bilateral, Thyroglobulin antibody positive, and Urticaria.    Surgical History:  has a past surgical history that includes Laparoscopy w/ mini-laparotomy (2003); Total vaginal hysterectomy (01/2015); Bunionectomy (Left, 02/06/2018); [place] tooth extraction (Bilateral); Oophorectomy (Right, 03/2012); Oophorectomy (Left, 01/2015); and Hysterectomy (01/2015).    Family History: family history includes Allergies in her brother; Asthma in her brother; BRCA 1/2 in her paternal cousin; Bladder Cancer in her father; Breast cancer in her mother; Breast cancer (age of onset: 46) in her sister; Cancer in her father, maternal grandmother, mother, paternal cousin, and sister; Diabetes in her brother, brother, brother, maternal aunt, and paternal uncle; Factor V Leiden deficiency in her brother; Kidney cancer (age of onset: 60) in her father; Lung cancer (age of onset: 71) in her mother; Other in her sister and sister; Skin cancer in her father; Throat cancer in her paternal grandfather; Thyroid disease in her mother..    Social History:  reports that she has never smoked. She has never been  exposed to tobacco smoke. She has never used smokeless tobacco. She reports current alcohol use. She reports that she does not use drugs.    Review of patient's allergies indicates:   Allergen Reactions    Niacin preparations      Patient states she has flushing    Sulfa (sulfonamide antibiotics)      Tongue swelling    Iodine and iodide containing products Rash       Medications:   Medications Prior to Admission   Medication Sig Dispense Refill Last Dose    allerg xt,D.farinae-D.pteronys (ODACTRA) 12 SQ-HDM Subl Place 1 tablet under the tongue every evening. 90 tablet 3 10/25/2023    aspirin (ECOTRIN) 81 MG EC tablet Take 81 mg by mouth once daily.   Past Week    buPROPion (WELLBUTRIN XL) 150 MG TB24 tablet TAKE 1 TABLET BY MOUTH EVERY DAY. TAKE WITH 300MG TABLETS 90 tablet 3 10/25/2023    buPROPion (WELLBUTRIN XL) 300 MG 24 hr tablet TAKE 1 TABLET BY MOUTH EVERY DAY WITH 150MG TABLET AS DIRECTED 90 tablet 1     cetirizine (ZYRTEC) 10 MG tablet Take 1 tablet (10 mg total) by mouth once daily. 30 tablet 2 10/25/2023    cyanocobalamin, vitamin B-12, 5,000 mcg Subl Place under the tongue once a week.   10/25/2023    diphenhydrAMINE (BENADRYL) 50 MG capsule Take 50 mg by mouth every 6 (six) hours as needed for Itching.   Past Week    estradioL (VIVELLE-DOT) 0.1 mg/24 hr PTSW Place 1 patch onto the skin twice a week. 8 patch 11 10/26/2023    fluticasone (FLONASE) 50 mcg/actuation nasal spray PLACE 2 SPRAYS IN EACH NOSTRIL ONCE DAILY 1 Bottle 1 10/25/2023    hydrOXYchloroQUINE (PLAQUENIL) 200 mg tablet TK 2 TS PO QD   10/25/2023    montelukast (SINGULAIR) 10 mg tablet Take 1 tablet (10 mg total) by mouth every evening. 30 tablet 11 Past Week    tiZANidine (ZANAFLEX) 4 MG tablet TAKE 1 TABLET(4 MG) BY MOUTH TWICE DAILY AS NEEDED 60 tablet 1 Past Week    vitamin D (VITAMIN D3) 1000 units Tab Take 2,000 Units by mouth 2 (two) times daily.   Past Week    ALPRAZolam (XANAX) 1 MG tablet Take 1 tablet (1 mg total) by mouth  nightly as needed for Insomnia or Anxiety. 21 tablet 0     azelastine (ASTELIN) 137 mcg (0.1 %) nasal spray USE 1 TO 2 SPRAYS IN EACH NOSTRIL TWICE DAILY 30 mL 11     diphenhydrAMINE (SOMINEX) 25 mg tablet Take 25 mg by mouth nightly as needed for Insomnia.       EPINEPHrine (EPIPEN 2-AVILA) 0.3 mg/0.3 mL AtIn Inject 0.3 mLs (0.3 mg total) into the muscle as needed (Anaphylaxis). 2 each 0     prasterone, dhea, (INTRAROSA) 6.5 mg Inst Place 6.5 mg vaginally every evening. 30 each 12     UNABLE TO FIND medication name: tumeric 200 mg twice a day       UNABLE TO FIND Apply 0.75 g topically every evening. Testosterone 1% 30 g 5        Physical Exam:    Vital Signs:   Vitals:    10/26/23 0718   BP: 109/75   Pulse: 81   Resp: 16   Temp: 98.1 °F (36.7 °C)       General Appearance: Well appearing in no acute distress  Lungs: no labored breathing  CVS:  regular rate  Abdomen: non tender    Labs:  Lab Results   Component Value Date    WBC 3.01 (L) 04/05/2023    HGB 12.4 04/05/2023    HCT 37.6 04/05/2023     04/05/2023    CHOL 179 04/05/2023    TRIG 47 04/05/2023    HDL 75 04/05/2023    ALT 13 04/05/2023    AST 18 04/05/2023     04/05/2023    K 4.0 04/05/2023     04/05/2023    CREATININE 0.9 04/05/2023    BUN 10 04/05/2023    CO2 26 04/05/2023    TSH 2.407 04/05/2023    HGBA1C 4.9 04/05/2023       I have explained the risks and benefits of this endoscopic procedure to the patient including but not limited to bleeding, inflammation, infection, perforation, and death.      Ishmael Pham MD

## 2023-10-26 NOTE — ANESTHESIA PREPROCEDURE EVALUATION
10/26/2023  Mirian العلي is a 47 y.o., female.    Past Medical History:   Diagnosis Date    Angio-edema     AR (allergic rhinitis)     BRCA1 negative     BRCA2 negative     Eczema     Endometriosis, mild 2002    Factor V Leiden mutation     Genetic testing 10/2020    Invitae Multi-Cancer Panel & Invitae Myelodysplastic Syndrome/Leukemia Panel (84 genes):  NEGATIVE for mutations.    Ovarian cyst, bilateral     Thyroglobulin antibody positive     per patient, who stated results were given by Dr. Coleman Velez in Rheumatology on 04/22/2019    Urticaria      Past Surgical History:   Procedure Laterality Date    BUNIONECTOMY Left 02/06/2018    HYSTERECTOMY  01/2015    TLHBSO    LAPAROSCOPY W/ MINI-LAPAROTOMY  2003    OOPHORECTOMY Right 03/2012    OOPHORECTOMY Left 01/2015    TOTAL VAGINAL HYSTERECTOMY  01/2015    w/Right USO    WISDOM TOOTH EXTRACTION Bilateral      No results found for this or any previous visit.      Pre-op Assessment    I have reviewed the Patient Summary Reports.    I have reviewed the NPO Status.   I have reviewed the Medications.     Review of Systems  Anesthesia Hx:  No problems with previous Anesthesia  Denies Family Hx of Anesthesia complications.   Denies Personal Hx of Anesthesia complications.   Hematology/Oncology:  Hematology Normal   Oncology Normal    -- Hypercoagulable state - Immunodeficiency Disorder: Factor V Leiden    EENT/Dental:EENT/Dental Normal   Cardiovascular:  Cardiovascular Normal Exercise tolerance: good     Pulmonary:  Pulmonary Normal    Renal/:  Renal/ Normal     Hepatic/GI:  Hepatic/GI Normal Bowel Prep.    Musculoskeletal:  Musculoskeletal Normal    Neurological:   Neuromuscular Disease, Headaches    Endocrine:  Endocrine Normal    Dermatological:  Skin Normal    Psych:   Psychiatric History depression          Physical  Exam  General: Well nourished, Cooperative, Alert and Oriented    Airway:  Mallampati: II   Mouth Opening: Normal  TM Distance: Normal  Tongue: Normal  Neck ROM: Normal ROM    Dental:  Intact        Anesthesia Plan  Type of Anesthesia, risks & benefits discussed:    Anesthesia Type: Gen Natural Airway  Intra-op Monitoring Plan: Standard ASA Monitors  Induction:  IV  Informed Consent: Informed consent signed with the Patient and all parties understand the risks and agree with anesthesia plan.  All questions answered.   ASA Score: 3  Day of Surgery Review of History & Physical: H&P Update referred to the surgeon/provider.    Ready For Surgery From Anesthesia Perspective.     .

## 2023-10-26 NOTE — TRANSFER OF CARE
"Anesthesia Transfer of Care Note    Patient: Mirian العلي    Procedure(s) Performed: Procedure(s) (LRB):  COLONOSCOPY (N/A)    Patient location: GI    Anesthesia Type: general    Transport from OR: Transported from OR on room air with adequate spontaneous ventilation    Post pain: adequate analgesia    Post assessment: no apparent anesthetic complications and tolerated procedure well    Post vital signs: stable    Level of consciousness: sedated    Nausea/Vomiting: no nausea/vomiting    Complications: none    Transfer of care protocol was followed      Last vitals:   Visit Vitals  /75   Pulse 81   Temp 36.7 °C (98.1 °F)   Resp 16   Ht 5' 11" (1.803 m)   Wt 77.1 kg (170 lb)   LMP 01/01/2015 (Approximate)   SpO2 99%   Breastfeeding No   BMI 23.71 kg/m²     "

## 2023-11-07 ENCOUNTER — OFFICE VISIT (OUTPATIENT)
Dept: OBSTETRICS AND GYNECOLOGY | Facility: CLINIC | Age: 48
End: 2023-11-07
Payer: COMMERCIAL

## 2023-11-07 VITALS
DIASTOLIC BLOOD PRESSURE: 72 MMHG | HEIGHT: 71 IN | WEIGHT: 176.69 LBS | BODY MASS INDEX: 24.74 KG/M2 | SYSTOLIC BLOOD PRESSURE: 105 MMHG

## 2023-11-07 DIAGNOSIS — Z91.89 INCREASED RISK OF BREAST CANCER: ICD-10-CM

## 2023-11-07 DIAGNOSIS — Z80.3 FAMILY HISTORY OF BREAST CANCER: ICD-10-CM

## 2023-11-07 DIAGNOSIS — R92.8 ABNORMAL FINDING ON BREAST IMAGING: Primary | ICD-10-CM

## 2023-11-07 DIAGNOSIS — Z12.39 BREAST CANCER SCREENING, HIGH RISK PATIENT: ICD-10-CM

## 2023-11-07 PROCEDURE — 3074F SYST BP LT 130 MM HG: CPT | Mod: CPTII,S$GLB,, | Performed by: PHYSICIAN ASSISTANT

## 2023-11-07 PROCEDURE — 99999 PR PBB SHADOW E&M-EST. PATIENT-LVL IV: ICD-10-PCS | Mod: PBBFAC,,, | Performed by: PHYSICIAN ASSISTANT

## 2023-11-07 PROCEDURE — 99999 PR PBB SHADOW E&M-EST. PATIENT-LVL IV: CPT | Mod: PBBFAC,,, | Performed by: PHYSICIAN ASSISTANT

## 2023-11-07 PROCEDURE — 3044F PR MOST RECENT HEMOGLOBIN A1C LEVEL <7.0%: ICD-10-PCS | Mod: CPTII,S$GLB,, | Performed by: PHYSICIAN ASSISTANT

## 2023-11-07 PROCEDURE — 1160F PR REVIEW ALL MEDS BY PRESCRIBER/CLIN PHARMACIST DOCUMENTED: ICD-10-PCS | Mod: CPTII,S$GLB,, | Performed by: PHYSICIAN ASSISTANT

## 2023-11-07 PROCEDURE — 99213 PR OFFICE/OUTPT VISIT, EST, LEVL III, 20-29 MIN: ICD-10-PCS | Mod: S$GLB,,, | Performed by: PHYSICIAN ASSISTANT

## 2023-11-07 PROCEDURE — 1159F PR MEDICATION LIST DOCUMENTED IN MEDICAL RECORD: ICD-10-PCS | Mod: CPTII,S$GLB,, | Performed by: PHYSICIAN ASSISTANT

## 2023-11-07 PROCEDURE — 1160F RVW MEDS BY RX/DR IN RCRD: CPT | Mod: CPTII,S$GLB,, | Performed by: PHYSICIAN ASSISTANT

## 2023-11-07 PROCEDURE — 3008F BODY MASS INDEX DOCD: CPT | Mod: CPTII,S$GLB,, | Performed by: PHYSICIAN ASSISTANT

## 2023-11-07 PROCEDURE — 99213 OFFICE O/P EST LOW 20 MIN: CPT | Mod: S$GLB,,, | Performed by: PHYSICIAN ASSISTANT

## 2023-11-07 PROCEDURE — 3074F PR MOST RECENT SYSTOLIC BLOOD PRESSURE < 130 MM HG: ICD-10-PCS | Mod: CPTII,S$GLB,, | Performed by: PHYSICIAN ASSISTANT

## 2023-11-07 PROCEDURE — 3078F PR MOST RECENT DIASTOLIC BLOOD PRESSURE < 80 MM HG: ICD-10-PCS | Mod: CPTII,S$GLB,, | Performed by: PHYSICIAN ASSISTANT

## 2023-11-07 PROCEDURE — 1159F MED LIST DOCD IN RCRD: CPT | Mod: CPTII,S$GLB,, | Performed by: PHYSICIAN ASSISTANT

## 2023-11-07 PROCEDURE — 3044F HG A1C LEVEL LT 7.0%: CPT | Mod: CPTII,S$GLB,, | Performed by: PHYSICIAN ASSISTANT

## 2023-11-07 PROCEDURE — 3078F DIAST BP <80 MM HG: CPT | Mod: CPTII,S$GLB,, | Performed by: PHYSICIAN ASSISTANT

## 2023-11-07 PROCEDURE — 3008F PR BODY MASS INDEX (BMI) DOCUMENTED: ICD-10-PCS | Mod: CPTII,S$GLB,, | Performed by: PHYSICIAN ASSISTANT

## 2023-11-07 NOTE — PROGRESS NOTES
"  History:       HPI:   Mirian العلي presents for follow up of increased risk of breast cancer. Previously calculated her Tyrer-Cuzick score to be 22.3% and her 5 year risk with the Ilene Model of 2.7%. No significant changes in her personal or family medical history since last seen.  Denies breast mass, changes in skin or nipple, or drainage.  There was a right breast non-mass enhancement seen on breast MRI on 2023. Ultimately had right breast biopsy on 2023 with path showing "Benign breast tissue with cysts, usual ductal hyperplasia (UDH) and stromal fibrosis in a background of blood clot. Rare microcalcifications are identified in association with benign breast tissue. Negative for atypia or malignancy." Reports swelling and mass in the right breast just lateral to nipple since biopsy. It has continued to improve and much smaller. Referred to breast surgery by Dr. Mir and scheduled this week.    BREAST IMAGING  Bilateral Screening Mammogram: 2023- negative  Breast MRI: 2023 - right breast non-mass enhancement  Right breast Biopsy: 2023- Benign breast tissue with cysts, usual ductal hyperplasia (UDH) and stromal fibrosis in a background of blood clot. Rare microcalcifications are identified in association with benign breast tissue. Negative for atypia or malignancy.     Personal history of cancer: no  Prior chest wall radiation at ages 10-30: no  Genetic testing: negative  Ashkenazi inheritance: no  OB/Gyn history:    Number of pregnancies & age at first gestation: , first live birth at age 30   Age of menarche: 15 y/o  Age of menopause: surgical menopause at age 40   Last menstrual period:    Body mass index is 24.64 kg/m².   HRT Use: yes, currently estradiol and testosterone  Number of previous biopsies:  2023 Right breast- Benign breast tissue with cysts, usual ductal hyperplasia (UDH) and stromal fibrosis in a background of blood clot. Rare microcalcifications " are identified in association with benign breast tissue. Negative for atypia or malignancy.    Breast density: Heterogeneously dense    Family History:  Mother- Breast Cancer at 68; Lung cancer ( at 74)  Father- Kidney and bladder cancers  Sister- Inflammatory Breast Cancer at 46 ( at 48); She had genetic testing that was negative.    Past Medical   Past Medical History:   Diagnosis Date    Angio-edema     AR (allergic rhinitis)     BRCA1 negative     BRCA2 negative     Eczema     Endometriosis, mild 2002    Factor V Leiden mutation     Genetic testing 10/2020    Invitae Multi-Cancer Panel & Invitae Myelodysplastic Syndrome/Leukemia Panel (84 genes):  NEGATIVE for mutations.    Ovarian cyst, bilateral     Thyroglobulin antibody positive     per patient, who stated results were given by Dr. Coleman Velez in Rheumatology on 2019    Urticaria      Patient Active Problem List   Diagnosis    Fatigue    Ovarian cyst    Breast tenderness in female    Generalized headaches    Endometriosis of pelvis    Unmedicated IUD    Factor V Leiden carrier    Rhinitis, allergic    Pelvic pain in female    S/P hysterectomy with oophorectomy    Family history of breast cancer in mother    Chest pain on breathing    Precordial pain    Hemangioma    Corneal abrasion, left    Pectus excavatum    Shingles    Abnormal laboratory test    Carpal tunnel syndrome of right wrist    Right shoulder pain    Swelling of arm    Primary insomnia    Increased risk of breast cancer    Decreased right shoulder range of motion    Muscle weakness of right upper extremity    Isolated cervical dystonia    Inflammatory polyarthropathy    Drug-induced immunodeficiency    Current mild episode of major depressive disorder, unspecified whether recurrent       Social History   Social History     Tobacco Use    Smoking status: Never     Passive exposure: Never    Smokeless tobacco: Never   Substance Use Topics    Alcohol use: Yes     Comment: week     Drug use: Never       Family History  Family History   Problem Relation Age of Onset    Thyroid disease Mother     Breast cancer Mother         diagnosed in her 60s; bilateral    Lung cancer Mother 71        former smoker    Cancer Mother         Breast cancers over age 65, lung cancer    Skin cancer Father         type?    Bladder Cancer Father         diagnosed in his 60s    Kidney cancer Father 60    Cancer Father         Skin, kidney, bladder    Breast cancer Sister 46        inflammatory breast cancer, unilateral; Real genetic test negative    Other Sister         dermatothyocitis    Cancer Sister         Inflammatory breast cancer    Other Sister         pre-cancer? cervical?    Diabetes Brother     Factor V Leiden deficiency Brother     Allergies Brother     Asthma Brother     Diabetes Brother         Type 2    Diabetes Brother         Type 2    Diabetes Maternal Aunt     Diabetes Paternal Uncle     Cancer Maternal Grandmother         bone marrow cancer in her 60s- multiple myeloma?    Throat cancer Paternal Grandfather         diagnosed in his 70s    Cancer Paternal Cousin         unknown origin    BRCA 1/2 Paternal Cousin         +BRCA2 mutation [id number] (patient Mirian tested negative for this twice)       Medications    Current Outpatient Medications:     buPROPion (WELLBUTRIN XL) 300 MG 24 hr tablet, TAKE 1 TABLET BY MOUTH EVERY DAY WITH 150MG TABLET AS DIRECTED, Disp: 90 tablet, Rfl: 1    allerg xt,D.farinae-D.pteronys (ODACTRA) 12 SQ-HDM Subl, Place 1 tablet under the tongue every evening., Disp: 90 tablet, Rfl: 3    ALPRAZolam (XANAX) 1 MG tablet, Take 1 tablet (1 mg total) by mouth nightly as needed for Insomnia or Anxiety., Disp: 21 tablet, Rfl: 0    aspirin (ECOTRIN) 81 MG EC tablet, Take 81 mg by mouth once daily., Disp: , Rfl:     azelastine (ASTELIN) 137 mcg (0.1 %) nasal spray, USE 1 TO 2 SPRAYS IN EACH NOSTRIL TWICE DAILY, Disp: 30 mL, Rfl: 11    buPROPion (WELLBUTRIN XL) 150 MG TB24  tablet, TAKE 1 TABLET BY MOUTH EVERY DAY. TAKE WITH 300MG TABLETS, Disp: 90 tablet, Rfl: 3    cetirizine (ZYRTEC) 10 MG tablet, Take 1 tablet (10 mg total) by mouth once daily., Disp: 30 tablet, Rfl: 2    cyanocobalamin, vitamin B-12, 5,000 mcg Subl, Place under the tongue once a week., Disp: , Rfl:     diphenhydrAMINE (BENADRYL) 50 MG capsule, Take 50 mg by mouth every 6 (six) hours as needed for Itching., Disp: , Rfl:     diphenhydrAMINE (SOMINEX) 25 mg tablet, Take 25 mg by mouth nightly as needed for Insomnia., Disp: , Rfl:     EPINEPHrine (EPIPEN 2-AVILA) 0.3 mg/0.3 mL AtIn, Inject 0.3 mLs (0.3 mg total) into the muscle as needed (Anaphylaxis)., Disp: 2 each, Rfl: 0    estradioL (VIVELLE-DOT) 0.1 mg/24 hr PTSW, Place 1 patch onto the skin twice a week., Disp: 8 patch, Rfl: 11    fluticasone (FLONASE) 50 mcg/actuation nasal spray, PLACE 2 SPRAYS IN EACH NOSTRIL ONCE DAILY, Disp: 1 Bottle, Rfl: 1    hydrOXYchloroQUINE (PLAQUENIL) 200 mg tablet, TK 2 TS PO QD, Disp: , Rfl:     montelukast (SINGULAIR) 10 mg tablet, Take 1 tablet (10 mg total) by mouth every evening., Disp: 30 tablet, Rfl: 11    prasterone, dhea, (INTRAROSA) 6.5 mg Inst, Place 6.5 mg vaginally every evening., Disp: 30 each, Rfl: 12    tiZANidine (ZANAFLEX) 4 MG tablet, TAKE 1 TABLET(4 MG) BY MOUTH TWICE DAILY AS NEEDED, Disp: 60 tablet, Rfl: 1    UNABLE TO FIND, medication name: tumeric 200 mg twice a day, Disp: , Rfl:     UNABLE TO FIND, Apply 0.75 g topically every evening. Testosterone 1%, Disp: 30 g, Rfl: 5    vitamin D (VITAMIN D3) 1000 units Tab, Take 2,000 Units by mouth 2 (two) times daily., Disp: , Rfl:     Allergies  Review of patient's allergies indicates:   Allergen Reactions    Niacin preparations      Patient states she has flushing    Sulfa (sulfonamide antibiotics)      Tongue swelling    Iodine and iodide containing products Rash       Review of Systems  General: No fever, chills, or weight loss.  Chest: No chest pain, shortness of  "breath, or palpitations.  Breast: No pain, or nipple discharge. +mass after biopsy in right breast  Vulva: No pain, lesions, or itching.  Vagina: No relaxation, itching, discharge, or lesions.  Abdomen: No pain, nausea, vomiting, diarrhea, or constipation.  Urinary: No incontinence, nocturia, frequency, or dysuria.  Extremities:  No leg cramps, edema, or calf pain.  Neurologic: No headaches, dizziness, or visual changes.    Objective:     Vitals:    11/07/23 1423   BP: 105/72   Weight: 80.2 kg (176 lb 11.2 oz)   Height: 5' 11" (1.803 m)     Body mass index is 24.64 kg/m².    GENERAL:  Well-developed, well-nourished female in no acute distress. Vital signs reviewed.   SKIN:  Warm, dry without rashes, purpura or petechiae.  EYES:  EOMs intact.  Conjunctivae normal.  HEAD:  Normocephalic.  EARS/NOSE/MOUTH/THROAT:  Hearing intact.   NECK:  Supple with good range of motion; no masses  PSYCHIATRIC:  Normal affect and mood.  Alert and Oriented x 3.   BREASTS:Symmetrical, no skin changes or visible lesions.  No nipple discharge bilaterally. Slight swelling/mass in the right breast at medial areola    Physical Exam   Pulmonary/Chest:            Assessment:     Increased risk of breast cancer: This is a 48 y.o. female who presents for follow up of increased risk of breast cancer based on an elevated Tyrer Cuzick score.      Plan:   Continue annual screening mammograms and breast MRI, alternating imaging every 6 months until age 77.    6 month follow up bilateral diagnostic mammogram in 3/2024  Swelling after biopsy continues to resolve.  She will monitor and follow up with Breast Surgery if does not resolve. She will let me know  Breast MRI in 9/2024  Two physical exams per year, one can be with her OB/GYN.      Follow up in 1 year for CBE.     I spent a total of 25 minutes on the day of the visit.This includes face to face time and non-face to face time preparing to see the patient (eg, review of tests), obtaining and/or " reviewing separately obtained history, documenting clinical information in the electronic or other health record, independently interpreting results and communicating results to the patient/family/caregiver, or care coordinator.  \

## 2023-11-11 NOTE — TELEPHONE ENCOUNTER
Unable to make contact, left message to inform patient the Allergy Team can assist with rescheduling the 4 months follow up.  Awaiting call back to assist with booking appointment.  
PAST MEDICAL HISTORY:  Dementia     HTN (hypertension)

## 2023-12-21 ENCOUNTER — TELEPHONE (OUTPATIENT)
Dept: PAIN MEDICINE | Facility: CLINIC | Age: 48
End: 2023-12-21
Payer: COMMERCIAL

## 2023-12-21 ENCOUNTER — OFFICE VISIT (OUTPATIENT)
Dept: PAIN MEDICINE | Facility: CLINIC | Age: 48
End: 2023-12-21
Attending: ANESTHESIOLOGY
Payer: COMMERCIAL

## 2023-12-21 VITALS
RESPIRATION RATE: 19 BRPM | HEIGHT: 71 IN | TEMPERATURE: 98 F | DIASTOLIC BLOOD PRESSURE: 74 MMHG | HEART RATE: 80 BPM | SYSTOLIC BLOOD PRESSURE: 116 MMHG | BODY MASS INDEX: 24.69 KG/M2 | WEIGHT: 176.38 LBS

## 2023-12-21 DIAGNOSIS — M79.18 MYOFASCIAL PAIN: ICD-10-CM

## 2023-12-21 DIAGNOSIS — G24.3 ISOLATED CERVICAL DYSTONIA: Primary | ICD-10-CM

## 2023-12-21 DIAGNOSIS — M62.838 MUSCLE SPASM: ICD-10-CM

## 2023-12-21 PROCEDURE — 3044F PR MOST RECENT HEMOGLOBIN A1C LEVEL <7.0%: ICD-10-PCS | Mod: CPTII,S$GLB,, | Performed by: ANESTHESIOLOGY

## 2023-12-21 PROCEDURE — 95874 PR NEEDLE EMG GUIDANCE FOR CHEMODENERVATION: ICD-10-PCS | Mod: S$GLB,,, | Performed by: ANESTHESIOLOGY

## 2023-12-21 PROCEDURE — 99212 PR OFFICE/OUTPT VISIT, EST, LEVL II, 10-19 MIN: ICD-10-PCS | Mod: 25,S$GLB,, | Performed by: ANESTHESIOLOGY

## 2023-12-21 PROCEDURE — 99212 OFFICE O/P EST SF 10 MIN: CPT | Mod: 25,S$GLB,, | Performed by: ANESTHESIOLOGY

## 2023-12-21 PROCEDURE — 99999 PR PBB SHADOW E&M-EST. PATIENT-LVL V: ICD-10-PCS | Mod: PBBFAC,,, | Performed by: ANESTHESIOLOGY

## 2023-12-21 PROCEDURE — 3074F SYST BP LT 130 MM HG: CPT | Mod: CPTII,S$GLB,, | Performed by: ANESTHESIOLOGY

## 2023-12-21 PROCEDURE — 3078F PR MOST RECENT DIASTOLIC BLOOD PRESSURE < 80 MM HG: ICD-10-PCS | Mod: CPTII,S$GLB,, | Performed by: ANESTHESIOLOGY

## 2023-12-21 PROCEDURE — 1159F PR MEDICATION LIST DOCUMENTED IN MEDICAL RECORD: ICD-10-PCS | Mod: CPTII,S$GLB,, | Performed by: ANESTHESIOLOGY

## 2023-12-21 PROCEDURE — 3044F HG A1C LEVEL LT 7.0%: CPT | Mod: CPTII,S$GLB,, | Performed by: ANESTHESIOLOGY

## 2023-12-21 PROCEDURE — 1159F MED LIST DOCD IN RCRD: CPT | Mod: CPTII,S$GLB,, | Performed by: ANESTHESIOLOGY

## 2023-12-21 PROCEDURE — 3008F PR BODY MASS INDEX (BMI) DOCUMENTED: ICD-10-PCS | Mod: CPTII,S$GLB,, | Performed by: ANESTHESIOLOGY

## 2023-12-21 PROCEDURE — 64616 CHEMODENERV MUSC NECK DYSTON: CPT | Mod: RT,S$GLB,, | Performed by: ANESTHESIOLOGY

## 2023-12-21 PROCEDURE — 1160F PR REVIEW ALL MEDS BY PRESCRIBER/CLIN PHARMACIST DOCUMENTED: ICD-10-PCS | Mod: CPTII,S$GLB,, | Performed by: ANESTHESIOLOGY

## 2023-12-21 PROCEDURE — 3078F DIAST BP <80 MM HG: CPT | Mod: CPTII,S$GLB,, | Performed by: ANESTHESIOLOGY

## 2023-12-21 PROCEDURE — 3008F BODY MASS INDEX DOCD: CPT | Mod: CPTII,S$GLB,, | Performed by: ANESTHESIOLOGY

## 2023-12-21 PROCEDURE — 95874 GUIDE NERV DESTR NEEDLE EMG: CPT | Mod: S$GLB,,, | Performed by: ANESTHESIOLOGY

## 2023-12-21 PROCEDURE — 64616 PR CHEMODENERVATION NECK MUSCLES EXC LARNYNX, UNI: ICD-10-PCS | Mod: RT,S$GLB,, | Performed by: ANESTHESIOLOGY

## 2023-12-21 PROCEDURE — 1160F RVW MEDS BY RX/DR IN RCRD: CPT | Mod: CPTII,S$GLB,, | Performed by: ANESTHESIOLOGY

## 2023-12-21 PROCEDURE — 99999 PR PBB SHADOW E&M-EST. PATIENT-LVL V: CPT | Mod: PBBFAC,,, | Performed by: ANESTHESIOLOGY

## 2023-12-21 PROCEDURE — 3074F PR MOST RECENT SYSTOLIC BLOOD PRESSURE < 130 MM HG: ICD-10-PCS | Mod: CPTII,S$GLB,, | Performed by: ANESTHESIOLOGY

## 2023-12-21 RX ORDER — TIZANIDINE 4 MG/1
TABLET ORAL
Qty: 60 TABLET | Refills: 12 | Status: SHIPPED | OUTPATIENT
Start: 2023-12-21

## 2023-12-21 NOTE — PROGRESS NOTES
Patient Name: Mirian العلي  MRN: [id number]     Patient presents for repeat Botox injection. No new symptoms, no side effects reported from last injection.         INFORMED CONSENT: The procedure, risks, benefits and options were discussed with patient. There are no contraindications to the procedure. The patient expressed understanding and agreed to proceed. The personnel performing the procedure was discussed. I verify that I personally obtained Mirian's consent prior to the start of the procedure and the signed consent can be found on the patient's chart.  Shoulder improved with HEP     Procedure Date: 12/21/2023     Anesthesia: Topical     Sedation: None     Pre-operative diagnosis:     1. Isolated cervical dystonia  onabotulinumtoxina injection 400 Units    Prior authorization Order     2. Muscle spasm  onabotulinumtoxina injection 400 Units    Prior authorization Order                    We discussed with the patient the assessment and recommendations. The following is the plan we agreed on:     1.  Procedure:  Under clean technique, EMG guidance and after discussing with the patient we used 400 units Botox.  No units wasted.  We injected Right splenius capitis, Right longismus,  Right upper trapezius, Right levator scapula, and right upper pectoral muscles.  Patient tolerated procedure well.  2.  Return as needed.  Otherwise follow-up in 3 months to repeat injection with 400 units Botox.  3. Refilled tizanidine 4mg BID      I performed a history, review of systems, and physical exam with the patient. The assessment and plan were discussed and agreed upon with Dr. Mcclendon, the attending of record, before sharing with the patient. The face to face encounter time, including answering all patient questions, was 20 minutes.      1. Isolated cervical dystonia  onabotulinumtoxina injection 400 Units      2. Muscle spasm  onabotulinumtoxina injection 400 Units      3. Myofascial pain  tiZANidine (ZANAFLEX) 4 MG  tablet

## 2024-03-02 ENCOUNTER — PATIENT MESSAGE (OUTPATIENT)
Dept: OBSTETRICS AND GYNECOLOGY | Facility: CLINIC | Age: 49
End: 2024-03-02
Payer: COMMERCIAL

## 2024-03-02 DIAGNOSIS — Z12.31 SCREENING MAMMOGRAM, ENCOUNTER FOR: ICD-10-CM

## 2024-03-02 DIAGNOSIS — R92.8 ABNORMAL FINDING ON BREAST IMAGING: Primary | ICD-10-CM

## 2024-03-07 ENCOUNTER — HOSPITAL ENCOUNTER (OUTPATIENT)
Dept: RADIOLOGY | Facility: OTHER | Age: 49
Discharge: HOME | End: 2024-03-07
Attending: PHYSICIAN ASSISTANT
Payer: COMMERCIAL

## 2024-03-07 DIAGNOSIS — R92.8 ABNORMAL FINDING ON BREAST IMAGING: ICD-10-CM

## 2024-03-07 DIAGNOSIS — Z12.31 SCREENING MAMMOGRAM, ENCOUNTER FOR: ICD-10-CM

## 2024-03-07 PROCEDURE — 77061 BREAST TOMOSYNTHESIS UNI: CPT | Mod: TC,RT

## 2024-03-07 PROCEDURE — 77061 BREAST TOMOSYNTHESIS UNI: CPT | Mod: 26,RT,, | Performed by: RADIOLOGY

## 2024-03-07 PROCEDURE — 77065 DX MAMMO INCL CAD UNI: CPT | Mod: 26,RT,, | Performed by: RADIOLOGY

## 2024-03-07 PROCEDURE — 77065 DX MAMMO INCL CAD UNI: CPT | Mod: TC,RT

## 2024-03-12 ENCOUNTER — PATIENT MESSAGE (OUTPATIENT)
Dept: PRIMARY CARE CLINIC | Facility: CLINIC | Age: 49
End: 2024-03-12
Payer: COMMERCIAL

## 2024-03-13 ENCOUNTER — PATIENT MESSAGE (OUTPATIENT)
Dept: PRIMARY CARE CLINIC | Facility: CLINIC | Age: 49
End: 2024-03-13

## 2024-03-13 ENCOUNTER — OFFICE VISIT (OUTPATIENT)
Dept: PRIMARY CARE CLINIC | Facility: CLINIC | Age: 49
End: 2024-03-13
Attending: FAMILY MEDICINE
Payer: COMMERCIAL

## 2024-03-13 DIAGNOSIS — F51.01 PRIMARY INSOMNIA: Primary | ICD-10-CM

## 2024-03-13 DIAGNOSIS — M06.4 INFLAMMATORY POLYARTHROPATHY: ICD-10-CM

## 2024-03-13 DIAGNOSIS — F32.0 CURRENT MILD EPISODE OF MAJOR DEPRESSIVE DISORDER, UNSPECIFIED WHETHER RECURRENT: ICD-10-CM

## 2024-03-13 DIAGNOSIS — D84.821 DRUG-INDUCED IMMUNODEFICIENCY: ICD-10-CM

## 2024-03-13 DIAGNOSIS — D68.51 FACTOR V LEIDEN CARRIER: ICD-10-CM

## 2024-03-13 DIAGNOSIS — Z79.899 DRUG-INDUCED IMMUNODEFICIENCY: ICD-10-CM

## 2024-03-13 PROCEDURE — 99214 OFFICE O/P EST MOD 30 MIN: CPT | Mod: 95,,, | Performed by: FAMILY MEDICINE

## 2024-03-13 RX ORDER — TRAZODONE HYDROCHLORIDE 50 MG/1
50 TABLET ORAL NIGHTLY PRN
Qty: 30 TABLET | Refills: 2 | Status: SHIPPED | OUTPATIENT
Start: 2024-03-13 | End: 2024-06-10

## 2024-03-13 NOTE — PROGRESS NOTES
Subjective:       Patient ID: Mirian العلي is a 48 y.o. female.    Chief Complaint: insomnia    The patient location is: home  The chief complaint leading to consultation is: above    Visit type: audiovisual    Face to Face time with patient: 15 minutes 15minutes of total time spent on the encounter, which includes face to face time and non-face to face time preparing to see the patient (eg, review of tests), Obtaining and/or reviewing separately obtained history, Documenting clinical information in the electronic or other health record, Independently interpreting results (not separately reported) and communicating results to the patient/family/caregiver, or Care coordination (not separately reported).     Each patient to whom he or she provides medical services by telemedicine is:  (1) informed of the relationship between the physician and patient and the respective role of any other health care provider with respect to management of the patient; and (2) notified that he or she may decline to receive medical services by telemedicine and may withdraw from such care at any time.    Notes:     Pt presents with concerns of insomnia. Has been taking her husbands trazodone and it has been working somewhat. Pt wants her own RF. Was given this awhile back from me as well      Review of Systems   Constitutional:  Positive for activity change. Negative for unexpected weight change.   HENT:  Positive for rhinorrhea. Negative for hearing loss and trouble swallowing.    Eyes:  Positive for discharge and visual disturbance.   Respiratory:  Negative for chest tightness and wheezing.    Cardiovascular:  Positive for palpitations. Negative for chest pain.   Gastrointestinal:  Positive for constipation. Negative for blood in stool, diarrhea and vomiting.   Endocrine: Negative for polydipsia and polyuria.   Genitourinary:  Negative for difficulty urinating, dysuria, hematuria and menstrual problem.   Musculoskeletal:   Positive for neck pain. Negative for arthralgias and joint swelling.   Neurological:  Positive for headaches. Negative for weakness.   Psychiatric/Behavioral:  Negative for confusion and dysphoric mood.    All other systems reviewed and are negative.        Objective:      Physical Exam  Vitals reviewed.   Constitutional:       General: She is not in acute distress.     Appearance: Normal appearance. She is well-developed. She is not ill-appearing or toxic-appearing.   HENT:      Head: Normocephalic and atraumatic.   Eyes:      Extraocular Movements: Extraocular movements intact.   Pulmonary:      Effort: Pulmonary effort is normal.   Musculoskeletal:         General: Normal range of motion.      Cervical back: Neck supple.      Right lower leg: No edema.      Left lower leg: No edema.   Neurological:      Mental Status: She is alert and oriented to person, place, and time.   Psychiatric:         Mood and Affect: Mood normal.         Behavior: Behavior normal.         Thought Content: Thought content normal.         Judgment: Judgment normal.         Assessment:       1. Primary insomnia        Plan:   1. Primary insomnia  -     traZODone (DESYREL) 50 MG tablet; Take 1 tablet (50 mg total) by mouth nightly as needed for Insomnia.  Dispense: 30 tablet; Refill: 2    Trial of trazodone. SEs of med d/w pt., can take 75mg to 100 mg as well daily prn if she has hard time sleeping at 50 mg dosing  Inflamm poly. Drug induced immunodef: managed by rheum on med stable  MDD; stable on med  Factor 5 leiden stable Managed by [facility]  RTC prn symptoms

## 2024-03-21 ENCOUNTER — OFFICE VISIT (OUTPATIENT)
Dept: PAIN MEDICINE | Facility: CLINIC | Age: 49
End: 2024-03-21
Attending: ANESTHESIOLOGY
Payer: COMMERCIAL

## 2024-03-21 VITALS
HEART RATE: 86 BPM | RESPIRATION RATE: 18 BRPM | WEIGHT: 176.38 LBS | BODY MASS INDEX: 24.6 KG/M2 | DIASTOLIC BLOOD PRESSURE: 80 MMHG | SYSTOLIC BLOOD PRESSURE: 111 MMHG | OXYGEN SATURATION: 98 % | TEMPERATURE: 98 F

## 2024-03-21 DIAGNOSIS — G24.3 ISOLATED CERVICAL DYSTONIA: Primary | ICD-10-CM

## 2024-03-21 DIAGNOSIS — M62.838 MUSCLE SPASM: ICD-10-CM

## 2024-03-21 PROCEDURE — 3008F BODY MASS INDEX DOCD: CPT | Mod: CPTII,S$GLB,, | Performed by: ANESTHESIOLOGY

## 2024-03-21 PROCEDURE — 1160F RVW MEDS BY RX/DR IN RCRD: CPT | Mod: CPTII,S$GLB,, | Performed by: ANESTHESIOLOGY

## 2024-03-21 PROCEDURE — 99999 PR PBB SHADOW E&M-EST. PATIENT-LVL V: CPT | Mod: PBBFAC,,, | Performed by: ANESTHESIOLOGY

## 2024-03-21 PROCEDURE — 3074F SYST BP LT 130 MM HG: CPT | Mod: CPTII,S$GLB,, | Performed by: ANESTHESIOLOGY

## 2024-03-21 PROCEDURE — 1159F MED LIST DOCD IN RCRD: CPT | Mod: CPTII,S$GLB,, | Performed by: ANESTHESIOLOGY

## 2024-03-21 PROCEDURE — 95874 GUIDE NERV DESTR NEEDLE EMG: CPT | Mod: S$GLB,,, | Performed by: ANESTHESIOLOGY

## 2024-03-21 PROCEDURE — 3079F DIAST BP 80-89 MM HG: CPT | Mod: CPTII,S$GLB,, | Performed by: ANESTHESIOLOGY

## 2024-03-21 PROCEDURE — 64616 CHEMODENERV MUSC NECK DYSTON: CPT | Mod: RT,S$GLB,, | Performed by: ANESTHESIOLOGY

## 2024-03-21 NOTE — PROGRESS NOTES
Patient Name: Mirian العلي  MRN: [id number]     Patient presents for repeat Botox injection. No new symptoms, no side effects reported from last injection.         INFORMED CONSENT: The procedure, risks, benefits and options were discussed with patient. There are no contraindications to the procedure. The patient expressed understanding and agreed to proceed. The personnel performing the procedure was discussed. I verify that I personally obtained Mirian's consent prior to the start of the procedure and the signed consent can be found on the patient's chart.  Shoulder improved with HEP     Procedure Date: 03/21/2024     Anesthesia: Topical     Sedation: None     Pre-operative diagnosis:   1. Isolated cervical dystonia  onabotulinumtoxina injection 400 Units      2. Muscle spasm  onabotulinumtoxina injection 400 Units               We discussed with the patient the assessment and recommendations. The following is the plan we agreed on:     1.  Procedure:  Under clean technique, EMG guidance and after discussing with the patient we used 400 units Botox.  No units wasted.  We injected Right splenius capitis, Right longismus, Right upper trapezius, Right levator scapula, and right upper pectoral muscles.  Patient tolerated procedure well.  2.  Return as needed.  Otherwise follow-up in 3 months to repeat injection with 400 units Botox.     I performed a history, review of systems, and physical exam with the patient. The assessment and plan were discussed and agreed upon with Dr. Mcclendon, the attending of record, before sharing with the patient. The face to face encounter time, including answering all patient questions, was 20 minutes.

## 2024-03-22 ENCOUNTER — OFFICE VISIT (OUTPATIENT)
Dept: ALLERGY | Facility: CLINIC | Age: 49
End: 2024-03-22
Payer: COMMERCIAL

## 2024-03-22 VITALS
BODY MASS INDEX: 25.24 KG/M2 | HEART RATE: 80 BPM | WEIGHT: 181 LBS | SYSTOLIC BLOOD PRESSURE: 95 MMHG | DIASTOLIC BLOOD PRESSURE: 68 MMHG

## 2024-03-22 DIAGNOSIS — Z91.09 ALLERGY TO AMERICAN HOUSE DUST MITE: ICD-10-CM

## 2024-03-22 DIAGNOSIS — Z51.6 ALLERGY DESENSITIZATION THERAPY: ICD-10-CM

## 2024-03-22 DIAGNOSIS — G47.9 RESTLESS SLEEPER: ICD-10-CM

## 2024-03-22 DIAGNOSIS — J30.9 CHRONIC ALLERGIC RHINITIS: Primary | ICD-10-CM

## 2024-03-22 DIAGNOSIS — L29.9 GENERALIZED PRURITUS: ICD-10-CM

## 2024-03-22 DIAGNOSIS — R68.89 HEAT INTOLERANCE: ICD-10-CM

## 2024-03-22 PROCEDURE — 1159F MED LIST DOCD IN RCRD: CPT | Mod: CPTII,S$GLB,, | Performed by: STUDENT IN AN ORGANIZED HEALTH CARE EDUCATION/TRAINING PROGRAM

## 2024-03-22 PROCEDURE — 3008F BODY MASS INDEX DOCD: CPT | Mod: CPTII,S$GLB,, | Performed by: STUDENT IN AN ORGANIZED HEALTH CARE EDUCATION/TRAINING PROGRAM

## 2024-03-22 PROCEDURE — 99214 OFFICE O/P EST MOD 30 MIN: CPT | Mod: S$GLB,,, | Performed by: STUDENT IN AN ORGANIZED HEALTH CARE EDUCATION/TRAINING PROGRAM

## 2024-03-22 PROCEDURE — 99999 PR PBB SHADOW E&M-EST. PATIENT-LVL IV: CPT | Mod: PBBFAC,,, | Performed by: STUDENT IN AN ORGANIZED HEALTH CARE EDUCATION/TRAINING PROGRAM

## 2024-03-22 PROCEDURE — 3078F DIAST BP <80 MM HG: CPT | Mod: CPTII,S$GLB,, | Performed by: STUDENT IN AN ORGANIZED HEALTH CARE EDUCATION/TRAINING PROGRAM

## 2024-03-22 PROCEDURE — 3074F SYST BP LT 130 MM HG: CPT | Mod: CPTII,S$GLB,, | Performed by: STUDENT IN AN ORGANIZED HEALTH CARE EDUCATION/TRAINING PROGRAM

## 2024-03-22 PROCEDURE — 1160F RVW MEDS BY RX/DR IN RCRD: CPT | Mod: CPTII,S$GLB,, | Performed by: STUDENT IN AN ORGANIZED HEALTH CARE EDUCATION/TRAINING PROGRAM

## 2024-03-22 NOTE — PROGRESS NOTES
ALLERGY & IMMUNOLOGY CLINIC - FOLLOW UP     HISTORY OF PRESENT ILLNESS     Patient ID: Mirian العلي is a 48 y.o. female    CC: allergic rhinitic, restless sleep    HPI: Mirian العلي is a 48 y.o. female following up for allergic rhinitis (started odactra 9/26/22), also with recent restless sleep.    She is asking if restless sleep can be a side effect of montelukast. She had been noticing restless sleep and feeling tired when she wakes up. She says she got a ring to monitor her sleep. It doesn't  any breathing issues. But it reports her sleep is restless.   She says she doesn't know exactly when it started, but it worsened around xmas.  She says she has noticed some bad dreams, but not terrible.  She hasn't noticed any day time psychiatric side effects.  She started montelukast in September and thinks it is helping with her rhinitis.     She remains on odactra nightly, no issues with it.  Remains on flonase and azelastine nasal sprays.   Using saline nasal spray and rinses as needed. She says when she uses a rinse, it no longer seems like there is a lot of mucus.  She takes cetirizine usually once per day, occasionally twice.     When she tried pepcid, it didn't help with post-nasal drip. She uses it when she feels reflux.     She says she has had itching, but not as bad as it has been in the past.  She still feels that the cetirizine helps with the pruritus.  She says she hasn't been having sweats, but she feels hot a lot. She says she had a total hysterectomy so already went through menopause in the past.      MEDICAL HISTORY     Vaccines:   Immunization History   Administered Date(s) Administered    COVID-19 MRNA, LN-S PF (MODERNA HALF 0.25 ML DOSE) 12/18/2021    COVID-19, MRNA, LN-S, PF (MODERNA FULL 0.5 ML DOSE) 02/23/2021, 02/23/2021, 03/24/2021    COVID-19, mRNA, LNP-S, bivalent booster, PF (PFIZER OMICRON) 08/11/2023    Influenza 10/05/2015, 08/27/2016    Influenza (Flumist) -  Quadrivalent - Intranasal *Preferred* (2-49 years old) 09/27/2014    Influenza - Quadrivalent 09/15/2019    Influenza - Quadrivalent - MDCK - PF 12/11/2021, 10/30/2022    Influenza - Quadrivalent - PF *Preferred* (6 months and older) 09/16/2017, 10/27/2018, 09/27/2020, 08/11/2023    Influenza - Trivalent (ADULT) 10/08/2011, 09/13/2013    Tdap 10/07/2019     MedHx:   Patient Active Problem List   Diagnosis    Fatigue    Ovarian cyst    Breast tenderness in female    Generalized headaches    Endometriosis of pelvis    Unmedicated IUD    Factor V Leiden carrier    Rhinitis, allergic    Pelvic pain in female    S/P hysterectomy with oophorectomy    Family history of breast cancer in mother    Chest pain on breathing    Precordial pain    Hemangioma    Corneal abrasion, left    Pectus excavatum    Shingles    Abnormal laboratory test    Carpal tunnel syndrome of right wrist    Right shoulder pain    Swelling of arm    Primary insomnia    Increased risk of breast cancer    Decreased right shoulder range of motion    Muscle weakness of right upper extremity    Isolated cervical dystonia    Inflammatory polyarthropathy    Drug-induced immunodeficiency    Current mild episode of major depressive disorder, unspecified whether recurrent     SurgHx:   Past Surgical History:   Procedure Laterality Date    BUNIONECTOMY Left 02/06/2018    COLONOSCOPY N/A 10/26/2023    Procedure: COLONOSCOPY;  Surgeon: Ishmael Pham MD;  Location: [address];  Service: Endoscopy;  Laterality: N/A;  Referred by: Dr. Nora Brooks  Prep: suprep  Route instructions sent: [id number]  Other concerns: requests first case  10/19-precall complete-MS    HYSTERECTOMY  01/2015    TLHBSO    LAPAROSCOPY W/ MINI-LAPAROTOMY  2003    OOPHORECTOMY Right 03/2012    OOPHORECTOMY Left 01/2015    TOTAL VAGINAL HYSTERECTOMY  01/2015    w/Right USO    WISDOM TOOTH EXTRACTION Bilateral      Medications:   Current Outpatient Medications on File Prior to Visit    Medication Sig Dispense Refill    allerg xt,D.farinae-D.pteronys (ODACTRA) 12 SQ-HDM Subl Place 1 tablet under the tongue every evening. 90 tablet 3    aspirin (ECOTRIN) 81 MG EC tablet Take 81 mg by mouth once daily.      azelastine (ASTELIN) 137 mcg (0.1 %) nasal spray USE 1 TO 2 SPRAYS IN EACH NOSTRIL TWICE DAILY 30 mL 11    buPROPion (WELLBUTRIN XL) 150 MG TB24 tablet TAKE 1 TABLET BY MOUTH EVERY DAY. TAKE WITH 300MG TABLETS 90 tablet 3    buPROPion (WELLBUTRIN XL) 300 MG 24 hr tablet TAKE 1 TABLET BY MOUTH EVERY DAY WITH 150MG TABLET AS DIRECTED 90 tablet 1    cetirizine (ZYRTEC) 10 MG tablet Take 1 tablet (10 mg total) by mouth once daily. 30 tablet 2    cyanocobalamin, vitamin B-12, 5,000 mcg Subl Place under the tongue once a week.      diphenhydrAMINE (BENADRYL) 50 MG capsule Take 50 mg by mouth every 6 (six) hours as needed for Itching.      diphenhydrAMINE (SOMINEX) 25 mg tablet Take 25 mg by mouth nightly as needed for Insomnia.      EPINEPHrine (EPIPEN 2-AVILA) 0.3 mg/0.3 mL AtIn Inject 0.3 mLs (0.3 mg total) into the muscle as needed (Anaphylaxis). 2 each 0    estradioL (VIVELLE-DOT) 0.1 mg/24 hr PTSW Place 1 patch onto the skin twice a week. 8 patch 11    fluticasone (FLONASE) 50 mcg/actuation nasal spray PLACE 2 SPRAYS IN EACH NOSTRIL ONCE DAILY 1 Bottle 1    hydrOXYchloroQUINE (PLAQUENIL) 200 mg tablet TK 2 TS PO QD      montelukast (SINGULAIR) 10 mg tablet Take 1 tablet (10 mg total) by mouth every evening. 30 tablet 11    prasterone, dhea, (INTRAROSA) 6.5 mg Inst Place 6.5 mg vaginally every evening. 30 each 12    tiZANidine (ZANAFLEX) 4 MG tablet TAKE 1 TABLET(4 MG) BY MOUTH TWICE DAILY AS NEEDED 60 tablet 12    traZODone (DESYREL) 50 MG tablet Take 1 tablet (50 mg total) by mouth nightly as needed for Insomnia. 30 tablet 2    UNABLE TO FIND medication name: tumeric 200 mg twice a day      UNABLE TO FIND Apply 0.75 g topically every evening. Testosterone 1% 30 g 5    vitamin D (VITAMIN D3) 1000  units Tab Take 2,000 Units by mouth 2 (two) times daily.      ALPRAZolam (XANAX) 1 MG tablet Take 1 tablet (1 mg total) by mouth nightly as needed for Insomnia or Anxiety. 21 tablet 0     Current Facility-Administered Medications on File Prior to Visit   Medication Dose Route Frequency Provider Last Rate Last Admin    [COMPLETED] onabotulinumtoxina injection 400 Units  400 Units Intramuscular 1 time in Clinic/HOD Fatemeh Mcclendon MD   400 Units at 03/21/24 1642     Drug Allergies:   Review of patient's allergies indicates:   Allergen Reactions    Niacin preparations      Patient states she has flushing    Sulfa (sulfonamide antibiotics)      Tongue swelling    Iodine and iodide containing products Rash     SocHx:   Social History     Tobacco Use    Smoking status: Never     Passive exposure: Never    Smokeless tobacco: Never   Substance Use Topics    Alcohol use: Yes     Comment: week    Drug use: Never     Additional History Obtained at Initial Visit:  H/o Asthma: She says she was given an inhaler as a teenager, but no longer needs an inhaler and has never been diagnosed with asthma  H/o Eczema: denies  H/o Rhinitis: endorses  Food Allergy: denies  Drug Allergies:   Iodine contrast -- she got hives. She didn't get hives when she pretreated with benadryl.  Sulfa antibiotics -- she got it after a surgery in 2015. Had tongue swelling.  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  Infection Hx: Denies frequent infections requiring antibiotics. No history of pneumonia.     PHYSICAL EXAM     VS: BP 95/68 (BP Location: Left arm, Patient Position: Sitting, BP Method: Medium (Automatic))   Pulse 80   Wt 82.1 kg (181 lb)   LMP 01/01/2015 (Approximate)   BMI 25.24 kg/m²   GENERAL: Alert, NAD, well-appearing  EYES: EOMI, no conjunctival injection, no discharge, no infraorbital shiners  NOSE: NT 2+ B/L, no stringing mucous, no polyps  visualized  ORAL: MMM, no ulcers, no thrush  NECK: no thyromegaly, no LAD  LUNGS: CTAB, no w/r/c, no increased WOB  HEART: RRR, normal S1/S2, no m/g/r  DERM: no rashes  NEURO: normal speech, normal gait, no facial asymmetry     LABORATORY STUDIES     Component      Latest Ref [facility] 4/5/2023   WBC      3.90 - 12.70 K/uL 3.01 (L)    RBC      4.00 - 5.40 M/uL 4.22    Hemoglobin      12.0 - 16.0 g/dL 12.4    Hematocrit      37.0 - 48.5 % 37.6    MCV      82 - 98 fL 89    MCH      27.0 - 31.0 pg 29.4    MCHC      32.0 - 36.0 g/dL 33.0    RDW      11.5 - 14.5 % 12.1    Platelets      150 - 450 K/uL 215    MPV      9.2 - 12.9 fL 10.6    Immature Granulocytes      0.0 - 0.5 % 0.0    Gran # (ANC)      1.8 - 7.7 K/uL 1.4 (L)    Immature Grans (Abs)      0.00 - 0.04 K/uL 0.00    Lymph #      1.0 - 4.8 K/uL 1.4    Mono #      0.3 - 1.0 K/uL 0.3    Eos #      0.0 - 0.5 K/uL 0.0    Baso #      0.00 - 0.20 K/uL 0.04    Differential Method Automated    Sodium      136 - 145 mmol/L 140    Potassium      3.5 - 5.1 mmol/L 4.0    Chloride      95 - 110 mmol/L 104    CO2      23 - 29 mmol/L 26    Glucose      70 - 110 mg/dL 92    BUN      6 - 20 mg/dL 10    Creatinine      0.5 - 1.4 mg/dL 0.9    Calcium      8.7 - 10.5 mg/dL 9.2    PROTEIN TOTAL      6.0 - 8.4 g/dL 6.7    Albumin      3.5 - 5.2 g/dL 4.2    BILIRUBIN TOTAL      0.1 - 1.0 mg/dL 0.4    Alkaline Phosphatase      55 - 135 U/L 38 (L)    AST      10 - 40 U/L 18    ALT      10 - 44 U/L 13    Hemoglobin A1C External      4.0 - 5.6 % 4.9    TSH      0.400 - 4.000 uIU/mL 2.407        ALLERGEN TESTING     Skin Prick:  Inhalant Skin Testing Results 9/7/22:  Interpretation: Appropriate positive and negative controls. Positives were to dust mite. All other allergens tested were negative.    Immunocaps:   Component      Latest Ref Rng & Units 7/22/2022 1/10/2013   D. farinae      <0.10 kU/L <0.10 1.23 (H)   D. farinae Class       CLASS 0 CLASS II   Mite Dust Pteronyssinus IgE      <0.10  kU/L <0.10 0.75 (H)   D. pteronyssinus Class       CLASS 0 CLASS II   BAHIA GRASS      <0.10 kU/L <0.10 <0.35   [place] Class       CLASS 0 CLASS 0   Bereket Grass      <0.10 kU/L <0.10 <0.35   Bereket Grass Class       CLASS 0 CLASS 0   [place](Quercus alba) IgE      <0.10 kU/L <0.10 <0.35   [place], Class       CLASS 0 CLASS 0   Pecan Cannon Tree      <0.10 kU/L <0.10 <0.35   Pecan, Class       CLASS 0 CLASS 0   Marshelder IgE      <0.10 kU/L <0.10 <0.35   Marshelder Class       CLASS 0 CLASS 0   Ragweed, Short, IgE      <0.35 kU/L  <0.35   Ragweed, Short, Class        CLASS 0   A. tenius, IgE      <0.35 kU/L  <0.35   A. tenius Class        CLASS 0   Aspergillus Fumigatus IgE      <0.10 kU/L <0.10 <0.35   A. fumigatus Class       CLASS 0 CLASS 0   BERMUDA GRASS      <0.10 kU/L <0.10    [place] Grass Class       CLASS 0    [place] IgE      <0.10 kU/L <0.10    [place] Class       CLASS 0    Plantain      <0.10 kU/L <0.10    English Plantain Class       CLASS 0    Ragweed, Western IgE      <0.10 kU/L <0.10    Ragweed, Western, Class       CLASS 0    Alternaria alternata      <0.10 kU/L <0.10    Altern. alternata Class       CLASS 0    Cat Dander      <0.10 kU/L <0.10    Cat Epithelium Class       CLASS 0    Dog Dander, IgE      <0.10 kU/L <0.10    Dog Dander Class       CLASS 0    WHITE KIKE TREE      <0.10 kU/L <0.10    White Kike Class       CLASS 0    Allergen Maple ([place]) IgE      <0.10 kU/L <0.10    Allergen Maple ([place]) Class       CLASS 0    [place] IgE      <0.10 kU/L <0.10    [place] Class       CLASS 0    [place]ch IgE      <0.10 kU/L <0.10    [place]ch Class       CLASS 0    CARLOS GRASS      <0.10 kU/L <0.10    Carlos Grass Class       CLASS 0    Allergen Candida albicans IgE      <0.10 kU/L <0.10    Allergen Candida albicans Class       CLASS 0    Cladosporium, IgE      <0.10 kU/L <0.10    Cladosporium Class       CLASS 0    Curvularia lunata      <0.10 kU/L <0.10    Curvularia Lunata  Class       CLASS 0    Penicillium, IgE      <0.10 kU/L <0.10    Penicillium Class       CLASS 0    IgE      0 - 100 IU/ml  109 (H)      PULMONARY FUNCTION TESTING     Date 1/10/13:  FVC:         101%ile   FEV1:         77%ile  FEV1/FVC: 63%  FEF 25-75: 44%ile  Interpretation: mild obstruction     ASSESSMENT & PLAN     Mirian العلي is a 48 y.o. female with     # Chronic allergic rhinitis, allergy to dust mite: Started sublingual immunotherapy with odactra 9/26/22, and she noticed some but not significant improvement. Flonase and azelastine also help somewhat. She has noticed improvement since starting montelukast in 9/2023.   -continue odactra 1 sublingual tablet nightly (she has epipen).  -continue flonase 1-2 SEN BID.  -continue azelastine nasal spray 1-2 SEN BID.  -continue prn saline nasal spray and rinses.  -continue cetirizine as below.  -plan for montelukast as below.     # Restless sleep: She started montelukast in 9/2023. She doesn't know exactly when the restless sleep started but says it worsened around eli. Discussed that restless sleep can be a side effect of montelukast, but it isn't clear if this is the case for her. Also, she is finding the montelukast helpful for her allergies.   -recommend she trial stopping montelukast for 2-4 weeks. If it still isn't clear that montelukast is the reason for her restless sleep, would then trial restarting it. If symptoms improve and then worsen again, this would suggest montelukast is the culprit and would then recommend discontinuing it.     # Generalized pruritus: helped by cetirizine.  -continue cetirizine 10 mg daily, plus additional 10 mg prn.    # Heat intolerance: No sweats, just feeling hot. She reports she already went through menopause after her total hysterectomy. Recommend she discuss with her pcp. Offered to check TSH, but patient will wait until her upcoming pcp appointment as they will likely be checking additional labs at that  time.      Follow up: 6 months or sooner if needed     I spent a total of 30 minutes on the day of the visit.  This includes face to face time and non-face to face time preparing to see the patient, obtaining and/or reviewing separately obtained history, documenting clinical information in the electronic or other health record.    Dulce Delvalle MD  Allergy/Immunology

## 2024-03-26 ENCOUNTER — PATIENT MESSAGE (OUTPATIENT)
Dept: PAIN MEDICINE | Facility: CLINIC | Age: 49
End: 2024-03-26
Payer: COMMERCIAL

## 2024-04-07 DIAGNOSIS — N95.1 MENOPAUSAL SYNDROME: ICD-10-CM

## 2024-04-07 NOTE — TELEPHONE ENCOUNTER
Refill Routing Note   Medication(s) are not appropriate for processing by [facility] for the following reason(s):        Outside of protocol    [facility] action(s):  Route               Appointments  past 12m or future 3m with PCP    Date Provider   Last Visit   10/4/2023 Tiff Mir MD   Next Visit   Visit date not found Tiff Mir MD   ED visits in past 90 days: 0        Note composed:10:18 AM 04/07/2024

## 2024-04-08 RX ORDER — ESTRADIOL 0.1 MG/D
FILM, EXTENDED RELEASE TRANSDERMAL
Qty: 8 PATCH | Refills: 5 | Status: SHIPPED | OUTPATIENT
Start: 2024-04-08

## 2024-04-15 ENCOUNTER — HOSPITAL ENCOUNTER (OUTPATIENT)
Dept: RADIOLOGY | Facility: HOSPITAL | Age: 49
Discharge: HOME | End: 2024-04-15
Attending: PHYSICIAN ASSISTANT
Payer: COMMERCIAL

## 2024-04-15 VITALS — WEIGHT: 178 LBS | BODY MASS INDEX: 24.83 KG/M2

## 2024-04-15 DIAGNOSIS — Z12.31 SCREENING MAMMOGRAM, ENCOUNTER FOR: ICD-10-CM

## 2024-04-15 PROCEDURE — 77063 BREAST TOMOSYNTHESIS BI: CPT | Mod: 26,,, | Performed by: RADIOLOGY

## 2024-04-15 PROCEDURE — 77067 SCR MAMMO BI INCL CAD: CPT | Mod: 26,,, | Performed by: RADIOLOGY

## 2024-04-15 PROCEDURE — 77067 SCR MAMMO BI INCL CAD: CPT | Mod: TC

## 2024-04-19 DIAGNOSIS — R07.1 CHEST PAIN ON BREATHING: ICD-10-CM

## 2024-04-19 DIAGNOSIS — F41.9 ANXIETY: ICD-10-CM

## 2024-04-19 NOTE — TELEPHONE ENCOUNTER
Refill Encounter    PCP Visits: Recent Visits  Date Type Provider Dept   03/13/24 Office Visit Nora Brooks MD [facility]   Showing recent visits within past 360 days and meeting all other requirements  Future Appointments  Date Type Provider Dept   05/07/24 Appointment Nora Brooks MD [facility]   Showing future appointments within next 720 days and meeting all other requirements     Last 3 Blood Pressure:   BP Readings from Last 3 Encounters:   03/22/24 95/68   03/21/24 111/80   12/21/23 116/74     Preferred Pharmacy:   [id number] [id number] [facility] [address]  [address] [address]  Phone: [phone] Fax: [phone]      Requested RX:  Requested Prescriptions     Pending Prescriptions Disp Refills    ALPRAZolam (XANAX) 1 MG tablet 21 tablet 0     Sig: Take 1 tablet (1 mg total) by mouth nightly as needed for Insomnia or Anxiety.      RX Route: Normal

## 2024-04-19 NOTE — TELEPHONE ENCOUNTER
No care due was identified.  [facility] Embedded Care Due Messages. Reference number: [id number].   4/19/2024 3:17:55 PM CDT

## 2024-04-22 RX ORDER — ALPRAZOLAM 1 MG/1
1 TABLET ORAL NIGHTLY PRN
Qty: 21 TABLET | Refills: 0 | Status: SHIPPED | OUTPATIENT
Start: 2024-04-22 | End: 2024-05-22

## 2024-04-26 DIAGNOSIS — F32.0 CURRENT MILD EPISODE OF MAJOR DEPRESSIVE DISORDER, UNSPECIFIED WHETHER RECURRENT: ICD-10-CM

## 2024-04-26 NOTE — TELEPHONE ENCOUNTER
No care due was identified.  [facility] Embedded Care Due Messages. Reference number: [id number].   4/26/2024 1:30:45 PM CDT

## 2024-04-26 NOTE — TELEPHONE ENCOUNTER
Refill Encounter    PCP Visits: Recent Visits  Date Type Provider Dept   03/13/24 Office Visit Nora Brooks MD [facility]   Showing recent visits within past 360 days and meeting all other requirements  Future Appointments  Date Type Provider Dept   05/07/24 Appointment Nora Brooks MD [facility]   Showing future appointments within next 720 days and meeting all other requirements     Last 3 Blood Pressure:   BP Readings from Last 3 Encounters:   03/22/24 95/68   03/21/24 111/80   12/21/23 116/74     Preferred Pharmacy:   [id number] [address] [facility]  [address] [facility] [address] [facility] [address]  Phone: [phone] Fax: [phone]    Requested RX:  Requested Prescriptions     Pending Prescriptions Disp Refills    buPROPion (WELLBUTRIN XL) 300 MG 24 hr tablet 90 tablet 1      RX Route: Normal

## 2024-04-29 DIAGNOSIS — N95.2 POSTMENOPAUSAL ATROPHIC VAGINITIS: ICD-10-CM

## 2024-04-29 RX ORDER — BUPROPION HYDROCHLORIDE 300 MG/1
300 TABLET ORAL DAILY
Qty: 90 TABLET | Refills: 0 | Status: SHIPPED | OUTPATIENT
Start: 2024-04-29 | End: 2024-07-28

## 2024-04-29 RX ORDER — PRASTERONE 6.5 MG/1
INSERT VAGINAL
Qty: 30 EACH | Refills: 5 | Status: SHIPPED | OUTPATIENT
Start: 2024-04-29

## 2024-05-02 PROBLEM — Z91.09 ALLERGY TO MITES: Status: ACTIVE | Noted: 2024-05-02

## 2024-05-07 ENCOUNTER — OFFICE VISIT (OUTPATIENT)
Dept: PRIMARY CARE CLINIC | Facility: CLINIC | Age: 49
End: 2024-05-07
Attending: FAMILY MEDICINE
Payer: COMMERCIAL

## 2024-05-07 VITALS
HEIGHT: 71 IN | DIASTOLIC BLOOD PRESSURE: 64 MMHG | HEART RATE: 82 BPM | WEIGHT: 178 LBS | OXYGEN SATURATION: 99 % | RESPIRATION RATE: 15 BRPM | BODY MASS INDEX: 24.92 KG/M2 | SYSTOLIC BLOOD PRESSURE: 90 MMHG

## 2024-05-07 DIAGNOSIS — Z00.00 ANNUAL PHYSICAL EXAM: Primary | ICD-10-CM

## 2024-05-07 DIAGNOSIS — Z23 NEED FOR PNEUMOCOCCAL 20-VALENT CONJUGATE VACCINATION: ICD-10-CM

## 2024-05-07 DIAGNOSIS — R42 DIZZINESS: ICD-10-CM

## 2024-05-07 DIAGNOSIS — N95.1 MENOPAUSAL SYNDROME: ICD-10-CM

## 2024-05-07 PROCEDURE — 3008F BODY MASS INDEX DOCD: CPT | Mod: CPTII,S$GLB,, | Performed by: FAMILY MEDICINE

## 2024-05-07 PROCEDURE — 1160F RVW MEDS BY RX/DR IN RCRD: CPT | Mod: CPTII,S$GLB,, | Performed by: FAMILY MEDICINE

## 2024-05-07 PROCEDURE — 3044F HG A1C LEVEL LT 7.0%: CPT | Mod: CPTII,S$GLB,, | Performed by: FAMILY MEDICINE

## 2024-05-07 PROCEDURE — 1159F MED LIST DOCD IN RCRD: CPT | Mod: CPTII,S$GLB,, | Performed by: FAMILY MEDICINE

## 2024-05-07 PROCEDURE — 90677 PCV20 VACCINE IM: CPT | Mod: S$GLB,,, | Performed by: FAMILY MEDICINE

## 2024-05-07 PROCEDURE — 3078F DIAST BP <80 MM HG: CPT | Mod: CPTII,S$GLB,, | Performed by: FAMILY MEDICINE

## 2024-05-07 PROCEDURE — 90471 IMMUNIZATION ADMIN: CPT | Mod: S$GLB,,, | Performed by: FAMILY MEDICINE

## 2024-05-07 PROCEDURE — 99396 PREV VISIT EST AGE 40-64: CPT | Mod: 25,S$GLB,, | Performed by: FAMILY MEDICINE

## 2024-05-07 PROCEDURE — 99999 PR PBB SHADOW E&M-EST. PATIENT-LVL V: CPT | Mod: PBBFAC,,, | Performed by: FAMILY MEDICINE

## 2024-05-07 PROCEDURE — 3074F SYST BP LT 130 MM HG: CPT | Mod: CPTII,S$GLB,, | Performed by: FAMILY MEDICINE

## 2024-05-07 RX ORDER — TESTOSTERONE GEL, 1% 10 MG/G
GEL TRANSDERMAL
COMMUNITY
Start: 2024-03-25

## 2024-05-08 ENCOUNTER — LAB VISIT (OUTPATIENT)
Dept: LAB | Facility: HOSPITAL | Age: 49
End: 2024-05-08
Payer: COMMERCIAL

## 2024-05-08 DIAGNOSIS — Z00.00 ANNUAL PHYSICAL EXAM: ICD-10-CM

## 2024-05-08 LAB
ALBUMIN SERPL BCP-MCNC: 4.2 G/DL (ref 3.5–5.2)
ALP SERPL-CCNC: 46 U/L (ref 55–135)
ALT SERPL W/O P-5'-P-CCNC: 11 U/L (ref 10–44)
ANION GAP SERPL CALC-SCNC: 8 MMOL/L (ref 8–16)
AST SERPL-CCNC: 15 U/L (ref 10–40)
BASOPHILS # BLD AUTO: 0.03 K/UL (ref 0–0.2)
BASOPHILS NFR BLD: 0.7 % (ref 0–1.9)
BILIRUB SERPL-MCNC: 0.6 MG/DL (ref 0.1–1)
BUN SERPL-MCNC: 8 MG/DL (ref 6–20)
CALCIUM SERPL-MCNC: 9.4 MG/DL (ref 8.7–10.5)
CHLORIDE SERPL-SCNC: 105 MMOL/L (ref 95–110)
CHOLEST SERPL-MCNC: 162 MG/DL (ref 120–199)
CHOLEST/HDLC SERPL: 2.9 {RATIO} (ref 2–5)
CO2 SERPL-SCNC: 23 MMOL/L (ref 23–29)
CREAT SERPL-MCNC: 0.8 MG/DL (ref 0.5–1.4)
DIFFERENTIAL METHOD BLD: ABNORMAL
EOSINOPHIL # BLD AUTO: 0 K/UL (ref 0–0.5)
EOSINOPHIL NFR BLD: 0 % (ref 0–8)
ERYTHROCYTE [DISTWIDTH] IN BLOOD BY AUTOMATED COUNT: 12.6 % (ref 11.5–14.5)
EST. GFR  (NO RACE VARIABLE): >60 ML/MIN/1.73 M^2
ESTIMATED AVG GLUCOSE: 91 MG/DL (ref 68–131)
GLUCOSE SERPL-MCNC: 95 MG/DL (ref 70–110)
HBA1C MFR BLD: 4.8 % (ref 4–5.6)
HCT VFR BLD AUTO: 40.7 % (ref 37–48.5)
HDLC SERPL-MCNC: 56 MG/DL (ref 40–75)
HDLC SERPL: 34.6 % (ref 20–50)
HGB BLD-MCNC: 13 G/DL (ref 12–16)
IMM GRANULOCYTES # BLD AUTO: 0 K/UL (ref 0–0.04)
IMM GRANULOCYTES NFR BLD AUTO: 0 % (ref 0–0.5)
LDLC SERPL CALC-MCNC: 88.4 MG/DL (ref 63–159)
LYMPHOCYTES # BLD AUTO: 0.8 K/UL (ref 1–4.8)
LYMPHOCYTES NFR BLD: 16.6 % (ref 18–48)
MCH RBC QN AUTO: 29.3 PG (ref 27–31)
MCHC RBC AUTO-ENTMCNC: 31.9 G/DL (ref 32–36)
MCV RBC AUTO: 92 FL (ref 82–98)
MONOCYTES # BLD AUTO: 0.5 K/UL (ref 0.3–1)
MONOCYTES NFR BLD: 10 % (ref 4–15)
NEUTROPHILS # BLD AUTO: 3.3 K/UL (ref 1.8–7.7)
NEUTROPHILS NFR BLD: 72.7 % (ref 38–73)
NONHDLC SERPL-MCNC: 106 MG/DL
NRBC BLD-RTO: 0 /100 WBC
PLATELET # BLD AUTO: 167 K/UL (ref 150–450)
PMV BLD AUTO: 11 FL (ref 9.2–12.9)
POTASSIUM SERPL-SCNC: 3.9 MMOL/L (ref 3.5–5.1)
PROT SERPL-MCNC: 6.9 G/DL (ref 6–8.4)
RBC # BLD AUTO: 4.43 M/UL (ref 4–5.4)
SODIUM SERPL-SCNC: 136 MMOL/L (ref 136–145)
TRIGL SERPL-MCNC: 88 MG/DL (ref 30–150)
TSH SERPL DL<=0.005 MIU/L-ACNC: 2.44 UIU/ML (ref 0.4–4)
WBC # BLD AUTO: 4.51 K/UL (ref 3.9–12.7)

## 2024-05-08 PROCEDURE — 84443 ASSAY THYROID STIM HORMONE: CPT | Performed by: FAMILY MEDICINE

## 2024-05-08 PROCEDURE — 80061 LIPID PANEL: CPT | Performed by: FAMILY MEDICINE

## 2024-05-08 PROCEDURE — 80053 COMPREHEN METABOLIC PANEL: CPT | Performed by: FAMILY MEDICINE

## 2024-05-08 PROCEDURE — 83036 HEMOGLOBIN GLYCOSYLATED A1C: CPT | Performed by: FAMILY MEDICINE

## 2024-05-08 PROCEDURE — 36415 COLL VENOUS BLD VENIPUNCTURE: CPT | Performed by: FAMILY MEDICINE

## 2024-05-08 PROCEDURE — 85025 COMPLETE CBC W/AUTO DIFF WBC: CPT | Performed by: FAMILY MEDICINE

## 2024-05-10 ENCOUNTER — PATIENT MESSAGE (OUTPATIENT)
Dept: PRIMARY CARE CLINIC | Facility: CLINIC | Age: 49
End: 2024-05-10
Payer: COMMERCIAL

## 2024-06-10 DIAGNOSIS — F51.01 PRIMARY INSOMNIA: ICD-10-CM

## 2024-06-10 RX ORDER — TRAZODONE HYDROCHLORIDE 50 MG/1
50 TABLET ORAL NIGHTLY PRN
Qty: 90 TABLET | Refills: 3 | Status: SHIPPED | OUTPATIENT
Start: 2024-06-10

## 2024-06-10 NOTE — TELEPHONE ENCOUNTER
No care due was identified.  [facility] Embedded Care Due Messages. Reference number: [id number].   6/10/2024 5:27:30 AM CDT

## 2024-06-10 NOTE — TELEPHONE ENCOUNTER
Refill Routing Note   Medication(s) are not appropriate for processing by [facility] for the following reason(s):        Outside of protocol  Trazodone is prescribed for as needed use; ROUTE TO PCP    [facility] action(s):  Route               Appointments  past 12m or future 3m with PCP    Date Provider   Last Visit   5/7/2024 Nora Brooks MD   Next Visit   Visit date not found Nora Brooks MD   ED visits in past 90 days: 0        Note composed:5:55 AM 06/10/2024

## 2024-06-11 NOTE — PROGRESS NOTES
Subjective:       Patient ID: Mirian العلي is a 48 y.o. female.    Chief Complaint: Annual Exam (Pt experiencing fatigue, anxiety, and orthostatic hypotension )     Pt presents today for annual. Labs pending but does get labs done with specialists.  Is taking OTC med for insomnia.  Thinks that this insomnia could be related to perimenopausal symptoms.  Patient states that this gives her fatigue.  Patient also complains today of dizziness x2 weeks.  States that she has a monitor that assesses her sleep as well as her heart rate.  Does note some heart rate variability.  Patient does suffer from seasonal allergies has not seen ENT possibility of dizziness could be related to middle ear issues.  Patient is exercising and denies dizziness when doing high-intensity workouts  Cscope scheduled      Rash  Associated symptoms include rhinorrhea. Pertinent negatives include no congestion, cough, diarrhea, eye pain, fatigue, fever, shortness of breath, sore throat or vomiting.     Review of Systems   Constitutional:  Negative for activity change, appetite change, chills, fatigue, fever and unexpected weight change.   HENT:  Positive for hearing loss and rhinorrhea. Negative for nasal congestion, ear pain, sinus pressure/congestion, sore throat and trouble swallowing.    Eyes:  Positive for discharge. Negative for photophobia, pain and visual disturbance.   Respiratory:  Negative for apnea, cough, chest tightness, shortness of breath and wheezing.    Cardiovascular:  Negative for chest pain, palpitations and leg swelling.   Gastrointestinal:  Negative for abdominal distention, abdominal pain, blood in stool, constipation, diarrhea, nausea and vomiting.   Endocrine: Negative for polydipsia and polyuria.   Genitourinary: Negative.  Negative for difficulty urinating, dysuria, hematuria and menstrual problem.   Musculoskeletal:  Negative for arthralgias, back pain, joint swelling and neck pain.   Integumentary:  Positive  for rash.   Neurological:  Positive for dizziness. Negative for tremors, weakness, numbness and headaches.   Psychiatric/Behavioral:  Positive for dysphoric mood. Negative for behavioral problems, confusion, decreased concentration and sleep disturbance. The patient is not nervous/anxious.    All other systems reviewed and are negative.        Objective:      Physical Exam  Vitals reviewed.   Constitutional:       General: She is not in acute distress.     Appearance: Normal appearance. She is well-developed and normal weight. She is not ill-appearing, toxic-appearing or diaphoretic.   HENT:      Head: Normocephalic and atraumatic.      Right Ear: Tympanic membrane, ear canal and external ear normal. There is no impacted cerumen.      Left Ear: Tympanic membrane, ear canal and external ear normal. There is no impacted cerumen.      Nose: Nose normal.      Mouth/Throat:      Pharynx: No oropharyngeal exudate or posterior oropharyngeal erythema.   Eyes:      Extraocular Movements: Extraocular movements intact.      Conjunctiva/sclera: Conjunctivae normal.      Pupils: Pupils are equal, round, and reactive to light.   Neck:      Thyroid: No thyromegaly.   Cardiovascular:      Rate and Rhythm: Normal rate and regular rhythm.      Pulses: Normal pulses.      Heart sounds: Normal heart sounds. No murmur heard.  Pulmonary:      Effort: Pulmonary effort is normal. No respiratory distress.      Breath sounds: Normal breath sounds. No stridor. No rhonchi or rales.   Chest:      Chest wall: No tenderness.   Abdominal:      General: Bowel sounds are normal. There is no distension.      Palpations: Abdomen is soft. There is no mass.      Tenderness: There is no abdominal tenderness. There is no guarding or rebound.   Musculoskeletal:         General: No tenderness. Normal range of motion.      Cervical back: Normal range of motion and neck supple.      Right lower leg: No edema.      Left lower leg: No edema.   Lymphadenopathy:       Cervical: No cervical adenopathy.   Skin:     General: Skin is warm and dry.      Findings: No erythema.   Neurological:      General: No focal deficit present.      Mental Status: She is alert and oriented to person, place, and time. Mental status is at baseline.      Cranial Nerves: No cranial nerve deficit.      Sensory: No sensory deficit.      Motor: No weakness.      Coordination: Coordination normal.      Gait: Gait normal.      Deep Tendon Reflexes: Reflexes are normal and symmetric. Reflexes normal.   Psychiatric:         Mood and Affect: Mood normal.         Behavior: Behavior normal.         Thought Content: Thought content normal.         Judgment: Judgment normal.         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  Referral to ENT has been placed.  Patient is advised to use decongestant x3 days only to see if this helps alleviate some of her symptoms.  RTC prn symptoms  Annual physical exam  -     Hemoglobin A1C; Future; Expected date: 05/07/2024  -     Lipid Panel; Future; Expected date: 05/07/2024  -     Comprehensive Metabolic Panel; Future; Expected date: 05/07/2024  -     CBC Auto Differential; Future; Expected date: 05/07/2024  -     TSH; Future; Expected date: 05/07/2024  -     pneumoc 20-randy conj-dip cr(PF) (PREVNAR-20 (PF)) injection Syrg 0.5  mL    Dizziness  -     Ambulatory referral/consult to ENT; Future; Expected date: 05/14/2024    Need for pneumococcal 20-valent conjugate vaccination  -     pneumoc 20-randy conj-dip cr(PF) (PREVNAR-20 (PF)) injection Syrg 0.5 mL

## 2024-06-25 DIAGNOSIS — J30.9 ALLERGIC RHINITIS, UNSPECIFIED SEASONALITY, UNSPECIFIED TRIGGER: ICD-10-CM

## 2024-06-25 DIAGNOSIS — Z91.09 ALLERGY TO AMERICAN HOUSE DUST MITE: ICD-10-CM

## 2024-06-27 RX ORDER — DERMATOPHAGOIDES PTERONYSSINUS AND DERMATOPHAGOIDES FARINAE 6; 6 [ARB'U]/1; [ARB'U]/1
1 TABLET SUBLINGUAL NIGHTLY
Qty: 90 TABLET | Refills: 3 | Status: ACTIVE | OUTPATIENT
Start: 2024-06-27

## 2024-07-10 ENCOUNTER — PATIENT MESSAGE (OUTPATIENT)
Dept: PRIMARY CARE CLINIC | Facility: CLINIC | Age: 49
End: 2024-07-10
Payer: COMMERCIAL

## 2024-07-10 DIAGNOSIS — F32.0 CURRENT MILD EPISODE OF MAJOR DEPRESSIVE DISORDER, UNSPECIFIED WHETHER RECURRENT: ICD-10-CM

## 2024-07-10 RX ORDER — BUPROPION HYDROCHLORIDE 150 MG/1
TABLET ORAL
Qty: 90 TABLET | Refills: 3 | Status: SHIPPED | OUTPATIENT
Start: 2024-07-10

## 2024-07-10 RX ORDER — BUPROPION HYDROCHLORIDE 300 MG/1
300 TABLET ORAL DAILY
Qty: 90 TABLET | Refills: 0 | Status: SHIPPED | OUTPATIENT
Start: 2024-07-10 | End: 2024-10-08

## 2024-07-10 RX ORDER — BUPROPION HYDROCHLORIDE 300 MG/1
TABLET ORAL
Qty: 90 TABLET | Refills: 0 | OUTPATIENT
Start: 2024-07-10

## 2024-07-10 NOTE — TELEPHONE ENCOUNTER
Refill Decision Note   Mirian Perezsey  is requesting a refill authorization.  Brief Assessment and Rationale for Refill:  Approve     Medication Therapy Plan: PER [facility] DATA PT. CURRENTLY TAKING 300 MG ALONG WITH 150 MG      Alert overridden per protocol: Yes   Comments:     Note composed:6:22 AM 07/10/2024

## 2024-07-10 NOTE — TELEPHONE ENCOUNTER
No care due was identified.  Health [facility] Embedded Care Due Messages. Reference number: [id number].   7/10/2024 5:31:28 AM CDT

## 2024-07-10 NOTE — TELEPHONE ENCOUNTER
No care due was identified.  Health [facility] Embedded Care Due Messages. Reference number: [id number].   7/10/2024 5:31:00 AM CDT

## 2024-07-10 NOTE — TELEPHONE ENCOUNTER
Quick DC. Duplicate Request-  med pended in previous encounter, awaiting assessment    Refill Authorization Note   Mirian العلي  is requesting a refill authorization.  Brief Assessment and Rationale for Refill:  Quick Discontinue  Medication Therapy Plan:  DUPLICATE    Medication Reconciliation Completed:  No      Comments:   Pended Medication(s)       Requested Prescriptions     Pending Prescriptions Disp Refills    buPROPion (WELLBUTRIN XL) 300 MG 24 hr tablet [Pharmacy Med Name: BUPROPION XL 300MG TABLETS] 90 tablet 0     Sig: TAKE 1 TABLET(300 MG) BY MOUTH DAILY        Duplicate Pended Encounter(s)/ Last Prescribed Details: (includes pharmacy & prescriber details)              Note composed:6:20 AM 07/10/2024

## 2024-07-10 NOTE — TELEPHONE ENCOUNTER
No care due was identified.  [facility] Embedded Care Due Messages. Reference number: [id number].   7/10/2024 9:43:12 AM CDT

## 2024-07-11 ENCOUNTER — OFFICE VISIT (OUTPATIENT)
Dept: PAIN MEDICINE | Facility: CLINIC | Age: 49
End: 2024-07-11
Attending: ANESTHESIOLOGY
Payer: COMMERCIAL

## 2024-07-11 ENCOUNTER — TELEPHONE (OUTPATIENT)
Dept: PAIN MEDICINE | Facility: CLINIC | Age: 49
End: 2024-07-11

## 2024-07-11 VITALS
BODY MASS INDEX: 24.32 KG/M2 | SYSTOLIC BLOOD PRESSURE: 96 MMHG | TEMPERATURE: 98 F | HEART RATE: 84 BPM | DIASTOLIC BLOOD PRESSURE: 65 MMHG | WEIGHT: 174.38 LBS

## 2024-07-11 DIAGNOSIS — M62.838 MUSCLE SPASM: ICD-10-CM

## 2024-07-11 DIAGNOSIS — G24.3 ISOLATED CERVICAL DYSTONIA: Primary | ICD-10-CM

## 2024-07-11 PROCEDURE — 99999 PR PBB SHADOW E&M-EST. PATIENT-LVL IV: CPT | Mod: PBBFAC,,, | Performed by: ANESTHESIOLOGY

## 2024-07-11 NOTE — PROGRESS NOTES
Patient Name: Mirian العلي  MRN: [id number]     Patient presents for repeat Botox injection. No new symptoms, no side effects reported from last injection.         INFORMED CONSENT: The procedure, risks, benefits and options were discussed with patient. There are no contraindications to the procedure. The patient expressed understanding and agreed to proceed. The personnel performing the procedure was discussed. I verify that I personally obtained Mirian's consent prior to the start of the procedure and the signed consent can be found on the patient's chart.  Shoulder improved with HEP     Procedure Date: 07/11/2024     Anesthesia: Topical     Sedation: None     Pre-operative diagnosis:   1. Isolated cervical dystonia        2. Muscle spasm                   We discussed with the patient the assessment and recommendations. The following is the plan we agreed on:     1.  Procedure:  Under clean technique, EMG guidance and after discussing with the patient we used 400 units Botox.  No units wasted.     Right Splenius Capitus 50 units   Right Longissimus 50 units   Right Upper Trapezius 150 units   Right Levator Scapulae 100 units   Right Infraspinatus 50 units  2.  Return as needed.  Otherwise follow-up in 3 months to repeat injection with 400 units Botox.     Rakan Javier MD  PGY-5  Interventional Pain Management Fellow  [facility]  Pager: [phone]     I performed a history, review of systems, and physical exam with the patient. The assessment and plan were discussed and agreed upon with Dr. Mcclendon, the attending of record, before sharing with the patient. The face to face encounter time, including answering all patient questions, was 20 minutes.

## 2024-07-26 ENCOUNTER — TELEPHONE (OUTPATIENT)
Dept: OTOLARYNGOLOGY | Facility: CLINIC | Age: 49
End: 2024-07-26
Payer: COMMERCIAL

## 2024-07-29 ENCOUNTER — CLINICAL SUPPORT (OUTPATIENT)
Dept: AUDIOLOGY | Facility: CLINIC | Age: 49
End: 2024-07-29
Payer: COMMERCIAL

## 2024-07-29 ENCOUNTER — OFFICE VISIT (OUTPATIENT)
Dept: OTOLARYNGOLOGY | Facility: CLINIC | Age: 49
End: 2024-07-29
Payer: COMMERCIAL

## 2024-07-29 DIAGNOSIS — Z91.09 HOUSE DUST MITE ALLERGY: Primary | ICD-10-CM

## 2024-07-29 DIAGNOSIS — R09.82 POST-NASAL DRIP: ICD-10-CM

## 2024-07-29 DIAGNOSIS — R42 DIZZINESS: ICD-10-CM

## 2024-07-29 DIAGNOSIS — K21.9 LARYNGOPHARYNGEAL REFLUX (LPR): ICD-10-CM

## 2024-07-29 DIAGNOSIS — R09.89 THROAT CLEARING: ICD-10-CM

## 2024-07-29 DIAGNOSIS — H93.8X3 SENSATION OF FULLNESS IN BOTH EARS: Primary | ICD-10-CM

## 2024-07-29 DIAGNOSIS — R68.84 MAXILLA PAIN: ICD-10-CM

## 2024-07-29 PROCEDURE — 92557 COMPREHENSIVE HEARING TEST: CPT | Mod: S$GLB,,, | Performed by: AUDIOLOGIST

## 2024-07-29 PROCEDURE — 99999 PR PBB SHADOW E&M-EST. PATIENT-LVL I: CPT | Mod: PBBFAC,,, | Performed by: AUDIOLOGIST

## 2024-07-29 PROCEDURE — 92567 TYMPANOMETRY: CPT | Mod: S$GLB,,, | Performed by: AUDIOLOGIST

## 2024-07-29 PROCEDURE — 99204 OFFICE O/P NEW MOD 45 MIN: CPT | Mod: S$GLB,,, | Performed by: OTOLARYNGOLOGY

## 2024-07-29 PROCEDURE — 99999 PR PBB SHADOW E&M-EST. PATIENT-LVL III: CPT | Mod: PBBFAC,,, | Performed by: OTOLARYNGOLOGY

## 2024-07-29 PROCEDURE — 3044F HG A1C LEVEL LT 7.0%: CPT | Mod: CPTII,S$GLB,, | Performed by: OTOLARYNGOLOGY

## 2024-07-29 RX ORDER — SOD CHLOR,BICARB/SQUEEZ BOTTLE
1 PACKET, WITH RINSE DEVICE NASAL 2 TIMES DAILY
Start: 2024-07-29

## 2024-07-29 RX ORDER — MOMETASONE FUROATE MONOHYDRATE 50 UG/1
1 SPRAY, METERED NASAL 2 TIMES DAILY
Qty: 34 G | Refills: 3 | Status: SHIPPED | OUTPATIENT
Start: 2024-07-29

## 2024-07-29 RX ORDER — FAMOTIDINE 20 MG/1
20 TABLET, FILM COATED ORAL NIGHTLY PRN
Qty: 30 TABLET | Refills: 5 | Status: SHIPPED | OUTPATIENT
Start: 2024-07-29 | End: 2025-07-29

## 2024-07-29 RX ORDER — OMEPRAZOLE 20 MG/1
CAPSULE, DELAYED RELEASE ORAL
Qty: 60 CAPSULE | Refills: 2 | Status: SHIPPED | OUTPATIENT
Start: 2024-07-29

## 2024-07-29 NOTE — PATIENT INSTRUCTIONS
NASAL SALINE    Still saline comes in many preparations including sprays/mists, gels, and rinses.  Different preparations served different purposes.  Saline spray helps to briefly moisturize the nose and help clear mucus.  Saline gels coat the nose for longer protective benefit of keeping the linings the nose moist.  Saline rinses clear the nose and sinuses and a more thorough way in her best used for significant postnasal drip and sinus complaints.  A combination of saline sprays/mists, gels and rinses should be used to address routine nasal clearing and dryness issues as well as flushing for better control of allergy and postnasal drip symptoms.  There is no real risk of over use of nasal saline products.  Saline sprays do not have any of the potential rebound or addiction of nasal decongestant sprays.  Nasal saline sprays and rinses should be used prior to the application of any medicated nasal sprays such as nasal steroids or nasal antihistamine sprays.        INTRANASAL STEROID SPRAYS      Intranasal steroid sprays are available both by prescription and over-the-counter both in generic and brand name preparations.  They are all very similar in efficacy and side effect profiles.  Over-the-counter and prescription intranasal steroids include fluticasone propionate (Flonase), fluticasone furoate (Sensimist), triamcinolone (Nasacort), and budesonide (Rhinocort).  While these medications are available as prescriptions as well there are few nasal steroids in her available by prescription only and include mometasone (Nasonex), flunisolide (Nasarel), and beclomethasone (QNASL).    Nasal steroids or the foundation of treatment of both allergy and other inflammatory conditions of the nose and sinuses.  They are safe for regular use and while there are many side effects listed most of these are steroid class effects and not typically encountered.  Typical side effects include dryness and even ulceration and bleeding  of the nose.  These side effects can be minimized by proper application and proper moisturization with saline and saline gel.    Sometimes changing between 1 brand of nasal steroid and another can result in improved control of symptoms especially after long term use of one particular nasal steroid.    Proper application of the nasal steroid spray is accomplished by spraying towards the I/ear on the same side with the tip of the superior just barely inside the nostril with the chin slightly downward.  Any dripping should be gently inhaled not sniff test backwards into the throat.  Labeled instructions should be followed.        ASTELIN (Azelastine) nasal spray    Astelin is a topical nasal antihistamine which can be of additional benefit in controlling nasal allergy and postnasal drip.  Typically is recommended on an as-needed basis 1-2 sprays each nostril twice daily.  People often find it beneficial at night.  This typically added to her regimen of saline and nasal steroids as a 3rd line agent for as needed use.  Excessive use can cause excessive dryness and even nose bleeds.  It has a very strong taste which many people find intolerable.  Astelin needs to be stopped 5-7 days prior to any skin allergy testing just like oral antihistamines as it will inhibit the skin response.      SINUS RINSE INSTRUCTIONS    Nasal Saline Irrigation Instructions  You can wash your nasal and sinus passages using nasal saline (salt water) irrigation. This   is simple and effective. Follow the instructions below, as well as the ones provided by your   physician.  Supplies  First, you will need a nasal saline irrigation bottle and rinse solution.   You can purchase nasal rinse kits that include these items (such as   NeilMed®, Ayr®, Simply Saline®, Ocean Complete®) at most drug   stores. You can also make your own saline irrigation solution by   adding kosher (non-iodine) salt and baking soda to distilled water.   Your physician may  tell you to add medications like a steroid or   antibiotic to the rinse as needed.  Steps for nasal irrigation  Step 1. Fill the bottle  ? Wash your hands.  ? Fill the irrigation bottle with lukewarm distilled water or boiled water that has cooled.  Step 2. Mix the solution  ? Put the saline and salt packet contents into the bottle.  ? Tighten the top of the bottle and shake it gently to dissolve the mixture.  ? If you are making your own solution:   - Add 1/4 to 1/2 teaspoon of baking soda and 1/8 teaspoon of kosher (non-iodine) salt   into the bottle.   - Tighten the top of the bottle.   - Shake the bottle gently to dissolve the mixture.  Step 3. Get into position  ?  front of the sink.  ? Unless you were instructed to use another position, bend forward.   Then tilt your face down about 45 degrees so that you are looking   down into the sink.  ? Gently place the spout of the saline bottle against 1 of your nostrils.  [facility]  CARE AND TREATMENT  Patient Education  ©2018 [employer]  ©2018 [employer]  Step 4. Rinse  ? Breathing through your mouth, gently squeeze the   bottle. This will squirt the solution into your nostril. The   solution will start to drain from the other nostril. Some   may drain from your mouth. This is normal.  ? Use 2 ounces (half of the bottle) on each nostril.  ? Afterwards, you may need to blow your nose gently to   help drain any solution that is left behind.  Step 5. Repeat  ? Repeat steps 3 and 4 with the other nostril.  You can watch a video to learn how to do nasal saline irrigation. Go to [email or web address] and   search for NeilMed Sinus Rinse.  Step 6.  Clean the bottle and cap. Air dry the Sinus Rinse bottle, cap, and tube on a clean paper towel, a lint free towel, or use NeilMed® NasaDOCK® or NasaDOCK plus (sold separately) to store the bottle, cap and tube.  Please read Warnings before using.  Our recommendation is  to replace the bottle every three months.      NEILMED ASHOK INSTRUCTIONS    It is very important to keep these devices clean and free from any contamination. Replace the bottle every 3 months.  NasaDock Plus  NasaDock NeilMed® SINUS RINSE Squeeze Bottle: Please perform routine inspections of the bottle and tube for any discolorations and cracks. If there are any visual signs of deterioration or permanent color changes, please clean thoroughly. If the discolorations remain after cleansing, discard the items and purchase new ones. Please follow these instructions after each use of the product. Be sure to replace your product after three months.  Step 1: Rinse the cap, tube and bottle using running water. Fill the bottle with distilled, micro-filtered (through 0.2 micron), reverse osmosis filtered, commercially bottled or previously boiled and cooled down water at lukewarm or body temperature..  Step 2: Add a few drops of dish washing liquid or baby shampoo.  Step 3: Attach the cap and tube to the bottle; hold your finger over the opening in the cap and shake the bottle vigorously.  Step 4: Squeeze the bottle hard to allow the soapy solution to clean the interior of the tube and the cap. Empty out the bottle completely.  Step 5: Rinse the soap from the bottle, cap and tube thoroughly and place the items on a clean paper towel to dry or use the preferred NasaDOCK® or NasaDOCK plus.    The NasaDOCK® is a simple, hygienic way to dry and store the SINUS RINSE bottle, cap and tube. NasaDOCK® comes with various hanging options and is available in different colors. Our newest model also offers storage for our SINUS RINSE mixture packets. We strongly suggest using NasaDOCK® as an inexpensive, easy way to dry the cap, tube and SINUS RINSE bottle.        Cleaning:  Do not use a  to clean the inside of a bottle. While our bottle is  safe, a  will not adequately clean the SINUS  RINSE bottle. The water jets in dishwashers cannot enter the narrow neck of the bottle, and portions of the bottles interior will not be cleaned thoroughly. Additional methods of cleaning the bottle include the use of concentrated white vinegar or isopropyl alcohol (70% concentration), followed by scrubbing and rinsing as described above.       Microwave Disinfection  Clean the device with soap and water as mentioned above and shake off the excess water. Now place the bottle, cap and tube in the microwave for 40 seconds. This will disinfect the bottle, cap and tube. If the microwave has been used recently, please make sure that the inside of the microwave has cooled back down to room temperature before using it to disinfect the bottle.    NeilMed NasaFlo® Neti Pot Users:  Use the same procedure as above.    Sinugator® Cleaning Directions:  Clean the Sinugator® by running plain water and dry with a clean lint free towel and then air dry the unit by keeping it open to the air. The nasal  tip, blue reservoir and white soft tube can be disinfected by cleaning with soap and water and shaking off the excess water before placing in the home microwave for 60 seconds. Clean the entire unit with a few drops of dishwashing liquid and water every three days to keep the unit clean. As a fi nal rinse to wash off any residual soap or tap water, use either distilled, micro-filtered (through 0.2 micron filter), commercially bottled or previously boiled & cooled down water. Please make sure to rinse thoroughly during each wash so no soap is left behind. DO NOT place the white motor unit in microwave for disinfection. Because of the units stainless steel components, this can cause damage or fire hazards.    General Principles of Maintenance & Storage:  When permissible use a microwave periodically to disinfect devices. Always store NeilMed® products in a cool and dry place with adequate ventilation. NasaDOCK® or NasaDOCK  plus offer a simple hygienic way to air dry & neatly store the bottle, cap, tube and NasaFlo. Do not store the bottle with the cap screwed on, unless both are dry. Do not store the wet parts in a sealed plastic bag. If you travel before they are dry, wrap parts separately in paper towels. Hand soap or shampoo can be used for cleaning parts while away from home.        USE ONLY AS DIRECTED, IF SYMPTOMS PERSIST SEE YOUR DOCTOR/HEALTHCARE PROFESSIONAL. ALWAYS READ THE LABEL.        ACID REFLUX   What is acid reflux?    When we eat, food passes from the throat and into the stomach through a tube called the esophagus. At the bottom of the esophagus is a ring of muscles that acts as a valve between the esophagus and stomach, called the lower esophageal sphincter. Smoking, alcohol, and certain types of food may weaken the sphincter, so it may stop closing properly. The contents in the stomach then may leak back, or reflux, into the esophagus. This problem is called gastroesophageal reflux disease (GERD). Symptoms of GERD include heartburn, belching, regurgitation of stomach contents, and swallowing difficulties.    Sometimes, the stomach acid travels up through the esophagus and spills into the larynx or pharynx (voice box). This is called laryngopharyngeal reflux (LPR) and is irritating to the vocal folds and surrounding tissues. Often, patients with LPR do not experience heartburn as a symptom. More commonly, symptoms of LPR include hoarseness, excessive mucous resulting in frequent throat clearing, post-nasal drip, coughing, throat soreness or burning, choking episodes, difficulty swallowing, and sensation of a lump in the throat.     How is acid reflux treated?   Treatment for acid reflux can involve any combination of medication, lifestyle modifications, and surgery.   Medications. Your doctor may prescribe a proton pump inhibitor (PPI) or an H2 blocker. If you are prescribed a PPI, take in on an empty stomach in  the morning 30 minutes prior to eating breakfast. Keep in mind that it may take 4-6 weeks before symptoms begin to resolve, so do not stop medications without consulting your doctor.   Lifestyle and dietary modifications. Eat smaller meals at a slower pace. Avoid over-eating. If you are overweight, try to lose weight. Do not lie down or exercise directly after eating; eat your last meal of the day at least 2-3 hours prior to going to sleep. Avoid tight-fitting clothes. If you are a smoker, reduce or quit smoking. Elevate your head of bed 4-6 inches by putting phone books under the legs at the head of your bed or buy a wedge pillow, but do not use more than two regular pillows as this causes the body to curl and compresses your stomach.     Food group Foods to avoid to reduce reflux   Beverages  Whole milk, 2% milk, chocolate milk/hot chocolate, alcohol, coffee (regular and decaf), caffeinated tea, mint tea, carbonated beverages, citrus juice    Breads/grains Commercial sweet rolls, doughnuts, croissants, and other high-fat pastries    Fruits and vegetables Fried or cream-style vegetables, tomatoes, tomato-based products, citrus fruits, hot peppers    Soups and seasonings Cream, cheese, tomato-based soups, vinegar    Meats and proteins Fatty or fried meat/fish, sosa, sausage, pepperoni, lunch meat, fried eggs    Fats and oil Lard, sosa drippings, salt pork, meat drippings, gravies, highly seasoned salad dressings, nuts    Sweets/desserts Anything made with or from chocolate, peppermint, spearmint, whole milk, or cream; high-fat pastries, gum, hard candy           THROAT CLEARING     Why am I clearing my throat so often?   Irritation of the vocal folds and surrounding area can cause the urge to clear your throat. This irritation can be caused by acid reflux, allergies, or environmental factors, as well as from a cold or sore throat. Talk with your doctor about the treatment of possible underlying causes. However,  throat clearing can turn into a cycle. You clear your throat after feeling an irritation, which causes more irritation, which causes you to continue clearing your throat, which causes more irritation.     What can I do about it?   Ask a friend or family member to help you keep track - you may not even realize how often you do it.   Replace the throat clearing with a different behavior.   Take a sip of water and then swallow. Repeat until the sensation subsides.  Swallow hard (even without water)   Use the silent throat clear method by making the h sound when you exhale  Breathe in deeply through your nose and exhale on shhh   Hum quietly   Keep yourself hydrated.   Drink plenty of water   Eat wet snacks (grapes, apples, melon, cucumber)   Use a humidifier at home or at work   Suck on hard candies, but avoid too much sugar and avoid anything with mint, menthol, camphor, or eucaplyptus, as these will have a more irritating effect.      WEANING OFF PPI's FOR REFLUX    Long term Omeprazole and other PPI (proton pump inhibitor) drugs should be avoided.  If still actively using twice daily 40 mg twice daily this should be reduced to Prilosec (omeprazole) OTC 20 mg twice daily 30 minutes prior to breakfast and dinner and add night time OTC Pepcid (famotidine) 10-20 mg as needed only (not for regular use) for breakthrough reflux symptoms.  Aggressive lifestyle changes are recommended to prevent or at least minimize breakthrough symptoms.      Prilosec OTC 20 mg should also be weaned down to daily over a 1-2 weeks then every other day over 1-2 weeks.  It is safe to take for persistent heartburn on an as needed basis up to 14 days without routine medical monitoring.     It is also very common to experience symptomatic heartburn needing antacids such as TUMS and Gaviscon while weaning down off long term PPI therapy.    Any more frequent/chronic uses of Omeprazole and other PPI's (esomeprazole, pantoprazole, lansoprazole,  etc.) needs to be monitored for both necessesity and potential side effects.  Such monitoring may include lab testing to monitor kidney function and electrolytes as well as bone density monitoring.    ALLERGY CONTROL MEASURES  From [facility]      TREATMENT    The first treatment for controlling dust mite allergy is avoiding dust mites as much as possible. When you minimize your exposure to dust mites, you can expect fewer or less severe allergic reactions. However, it's impossible to completely eliminate dust mites from your environment. You may also need medications to control symptoms.  Allergy medications  Your doctor may direct you to take one of the following medications to improve nasal allergy symptoms:  Antihistamines reduce the production of an immune system chemical that is active in an allergic reaction. These drugs relieve itching, sneezing and runny nose. Over-the-counter antihistamine tablets, such as fexofenadine (Allegra Allergy), loratadine (Alavert, Claritin,), cetirizine (Zyrtec) and others, as well as antihistamine syrups for children, are available. Prescription antihistamines taken as a nasal spray include azelastine (Astelin, Astepro) and olopatadine (Patanase).  Corticosteroids delivered as a nasal spray can reduce inflammation and control symptoms of hay fever. These drugs include fluticasone propionate (Flonase Allergy Relief), mometasone furoate (Nasonex), triamcinolone (Nasacort Allergy 24HR), ciclesonide (Omnaris) and others. Nasal corticosteroids provide a low dose of the drug and have a much lower risk of side effects compared with oral corticosteroids.  Decongestants can help shrink swollen tissues in your nasal passages and make it easier to breathe through your nose. Some over-the-counter allergy tablets combine an antihistamine with a decongestant. Oral decongestants can increase blood pressure and shouldn't be taken if you have severe high blood pressure, glaucoma or  "cardiovascular disease. In men with an enlarged prostate, the drug can worsen the condition. Talk to your doctor about whether you can safely take a decongestant.  Over-the-counter decongestants taken as a nasal spray may briefly reduce allergy symptoms. If you use a decongestant spray for more than three days in a row, however, it can actually make nasal congestion worse.  Leukotriene modifiers block the action of certain immune system chemicals. Your doctor may prescribe the leukotriene modifier montelukast (Singulair), which comes in tablet form. Possible side effects of montelukast include upper respiratory infection, headache and fever. Less common side effects include behavior or mood changes, such as anxiousness or depression.    Other therapies  Immunotherapy. You can "train" your immune system not to be sensitive to an allergen. Immunotherapy is delivered either through a series of allergy shots or tablets taken under the tongue (sublingually). One to two weekly shots or tablets expose you to very small doses of the allergen -- in this case, the dust mite proteins that cause the allergic reaction. The dose is gradually increased, usually during a three- to six-month period. Maintenance shots or tablets are needed every four weeks for three to five years. Immunotherapy is usually used when other simple treatments are not satisfactory.  Nasal irrigation. You can use a neti pot or a specially designed squeeze bottle to flush thickened mucus and irritants from your sinuses with a prepared saltwater (saline) rinse. If you're preparing the saline solution yourself, use water that's contaminant-free -- distilled, sterile, previously boiled and cooled, or filtered with a filter that has an absolute pore size of 1 micron or smaller. Be sure to rinse the irrigation device after each use with contaminant-free water, and leave open to air-dry.      SELF CARE    Avoiding exposure to dust mites is the best strategy for " controlling dust mite allergy. While you can't completely eliminate dust mites from your home, you can significantly reduce their number. Here's how:  Use allergen-proof bed covers. Keep your mattress and pillows in dustproof or allergen-blocking covers. These covers, made of tightly woven fabric, prevent dust mites from colonizing or escaping from the mattress or pillows. Encase box springs in allergen-proof covers.  Wash bedding weekly. Wash all sheets, blankets, pillowcases and bedcovers in hot water that is at least 130 F (54.4 C) to kill dust mites and remove allergens. If bedding can't be washed hot, put the items in the dryer for at least 15 minutes at a temperature above 130 F (54.4 C) to kill the mites. Then wash and dry the bedding to remove allergens. Freezing nonwashable items for 24 hours also can kill dust mites, but this won't remove the allergens.  Keep humidity low. Maintain a relative humidity below 50% in your home. A dehumidifier or air conditioner can help keep humidity low, and a hygrometer (available at [employer] stores) can measure humidity levels.  Choose bedding wisely. Avoid bedcovers that trap dust easily and are difficult to clean frequently.  Buy washable stuffed toys. Wash them often in hot water and dry thoroughly. Also, keep stuffed toys off beds.  Remove dust. Use a damp or oiled mop or rag rather than dry materials to clean up dust. This prevents dust from becoming airborne and resettling.  Vacuum regularly. Vacuuming carpeting and upholstered furniture removes surface dust -- but vacuuming isn't effective at removing most dust mites and dust mite allergens. Use a vacuum  with a double-layered microfilter bag or a high-efficiency particulate air (HEPA) filter to help decrease house-dust emissions from the . If your allergies are severe, stay out of the area being vacuumed while someone else does the work. Wait about two hours before going back in the vacuumed  room.  Cut clutter. If it collects dust, it also collects dust mites. Remove knickknacks, tabletop ornaments, books, magazines and newspapers from your bedroom.  Remove carpeting and other dust mite habitats. Carpeting provides a comfortable habitat for dust mites. This is especially true if carpeting is over concrete, which holds moisture easily and provides a humid environment for mites. If possible, replace [id number] bedroom carpeting with tile, wood, linoleum or vinyl ky. Consider replacing other dust-collecting furnishings in bedrooms, such as upholstered furniture, nonwashable curtains and horizontal blinds.  Install a high-efficiency media filter in your furnace and air conditioning unit. Look for a filter with a Minimum Efficiency Reporting Value (MAE) of 11 or 12 and leave the fan on to create a whole house air filter. Be sure to change the filter every three months.

## 2024-07-29 NOTE — PROGRESS NOTES
Ms. Mirian العلي was seen in the clinic today for an audiological evaluation.  Mirian العلي reported a sensation of fullness for each ear.    Audiological testing revealed normal hearing sensitivity for the right ear and normal hearing sensitivity for the left ear.  A speech reception threshold was obtained at 10 dBHL for the right ear and at 5 dBHL for the left ear.  Speech discrimination was 100% for the right ear and 100% for the left ear.      Tympanometry testing revealed a Type A tympanogram for the right ear and a Type A tympanogram for the left ear.      Recommendations:  1. Otologic evaluation  2. Audiological evaluation, as needed  3. Hearing protection when in noise

## 2024-07-29 NOTE — PROGRESS NOTES
[facility]    Subjective:      Patient: Mirian العلي Patient PCP: Nora Brooks MD         :  1975     Sex:  female      MRN:  [id number]          Date of Visit: 2024      Chief Complaint: Sinus Problem (Post nasal drip )      Patient ID: Mirian العلي is a 48 y.o. female     Patient is a  lifelong NON-smoker with a past medical history of , allergic rhinitis (on Odactra SLIT for dust mite allergy followed by Dr. Delvalle; class 1 allergy to dust mites and a total IgE of 109) and angioedema, referred to me by Dr. Nora Brooks in consultation for bothersome postnasal drip not relieved by Benadryl, Astelin and Flonase.  RSI is 22/45 with 2/5 for gross reflux.  Gets some nonspecific upper chest symptoms attributed to reflux which improve with rarely used Pepcid.  Throat-clearing as chief issue attributed to postnasal drip.  Also has facial pressure sometimes in the forehead but always in the maxilla area sometimes into the teeth.  No purulent infections treated with antibiotics.  No loss of smell or nasal obstruction.    [place] [facility] [place] in the frontals ethmoids and maxillary sinuses with no air-fluid levels.  No images available for review.        Labs:  WBC   Date Value Ref Range Status   2024 4.51 3.90 - 12.70 K/uL Final     Hemoglobin   Date Value Ref Range Status   2024 13.0 12.0 - 16.0 g/dL Final     Platelets   Date Value Ref Range Status   2024 167 150 - 450 K/uL Final     Creatinine   Date Value Ref Range Status   2024 0.8 0.5 - 1.4 mg/dL Final     TSH   Date Value Ref Range Status   2024 2.443 0.400 - 4.000 uIU/mL Final     Hemoglobin A1C   Date Value Ref Range Status   2024 4.8 4.0 - 5.6 % Final     Comment:     ADA Screening Guidelines:  5.7-6.4%  Consistent with prediabetes  >or=6.5%  Consistent with diabetes    High levels of fetal hemoglobin interfere with the HbA1C  assay. Heterozygous hemoglobin  variants (HbS, HgC, etc)do  not significantly interfere with this assay.   However, presence of multiple variants may affect accuracy.         Past Medical History  She has a past medical history of Angio-edema, AR (allergic rhinitis), BRCA1 negative, BRCA2 negative, Eczema, Endometriosis, mild, Factor V Leiden mutation, Genetic testing, Ovarian cyst, bilateral, Thyroglobulin antibody positive, and Urticaria.    Family / Surgical / Social History  Her family history includes Allergies in her brother; Asthma in her brother; BRCA 1/2 in her paternal cousin; Bladder Cancer in her father; Breast cancer in her mother; Breast cancer (age of onset: 46) in her sister; Cancer in her father, maternal grandmother, mother, paternal cousin, and sister; Diabetes in her brother, brother, brother, maternal aunt, and paternal uncle; Factor V Leiden deficiency in her brother; Kidney cancer (age of onset: 60) in her father; Lung cancer (age of onset: 71) in her mother; Other in her sister and sister; Skin cancer in her father; Throat cancer in her paternal grandfather; Thyroid disease in her mother.    Past Surgical History:   Procedure Laterality Date    BREAST BIOPSY Right 09/13/2023    BUNIONECTOMY Left 02/06/2018    COLONOSCOPY N/A 10/26/2023    Procedure: COLONOSCOPY;  Surgeon: Ishmael Pham MD;  Location: [address]);  Service: Endoscopy;  Laterality: N/A;  Referred by: Dr. Nora Brooks  Prep: suprep  Route instructions sent: [id number]  Other concerns: requests first case  10/19-precall complete-MS    HYSTERECTOMY  01/2015    TLHBSO    LAPAROSCOPY W/ MINI-LAPAROTOMY  2003    OOPHORECTOMY Right 03/2012    OOPHORECTOMY Left 01/2015    TOTAL VAGINAL HYSTERECTOMY  01/2015    w/Right USO    WISDOM TOOTH EXTRACTION Bilateral        Social History     Tobacco Use    Smoking status: Never     Passive exposure: Never    Smokeless tobacco: Never   Substance and Sexual Activity    Alcohol use: Yes     Comment: week    Drug  "use: Never    Sexual activity: Yes     Partners: Male     Birth control/protection: Post-menopausal, Other-see comments     Comment:  since 1999,  2002       Medications  She has a current medication list which includes the following prescription(s): odactra, alprazolam, aspirin, azelastine, bupropion, bupropion, cetirizine, cyanocobalamin (vitamin b-12), diphenhydramine, epinephrine, estradiol, fluticasone propionate, hydroxychloroquine, intrarosa, testosterone, tizanidine, trazodone, UNABLE TO FIND, UNABLE TO FIND, and vitamin d.      Allergies  Review of patient's allergies indicates:   Allergen Reactions    Niacin preparations      Patient states she has flushing    Sulfa (sulfonamide antibiotics)      Tongue swelling    Iodine and iodide containing products Rash       All medications, allergies, and past history have been reviewed.    Objective:      Vitals:      5/7/2024     9:31 AM 7/11/2024     1:15 PM 7/29/2024     8:27 AM   Vitals - 1 value per visit   SYSTOLIC 90 96    DIASTOLIC 64 65    Pulse 82 84    Temp  98.1 °F (36.7 °C)    Resp 15     SPO2 99 %     Weight (lb) 178.02 174.38    Weight (kg) 80.75 79.1    Height 5' 11" (1.803 m)     BMI (Calculated) 24.8     Pain Score  Three Zero       There is no height or weight on file to calculate BSA.    Physical Exam:    GENERAL  APPEARANCE -  alert, appears stated age, and cooperative  BARRIER(S) TO COMMUNICATION -  none VOICE - appropriate for age and gender    INTEGUMENTARY  no suspicious head and neck lesions    HEENT  HEAD: Normocephalic, without obvious abnormality, atraumatic  FACE: INSPECTION - Symmetric, no signs of trauma, no suspicious lesion(s)      STRENGTH - facial symmetry intact     PALPATION -  No masses     SALIVARY GLANDS - non-tender with no appreciable mass    NECK/THYROID: normal atraumatic, no neck masses, normal thyroid, no jvd    EYES  Normal occular alignment and mobility with no visible nystagmus at " rest    EARS/NOSE/MOUTH/THROAT  EARS  PINNAE AND EXTERNAL EARS - no suspicious lesion OTOSCOPIC EXAM (surgical microscopy was not used for visualization/instrumentation): EAR EXAM - Normal ear canals, tympanic membranes and mobility, and middle ear spaces bilaterally.  HEARING - grossly intact to voice/finger rub    NOSE AND SINUSES  EXTERNAL NOSE - Grossly normal for age/sex  SEPTUM - mild deviation TURBINATES - within normal limits MUCOSA - slight dryness w/o gross crusting     MOUTH AND THROAT   ORAL CAVITY, LIPS, TEETH, GUMS & TONGUE - moist, no suspicious lesions  OROPHARYNX /TONSILS/PHARYNGEAL WALLS/HYPOPHARYNX - no erythema or exudates  NASOPHARYNX - limited mirror exam - unable to visualize due to anatomy/gag  LARYNX -  - limited mirror exam - no notable posterior edema.  No pooling of secretions.  No appreciable lesion or erythema.      CHEST AND LUNG   INSPECTION & AUSCULTATION - normal effort, no stridor    CARDIOVASCULAR  AUSCULTATION & PERIPHERAL VASCULAR - regular rate and rhythm.    NEUROLOGIC  MENTAL STATUS - alert, interactive CRANIAL NERVES - normal    LYMPHATIC  HEAD AND NECK - non-palpable; SUPRACLAVICULAR - deferred      Procedure(s):  None          Assessment:      Problem List Items Addressed This Visit    None  Visit Diagnoses       House dust mite allergy    -  Primary    Dizziness        Post-nasal drip        Maxilla pain        Throat clearing        Laryngopharyngeal reflux (LPR)                     Plan:      Reflux control measures for empiric treatment of LPR.  We will use b.i.d. omeprazole and PRN famotidine for up to 12 weeks.  Reassessing in 6 weeks.  Weaning down when tolerated.      Allergy control measures as outlined.  Continue current medications.  Consider changing from Flonase to Nasonex as prescribed.  Astelin on an as needed basis.  Patient not rinsing except when symptoms are severe.  Encouraged to do this b.i.d..      CT scanning of the sinuses for chronic maxillary  pressure if symptoms fail to improve.  May benefit from balloon dilation trying to avoid wide antrostomies which may worsen postnasal drip and some studies.    Follow up 6-8 weeks          Voice recognition software was used in the creation of this note/communication and any sound-alike errors which may have occurred from its use should be taken in context when interpreting.  If such errors prevent a clear understanding of the note/communication, please contact the office for clarification.

## 2024-09-03 ENCOUNTER — PATIENT MESSAGE (OUTPATIENT)
Dept: OBSTETRICS AND GYNECOLOGY | Facility: CLINIC | Age: 49
End: 2024-09-03
Payer: COMMERCIAL

## 2024-09-03 RX ORDER — DIAZEPAM 5 MG/1
TABLET ORAL
Qty: 2 TABLET | Refills: 0 | Status: SHIPPED | OUTPATIENT
Start: 2024-09-03

## 2024-09-16 ENCOUNTER — OFFICE VISIT (OUTPATIENT)
Dept: OTOLARYNGOLOGY | Facility: CLINIC | Age: 49
End: 2024-09-16
Payer: COMMERCIAL

## 2024-09-16 ENCOUNTER — HOSPITAL ENCOUNTER (OUTPATIENT)
Dept: RADIOLOGY | Facility: OTHER | Age: 49
Discharge: HOME | End: 2024-09-16
Attending: PHYSICIAN ASSISTANT
Payer: COMMERCIAL

## 2024-09-16 DIAGNOSIS — R09.89 THROAT CLEARING: ICD-10-CM

## 2024-09-16 DIAGNOSIS — Z91.89 INCREASED RISK OF BREAST CANCER: ICD-10-CM

## 2024-09-16 DIAGNOSIS — R68.84 MAXILLA PAIN: Primary | ICD-10-CM

## 2024-09-16 DIAGNOSIS — R09.82 POST-NASAL DRIP: ICD-10-CM

## 2024-09-16 DIAGNOSIS — H93.8X3 EAR FULLNESS, BILATERAL: ICD-10-CM

## 2024-09-16 DIAGNOSIS — K21.9 LARYNGOPHARYNGEAL REFLUX (LPR): ICD-10-CM

## 2024-09-16 DIAGNOSIS — R42 DIZZINESS: ICD-10-CM

## 2024-09-16 DIAGNOSIS — Z12.39 BREAST CANCER SCREENING, HIGH RISK PATIENT: ICD-10-CM

## 2024-09-16 DIAGNOSIS — Z80.3 FAMILY HISTORY OF BREAST CANCER: ICD-10-CM

## 2024-09-16 PROCEDURE — A9577 INJ MULTIHANCE: HCPCS | Performed by: PHYSICIAN ASSISTANT

## 2024-09-16 PROCEDURE — 1160F RVW MEDS BY RX/DR IN RCRD: CPT | Mod: CPTII,S$GLB,, | Performed by: OTOLARYNGOLOGY

## 2024-09-16 PROCEDURE — 25500020 PHARM REV CODE 255: Performed by: PHYSICIAN ASSISTANT

## 2024-09-16 PROCEDURE — 77049 MRI BREAST C-+ W/CAD BI: CPT | Mod: TC

## 2024-09-16 PROCEDURE — 31575 DIAGNOSTIC LARYNGOSCOPY: CPT | Mod: S$GLB,,, | Performed by: OTOLARYNGOLOGY

## 2024-09-16 PROCEDURE — 3044F HG A1C LEVEL LT 7.0%: CPT | Mod: CPTII,S$GLB,, | Performed by: OTOLARYNGOLOGY

## 2024-09-16 PROCEDURE — 77049 MRI BREAST C-+ W/CAD BI: CPT | Mod: 26,,, | Performed by: RADIOLOGY

## 2024-09-16 PROCEDURE — 99999 PR PBB SHADOW E&M-EST. PATIENT-LVL III: CPT | Mod: PBBFAC,,, | Performed by: OTOLARYNGOLOGY

## 2024-09-16 PROCEDURE — 99214 OFFICE O/P EST MOD 30 MIN: CPT | Mod: 25,S$GLB,, | Performed by: OTOLARYNGOLOGY

## 2024-09-16 PROCEDURE — 1159F MED LIST DOCD IN RCRD: CPT | Mod: CPTII,S$GLB,, | Performed by: OTOLARYNGOLOGY

## 2024-09-16 RX ADMIN — GADOBENATE DIMEGLUMINE 14 ML: 529 INJECTION, SOLUTION INTRAVENOUS at 04:09

## 2024-09-16 NOTE — PROCEDURES
"Laryngoscopy    Date/Time: 9/16/2024 11:40 AM    Performed by: Bereket Garcia MD  Authorized by: Bereket Garcia MD    Time out: Immediately prior to procedure a "time out" was called to verify the correct patient, procedure, equipment, support staff and site/side marked as required.    Consent Done?:  Yes (Verbal)  Anesthesia:     Local anesthetic:  None    Patient tolerance:  Patient tolerated the procedure well with no immediate complications    Decongestion performed?: Yes    Laryngoscopy:     Areas examined:  Nasal cavities, nasopharynx, oropharynx, hypopharynx, larynx and vocal cords  Larynx/hypopharynx:      Flexible video endoscopy performed with Storz endoscope through both nares evaluating the inferior meatus middle meatus and sphenoethmoid recesses bilaterally as well as the nasopharynx oropharynx hypopharynx and larynx.  No edema or active drainage from the sinuses.  Some minimal hyperemia of the nasopharynx.  No edema of the middle meatus or uncinate process.  No pooling of secretions.  Mild to mild-to-moderate postcricoid edema and hyperemia.  No pooling of secretions asymmetry of movement or any suspicious lesion.  Minimal cobblestoning.    "

## 2024-09-16 NOTE — PATIENT INSTRUCTIONS
Continue with rinses.  Nasal steroids as needed try and avoid drying nasal antihistamines sprays.      Reminded of necessity for throat-clearing avoidance to try and improve throat symptoms.      CT scanning of the sinuses to rule out unlikely occult sinus disease as the cause.      Wean off of reflux medicine since there has been no improvement with their use in mild intermittent reflux symptoms or throat-clearing symptoms.      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      E visit to discuss results of CT scan, symptoms and next steps in the coming weeks.    Return with any worsening of symptoms, failure to improve, or any other concerns for further evaluation and treatment.        Voice recognition software was used in the creation of this note/communication and any sound-alike errors which may have occurred from its use should be taken in context when interpreting.  If such errors prevent a clear understanding of the note/communication, please contact the office for clarification.    WEANING OFF PPI's FOR REFLUX    Long term Omeprazole and other PPI (proton pump inhibitor) drugs should be avoided.  If still actively using twice daily 40 mg twice daily this should be reduced to Prilosec (omeprazole) OTC 20 mg twice daily 30 minutes prior to breakfast and dinner and add night time OTC Pepcid (famotidine) 10-20 mg as needed only (not for regular use) for breakthrough reflux symptoms.  Aggressive lifestyle changes are recommended to prevent or at least minimize breakthrough symptoms.      Prilosec OTC 20 mg should also be weaned down to daily over a 1-2 weeks then every other day over 1-2 weeks.  It is safe to take for persistent heartburn on an as needed basis up to 14 days without routine medical monitoring.     It is also very common to experience symptomatic heartburn needing antacids such as TUMS and Gaviscon while weaning down off long term PPI therapy.    Any more  frequent/chronic uses of Omeprazole and other PPI's (esomeprazole, pantoprazole, lansoprazole, etc.) needs to be monitored for both necessesity and potential side effects.  Such monitoring may include lab testing to monitor kidney function and electrolytes as well as bone density monitoring.          THROAT CLEARING     Why am I clearing my throat so often?   Irritation of the vocal folds and surrounding area can cause the urge to clear your throat. This irritation can be caused by acid reflux, allergies, or environmental factors, as well as from a cold or sore throat. Talk with your doctor about the treatment of possible underlying causes. However, throat clearing can turn into a cycle. You clear your throat after feeling an irritation, which causes more irritation, which causes you to continue clearing your throat, which causes more irritation.     What can I do about it?   Ask a friend or family member to help you keep track - you may not even realize how often you do it.   Replace the throat clearing with a different behavior.   Take a sip of water and then swallow. Repeat until the sensation subsides.  Swallow hard (even without water)   Use the silent throat clear method by making the h sound when you exhale  Breathe in deeply through your nose and exhale on shhh   Hum quietly   Keep yourself hydrated.   Drink plenty of water   Eat wet snacks (grapes, apples, melon, cucumber)   Use a humidifier at home or at work   Suck on hard candies, but avoid too much sugar and avoid anything with mint, menthol, camphor, or eucaplyptus, as these will have a more irritating effect.      NASAL SALINE    Still saline comes in many preparations including sprays/mists, gels, and rinses.  Different preparations served different purposes.  Saline spray helps to briefly moisturize the nose and help clear mucus.  Saline gels coat the nose for longer protective benefit of keeping the linings the nose moist.  Saline rinses  clear the nose and sinuses and a more thorough way in her best used for significant postnasal drip and sinus complaints.  A combination of saline sprays/mists, gels and rinses should be used to address routine nasal clearing and dryness issues as well as flushing for better control of allergy and postnasal drip symptoms.  There is no real risk of over use of nasal saline products.  Saline sprays do not have any of the potential rebound or addiction of nasal decongestant sprays.  Nasal saline sprays and rinses should be used prior to the application of any medicated nasal sprays such as nasal steroids or nasal antihistamine sprays.        INTRANASAL STEROID SPRAYS      Intranasal steroid sprays are available both by prescription and over-the-counter both in generic and brand name preparations.  They are all very similar in efficacy and side effect profiles.  Over-the-counter and prescription intranasal steroids include fluticasone propionate (Flonase), fluticasone furoate (Sensimist), triamcinolone (Nasacort), and budesonide (Rhinocort).  While these medications are available as prescriptions as well there are few nasal steroids in her available by prescription only and include mometasone (Nasonex), flunisolide (Nasarel), and beclomethasone (QNASL).    Nasal steroids or the foundation of treatment of both allergy and other inflammatory conditions of the nose and sinuses.  They are safe for regular use and while there are many side effects listed most of these are steroid class effects and not typically encountered.  Typical side effects include dryness and even ulceration and bleeding of the nose.  These side effects can be minimized by proper application and proper moisturization with saline and saline gel.    Sometimes changing between 1 brand of nasal steroid and another can result in improved control of symptoms especially after long term use of one particular nasal steroid.    Proper application of the nasal  steroid spray is accomplished by spraying towards the I/ear on the same side with the tip of the superior just barely inside the nostril with the chin slightly downward.  Any dripping should be gently inhaled not sniff test backwards into the throat.  Labeled instructions should be followed.        ASTELIN (Azelastine) nasal spray    Astelin is a topical nasal antihistamine which can be of additional benefit in controlling nasal allergy and postnasal drip.  Typically is recommended on an as-needed basis 1-2 sprays each nostril twice daily.  People often find it beneficial at night.  This typically added to her regimen of saline and nasal steroids as a 3rd line agent for as needed use.  Excessive use can cause excessive dryness and even nose bleeds.  It has a very strong taste which many people find intolerable.  Astelin needs to be stopped 5-7 days prior to any skin allergy testing just like oral antihistamines as it will inhibit the skin response.      SINUS RINSE INSTRUCTIONS    Nasal Saline Irrigation Instructions  You can wash your nasal and sinus passages using nasal saline (salt water) irrigation. This   is simple and effective. Follow the instructions below, as well as the ones provided by your   physician.  Supplies  First, you will need a nasal saline irrigation bottle and rinse solution.   You can purchase nasal rinse kits that include these items (such as   NeilMed®, Ayr®, Simply Saline®, Ocean Complete®) at most drug   stores. You can also make your own saline irrigation solution by   adding kosher (non-iodine) salt and baking soda to distilled water.   Your physician may tell you to add medications like a steroid or   antibiotic to the rinse as needed.  Steps for nasal irrigation  Step 1. Fill the bottle  ? Wash your hands.  ? Fill the irrigation bottle with lukewarm distilled water or boiled water that has cooled.  Step 2. Mix the solution  ? Put the saline and salt packet contents into the bottle.  ?  Tighten the top of the bottle and shake it gently to dissolve the mixture.  ? If you are making your own solution:   - Add 1/4 to 1/2 teaspoon of baking soda and 1/8 teaspoon of kosher (non-iodine) salt   into the bottle.   - Tighten the top of the bottle.   - Shake the bottle gently to dissolve the mixture.  Step 3. Get into position  ?  front of the sink.  ? Unless you were instructed to use another position, bend forward.   Then tilt your face down about 45 degrees so that you are looking   down into the sink.  ? Gently place the spout of the saline bottle against 1 of your nostrils.  [facility]  CARE AND TREATMENT  Patient Education  ©2018 [employer]  ©2018 [employer]  Step 4. Rinse  ? Breathing through your mouth, gently squeeze the   bottle. This will squirt the solution into your nostril. The   solution will start to drain from the other nostril. Some   may drain from your mouth. This is normal.  ? Use 2 ounces (half of the bottle) on each nostril.  ? Afterwards, you may need to blow your nose gently to   help drain any solution that is left behind.  Step 5. Repeat  ? Repeat steps 3 and 4 with the other nostril.  You can watch a video to learn how to do nasal saline irrigation. Go to [email or web address] and   search for NeilMed Sinus Rinse.  Step 6.  Clean the bottle and cap. Air dry the Sinus Rinse bottle, cap, and tube on a clean paper towel, a lint free towel, or use NeilMed® NasaDOCK® or NasaDOCK plus (sold separately) to store the bottle, cap and tube.  Please read Warnings before using.  Our recommendation is to replace the bottle every three months.      NEILMED ASHOK INSTRUCTIONS    It is very important to keep these devices clean and free from any contamination. Replace the bottle every 3 months.  NasaDock Plus  NasaDock NeilMed® SINUS RINSE Squeeze Bottle: Please perform routine inspections of the bottle and tube for any discolorations  and cracks. If there are any visual signs of deterioration or permanent color changes, please clean thoroughly. If the discolorations remain after cleansing, discard the items and purchase new ones. Please follow these instructions after each use of the product. Be sure to replace your product after three months.  Step 1: Rinse the cap, tube and bottle using running water. Fill the bottle with distilled, micro-filtered (through 0.2 micron), reverse osmosis filtered, commercially bottled or previously boiled and cooled down water at lukewarm or body temperature..  Step 2: Add a few drops of dish washing liquid or baby shampoo.  Step 3: Attach the cap and tube to the bottle; hold your finger over the opening in the cap and shake the bottle vigorously.  Step 4: Squeeze the bottle hard to allow the soapy solution to clean the interior of the tube and the cap. Empty out the bottle completely.  Step 5: Rinse the soap from the bottle, cap and tube thoroughly and place the items on a clean paper towel to dry or use the preferred NasaDOCK® or NasaDOCK plus.    The NasaDOCK® is a simple, hygienic way to dry and store the SINUS RINSE bottle, cap and tube. NasaDOCK® comes with various hanging options and is available in different colors. Our newest model also offers storage for our SINUS RINSE mixture packets. We strongly suggest using NasaDOCK® as an inexpensive, easy way to dry the cap, tube and SINUS RINSE bottle.        Cleaning:  Do not use a  to clean the inside of a bottle. While our bottle is  safe, a  will not adequately clean the SINUS RINSE bottle. The water jets in dishwashers cannot enter the narrow neck of the bottle, and portions of the bottles interior will not be cleaned thoroughly. Additional methods of cleaning the bottle include the use of concentrated white vinegar or isopropyl alcohol (70% concentration), followed by scrubbing and rinsing as described  above.       Microwave Disinfection  Clean the device with soap and water as mentioned above and shake off the excess water. Now place the bottle, cap and tube in the microwave for 40 seconds. This will disinfect the bottle, cap and tube. If the microwave has been used recently, please make sure that the inside of the microwave has cooled back down to room temperature before using it to disinfect the bottle.    NeilMed NasaFlo® Neti Pot Users:  Use the same procedure as above.    Sinugator® Cleaning Directions:  Clean the Sinugator® by running plain water and dry with a clean lint free towel and then air dry the unit by keeping it open to the air. The nasal  tip, blue reservoir and white soft tube can be disinfected by cleaning with soap and water and shaking off the excess water before placing in the home microwave for 60 seconds. Clean the entire unit with a few drops of dishwashing liquid and water every three days to keep the unit clean. As a fi nal rinse to wash off any residual soap or tap water, use either distilled, micro-filtered (through 0.2 micron filter), commercially bottled or previously boiled & cooled down water. Please make sure to rinse thoroughly during each wash so no soap is left behind. DO NOT place the white motor unit in microwave for disinfection. Because of the units stainless steel components, this can cause damage or fire hazards.    General Principles of Maintenance & Storage:  When permissible use a microwave periodically to disinfect devices. Always store NeilMed® products in a cool and dry place with adequate ventilation. NasaDOCK® or NasaDOCK plus offer a simple hygienic way to air dry & neatly store the bottle, cap, tube and NasaFlo. Do not store the bottle with the cap screwed on, unless both are dry. Do not store the wet parts in a sealed plastic bag. If you travel before they are dry, wrap parts separately in paper towels. Hand soap or shampoo can be used for cleaning  parts while away from home.        USE ONLY AS DIRECTED, IF SYMPTOMS PERSIST SEE YOUR DOCTOR/HEALTHCARE PROFESSIONAL. ALWAYS READ THE LABEL.        ACID REFLUX   What is acid reflux?    When we eat, food passes from the throat and into the stomach through a tube called the esophagus. At the bottom of the esophagus is a ring of muscles that acts as a valve between the esophagus and stomach, called the lower esophageal sphincter. Smoking, alcohol, and certain types of food may weaken the sphincter, so it may stop closing properly. The contents in the stomach then may leak back, or reflux, into the esophagus. This problem is called gastroesophageal reflux disease (GERD). Symptoms of GERD include heartburn, belching, regurgitation of stomach contents, and swallowing difficulties.    Sometimes, the stomach acid travels up through the esophagus and spills into the larynx or pharynx (voice box). This is called laryngopharyngeal reflux (LPR) and is irritating to the vocal folds and surrounding tissues. Often, patients with LPR do not experience heartburn as a symptom. More commonly, symptoms of LPR include hoarseness, excessive mucous resulting in frequent throat clearing, post-nasal drip, coughing, throat soreness or burning, choking episodes, difficulty swallowing, and sensation of a lump in the throat.     How is acid reflux treated?   Treatment for acid reflux can involve any combination of medication, lifestyle modifications, and surgery.   Medications. Your doctor may prescribe a proton pump inhibitor (PPI) or an H2 blocker. If you are prescribed a PPI, take in on an empty stomach in the morning 30 minutes prior to eating breakfast. Keep in mind that it may take 4-6 weeks before symptoms begin to resolve, so do not stop medications without consulting your doctor.   Lifestyle and dietary modifications. Eat smaller meals at a slower pace. Avoid over-eating. If you are overweight, try to lose weight. Do not lie down or  exercise directly after eating; eat your last meal of the day at least 2-3 hours prior to going to sleep. Avoid tight-fitting clothes. If you are a smoker, reduce or quit smoking. Elevate your head of bed 4-6 inches by putting phone books under the legs at the head of your bed or buy a wedge pillow, but do not use more than two regular pillows as this causes the body to curl and compresses your stomach.     Food group Foods to avoid to reduce reflux   Beverages  Whole milk, 2% milk, chocolate milk/hot chocolate, alcohol, coffee (regular and decaf), caffeinated tea, mint tea, carbonated beverages, citrus juice    Breads/grains Commercial sweet rolls, doughnuts, croissants, and other high-fat pastries    Fruits and vegetables Fried or cream-style vegetables, tomatoes, tomato-based products, citrus fruits, hot peppers    Soups and seasonings Cream, cheese, tomato-based soups, vinegar    Meats and proteins Fatty or fried meat/fish, sosa, sausage, pepperoni, lunch meat, fried eggs    Fats and oil Lard, sosa drippings, salt pork, meat drippings, gravies, highly seasoned salad dressings, nuts    Sweets/desserts Anything made with or from chocolate, peppermint, spearmint, whole milk, or cream; high-fat pastries, gum, hard candy

## 2024-09-16 NOTE — PROGRESS NOTES
[facility]    Subjective:      Patient: Mirian العلي Patient PCP: Nora Brooks MD         :  1975     Sex:  female      MRN:  [id number]          Date of Visit: 2024      Chief Complaint: Follow-up (Sinus f/u) and Otalgia (Pain lvl 5)      Patient ID: Mirian العلي is a 48 y.o. female       2024 follow up visit patient seen for initial consultation 6 weeks ago with complaints postnasal drip with findings of more nasal dryness rather than any actual drainage raising concerns of possible chronic throat clearing etiology of symptoms of postnasal drip.  Encouraged to avoid drying medications including Astelin in favor of rinses and reflux and throat clearing precautions.  She returns today with ongoing symptoms of facial pressure and discomfort and fullness and pressure in the ears.  Driving recently created significant pressure in the ears and associated dizziness.  This abated when her ears were able to pop.  She does not feel any real change in mild intermittent dyspepsia while on acid suppression medications.  Compliant with rinses rinsing at least once a day.  Stopped Astelin.  RSI is still elevated at 20 with 2/5 for gross reflux symptoms.    2024 initial patient consultation Patient is a  lifelong NON-smoker with a past medical history of , allergic rhinitis (on Odactra SLIT for dust mite allergy followed by Dr. Delvalle; class 1 allergy to dust mites and a total IgE of 109) and angioedema, referred to me by Dr. Nora Brooks in consultation for bothersome postnasal drip not relieved by Benadryl, Astelin and Flonase.  RSI is 22/45 with 2/5 for gross reflux.  Gets some nonspecific upper chest symptoms attributed to reflux which improve with rarely used Pepcid.  Throat-clearing as chief issue attributed to postnasal drip.  Also has facial pressure sometimes in the forehead but always in the maxilla area sometimes into the teeth.  No purulent infections treated with  antibiotics.  No loss of smell or nasal obstruction.    04/28/2017 CT sinuses [facility] reports polypoid sinus changes in the frontals ethmoids and maxillary sinuses with no air-fluid levels.  No images available for review.        Labs:  WBC   Date Value Ref Range Status   05/08/2024 4.51 3.90 - 12.70 K/uL Final     Hemoglobin   Date Value Ref Range Status   05/08/2024 13.0 12.0 - 16.0 g/dL Final     Platelets   Date Value Ref Range Status   05/08/2024 167 150 - 450 K/uL Final     Creatinine   Date Value Ref Range Status   05/08/2024 0.8 0.5 - 1.4 mg/dL Final     TSH   Date Value Ref Range Status   05/08/2024 2.443 0.400 - 4.000 uIU/mL Final     Hemoglobin A1C   Date Value Ref Range Status   05/08/2024 4.8 4.0 - 5.6 % Final     Comment:     ADA Screening Guidelines:  5.7-6.4%  Consistent with prediabetes  >or=6.5%  Consistent with diabetes    High levels of fetal hemoglobin interfere with the HbA1C  assay. Heterozygous hemoglobin variants (HbS, HgC, etc)do  not significantly interfere with this assay.   However, presence of multiple variants may affect accuracy.         Past Medical History  She has a past medical history of Angio-edema, AR (allergic rhinitis), BRCA1 negative, BRCA2 negative, Eczema, Endometriosis, mild, Factor V Leiden mutation, Genetic testing, Ovarian cyst, bilateral, Thyroglobulin antibody positive, and Urticaria.    Family / Surgical / Social History  Her family history includes Allergies in her brother; Asthma in her brother; BRCA 1/2 in her paternal cousin; Bladder Cancer in her father; Breast cancer in her mother; Breast cancer (age of onset: 46) in her sister; Cancer in her father, maternal grandmother, mother, paternal cousin, and sister; Diabetes in her brother, brother, brother, maternal aunt, and paternal uncle; Factor V Leiden deficiency in her brother; Kidney cancer (age of onset: 60) in her father; Lung cancer (age of onset: 71) in her mother; Other in her sister and sister; Skin cancer  in her father; Throat cancer in her paternal grandfather; Thyroid disease in her mother.    Past Surgical History:   Procedure Laterality Date    BREAST BIOPSY Right 09/13/2023    BUNIONECTOMY Left 02/06/2018    COLONOSCOPY N/A 10/26/2023    Procedure: COLONOSCOPY;  Surgeon: Ishmael Pham MD;  Location: [facility] ([address] [place] [address]);  Service: Endoscopy;  Laterality: N/A;  Referred by: Dr. Nora Brooks  Prep: suprep  Route instructions sent: [facility]  Other concerns: requests first case  10/19-precall complete-MS    HYSTERECTOMY  01/2015    TLHBSO    LAPAROSCOPY W/ MINI-LAPAROTOMY  2003    OOPHORECTOMY Right 03/2012    OOPHORECTOMY Left 01/2015    TOTAL VAGINAL HYSTERECTOMY  01/2015    w/Right USO    WISDOM TOOTH EXTRACTION Bilateral        Social History     Tobacco Use    Smoking status: Never     Passive exposure: Never    Smokeless tobacco: Never   Substance and Sexual Activity    Alcohol use: Yes     Comment: week    Drug use: Never    Sexual activity: Yes     Partners: Male     Birth control/protection: Post-menopausal, Other-see comments     Comment:  since 1999,  2002       Medications  She has a current medication list which includes the following prescription(s): odactra, alprazolam, aspirin, azelastine, bupropion, bupropion, cetirizine, cyanocobalamin (vitamin b-12), diazepam, diphenhydramine, epinephrine, estradiol, famotidine, hydroxychloroquine, mometasone, omeprazole, intrarosa, neilmed sinus rinse complete, testosterone, tizanidine, trazodone, UNABLE TO FIND, UNABLE TO FIND, and vitamin d.      Allergies  Review of patient's allergies indicates:   Allergen Reactions    Niacin preparations      Patient states she has flushing    Sulfa (sulfonamide antibiotics)      Tongue swelling    Iodine and iodide containing products Rash       All medications, allergies, and past history have been reviewed.    Objective:      Vitals:      7/11/2024     1:15 PM 7/29/2024     8:27 AM 9/16/2024    11:33 AM    Vitals - 1 value per visit   SYSTOLIC 96     DIASTOLIC 65     Pulse 84     Temp 98.1 °F (36.7 °C)     Weight (lb) 174.38     Weight (kg) 79.1     Pain Score Three Zero Three       There is no height or weight on file to calculate BSA.    Physical Exam:    GENERAL  APPEARANCE -  alert, appears stated age, and cooperative  BARRIER(S) TO COMMUNICATION -  none VOICE - appropriate for age and gender    INTEGUMENTARY  no suspicious head and neck lesions    HEENT  HEAD: Normocephalic, without obvious abnormality, atraumatic  FACE: INSPECTION - Symmetric, no signs of trauma, no suspicious lesion(s)      STRENGTH - facial symmetry intact     PALPATION -  No masses     SALIVARY GLANDS - non-tender with no appreciable mass    NECK/THYROID: normal atraumatic, no neck masses, normal thyroid, no jvd    EYES  Normal occular alignment and mobility with no visible nystagmus at rest    EARS/NOSE/MOUTH/THROAT  EARS  PINNAE AND EXTERNAL EARS - no suspicious lesion OTOSCOPIC EXAM (surgical microscopy was not used for visualization/instrumentation): EAR EXAM - Normal ear canals, tympanic membranes and mobility, and middle ear spaces bilaterally.  HEARING - grossly intact to voice/finger rub    NOSE AND SINUSES  EXTERNAL NOSE - Grossly normal for age/sex  SEPTUM - mild deviation TURBINATES - within normal limits MUCOSA - slight dryness w/o gross crusting     MOUTH AND THROAT   ORAL CAVITY, LIPS, TEETH, GUMS & TONGUE - moist, no suspicious lesions  OROPHARYNX /TONSILS/PHARYNGEAL WALLS/HYPOPHARYNX - no erythema or exudates  NASOPHARYNX - limited mirror exam - unable to visualize due to anatomy/gag  LARYNX -  - limited mirror exam - no notable posterior edema.  No pooling of secretions.  No appreciable lesion or erythema.      CHEST AND LUNG   INSPECTION & AUSCULTATION - normal effort, no stridor    CARDIOVASCULAR  AUSCULTATION & PERIPHERAL VASCULAR - regular rate and rhythm.    NEUROLOGIC  MENTAL STATUS - alert, interactive CRANIAL NERVES  - normal    LYMPHATIC  HEAD AND NECK - non-palpable; SUPRACLAVICULAR - deferred      Procedure(s):  Nasal endoscopy performed.  See procedure note.      Flexible video endoscopy performed with Storz endoscope through both nares evaluating the inferior meatus middle meatus and sphenoethmoid recesses bilaterally as well as the nasopharynx oropharynx hypopharynx and larynx.  No edema or active drainage from the sinuses.  Some minimal hyperemia of the nasopharynx.  No edema of the middle meatus or uncinate process.  No pooling of secretions.  Mild to mild-to-moderate postcricoid edema and hyperemia.  No pooling of secretions asymmetry of movement or any suspicious lesion.  Minimal cobblestoning.                  Assessment:      Problem List Items Addressed This Visit    None  Visit Diagnoses       Maxilla pain    -  Primary    Dizziness        Post-nasal drip        Laryngopharyngeal reflux (LPR)        Throat clearing        Ear fullness, bilateral                       Plan:      Continue with rinses.  Nasal steroids as needed try and avoid drying nasal antihistamines sprays.      Reminded of necessity for throat-clearing avoidance to try and improve throat symptoms.      CT scanning of the sinuses to rule out unlikely occult sinus disease as the cause.      Wean off of reflux medicine since there has been no improvement with their use in mild intermittent reflux symptoms or throat-clearing symptoms.      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      E visit to discuss results of CT scan, symptoms and next steps in the coming weeks.    Return with any worsening of symptoms, failure to improve, or any other concerns for further evaluation and treatment.          Voice recognition software was used in the creation of this note/communication and any sound-alike errors which may have occurred from its use should be taken in context when interpreting.  If such errors prevent a  clear understanding of the note/communication, please contact the office for clarification.

## 2024-09-19 ENCOUNTER — TELEPHONE (OUTPATIENT)
Dept: OTOLARYNGOLOGY | Facility: CLINIC | Age: 49
End: 2024-09-19
Payer: COMMERCIAL

## 2024-09-19 NOTE — TELEPHONE ENCOUNTER
Left message on voicemail for pt to call back. Need to verify with pt if she wants CT orders sent elsewhere? We already have received approval from her insurance to have CT at [facility]. Thanks, Georgette

## 2024-09-19 NOTE — TELEPHONE ENCOUNTER
----- Message from Ninfa Bejarano sent at 9/19/2024  9:24 AM CDT -----  Regarding: orders  Contact: christiana @ [phone]  Christiana with [facility] is needing the order for CT to be faxed to  [facility] in [address] phone [phone] fax [phone]. Please call to advose further. Thank you for all you are doing.

## 2024-09-20 ENCOUNTER — OFFICE VISIT (OUTPATIENT)
Dept: ALLERGY | Facility: CLINIC | Age: 49
End: 2024-09-20
Payer: COMMERCIAL

## 2024-09-20 VITALS
SYSTOLIC BLOOD PRESSURE: 111 MMHG | HEART RATE: 81 BPM | HEIGHT: 71 IN | DIASTOLIC BLOOD PRESSURE: 72 MMHG | WEIGHT: 167.13 LBS | OXYGEN SATURATION: 100 % | BODY MASS INDEX: 23.4 KG/M2

## 2024-09-20 DIAGNOSIS — L29.9 GENERALIZED PRURITUS: ICD-10-CM

## 2024-09-20 DIAGNOSIS — Z51.6 ALLERGY DESENSITIZATION THERAPY: ICD-10-CM

## 2024-09-20 DIAGNOSIS — Z91.09 ALLERGY TO AMERICAN HOUSE DUST MITE: ICD-10-CM

## 2024-09-20 DIAGNOSIS — J30.9 CHRONIC ALLERGIC RHINITIS: Primary | ICD-10-CM

## 2024-09-20 PROCEDURE — 99999 PR PBB SHADOW E&M-EST. PATIENT-LVL V: CPT | Mod: PBBFAC,,, | Performed by: STUDENT IN AN ORGANIZED HEALTH CARE EDUCATION/TRAINING PROGRAM

## 2024-09-20 NOTE — PROGRESS NOTES
ALLERGY & IMMUNOLOGY CLINIC - FOLLOW UP     HISTORY OF PRESENT ILLNESS     Patient ID: Mirian العلي is a 48 y.o. female    CC: allergic rhinitic    HPI: Mirian العلي is a 48 y.o. female following up for allergic rhinitis (started odactra 9/26/22).    Using nasonex 1 SEN BID. She can't tell if it is making a difference. Using the azelastine prn. A lot of dust in house due to recent renovations, which is triggering symptoms. She says her reaction to the dust isn't as bad as it would be before the odactra.   She feels her ears have been more blocked.   She stopped the singulair due to sleep disturbances.   Remains on cetirizine (often twice per day).   She also takes an additional sinus pill sometimes (a combo 1st gen antihistamine and decongestant).   She is seeing ENT.  She is getting a CT sinuses next week.   The pruritus is under control.      MEDICAL HISTORY     Vaccines:   Immunization History   Administered Date(s) Administered    COVID-19 MRNA, LN-S PF (MODERNA HALF 0.25 ML DOSE) 12/18/2021    COVID-19, MRNA, LN-S, PF (MODERNA FULL 0.5 ML DOSE) 02/23/2021, 02/23/2021, 03/24/2021    COVID-19, mRNA, LNP-S, bivalent booster, PF (PFIZER OMICRON) 08/11/2023    Influenza 10/05/2015, 08/27/2016    Influenza (Flumist) - Quadrivalent - Intranasal *Preferred* (2-49 years old) 09/27/2014    Influenza - Quadrivalent 09/15/2019    Influenza - Quadrivalent - MDCK - PF 12/11/2021, 10/30/2022    Influenza - Quadrivalent - PF *Preferred* (6 months and older) 09/16/2017, 10/27/2018, 09/27/2020, 08/11/2023    Influenza - Trivalent - Afluria, Fluzone MDV 10/08/2011, 09/13/2013    Influenza - Trivalent - Fluarix, Flulaval, Fluzone, Afluria - PF 10/05/2015, 08/27/2016    Pneumococcal Conjugate - 20 Valent 05/07/2024    Tdap 10/07/2019     MedHx:   Patient Active Problem List   Diagnosis    Fatigue    Ovarian cyst    Breast tenderness in female    Generalized headaches    Endometriosis of pelvis    Unmedicated IUD     Factor V Leiden carrier    Rhinitis, allergic    Pelvic pain in female    S/P hysterectomy with oophorectomy    Family history of breast cancer in mother    Chest pain on breathing    Precordial pain    Hemangioma    Corneal abrasion, left    Pectus excavatum    Shingles    Abnormal laboratory test    Carpal tunnel syndrome of right wrist    Right shoulder pain    Swelling of arm    Primary insomnia    Increased risk of breast cancer    Decreased right shoulder range of motion    Muscle weakness of right upper extremity    Isolated cervical dystonia    Inflammatory polyarthropathy    Drug-induced immunodeficiency    Current mild episode of major depressive disorder, unspecified whether recurrent    Allergy to mites     SurgHx:   Past Surgical History:   Procedure Laterality Date    BREAST BIOPSY Right 09/13/2023    BUNIONECTOMY Left 02/06/2018    COLONOSCOPY N/A 10/26/2023    Procedure: COLONOSCOPY;  Surgeon: Ishmael Pham MD;  Location: [address]);  Service: Endoscopy;  Laterality: N/A;  Referred by: Dr. Nora Brooks  Prep: suprep  Route instructions sent: [facility]  Other concerns: requests first case  10/19-precall complete-MS    HYSTERECTOMY  01/2015    TLHBSO    LAPAROSCOPY W/ MINI-LAPAROTOMY  2003    OOPHORECTOMY Right 03/2012    OOPHORECTOMY Left 01/2015    TOTAL VAGINAL HYSTERECTOMY  01/2015    w/Right USO    WISDOM TOOTH EXTRACTION Bilateral      Medications:   Current Outpatient Medications on File Prior to Visit   Medication Sig Dispense Refill    allerg xt,D.farinae-D.pteronys (ODACTRA) 12 SQ-HDM Subl Place 1 tablet under the tongue every evening. 90 tablet 3    ALPRAZolam (XANAX) 1 MG tablet Take 1 tablet (1 mg total) by mouth nightly as needed for Insomnia or Anxiety. 21 tablet 0    aspirin (ECOTRIN) 81 MG EC tablet Take 81 mg by mouth once daily.      azelastine (ASTELIN) 137 mcg (0.1 %) nasal spray USE 1 TO 2 SPRAYS IN EACH NOSTRIL TWICE DAILY 30 mL 11    buPROPion (WELLBUTRIN XL)  150 MG TB24 tablet TAKE 1 TABLET BY MOUTH EVERY DAY TAKE WITH 300 MG TABLET 90 tablet 3    buPROPion (WELLBUTRIN XL) 300 MG 24 hr tablet Take 1 tablet (300 mg total) by mouth once daily. 90 tablet 0    cetirizine (ZYRTEC) 10 MG tablet Take 1 tablet (10 mg total) by mouth once daily. 30 tablet 2    cyanocobalamin, vitamin B-12, 5,000 mcg Subl Place under the tongue once a week.      diazePAM (VALIUM) 5 MG tablet 1-2 PO 30-60 minutes prior to MRI 2 tablet 0    diphenhydrAMINE (BENADRYL) 50 MG capsule Take 50 mg by mouth every 6 (six) hours as needed for Itching.      EPINEPHrine (EPIPEN 2-AVILA) 0.3 mg/0.3 mL AtIn Inject 0.3 mLs (0.3 mg total) into the muscle as needed (Anaphylaxis). 2 each 0    estradioL (VIVELLE-DOT) 0.1 mg/24 hr PTSW APPLY 1 PATCH TOPICALLY TO THE SKIN 2 TIMES A WEEK 8 patch 5    famotidine (PEPCID) 20 MG tablet Take 1 tablet (20 mg total) by mouth nightly as needed for Heartburn. 30 tablet 5    hydrOXYchloroQUINE (PLAQUENIL) 200 mg tablet TK 2 TS PO QD      mometasone (NASONEX) 50 mcg/actuation nasal spray 1 spray by Nasal route 2 (two) times a day. Spray outwardly towards the ears.  Ideally AFTER a sinus rinse twice daily. 34 g 3    omeprazole (PRILOSEC) 20 MG capsule 20 mg p.o. b.i.d. 30 minutes before breakfast and dinner wean off as tolerated 60 capsule 2    prasterone, dhea, (INTRAROSA) 6.5 mg Inst PLACE 6.5MG VAGINALLY EVERY EVENING 30 each 5    sod chlor-bicarb-squeez bottle (NEILMED SINUS RINSE COMPLETE) pkdv 1 application  by sinus irrigation route 2 (two) times a day. Not right before bed      testosterone (ANDROGEL) 1 % (50 mg/5 gram) GlPk apply 3 clicks (0.75gm) TO inner thigh nightly      tiZANidine (ZANAFLEX) 4 MG tablet TAKE 1 TABLET(4 MG) BY MOUTH TWICE DAILY AS NEEDED 60 tablet 12    traZODone (DESYREL) 50 MG tablet TAKE 1 TABLET(50 MG) BY MOUTH EVERY NIGHT AS NEEDED FOR INSOMNIA 90 tablet 3    UNABLE TO FIND medication name: tumeric 200 mg twice a day      UNABLE TO FIND Apply 0.75  "g topically every evening. Testosterone 1% 30 g 5    vitamin D (VITAMIN D3) 1000 units Tab Take 2,000 Units by mouth 2 (two) times daily.       No current facility-administered medications on file prior to visit.     Drug Allergies:   Review of patient's allergies indicates:   Allergen Reactions    Niacin preparations      Patient states she has flushing    Sulfa (sulfonamide antibiotics)      Tongue swelling    Iodine and iodide containing products Rash     SocHx:   Social History     Tobacco Use    Smoking status: Never     Passive exposure: Never    Smokeless tobacco: Never   Substance Use Topics    Alcohol use: Yes     Comment: week    Drug use: Never     Additional History Obtained at Initial Visit:  H/o Asthma: She says she was given an inhaler as a teenager, but no longer needs an inhaler and has never been diagnosed with asthma  H/o Eczema: denies  H/o Rhinitis: endorses  Food Allergy: denies  Drug Allergies:   Iodine contrast -- she got hives. She didn't get hives when she pretreated with benadryl.  Sulfa antibiotics -- she got it after a surgery in 2015. Had tongue swelling.  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  Infection Hx: Denies frequent infections requiring antibiotics. No history of pneumonia.     PHYSICAL EXAM     VS: /72 (BP Location: Left arm, Patient Position: Sitting, BP Method: Medium (Automatic))   Pulse 81   Ht 5' 11" (1.803 m)   Wt 75.8 kg (167 lb 1.7 oz)   LMP 01/01/2015 (Approximate)   SpO2 100%   BMI 23.31 kg/m²   GENERAL: Alert, NAD, well-appearing  EYES: EOMI, no conjunctival injection, no discharge, no infraorbital shiners  NOSE: NT 2+ B/L, no stringing mucous, no polyps visualized  ORAL: MMM, no ulcers, no thrush  LUNGS: CTAB, no w/r/c, no increased WOB  HEART: RRR, normal S1/S2, no m/g/r  DERM: no rashes  NEURO: normal speech, normal gait, no facial asymmetry     LABORATORY " STUDIES     Component      Latest Ref Rng 5/8/2024   WBC      3.90 - 12.70 K/uL 4.51    RBC      4.00 - 5.40 M/uL 4.43    Hemoglobin      12.0 - 16.0 g/dL 13.0    Hematocrit      37.0 - 48.5 % 40.7    MCV      82 - 98 fL 92    MCH      27.0 - 31.0 pg 29.3    MCHC      32.0 - 36.0 g/dL 31.9 (L)    RDW      11.5 - 14.5 % 12.6    Platelet Count      150 - 450 K/uL 167    MPV      9.2 - 12.9 fL 11.0    Immature Granulocytes      0.0 - 0.5 % 0.0    Gran # (ANC)      1.8 - 7.7 K/uL 3.3    Immature Grans (Abs)      0.00 - 0.04 K/uL 0.00    Lymph #      1.0 - 4.8 K/uL 0.8 (L)    Mono #      0.3 - 1.0 K/uL 0.5    Eos #      0.0 - 0.5 K/uL 0.0    Baso #      0.00 - 0.20 K/uL 0.03    Differential Method Automated    Sodium      136 - 145 mmol/L 136    Potassium      3.5 - 5.1 mmol/L 3.9    Chloride      95 - 110 mmol/L 105    CO2      23 - 29 mmol/L 23    Glucose      70 - 110 mg/dL 95    BUN      6 - 20 mg/dL 8    Creatinine      0.5 - 1.4 mg/dL 0.8    Calcium      8.7 - 10.5 mg/dL 9.4    PROTEIN TOTAL      6.0 - 8.4 g/dL 6.9    Albumin      3.5 - 5.2 g/dL 4.2    BILIRUBIN TOTAL      0.1 - 1.0 mg/dL 0.6    ALP      55 - 135 U/L 46 (L)    AST      10 - 40 U/L 15    ALT      10 - 44 U/L 11    eGFR      >60 mL/min/1.73 m^2 >60.0    Anion Gap      8 - 16 mmol/L 8    Hemoglobin A1C External      4.0 - 5.6 % 4.8    Estimated Avg Glucose      68 - 131 mg/dL 91    TSH      0.400 - 4.000 uIU/mL 2.443       ALLERGEN TESTING     Skin Prick:  Inhalant Skin Testing Results 9/7/22:  Interpretation: Appropriate positive and negative controls. Positives were to dust mite. All other allergens tested were negative.    Immunocaps:   Component      Latest Ref Rng 1/10/2013 7/22/2022   D. farinae      <0.10 kU/L 1.23 (H)  <0.10    D. farinae Class CLASS II  CLASS 0    D. pteronyssinus Dust Mite IgE      <0.10 kU/L 0.75 (H)  <0.10    D. pteronyssinus Class CLASS II  CLASS 0    Bahia Grass IgE      <0.10 kU/L <0.35  <0.10    Bahia Class CLASS 0   CLASS 0    Bereket Grass IgE      <0.10 kU/L <0.35  <0.10    Bereket Grass Class CLASS 0  CLASS 0    Dennison Tree IgE      <0.10 kU/L <0.35  <0.10    [place], Class CLASS 0  CLASS 0    Pecan Kenedy Tree IgE      <0.10 kU/L <0.35  <0.10    Pecan, Class CLASS 0  CLASS 0    Marshelder IgE      <0.10 kU/L <0.35  <0.10    Marshelder Class CLASS 0  CLASS 0    Ragweed, Short, IgE      <0.35 kU/L <0.35     Ragweed, Short, Class CLASS 0     A. tenius, IgE      <0.35 kU/L <0.35     A. tenius Class CLASS 0     Aspergillus Fumigatus IgE      <0.10 kU/L <0.35  <0.10    A. fumigatus Class CLASS 0  CLASS 0    Bermuda Grass IgE      <0.10 kU/L  <0.10    Bermuda Grass Class  CLASS 0    Assumption IgE      <0.10 kU/L  <0.10    Assumption Class  CLASS 0    English Plantain IgE      <0.10 kU/L  <0.10    English Plantain Class  CLASS 0    Ragweed, Western IgE      <0.10 kU/L  <0.10    Ragweed, Western, Class  CLASS 0    A. alternata IgE      <0.10 kU/L  <0.10    Altern. alternata Class  CLASS 0    Cat Dander IgE      <0.10 kU/L  <0.10    Cat Epithelium Class  CLASS 0    Dog Dander IgE      <0.10 kU/L  <0.10    Dog Dander Class  CLASS 0    White Kike Tree IgE      <0.10 kU/L  <0.10    White Kike Class  CLASS 0    Boxelder Maple Tree IgE      <0.10 kU/L  <0.10    Allergen Maple ([place]) Class  CLASS 0    [place] Tree IgE      <0.10 kU/L  <0.10    [place] Class  CLASS 0    Silver [place] IgE      <0.10 kU/L  <0.10    Silver Birch Class  CLASS 0    Pedrito Grass IgE      <0.10 kU/L  <0.10    Pedrito Grass Class  CLASS 0    Allergen Candida albicans IgE      <0.10 kU/L  <0.10    Allergen Candida albicans Class  CLASS 0    Cladosporium IgE      <0.10 kU/L  <0.10    Cladosporium Class  CLASS 0    Curvularia lunata      <0.10 kU/L  <0.10    Curvularia Lunata Class  CLASS 0    Penicillium IgE      <0.10 kU/L  <0.10    Penicillium Class  CLASS 0    Total IgE      0 - 100 IU/ml 109 (H)        PULMONARY FUNCTION TESTING     Date 1/10/13:  FVC:          101%ile   FEV1:         77%ile  FEV1/FVC: 63%  FEF 25-75: 44%ile  Interpretation: mild obstruction     CHART REVIEW     Reviewed ENT notes, labs.      ASSESSMENT & PLAN     Mirian العلي is a 48 y.o. female with     # Chronic allergic rhinitis, allergy to dust mite: Started sublingual immunotherapy with odactra 9/26/22, and she has noticed some improvement, but symptoms still bothersome. Montelukast helped, but she didn't tolerate it due to sleep disturbances. Currently, she finds her symptoms are worse than usual due to recent renovations in home stirring up dust, but she does say her reaction to the dust isn't as bad as it would have been prior to odactra.   -continue odactra 1 sublingual tablet nightly (she has epipen).  -continue cetirizine 10 mg once to twice daily.  -continue nasonex 1 SEN BID.  -continue azelastine nasal spray 1-2 SEN BID prn.  -continue saline sinus rinses.  -she is following with ENT, and she has a CT sinuses scheduled for next week.     # Generalized pruritus: helped by cetirizine.  -continue cetirizine 10 mg once to twice daily.       Follow up: 6 months or sooner if needed     Dulce Delvalle MD  Allergy/Immunology

## 2024-09-23 ENCOUNTER — PATIENT MESSAGE (OUTPATIENT)
Dept: OTOLARYNGOLOGY | Facility: CLINIC | Age: 49
End: 2024-09-23
Payer: COMMERCIAL

## 2024-09-27 ENCOUNTER — TELEPHONE (OUTPATIENT)
Dept: OTOLARYNGOLOGY | Facility: CLINIC | Age: 49
End: 2024-09-27
Payer: COMMERCIAL

## 2024-09-27 ENCOUNTER — PATIENT MESSAGE (OUTPATIENT)
Dept: OTOLARYNGOLOGY | Facility: CLINIC | Age: 49
End: 2024-09-27
Payer: COMMERCIAL

## 2024-09-27 NOTE — TELEPHONE ENCOUNTER
----- Message from David Wilkins sent at 9/27/2024  3:43 PM CDT -----  Regarding: orders needed  ORDER REQUEST    Order Needed: CT [employer] Sinuses  Additional Info: pt has called multiple times, [facility] has not rec'd orders. Please fax to [phone] at earliest opportunity, they are awaiting orders to get her scheduled

## 2024-10-03 ENCOUNTER — PATIENT MESSAGE (OUTPATIENT)
Dept: PAIN MEDICINE | Facility: CLINIC | Age: 49
End: 2024-10-03
Payer: COMMERCIAL

## 2024-10-06 ENCOUNTER — PATIENT MESSAGE (OUTPATIENT)
Dept: ADMINISTRATIVE | Facility: OTHER | Age: 49
End: 2024-10-06
Payer: COMMERCIAL

## 2024-10-06 DIAGNOSIS — N95.1 MENOPAUSAL SYNDROME: ICD-10-CM

## 2024-10-06 DIAGNOSIS — F32.0 CURRENT MILD EPISODE OF MAJOR DEPRESSIVE DISORDER, UNSPECIFIED WHETHER RECURRENT: ICD-10-CM

## 2024-10-06 RX ORDER — BUPROPION HYDROCHLORIDE 300 MG/1
TABLET ORAL
Qty: 90 TABLET | Refills: 1 | Status: SHIPPED | OUTPATIENT
Start: 2024-10-06

## 2024-10-06 NOTE — TELEPHONE ENCOUNTER
Refill Decision Note   Mirian العلي  is requesting a refill authorization.  Brief Assessment and Rationale for Refill:  Approve     Medication Therapy Plan:        Comments:     Note composed:2:02 PM 10/06/2024

## 2024-10-06 NOTE — TELEPHONE ENCOUNTER
No care due was identified.  [facility] Embedded Care Due Messages. Reference number: [id number].   10/06/2024 5:28:59 AM CDT

## 2024-10-07 ENCOUNTER — TELEPHONE (OUTPATIENT)
Dept: PAIN MEDICINE | Facility: CLINIC | Age: 49
End: 2024-10-07
Payer: COMMERCIAL

## 2024-10-07 ENCOUNTER — PATIENT MESSAGE (OUTPATIENT)
Dept: PAIN MEDICINE | Facility: CLINIC | Age: 49
End: 2024-10-07
Payer: COMMERCIAL

## 2024-10-07 RX ORDER — ESTRADIOL 0.1 MG/D
FILM, EXTENDED RELEASE TRANSDERMAL
Qty: 8 PATCH | Refills: 1 | Status: SHIPPED | OUTPATIENT
Start: 2024-10-07

## 2024-10-07 NOTE — TELEPHONE ENCOUNTER
----- Message from Brenda sent at 10/7/2024 12:45 PM CDT -----  Regarding: call back  Type: Patient Call Back    Who called: pt     What is the request in detail: requesting call to reschedule Botox injection appt     Can the clinic reply by [facility]?yes     Would the patient rather a call back or a response via [facility]? Both     Best call back number: [phone]     Additional Information:

## 2024-10-07 NOTE — TELEPHONE ENCOUNTER
Refill Routing Note   Medication(s) are not appropriate for processing by [facility] for the following reason(s):        Outside of protocol    [facility] action(s):  Route             Appointments  past 12m or future 3m with PCP    Date Provider   Last Visit   10/4/2023 Tiff Mir MD   Next Visit   Visit date not found Tiff Mir MD   ED visits in past 90 days: 0        Note composed:1:42 PM 10/07/2024

## 2024-10-07 NOTE — TELEPHONE ENCOUNTER
Staff spoke with patient to reschedule her appointment due to Dr. Mcclendon being out the whole month of October. Staff asked patient did she want to be rescheduled with Dr. Mcclendon at his next available day or was she okay with getting her injections done by another provider. Patient agreed with being scheduled with another provider. Staff scheduled her on 10/25 with Dr. Coleman. Patient is in agreement to the above and verbalized understanding.

## 2024-10-13 DIAGNOSIS — N95.2 POSTMENOPAUSAL ATROPHIC VAGINITIS: ICD-10-CM

## 2024-10-14 DIAGNOSIS — F41.9 ANXIETY: ICD-10-CM

## 2024-10-14 DIAGNOSIS — R07.1 CHEST PAIN ON BREATHING: ICD-10-CM

## 2024-10-14 NOTE — TELEPHONE ENCOUNTER
No care due was identified.  [facility] Embedded Care Due Messages. Reference number: [id number].   10/14/2024 5:13:53 PM CDT

## 2024-10-14 NOTE — TELEPHONE ENCOUNTER
Refill Routing Note   Medication(s) are not appropriate for processing by [facility] for the following reason(s):        Outside of protocol    [facility] action(s):  Route               Appointments  past 12m or future 3m with PCP    Date Provider   Last Visit   10/4/2023 Tiff Mir MD   Next Visit   Visit date not found Tiff Mir MD   ED visits in past 90 days: 0        Note composed:7:40 PM 10/13/2024

## 2024-10-15 RX ORDER — ALPRAZOLAM 1 MG/1
1 TABLET ORAL NIGHTLY PRN
Qty: 21 TABLET | Refills: 0 | Status: SHIPPED | OUTPATIENT
Start: 2024-10-15 | End: 2024-11-14

## 2024-10-15 RX ORDER — PRASTERONE 6.5 MG/1
6.5 INSERT VAGINAL
Qty: 30 EACH | Refills: 1 | Status: SHIPPED | OUTPATIENT
Start: 2024-10-15

## 2024-10-15 NOTE — TELEPHONE ENCOUNTER
Refill Encounter    PCP Visits: Recent Visits  Date Type Provider Dept   05/07/24 Office Visit Nora Brooks MD [facility]   03/13/24 Office Visit Nora Brooks MD [facility]   Showing recent visits within past 360 days and meeting all other requirements  Future Appointments  No visits were found meeting these conditions.  Showing future appointments within next 720 days and meeting all other requirements     Last 3 Blood Pressure:   BP Readings from Last 3 Encounters:   09/20/24 111/72   07/11/24 96/65   05/07/24 90/64     Preferred Pharmacy:   [id number] - [place], [place] - [address] AT [facility]  [address] [place] [place] [address]  Phone: [phone] Fax: [phone]    [facility] - [facility] [address]  [address] [facility] [address]  Phone: [phone] Fax: [phone]    [facility]  [address] [place] [address]  Phone: [phone] Fax: [phone]    Requested RX:  Requested Prescriptions     Pending Prescriptions Disp Refills    ALPRAZolam (XANAX) 1 MG tablet 21 tablet 0     Sig: Take 1 tablet (1 mg total) by mouth nightly as needed for Insomnia or Anxiety.      RX Route: Normal

## 2024-10-16 ENCOUNTER — PATIENT MESSAGE (OUTPATIENT)
Dept: OTOLARYNGOLOGY | Facility: CLINIC | Age: 49
End: 2024-10-16
Payer: COMMERCIAL

## 2024-10-22 ENCOUNTER — TELEPHONE (OUTPATIENT)
Dept: PAIN MEDICINE | Facility: CLINIC | Age: 49
End: 2024-10-22
Payer: COMMERCIAL

## 2024-10-22 ENCOUNTER — PATIENT MESSAGE (OUTPATIENT)
Dept: PRIMARY CARE CLINIC | Facility: CLINIC | Age: 49
End: 2024-10-22
Payer: COMMERCIAL

## 2024-10-22 NOTE — TELEPHONE ENCOUNTER
Staff tried to contact patient in regards to rescheduling her appt due to  not being able to perform this procedure. So we will get her back scheduled with her provider which will be in December

## 2024-10-23 RX ORDER — OMEPRAZOLE 20 MG/1
CAPSULE, DELAYED RELEASE ORAL
Qty: 60 CAPSULE | Refills: 2 | Status: SHIPPED | OUTPATIENT
Start: 2024-10-23

## 2024-10-28 ENCOUNTER — PATIENT MESSAGE (OUTPATIENT)
Dept: OTOLARYNGOLOGY | Facility: CLINIC | Age: 49
End: 2024-10-28
Payer: COMMERCIAL

## 2024-11-11 ENCOUNTER — TELEPHONE (OUTPATIENT)
Dept: OTOLARYNGOLOGY | Facility: CLINIC | Age: 49
End: 2024-11-11
Payer: COMMERCIAL

## 2024-11-11 NOTE — TELEPHONE ENCOUNTER
----- Message from Tech Sabina sent at 11/11/2024  9:18 AM CST -----  Regarding: Patient call back  .Type: Patient Call Back    Who called:self     What is the request in detail:would like to know what are next steps after completing the CT scan     Can the clinic reply by [facility]?no     Would the patient rather a call back or a response via [facility]? Call     Best call back number:.[phone]      Additional Information:

## 2024-11-12 NOTE — TELEPHONE ENCOUNTER
Pt wants to discuss treatment, but would like to see provider at [facility]. Which provider do you suggest?

## 2024-11-12 NOTE — TELEPHONE ENCOUNTER
Patient reaching out regarding sinus CT results; scanned/uploaded into media on 10/17/2024.  Please advise, thank you!

## 2024-11-15 ENCOUNTER — TELEPHONE (OUTPATIENT)
Dept: OTOLARYNGOLOGY | Facility: CLINIC | Age: 49
End: 2024-11-15
Payer: COMMERCIAL

## 2024-11-15 NOTE — TELEPHONE ENCOUNTER
----- Message from Kianna sent at 11/15/2024  9:39 AM CST -----  Regarding: Return call  Contact: [phone]  Type:  Patient Returning Call    Who Called:Mirian  Who Left Message for Patient: hTea  Does the patient know what this is regarding?:Appt  Would the patient rather a call back or a response via [facility]? Call  Best Call Back Number: [phone]  Additional Information:

## 2024-11-24 DIAGNOSIS — N95.1 MENOPAUSAL SYNDROME: ICD-10-CM

## 2024-11-24 RX ORDER — ESTRADIOL 0.1 MG/D
FILM, EXTENDED RELEASE TRANSDERMAL
Qty: 24 PATCH | Refills: 0 | Status: SHIPPED | OUTPATIENT
Start: 2024-11-24

## 2024-11-25 NOTE — TELEPHONE ENCOUNTER
Refill Decision Note   Mirian Perezsey  is requesting a refill authorization.  Brief Assessment and Rationale for Refill:  Approve     Medication Therapy Plan:         Comments:     Note composed:10:30 PM 11/24/2024

## 2024-12-05 DIAGNOSIS — N95.1 MENOPAUSAL SYNDROME: ICD-10-CM

## 2024-12-05 NOTE — TELEPHONE ENCOUNTER
----- Message from Cassandra sent at 12/5/2024  8:59 AM CST -----  Regarding: FW: Refill Request  I scheduled her for a virtual on Jan 17th. She said she is completely out of medication. Not sure you she can get a bridge fill but I let her know I would pass that along.  ----- Message -----  From: Pat Zelaya LPN  Sent: 12/4/2024  11:51 AM CST  To: Cassandra Zelaya  Subject: RE: Refill Request                               Patient nneds an appointment she is overdue prior to getting any refills.  ----- Message -----  From: Cassandra Zelaya  Sent: 12/4/2024  11:48 AM CST  To: Adeola PAULA Staff  Subject: Refill Request                                   Patient called in. Requesting a refill of her Testosterone cream.

## 2024-12-17 DIAGNOSIS — R07.1 CHEST PAIN ON BREATHING: ICD-10-CM

## 2024-12-17 DIAGNOSIS — F41.9 ANXIETY: ICD-10-CM

## 2024-12-17 RX ORDER — ALPRAZOLAM 1 MG/1
TABLET ORAL
Qty: 21 TABLET | Refills: 2 | Status: SHIPPED | OUTPATIENT
Start: 2024-12-17

## 2024-12-17 NOTE — TELEPHONE ENCOUNTER
No care due was identified.  [facility] Embedded Care Due Messages. Reference number: [id number].   12/17/2024 8:17:30 AM CST

## 2024-12-17 NOTE — TELEPHONE ENCOUNTER
Refill Encounter    PCP Visits: Recent Visits  Date Type Provider Dept   05/07/24 Office Visit Nora Brooks MD [facility]   03/13/24 Office Visit Nora Brooks MD [facility]   Showing recent visits within past 360 days and meeting all other requirements  Future Appointments  No visits were found meeting these conditions.  Showing future appointments within next 720 days and meeting all other requirements     Last 3 Blood Pressure:   BP Readings from Last 3 Encounters:   09/20/24 111/72   07/11/24 96/65   05/07/24 90/64     Preferred Pharmacy:   [id number] [address] [facility]  [address] [facility] [address]  Phone: [phone] Fax: [phone]      Requested RX:  Requested Prescriptions     Pending Prescriptions Disp Refills    ALPRAZolam (XANAX) 1 MG tablet [Pharmacy Med Name: ALPRAZOLAM 1MG TABLETS] 21 tablet      Sig: TAKE 1 TABLET(1 MG) BY MOUTH EVERY NIGHT AS NEEDED FOR INSOMNIA OR ANXIETY      RX Route: Normal

## 2024-12-18 ENCOUNTER — TELEPHONE (OUTPATIENT)
Dept: PAIN MEDICINE | Facility: CLINIC | Age: 49
End: 2024-12-18
Payer: COMMERCIAL

## 2024-12-18 NOTE — TELEPHONE ENCOUNTER
Staff called patient to confirm attendance for upcoming appt. There was no answer, staff left pt voicemail and call back # stating to give call back to confirm or cancel.

## 2024-12-19 ENCOUNTER — TELEPHONE (OUTPATIENT)
Dept: PAIN MEDICINE | Facility: CLINIC | Age: 49
End: 2024-12-19
Payer: COMMERCIAL

## 2024-12-19 ENCOUNTER — OFFICE VISIT (OUTPATIENT)
Dept: PAIN MEDICINE | Facility: CLINIC | Age: 49
End: 2024-12-19
Attending: ANESTHESIOLOGY
Payer: COMMERCIAL

## 2024-12-19 VITALS
HEART RATE: 85 BPM | SYSTOLIC BLOOD PRESSURE: 121 MMHG | WEIGHT: 167.13 LBS | BODY MASS INDEX: 23.31 KG/M2 | DIASTOLIC BLOOD PRESSURE: 79 MMHG | TEMPERATURE: 98 F

## 2024-12-19 DIAGNOSIS — G24.3 ISOLATED CERVICAL DYSTONIA: Primary | ICD-10-CM

## 2024-12-19 DIAGNOSIS — M79.18 MYOFASCIAL PAIN: Primary | ICD-10-CM

## 2024-12-19 PROCEDURE — 99999 PR PBB SHADOW E&M-EST. PATIENT-LVL IV: CPT | Mod: PBBFAC,,, | Performed by: ANESTHESIOLOGY

## 2024-12-19 NOTE — PROGRESS NOTES
Patient Name: Mirian العلي  MRN: [id number]     Patient presents for repeat Botox injection. No new symptoms, no side effects reported from last injection.         INFORMED CONSENT: The procedure, risks, benefits and options were discussed with patient. There are no contraindications to the procedure. The patient expressed understanding and agreed to proceed. The personnel performing the procedure was discussed. I verify that I personally obtained Mirian's consent prior to the start of the procedure and the signed consent can be found on the patient's chart.  Shoulder improved with HEP     Procedure Date: 12/19/2024     Anesthesia: Topical     Sedation: None     Pre-operative diagnosis:   1. Isolated cervical dystonia  onabotulinumtoxina injection 400 Units                 We discussed with the patient the assessment and recommendations. The following is the plan we agreed on:     1.  Procedure:  Under clean technique, EMG guidance and after discussing with the patient we used 400 units Botox.  No units wasted.     Right Splenius Capitus 50 units   Right Longissimus 50 units   Right Upper Trapezius 150 units   Right Levator Scapulae 100 units   Right Infraspinatus 50 units  2.  Return as needed.  Otherwise follow-up in 3 months to repeat injection with 400 units Botox.     CLARIBEL Berrios DO  Pain Fellow [facility] 2024   I have personally taken the history and examined this patient and agree with the fellow's note as stated above.

## 2024-12-19 NOTE — PROGRESS NOTES
Patient Name: Mirian العلي  MRN: [id number]     Patient presents for repeat Botox injection. No new symptoms, no side effects reported from last injection.         INFORMED CONSENT: The procedure, risks, benefits and options were discussed with patient. There are no contraindications to the procedure. The patient expressed understanding and agreed to proceed. The personnel performing the procedure was discussed. I verify that I personally obtained Mirian's consent prior to the start of the procedure and the signed consent can be found on the patient's chart.  Shoulder improved with HEP     Procedure Date: 12/19/2024     Anesthesia: Topical     Sedation: None     Pre-operative diagnosis:   1. Isolated cervical dystonia  onabotulinumtoxina injection 400 Units                 We discussed with the patient the assessment and recommendations. The following is the plan we agreed on:     1.  Procedure:  Under clean technique, EMG guidance and after discussing with the patient we used 400 units Botox.  No units wasted.     Right Splenius Capitus 50 units   Right Longissimus 50 units   Right Upper Trapezius 150 units --> changed to 50U   Right Levator Scapulae 100 units --> changed to 50U   Right Infraspinatus 50 units      Added:    Right pectoralis major 100U    Left upper trapezius 50U  2.  Return as needed.  Otherwise follow-up in 3 months to repeat injection with 400 units Botox.     CLARIBEL Berrios, DO  Pain Fellow [employer] 2024      I performed a history, review of systems, and physical exam with the patient. The assessment and plan were discussed and agreed upon with Dr. Mcclendon, the attending of record, before sharing with the patient. The face to face encounter time, including answering all patient questions, was 20 minutes.         I have personally taken the history and examined this patient and agree with the fellow's note as stated above.

## 2024-12-24 ENCOUNTER — OFFICE VISIT (OUTPATIENT)
Dept: OTOLARYNGOLOGY | Facility: CLINIC | Age: 49
End: 2024-12-24
Payer: COMMERCIAL

## 2024-12-24 VITALS
DIASTOLIC BLOOD PRESSURE: 77 MMHG | HEART RATE: 80 BPM | SYSTOLIC BLOOD PRESSURE: 113 MMHG | WEIGHT: 167.13 LBS | BODY MASS INDEX: 23.31 KG/M2

## 2024-12-24 DIAGNOSIS — J34.2 DEVIATED NASAL SEPTUM: ICD-10-CM

## 2024-12-24 DIAGNOSIS — J30.89 PERENNIAL ALLERGIC RHINITIS WITH SEASONAL VARIATION: Primary | ICD-10-CM

## 2024-12-24 DIAGNOSIS — J34.3 NASAL TURBINATE HYPERTROPHY: ICD-10-CM

## 2024-12-24 DIAGNOSIS — J30.2 PERENNIAL ALLERGIC RHINITIS WITH SEASONAL VARIATION: Primary | ICD-10-CM

## 2024-12-24 DIAGNOSIS — J32.8 OTHER CHRONIC SINUSITIS: ICD-10-CM

## 2024-12-24 PROCEDURE — 99214 OFFICE O/P EST MOD 30 MIN: CPT | Mod: 25,S$GLB,, | Performed by: OTOLARYNGOLOGY

## 2024-12-24 PROCEDURE — 1159F MED LIST DOCD IN RCRD: CPT | Mod: CPTII,S$GLB,, | Performed by: OTOLARYNGOLOGY

## 2024-12-24 PROCEDURE — 3044F HG A1C LEVEL LT 7.0%: CPT | Mod: CPTII,S$GLB,, | Performed by: OTOLARYNGOLOGY

## 2024-12-24 PROCEDURE — 87075 CULTR BACTERIA EXCEPT BLOOD: CPT | Performed by: OTOLARYNGOLOGY

## 2024-12-24 PROCEDURE — 3008F BODY MASS INDEX DOCD: CPT | Mod: CPTII,S$GLB,, | Performed by: OTOLARYNGOLOGY

## 2024-12-24 PROCEDURE — 87070 CULTURE OTHR SPECIMN AEROBIC: CPT | Performed by: OTOLARYNGOLOGY

## 2024-12-24 PROCEDURE — 3078F DIAST BP <80 MM HG: CPT | Mod: CPTII,S$GLB,, | Performed by: OTOLARYNGOLOGY

## 2024-12-24 PROCEDURE — 87102 FUNGUS ISOLATION CULTURE: CPT | Performed by: OTOLARYNGOLOGY

## 2024-12-24 PROCEDURE — 99999 PR PBB SHADOW E&M-EST. PATIENT-LVL V: CPT | Mod: PBBFAC,,, | Performed by: OTOLARYNGOLOGY

## 2024-12-24 PROCEDURE — 3074F SYST BP LT 130 MM HG: CPT | Mod: CPTII,S$GLB,, | Performed by: OTOLARYNGOLOGY

## 2024-12-24 RX ORDER — DOXYCYCLINE 100 MG/1
100 CAPSULE ORAL EVERY 12 HOURS
Qty: 20 CAPSULE | Refills: 0 | Status: SHIPPED | OUTPATIENT
Start: 2024-12-24 | End: 2025-01-03

## 2024-12-24 NOTE — H&P (VIEW-ONLY)
Subjective:      Mirian العلي is a 49 y.o. female who was self-referred for sinus pressure.    She relates a long history for many years of perennial, daily, moderate severity symptoms of pain and pressure, typically in the left cheek and midface, with associated bilateral nasal congestion, aural fullness and postnasal drip.  She has partial relief with breathe rite strips.  She has tried numerous treatments, including flonase for over 8 weeks, nasonex, azelastine and budesonide sinus rinse.  She denies hyposmia or nasal discharge.    Current sinonasal medications as above.  The last course of antibiotics was  a zpak last month .  She does not regularly use nasal decongestant sprays.    She recalls previously having allergy testing that was reportedly positive for dust mites and has been taking Odactra for about 2 years.    She denies a history of asthma.    She relates a history of reflux symptoms which is managed with over-the-counter medication as needed.    She denies a diagnosis of obstructive sleep apnea.     She has not had sinonasal surgery.  She has not had a tonsillectomy.    She recalls a prior history of nasal trauma consisting of a broken nose as a teenager.    SNOT-22 score: : (Patient-Rptd) (P) 72  NOSE score:: (Patient-Rptd) (P) 60%  ETDQ-7 score:: (Patient-Rptd) (P) 5.9      Past Medical History  She has a past medical history of Angio-edema, AR (allergic rhinitis), BRCA1 negative, BRCA2 negative, Eczema, Endometriosis, mild, Factor V Leiden mutation, Genetic testing, Ovarian cyst, bilateral, Thyroglobulin antibody positive, and Urticaria.    Past Surgical History  She has a past surgical history that includes Laparoscopy w/ mini-laparotomy (2003); Total vaginal hysterectomy (01/2015); Bunionectomy (Left, 02/06/2018); [place] tooth extraction (Bilateral); Oophorectomy (Right, 03/2012); Oophorectomy (Left, 01/2015); Hysterectomy (01/2015); Colonoscopy (N/A, 10/26/2023); and Breast biopsy  (Right, 09/13/2023).    Family History  Her family history includes Allergies in her brother; Asthma in her brother; BRCA 1/2 in her paternal cousin; Bladder Cancer in her father; Breast cancer in her mother; Breast cancer (age of onset: 46) in her sister; Cancer in her father, maternal grandmother, mother, paternal cousin, and sister; Diabetes in her brother, brother, brother, maternal aunt, and paternal uncle; Factor V Leiden deficiency in her brother; Kidney cancer (age of onset: 60) in her father; Lung cancer (age of onset: 71) in her mother; Other in her sister and sister; Skin cancer in her father; Throat cancer in her paternal grandfather; Thyroid disease in her mother.    Social History  She reports that she has never smoked. She has never been exposed to tobacco smoke. She has never used smokeless tobacco. She reports current alcohol use. She reports that she does not use drugs.    Allergies  She is allergic to niacin preparations, sulfa (sulfonamide antibiotics), and iodine and iodide containing products.    Medications   She has a current medication list which includes the following prescription(s): odactra, alprazolam, aspirin, azelastine, bupropion, bupropion, cetirizine, cyanocobalamin (vitamin b-12), diazepam, diphenhydramine, epinephrine, estradiol, famotidine, hydroxychloroquine, mometasone, omeprazole, intrarosa, neilmed sinus rinse complete, testosterone, tizanidine, trazodone, UNABLE TO FIND, UNABLE TO FIND, vitamin d, and doxycycline.    Review of Systems     Constitutional: Positive for fatigue.  Negative for appetite change, chills, fever and unexpected weight loss.      HENT: Positive for ear pain, postnasal drip, sinus infection, sinus pressure, trouble swallowing and voice change.  Negative for ear discharge, ear infection, facial swelling, hearing loss, mouth sores, nosebleeds, ringing in the ears, runny nose, sore throat, stuffy nose, tonsil infection and dental problems.      Eyes:   Positive for eye drainage. Negative for change in eyesight, eye itching and photophobia.     Respiratory:  Positive for cough, shortness of breath, sleep apnea and snoring. Negative for wheezing.      Cardiovascular:  Negative for chest pain, foot swelling, irregular heartbeat and swollen veins.     Gastrointestinal:  Positive for constipation. Negative for abdominal pain, acid reflux, diarrhea, heartburn and vomiting.     Genitourinary: Positive for difficulty urinating. Negative for sexual problems and frequent urination.     Musc: Positive for aching muscles, back pain and neck pain. Negative for aching joints.     Skin: Negative for rash.     Allergy: Positive for seasonal allergies. Negative for food allergies.     Endocrine: Negative for cold intolerance and heat intolerance.      Neurological: Positive for dizziness, headaches and light-headedness. Negative for seizures and tremors.      Hematologic: Positive for bruises/bleeds easily and swollen glands.     Psychiatric: Positive for decreased concentration, nervous/anxious and sleep disturbance. Negative for depression.               Objective:     /77 (BP Location: Right arm, Patient Position: Sitting)   Pulse 80   Wt 75.8 kg (167 lb 1.7 oz)   LMP 01/01/2015 (Approximate)   BMI 23.31 kg/m²        Constitutional:   She appears well-developed. She is cooperative. Normal speech.  No hoarse voice.      Head:  Normocephalic. Salivary glands normal.  Facial strength is normal.      Ears:    Right Ear: No drainage or tenderness. Tympanic membrane is not perforated. Tympanic membrane mobility is normal. No middle ear effusion. No decreased hearing is noted.   Left Ear: No drainage or tenderness. Tympanic membrane is not perforated. Tympanic membrane mobility is normal.  No middle ear effusion. No decreased hearing is noted.     Nose:  Septal deviation present. No mucosal edema, rhinorrhea or polyps. No epistaxis. Turbinate hypertrophy.  Turbinates normal  and no turbinate masses.  Right sinus exhibits no maxillary sinus tenderness and no frontal sinus tenderness. Left sinus exhibits maxillary sinus tenderness. Left sinus exhibits no frontal sinus tenderness.   Negative modified Robertson maneuver    Mouth/Throat  Oropharynx clear and moist without lesions or asymmetry and normal uvula midline. She does not have dentures. Normal dentition. No oral lesions or mucous membrane lesions. No oropharyngeal exudate or posterior oropharyngeal erythema. Tonsils present, +1.  Mirror exam not performed due to patient tolerance.  Mirror exam not performed due to patient tolerance.      Neck:  Neck normal without thyromegaly masses, asymmetry, normal tracheal structure, crepitus, and tenderness, thyroid normal, trachea normal and no adenopathy. Normal range of motion present.     She has no cervical adenopathy.     Cardiovascular:    Regular rhythm.              Pulmonary/Chest:   Effort normal.     Psychiatric:   She has a normal mood and affect. Her speech is normal and behavior is normal.     Neurological:   No cranial nerve deficit.     Skin:   No rash noted.       Procedure    Nasal endoscopy performed.  See procedure note.        Data Reviewed    WBC (K/uL)   Date Value   05/08/2024 4.51     Eosinophil % (%)   Date Value   05/08/2024 0.0     Eos # (K/uL)   Date Value   05/08/2024 0.0     Platelets (K/uL)   Date Value   05/08/2024 167     Glucose (mg/dL)   Date Value   05/08/2024 95     Total IgE (IU/ml)   Date Value   01/10/2013 109 (H)       I independently reviewed the images of the CT sinuses dated 10/16/24 at [facility]. Pertinent findings include partial opacification of bilateral maxillary, ethmoid and frontals with leftward septal deviation.       Assessment:     1. Perennial allergic rhinitis with seasonal variation    2. Other chronic sinusitis    3. Deviated nasal septum    4. Nasal turbinate hypertrophy         Plan:     I had a long discussion with the patient regarding her  condition and the further workup and management options.  I swabbed her sinus exudate for culture and will use the results to direct antibiotic therapy as indicated.  In the meantime I prescribed a therapeutic course of doxycycline for 10 days.  She would benefit from endoscopic sinus surgery and septoplasty for the treatment of her condition.  This would include the ethmoid, maxillary and frontal sinuses and would be bilateral.  Inferior turbinate reduction would be included.  I discussed the risks, benefits and alternatives to surgery with the patient, as well as the expected postoperative course.  I gave her the opportunity to ask questions and I answered all of them.  I provided relevant printed information on her condition for her to review at home.  Same-day discharge is anticipated.  She may have anesthesia triage by telephone.   The surgery will be scheduled in the near future.    Follow up for test results.

## 2024-12-24 NOTE — PROGRESS NOTES
Subjective:      Mirian العلي is a 49 y.o. female who was self-referred for sinus pressure.    She relates a long history for many years of perennial, daily, moderate severity symptoms of pain and pressure, typically in the left cheek and midface, with associated bilateral nasal congestion, aural fullness and postnasal drip.  She has partial relief with breathe rite strips.  She has tried numerous treatments, including flonase for over 8 weeks, nasonex, azelastine and budesonide sinus rinse.  She denies hyposmia or nasal discharge.    Current sinonasal medications as above.  The last course of antibiotics was  a zpak last month .  She does not regularly use nasal decongestant sprays.    She recalls previously having allergy testing that was reportedly positive for dust mites and has been taking Odactra for about 2 years.    She denies a history of asthma.    She relates a history of reflux symptoms which is managed with over-the-counter medication as needed.    She denies a diagnosis of obstructive sleep apnea.     She has not had sinonasal surgery.  She has not had a tonsillectomy.    She recalls a prior history of nasal trauma consisting of a broken nose as a teenager.    SNOT-22 score: : (Patient-Rptd) (P) 72  NOSE score:: (Patient-Rptd) (P) 60%  ETDQ-7 score:: (Patient-Rptd) (P) 5.9      Past Medical History  She has a past medical history of Angio-edema, AR (allergic rhinitis), BRCA1 negative, BRCA2 negative, Eczema, Endometriosis, mild, Factor V Leiden mutation, Genetic testing, Ovarian cyst, bilateral, Thyroglobulin antibody positive, and Urticaria.    Past Surgical History  She has a past surgical history that includes Laparoscopy w/ mini-laparotomy (2003); Total vaginal hysterectomy (01/2015); Bunionectomy (Left, 02/06/2018); [place] tooth extraction (Bilateral); Oophorectomy (Right, 03/2012); Oophorectomy (Left, 01/2015); Hysterectomy (01/2015); Colonoscopy (N/A, 10/26/2023); and Breast biopsy  (Right, 09/13/2023).    Family History  Her family history includes Allergies in her brother; Asthma in her brother; BRCA 1/2 in her paternal cousin; Bladder Cancer in her father; Breast cancer in her mother; Breast cancer (age of onset: 46) in her sister; Cancer in her father, maternal grandmother, mother, paternal cousin, and sister; Diabetes in her brother, brother, brother, maternal aunt, and paternal uncle; Factor V Leiden deficiency in her brother; Kidney cancer (age of onset: 60) in her father; Lung cancer (age of onset: 71) in her mother; Other in her sister and sister; Skin cancer in her father; Throat cancer in her paternal grandfather; Thyroid disease in her mother.    Social History  She reports that she has never smoked. She has never been exposed to tobacco smoke. She has never used smokeless tobacco. She reports current alcohol use. She reports that she does not use drugs.    Allergies  She is allergic to niacin preparations, sulfa (sulfonamide antibiotics), and iodine and iodide containing products.    Medications   She has a current medication list which includes the following prescription(s): odactra, alprazolam, aspirin, azelastine, bupropion, bupropion, cetirizine, cyanocobalamin (vitamin b-12), diazepam, diphenhydramine, epinephrine, estradiol, famotidine, hydroxychloroquine, mometasone, omeprazole, intrarosa, neilmed sinus rinse complete, testosterone, tizanidine, trazodone, UNABLE TO FIND, UNABLE TO FIND, vitamin d, and doxycycline.    Review of Systems     Constitutional: Positive for fatigue.  Negative for appetite change, chills, fever and unexpected weight loss.      HENT: Positive for ear pain, postnasal drip, sinus infection, sinus pressure, trouble swallowing and voice change.  Negative for ear discharge, ear infection, facial swelling, hearing loss, mouth sores, nosebleeds, ringing in the ears, runny nose, sore throat, stuffy nose, tonsil infection and dental problems.      Eyes:   Positive for eye drainage. Negative for change in eyesight, eye itching and photophobia.     Respiratory:  Positive for cough, shortness of breath, sleep apnea and snoring. Negative for wheezing.      Cardiovascular:  Negative for chest pain, foot swelling, irregular heartbeat and swollen veins.     Gastrointestinal:  Positive for constipation. Negative for abdominal pain, acid reflux, diarrhea, heartburn and vomiting.     Genitourinary: Positive for difficulty urinating. Negative for sexual problems and frequent urination.     Musc: Positive for aching muscles, back pain and neck pain. Negative for aching joints.     Skin: Negative for rash.     Allergy: Positive for seasonal allergies. Negative for food allergies.     Endocrine: Negative for cold intolerance and heat intolerance.      Neurological: Positive for dizziness, headaches and light-headedness. Negative for seizures and tremors.      Hematologic: Positive for bruises/bleeds easily and swollen glands.     Psychiatric: Positive for decreased concentration, nervous/anxious and sleep disturbance. Negative for depression.               Objective:     /77 (BP Location: Right arm, Patient Position: Sitting)   Pulse 80   Wt 75.8 kg (167 lb 1.7 oz)   LMP 01/01/2015 (Approximate)   BMI 23.31 kg/m²        Constitutional:   She appears well-developed. She is cooperative. Normal speech.  No hoarse voice.      Head:  Normocephalic. Salivary glands normal.  Facial strength is normal.      Ears:    Right Ear: No drainage or tenderness. Tympanic membrane is not perforated. Tympanic membrane mobility is normal. No middle ear effusion. No decreased hearing is noted.   Left Ear: No drainage or tenderness. Tympanic membrane is not perforated. Tympanic membrane mobility is normal.  No middle ear effusion. No decreased hearing is noted.     Nose:  Septal deviation present. No mucosal edema, rhinorrhea or polyps. No epistaxis. Turbinate hypertrophy.  Turbinates normal  and no turbinate masses.  Right sinus exhibits no maxillary sinus tenderness and no frontal sinus tenderness. Left sinus exhibits maxillary sinus tenderness. Left sinus exhibits no frontal sinus tenderness.   Negative modified Traill maneuver    Mouth/Throat  Oropharynx clear and moist without lesions or asymmetry and normal uvula midline. She does not have dentures. Normal dentition. No oral lesions or mucous membrane lesions. No oropharyngeal exudate or posterior oropharyngeal erythema. Tonsils present, +1.  Mirror exam not performed due to patient tolerance.  Mirror exam not performed due to patient tolerance.      Neck:  Neck normal without thyromegaly masses, asymmetry, normal tracheal structure, crepitus, and tenderness, thyroid normal, trachea normal and no adenopathy. Normal range of motion present.     She has no cervical adenopathy.     Cardiovascular:    Regular rhythm.              Pulmonary/Chest:   Effort normal.     Psychiatric:   She has a normal mood and affect. Her speech is normal and behavior is normal.     Neurological:   No cranial nerve deficit.     Skin:   No rash noted.       Procedure    Nasal endoscopy performed.  See procedure note.        Data Reviewed    WBC (K/uL)   Date Value   05/08/2024 4.51     Eosinophil % (%)   Date Value   05/08/2024 0.0     Eos # (K/uL)   Date Value   05/08/2024 0.0     Platelets (K/uL)   Date Value   05/08/2024 167     Glucose (mg/dL)   Date Value   05/08/2024 95     Total IgE (IU/ml)   Date Value   01/10/2013 109 (H)       I independently reviewed the images of the CT sinuses dated 10/16/24 at [facility]. Pertinent findings include partial opacification of bilateral maxillary, ethmoid and frontals with leftward septal deviation.       Assessment:     1. Perennial allergic rhinitis with seasonal variation    2. Other chronic sinusitis    3. Deviated nasal septum    4. Nasal turbinate hypertrophy         Plan:     I had a long discussion with the patient regarding her  condition and the further workup and management options.  I swabbed her sinus exudate for culture and will use the results to direct antibiotic therapy as indicated.  In the meantime I prescribed a therapeutic course of doxycycline for 10 days.  She would benefit from endoscopic sinus surgery and septoplasty for the treatment of her condition.  This would include the ethmoid, maxillary and frontal sinuses and would be bilateral.  Inferior turbinate reduction would be included.  I discussed the risks, benefits and alternatives to surgery with the patient, as well as the expected postoperative course.  I gave her the opportunity to ask questions and I answered all of them.  I provided relevant printed information on her condition for her to review at home.  Same-day discharge is anticipated.  She may have anesthesia triage by telephone.   The surgery will be scheduled in the near future.    Follow up for test results.

## 2024-12-27 LAB
BACTERIA SPEC AEROBE CULT: NORMAL
BACTERIA SPEC ANAEROBE CULT: NORMAL

## 2024-12-30 ENCOUNTER — OFFICE VISIT (OUTPATIENT)
Dept: OBSTETRICS AND GYNECOLOGY | Facility: CLINIC | Age: 49
End: 2024-12-30
Attending: OBSTETRICS & GYNECOLOGY
Payer: COMMERCIAL

## 2024-12-30 DIAGNOSIS — N95.1 MENOPAUSAL SYNDROME: Primary | ICD-10-CM

## 2024-12-30 DIAGNOSIS — J30.89 PERENNIAL ALLERGIC RHINITIS WITH SEASONAL VARIATION: Primary | ICD-10-CM

## 2024-12-30 DIAGNOSIS — J30.2 PERENNIAL ALLERGIC RHINITIS WITH SEASONAL VARIATION: Primary | ICD-10-CM

## 2024-12-30 DIAGNOSIS — J34.2 DEVIATED NASAL SEPTUM: ICD-10-CM

## 2024-12-30 DIAGNOSIS — J32.8 OTHER CHRONIC SINUSITIS: ICD-10-CM

## 2024-12-30 DIAGNOSIS — J34.3 NASAL TURBINATE HYPERTROPHY: ICD-10-CM

## 2024-12-30 PROCEDURE — 3044F HG A1C LEVEL LT 7.0%: CPT | Mod: CPTII,95,, | Performed by: OBSTETRICS & GYNECOLOGY

## 2024-12-30 PROCEDURE — 99214 OFFICE O/P EST MOD 30 MIN: CPT | Mod: 95,,, | Performed by: OBSTETRICS & GYNECOLOGY

## 2024-12-30 RX ORDER — TESTOSTERONE 50 MG/5G
0.5 GEL TRANSDERMAL DAILY
Qty: 150 G | Refills: 0 | Status: SHIPPED | OUTPATIENT
Start: 2024-12-30

## 2024-12-30 RX ORDER — ESTRADIOL 0.1 MG/D
1 FILM, EXTENDED RELEASE TRANSDERMAL
Qty: 24 PATCH | Refills: 1 | Status: SHIPPED | OUTPATIENT
Start: 2024-12-30

## 2024-12-30 NOTE — PROGRESS NOTES
The patient location is: Home  The chief complaint leading to consultation is: Menopause, Postmenopausal hormone replacement    Visit type: audiovisual    Face to Face time with patient: 25 minutes  35 minutes of total time spent on the encounter, which includes face to face time and non-face to face time preparing to see the patient (eg, review of tests), Obtaining and/or reviewing separately obtained history, Documenting clinical information in the electronic or other health record, Independently interpreting results (not separately reported) and communicating results to the patient/family/caregiver, or Care coordination (not separately reported).         Each patient to whom he or she provides medical services by telemedicine is:  (1) informed of the relationship between the physician and patient and the respective role of any other health care provider with respect to management of the patient; and (2) notified that he or she may decline to receive medical services by telemedicine and may withdraw from such care at any time.    Notes:    Mirian العلي is a 49 y.o. female who presents to discuss ongoing hormone replacement therapy.  She is menopausal since age 39 with hysterectomy with BSO for endometriosis. Currently on HRT AND DOING WELL, WANTS TO CONTINUE. .The patient is taking hormone replacement therapy. Patient denies post-menopausal vaginal bleeding. Had a hysterectomy.  The patient is sexually active.  She denies the following contraindications to HRT:  Vaginal bleeding, history of VTE/PE, thrombophilia,  breast cancer, or active liver disease.       Current HRT:   Vivelle Dot 0.1 twice weekly  Compounded Testosterone 1% 3 clicks daily  (Continues to have low libido)    PCP: Dr. Nora Brooks       Routine labs: NA  Pap smear: 3/1/2014  Mammogram: 9-: MRI: BI-RADS Category: Overall: 1 - Negative  DEXA: NA  Colonoscopy: NA    Office Visit on 12/24/2024   Component Date Value Ref Range  Status    Anaerobic Culture 12/24/2024  (A)   Final                    Value:CUTIBACTERIUM ACNES  Few      Aerobic Bacterial Culture 12/24/2024 No significant isolate   Final    Fungus (Mycology) Culture 12/24/2024 Culture in progress   Preliminary       Past Medical History:   Diagnosis Date    Angio-edema     AR (allergic rhinitis)     BRCA1 negative     BRCA2 negative     Eczema     Endometriosis, mild 2002    Factor V Leiden mutation     Genetic testing 10/2020    Invitae Multi-Cancer Panel & Invitae Myelodysplastic Syndrome/Leukemia Panel (84 genes):  NEGATIVE for mutations.    Ovarian cyst, bilateral     Thyroglobulin antibody positive     per patient, who stated results were given by Dr. Coleman Velez in Rheumatology on 04/22/2019    Urticaria      Past Surgical History:   Procedure Laterality Date    BREAST BIOPSY Right 09/13/2023    BUNIONECTOMY Left 02/06/2018    COLONOSCOPY N/A 10/26/2023    Procedure: COLONOSCOPY;  Surgeon: Ishmael Pham MD;  Location: [address]);  Service: Endoscopy;  Laterality: N/A;  Referred by: Dr. Nora Brooks  Prep: suprep  Route instructions sent: [id number]  Other concerns: requests first case  10/19-precall complete-MS    HYSTERECTOMY  01/2015    TLHBSO    LAPAROSCOPY W/ MINI-LAPAROTOMY  2003    OOPHORECTOMY Right 03/2012    OOPHORECTOMY Left 01/2015    TOTAL VAGINAL HYSTERECTOMY  01/2015    w/Right USO    WISDOM TOOTH EXTRACTION Bilateral      Social History     Tobacco Use    Smoking status: Never     Passive exposure: Never    Smokeless tobacco: Never   Substance Use Topics    Alcohol use: Yes     Comment: week    Drug use: Never     Family History   Problem Relation Name Age of Onset    Thyroid disease Mother Mom     Breast cancer Mother Mom         diagnosed in her 60s; bilateral    Lung cancer Mother Mom 71        former smoker    Cancer Mother Mom         Breast cancers over age 65, lung cancer    Skin cancer Father Dad         type?    Bladder Cancer Father  Dad         diagnosed in his 60s    Kidney cancer Father Dad 60    Cancer Father Dad         Skin, kidney, bladder    Breast cancer Sister  46        inflammatory breast cancer, unilateral; myRisk genetic test negative    Other Sister          dermatothyocitis    Cancer Sister          Inflammatory breast cancer    Other Sister Manuela         pre-cancer? cervical?    Diabetes Brother Cali     Factor V Leiden deficiency Brother Cali     Allergies Brother Cali     Asthma Brother Cali     Diabetes Brother Chelsea         Type 2    Diabetes Brother Rudi         Type 2    Diabetes Maternal Aunt      Diabetes Paternal Uncle      Cancer Maternal Grandmother mgm         bone marrow cancer in her 60s- multiple myeloma?    Throat cancer Paternal Grandfather pgf         diagnosed in his 70s    Cancer Paternal Cousin pat aunt 2's son         unknown origin    BRCA 1/2 Paternal Cousin pat aunt 2's granddtr         +BRCA2 mutation [id number] (patient Mirian tested negative for this twice)     OB History    Para Term  AB Living   4 2 2   2 2   SAB IAB Ectopic Multiple Live Births   2       2      # Outcome Date GA Lbr Elan/2nd Weight Sex Type Anes PTL Lv   4 SAB            3 SAB            2 Term      Vag-Spont   FERMÍN   1 Term      Vag-Spont   FERMÍN      Obstetric Comments   Menarche age 14. LMP age 39 (postsurgical).   First child born age 30.   History of abnormal PAP smear: NO.   History of abnormal mammogram: NO.   History of sexually transmitted disease:  NO             Current Outpatient Medications:     allerg xt,D.farinae-D.pteronys (ODACTRA) 12 SQ-HDM Subl, Place 1 tablet under the tongue every evening., Disp: 90 tablet, Rfl: 3    ALPRAZolam (XANAX) 1 MG tablet, TAKE 1 TABLET(1 MG) BY MOUTH EVERY NIGHT AS NEEDED FOR INSOMNIA OR ANXIETY, Disp: 21 tablet, Rfl: 2    aspirin (ECOTRIN) 81 MG EC tablet, Take 81 mg by mouth once daily., Disp: , Rfl:     azelastine (ASTELIN) 137 mcg (0.1 %)  nasal spray, USE 1 TO 2 SPRAYS IN EACH NOSTRIL TWICE DAILY, Disp: 30 mL, Rfl: 11    buPROPion (WELLBUTRIN XL) 150 MG TB24 tablet, TAKE 1 TABLET BY MOUTH EVERY DAY TAKE WITH 300 MG TABLET, Disp: 90 tablet, Rfl: 3    buPROPion (WELLBUTRIN XL) 300 MG 24 hr tablet, TAKE 1 TABLET(300 MG) BY MOUTH DAILY, Disp: 90 tablet, Rfl: 1    cetirizine (ZYRTEC) 10 MG tablet, Take 1 tablet (10 mg total) by mouth once daily., Disp: 30 tablet, Rfl: 2    cyanocobalamin, vitamin B-12, 5,000 mcg Subl, Place under the tongue once a week., Disp: , Rfl:     diazePAM (VALIUM) 5 MG tablet, 1-2 PO 30-60 minutes prior to MRI, Disp: 2 tablet, Rfl: 0    diphenhydrAMINE (BENADRYL) 50 MG capsule, Take 50 mg by mouth every 6 (six) hours as needed for Itching., Disp: , Rfl:     doxycycline (MONODOX) 100 MG capsule, Take 1 capsule (100 mg total) by mouth every 12 (twelve) hours. for 10 days, Disp: 20 capsule, Rfl: 0    EPINEPHrine (EPIPEN 2-AVILA) 0.3 mg/0.3 mL AtIn, Inject 0.3 mLs (0.3 mg total) into the muscle as needed (Anaphylaxis)., Disp: 2 each, Rfl: 0    estradioL (VIVELLE-DOT) 0.1 mg/24 hr PTSW, Place 1 patch onto the skin twice a week., Disp: 24 patch, Rfl: 1    famotidine (PEPCID) 20 MG tablet, Take 1 tablet (20 mg total) by mouth nightly as needed for Heartburn., Disp: 30 tablet, Rfl: 5    hydrOXYchloroQUINE (PLAQUENIL) 200 mg tablet, TK 2 TS PO QD, Disp: , Rfl:     mometasone (NASONEX) 50 mcg/actuation nasal spray, 1 spray by Nasal route 2 (two) times a day. Spray outwardly towards the ears.  Ideally AFTER a sinus rinse twice daily., Disp: 34 g, Rfl: 3    omeprazole (PRILOSEC) 20 MG capsule, TAKE 1 CAPSULE BY MOUTH 2 TIMES DAILY, 30 MINUTES BEFORE BREAKFAST AND DINNER WEAN OFF AS TOLERATED, Disp: 60 capsule, Rfl: 2    prasterone, dhea, (INTRAROSA) 6.5 mg Inst, PLACE 6.5 MG VAGINALLY EVERY EVENING, Disp: 30 each, Rfl: 1    sod chlor-bicarb-squeez bottle (NEILMED SINUS RINSE COMPLETE) pkdv, 1 application  by sinus irrigation route 2 (two) times  a day. Not right before bed, Disp: , Rfl:     testosterone (TESTIM) 50 mg/5 gram (1 %) Gel, Apply 0.5 g topically once daily., Disp: 150 g, Rfl: 0    tiZANidine (ZANAFLEX) 4 MG tablet, TAKE 1 TABLET(4 MG) BY MOUTH TWICE DAILY AS NEEDED, Disp: 60 tablet, Rfl: 12    traZODone (DESYREL) 50 MG tablet, TAKE 1 TABLET(50 MG) BY MOUTH EVERY NIGHT AS NEEDED FOR INSOMNIA, Disp: 90 tablet, Rfl: 3    UNABLE TO FIND, medication name: tumeric 200 mg twice a day, Disp: , Rfl:     vitamin D (VITAMIN D3) 1000 units Tab, Take 2,000 Units by mouth 2 (two) times daily., Disp: , Rfl:     Review of Systems:  General: No fever, chills, or weight loss.  Chest: No chest pain, shortness of breath, or palpitations.  Breast: No pain, masses, or nipple discharge.  Vulva: No pain, lesions, or itching.  Vagina: No relaxation, itching, discharge, or lesions.  Abdomen: No pain, nausea, vomiting, diarrhea, or constipation.  Urinary: No incontinence, nocturia, frequency, or dysuria.  Extremities:  No leg cramps, edema, or calf pain.  Neurologic: No headaches, dizziness, or visual changes.    There were no vitals filed for this visit.  There is no height or weight on file to calculate BMI.    Assessment:    Menopausal syndrome  -     estradioL (VIVELLE-DOT) 0.1 mg/24 hr PTSW; Place 1 patch onto the skin twice a week.  Dispense: 24 patch; Refill: 1  -     testosterone (TESTIM) 50 mg/5 gram (1 %) Gel; Apply 0.5 g topically once daily.  Dispense: 150 g; Refill: 0        Plan:   Risks and benefits of hormone replacement therapy were discussed.  Hormone replacement therapy options, including bioidentical versus non-bioidentical hormones, as well as alternatives discussed.    PLAN:   Continue Vivelle Dot 0.2    Begin Testim 1% 0.5g daily    Follow up in several months  Will recheck labs once on typical optimal dose or if having side effects.  Instructed patient to call if she experiences any side effects or has any questions.

## 2025-01-02 ENCOUNTER — PATIENT MESSAGE (OUTPATIENT)
Dept: OTOLARYNGOLOGY | Facility: CLINIC | Age: 50
End: 2025-01-02
Payer: COMMERCIAL

## 2025-01-04 DIAGNOSIS — N95.2 POSTMENOPAUSAL ATROPHIC VAGINITIS: ICD-10-CM

## 2025-01-06 ENCOUNTER — PATIENT MESSAGE (OUTPATIENT)
Dept: OBSTETRICS AND GYNECOLOGY | Facility: CLINIC | Age: 50
End: 2025-01-06
Payer: COMMERCIAL

## 2025-01-06 RX ORDER — PRASTERONE 6.5 MG/1
6.5 INSERT VAGINAL
OUTPATIENT
Start: 2025-01-06

## 2025-01-06 NOTE — TELEPHONE ENCOUNTER
Refill Decision Note   Mirian Severiano  is requesting a refill authorization.  Brief Assessment and Rationale for Refill:  Quick Discontinue     Medication Therapy Plan:         Comments:     Note composed:6:32 AM 01/06/2025

## 2025-01-06 NOTE — TELEPHONE ENCOUNTER
Refill Routing Note   Medication(s) are not appropriate for processing by [facility] for the following reason(s):        Outside of protocol    [facility] action(s):  Route               Appointments  past 12m or future 3m with PCP    Date Provider   Last Visit   12/30/2024 Tiff Mir MD   Next Visit   1/4/2025 Tiff Mir MD   ED visits in past 90 days: 0        Note composed:6:32 AM 01/06/2025

## 2025-01-07 ENCOUNTER — TELEPHONE (OUTPATIENT)
Dept: OBSTETRICS AND GYNECOLOGY | Facility: CLINIC | Age: 50
End: 2025-01-07
Payer: COMMERCIAL

## 2025-01-07 ENCOUNTER — OFFICE VISIT (OUTPATIENT)
Dept: OBSTETRICS AND GYNECOLOGY | Facility: CLINIC | Age: 50
End: 2025-01-07
Payer: COMMERCIAL

## 2025-01-07 VITALS
WEIGHT: 164.38 LBS | HEIGHT: 71 IN | DIASTOLIC BLOOD PRESSURE: 72 MMHG | BODY MASS INDEX: 23.01 KG/M2 | HEART RATE: 93 BPM | SYSTOLIC BLOOD PRESSURE: 106 MMHG

## 2025-01-07 DIAGNOSIS — Z12.39 BREAST CANCER SCREENING, HIGH RISK PATIENT: ICD-10-CM

## 2025-01-07 DIAGNOSIS — Z91.89 INCREASED RISK OF BREAST CANCER: ICD-10-CM

## 2025-01-07 DIAGNOSIS — N64.4 BREAST PAIN, LEFT: Primary | ICD-10-CM

## 2025-01-07 DIAGNOSIS — Z80.3 FAMILY HISTORY OF BREAST CANCER: ICD-10-CM

## 2025-01-07 PROCEDURE — 99213 OFFICE O/P EST LOW 20 MIN: CPT | Mod: S$GLB,,, | Performed by: PHYSICIAN ASSISTANT

## 2025-01-07 PROCEDURE — 1160F RVW MEDS BY RX/DR IN RCRD: CPT | Mod: CPTII,S$GLB,, | Performed by: PHYSICIAN ASSISTANT

## 2025-01-07 PROCEDURE — 1159F MED LIST DOCD IN RCRD: CPT | Mod: CPTII,S$GLB,, | Performed by: PHYSICIAN ASSISTANT

## 2025-01-07 PROCEDURE — 3078F DIAST BP <80 MM HG: CPT | Mod: CPTII,S$GLB,, | Performed by: PHYSICIAN ASSISTANT

## 2025-01-07 PROCEDURE — 3008F BODY MASS INDEX DOCD: CPT | Mod: CPTII,S$GLB,, | Performed by: PHYSICIAN ASSISTANT

## 2025-01-07 PROCEDURE — 99999 PR PBB SHADOW E&M-EST. PATIENT-LVL V: CPT | Mod: PBBFAC,,, | Performed by: PHYSICIAN ASSISTANT

## 2025-01-07 PROCEDURE — 3074F SYST BP LT 130 MM HG: CPT | Mod: CPTII,S$GLB,, | Performed by: PHYSICIAN ASSISTANT

## 2025-01-07 RX ORDER — PRASTERONE 6.5 MG/1
6.5 INSERT VAGINAL
Qty: 28 EACH | Refills: 2 | Status: SHIPPED | OUTPATIENT
Start: 2025-01-07

## 2025-01-07 NOTE — PROGRESS NOTES
Subjective:      Mirian العلي is a 49 y.o. female who presents for left breast changes. Noticed in the last 1-2 days an intention in the left breast and increase in size. The left breast has always been slightly larger then the right, but recently has increased in size. Some tenderness with palpation. Denies masses, changes in the skin or drainage. No trauma to the area. She is on HRT, with vivelle dot and testosterone. Only change is that she ran out of testosterone about 1.5 weeks ago. She has had intentional weight loss as well. No increase in caffeine.   She is at increased risk for breast cancer based on previously calculated Tyrer-Cuzick score of 22.3% and her 5 year risk with the Ilene Model of 2.7%     BREAST IMAGING  Bilateral Screening Mammogram: 2023- negative  Breast MRI: 2023 - right breast non-mass enhancement  Right breast Biopsy: 2023- Benign breast tissue with cysts, usual ductal hyperplasia (UDH) and stromal fibrosis in a background of blood clot. Rare microcalcifications are identified in association with benign breast tissue. Negative for atypia or malignancy.   Right Diagnostic Mammogram: 3/7/2024 - negative  Bilateral Screening Mammogram:4/15/2024- negative  Breast MRI: 2024 - negative     Personal history of cancer: no  Prior chest wall radiation at ages 10-30: no  Genetic testing: negative  Ashkenazi inheritance: no  OB/Gyn history:               Number of pregnancies & age at first gestation: , first live birth at age 30              Age of menarche: 13 y/o  Age of menopause: surgical menopause at age 40              Last menstrual period:               Body mass index is 22.93kg/m².              HRT Use: yes, currently estradiol and testosterone  Number of previous biopsies:  2023 Right breast- Benign breast tissue with cysts, usual ductal hyperplasia (UDH) and stromal fibrosis in a background of blood clot. Rare microcalcifications are identified  in association with benign breast tissue. Negative for atypia or malignancy.               Breast density: Heterogeneously dense     Family History:  Mother- Breast Cancer at 68; Lung cancer ( at 74)  Father- Kidney and bladder cancers  Sister- Inflammatory Breast Cancer at 46 ( at 48); She had genetic testing that was negative    Office Visit on 2024   Component Date Value Ref Range Status    Anaerobic Culture 2024  (A)   Final                    Value:CUTIBACTERIUM ACNES  Few      Aerobic Bacterial Culture 2024 No significant isolate   Final    Fungus (Mycology) Culture 2024 Culture in progress   Preliminary       Past Medical History:   Diagnosis Date    Angio-edema     AR (allergic rhinitis)     BRCA1 negative     BRCA2 negative     Eczema     Endometriosis, mild 2002    Factor V Leiden mutation     Genetic testing 10/2020    Invitae Multi-Cancer Panel & Invitae Myelodysplastic Syndrome/Leukemia Panel (84 genes):  NEGATIVE for mutations.    Ovarian cyst, bilateral     Thyroglobulin antibody positive     per patient, who stated results were given by Dr. Coleman Velez in Rheumatology on 2019    Urticaria      Past Surgical History:   Procedure Laterality Date    BREAST BIOPSY Right 2023    BUNIONECTOMY Left 2018    COLONOSCOPY N/A 10/26/2023    Procedure: COLONOSCOPY;  Surgeon: Ishmael Pham MD;  Location: [facility] ([address] [place] [address]);  Service: Endoscopy;  Laterality: N/A;  Referred by: Dr. Nora Brooks  Prep: suprep  Route instructions sent: [id number]  Other concerns: requests first case  10/19-precall complete-MS    HYSTERECTOMY  2015    TLHBSO    LAPAROSCOPY W/ MINI-LAPAROTOMY  2003    OOPHORECTOMY Right 2012    OOPHORECTOMY Left 2015    TOTAL VAGINAL HYSTERECTOMY  2015    w/Right USO    WISDOM TOOTH EXTRACTION Bilateral      Social History     Tobacco Use    Smoking status: Never     Passive exposure: Never    Smokeless tobacco: Never   Substance Use  Topics    Alcohol use: Yes     Comment: week    Drug use: Never     Family History   Problem Relation Name Age of Onset    Thyroid disease Mother Mom     Breast cancer Mother Mom         diagnosed in her 60s; bilateral    Lung cancer Mother Mom 71        former smoker    Cancer Mother Mom         Breast cancers over age 65, lung cancer    Skin cancer Father Dad         type?    Bladder Cancer Father Dad         diagnosed in his 60s    Kidney cancer Father Dad 60    Cancer Father Dad         Skin, kidney, bladder    Breast cancer Sister  46        inflammatory breast cancer, unilateral; myRisk genetic test negative    Other Sister          dermatothyocitis    Cancer Sister          Inflammatory breast cancer    Other Sister Manuela         pre-cancer? cervical?    Diabetes Brother Cali     Factor V Leiden deficiency Brother Cali     Allergies Brother Cali     Asthma Brother Cali     Diabetes Brother Chelsea         Type 2    Diabetes Brother Grijalva         Type 2    Diabetes Maternal Aunt      Diabetes Paternal Uncle      Cancer Maternal Grandmother mgm         bone marrow cancer in her 60s- multiple myeloma?    Throat cancer Paternal Grandfather pgf         diagnosed in his 70s    Cancer Paternal Cousin pat aunt 2's son         unknown origin    BRCA 1/2 Paternal Cousin pat aunt 2's granddtr         +BRCA2 mutation [id number] (patient Mirian tested negative for this twice)     OB History    Para Term  AB Living   4 2 2   2 2   SAB IAB Ectopic Multiple Live Births   2       2      # Outcome Date GA Lbr Elan/2nd Weight Sex Type Anes PTL Lv   4 SAB            3 SAB            2 Term      Vag-Spont   FERMÍN   1 Term      Vag-Spont   FERMÍN      Obstetric Comments   Menarche age 14. LMP age 39 (postsurgical).   First child born age 30.   History of abnormal PAP smear: NO.   History of abnormal mammogram: NO.   History of sexually transmitted disease:  NO             Current Outpatient  Medications:     allerg xt,D.farinae-D.pteronys (ODACTRA) 12 SQ-HDM Subl, Place 1 tablet under the tongue every evening., Disp: 90 tablet, Rfl: 3    ALPRAZolam (XANAX) 1 MG tablet, TAKE 1 TABLET(1 MG) BY MOUTH EVERY NIGHT AS NEEDED FOR INSOMNIA OR ANXIETY, Disp: 21 tablet, Rfl: 2    aspirin (ECOTRIN) 81 MG EC tablet, Take 81 mg by mouth once daily., Disp: , Rfl:     azelastine (ASTELIN) 137 mcg (0.1 %) nasal spray, USE 1 TO 2 SPRAYS IN EACH NOSTRIL TWICE DAILY, Disp: 30 mL, Rfl: 11    buPROPion (WELLBUTRIN XL) 150 MG TB24 tablet, TAKE 1 TABLET BY MOUTH EVERY DAY TAKE WITH 300 MG TABLET, Disp: 90 tablet, Rfl: 3    buPROPion (WELLBUTRIN XL) 300 MG 24 hr tablet, TAKE 1 TABLET(300 MG) BY MOUTH DAILY, Disp: 90 tablet, Rfl: 1    cetirizine (ZYRTEC) 10 MG tablet, Take 1 tablet (10 mg total) by mouth once daily., Disp: 30 tablet, Rfl: 2    cyanocobalamin, vitamin B-12, 5,000 mcg Subl, Place under the tongue once a week., Disp: , Rfl:     diazePAM (VALIUM) 5 MG tablet, 1-2 PO 30-60 minutes prior to MRI, Disp: 2 tablet, Rfl: 0    diphenhydrAMINE (BENADRYL) 50 MG capsule, Take 50 mg by mouth every 6 (six) hours as needed for Itching., Disp: , Rfl:     EPINEPHrine (EPIPEN 2-AVILA) 0.3 mg/0.3 mL AtIn, Inject 0.3 mLs (0.3 mg total) into the muscle as needed (Anaphylaxis)., Disp: 2 each, Rfl: 0    estradioL (VIVELLE-DOT) 0.1 mg/24 hr PTSW, Place 1 patch onto the skin twice a week., Disp: 24 patch, Rfl: 1    famotidine (PEPCID) 20 MG tablet, Take 1 tablet (20 mg total) by mouth nightly as needed for Heartburn., Disp: 30 tablet, Rfl: 5    hydrOXYchloroQUINE (PLAQUENIL) 200 mg tablet, TK 2 TS PO QD, Disp: , Rfl:     mometasone (NASONEX) 50 mcg/actuation nasal spray, 1 spray by Nasal route 2 (two) times a day. Spray outwardly towards the ears.  Ideally AFTER a sinus rinse twice daily., Disp: 34 g, Rfl: 3    omeprazole (PRILOSEC) 20 MG capsule, TAKE 1 CAPSULE BY MOUTH 2 TIMES DAILY, 30 MINUTES BEFORE BREAKFAST AND DINNER WEAN OFF AS  "TOLERATED, Disp: 60 capsule, Rfl: 2    prasterone, dhea, (INTRAROSA) 6.5 mg Inst, PLACE 6.5 MG VAGINALLY EVERY EVENING, Disp: 28 each, Rfl: 2    sod chlor-bicarb-squeez bottle (NEILMED SINUS RINSE COMPLETE) pkdv, 1 application  by sinus irrigation route 2 (two) times a day. Not right before bed, Disp: , Rfl:     testosterone (TESTIM) 50 mg/5 gram (1 %) Gel, Apply 0.5 g topically once daily., Disp: 150 g, Rfl: 0    tiZANidine (ZANAFLEX) 4 MG tablet, TAKE 1 TABLET(4 MG) BY MOUTH TWICE DAILY AS NEEDED, Disp: 60 tablet, Rfl: 12    traZODone (DESYREL) 50 MG tablet, TAKE 1 TABLET(50 MG) BY MOUTH EVERY NIGHT AS NEEDED FOR INSOMNIA, Disp: 90 tablet, Rfl: 3    UNABLE TO FIND, medication name: tumeric 200 mg twice a day, Disp: , Rfl:     vitamin D (VITAMIN D3) 1000 units Tab, Take 2,000 Units by mouth 2 (two) times daily., Disp: , Rfl:     Review of Systems:  General: No fever, chills, or weight loss.  Chest: No chest pain, shortness of breath, or palpitations.  Breast: No masses, or nipple discharge. +left breast pain, changes  Vulva: No pain, lesions, or itching.  Vagina: No relaxation, itching, discharge, or lesions.  Abdomen: No pain, nausea, vomiting, diarrhea, or constipation.  Urinary: No incontinence, nocturia, frequency, or dysuria.  Extremities:  No leg cramps, edema, or calf pain.  Neurologic: No headaches, dizziness, or visual changes.    Objective:     Vitals:    01/07/25 1111   BP: 106/72   Pulse: 93   Weight: 74.6 kg (164 lb 6.4 oz)   Height: 5' 11" (1.803 m)   PainSc: 0-No pain     Body mass index is 22.93 kg/m².    PHYSICAL EXAM:  APPEARANCE: Well nourished, well developed, in no acute distress.  AFFECT: WNL, alert and oriented x 3  CHEST: Good respiratory effect  ABDOMEN: Soft.  No tenderness or masses.  No hepatosplenomegaly.  No hernias.  BREASTS: No skin changes or visible lesions.  No palpable masses, nipple discharge bilaterally. Left breast >Right breast. +left breast tenderness at medial " aspect  PELVIC: Normal external genitalia without lesions.  Normal hair distribution.  Adequate perineal   EXTREMITIES: No edema.    Assessment:      Breast pain, left  -     Mammo Digital Diagnostic Left with Margarito; Future; Expected date: 01/07/2025  -     US Breast Left Limited; Future; Expected date: 01/07/2025    Increased risk of breast cancer  -     MRI Breast w/wo Contrast, w/CAD, Bilateral; Future; Expected date: 01/07/2025    Family history of breast cancer  -     MRI Breast w/wo Contrast, w/CAD, Bilateral; Future; Expected date: 01/07/2025    Breast cancer screening, high risk patient  -     MRI Breast w/wo Contrast, w/CAD, Bilateral; Future; Expected date: 01/07/2025        Plan:   Left breast diagnostic mammogram and US if needed  Possibly related to HRT or weight changes.  Bilateral screening mammogram due 4/2025  Breast MRI due 10/2025  Follow up annually or sooner PRN    Instructed patient to call if she experiences any side effects or has any questions.    I spent a total of 25 minutes on the day of the visit.This includes face to face time and non-face to face time preparing to see the patient (eg, review of tests), obtaining and/or reviewing separately obtained history, documenting clinical information in the electronic or other health record, independently interpreting results and communicating results to the patient/family/caregiver, or care coordinator.

## 2025-01-07 NOTE — TELEPHONE ENCOUNTER
----- Message from Tammy sent at 1/7/2025  9:41 AM CST -----  Regarding: missed call       Who Called: Mirian         Who Left Message for Patient: mayi         Does the patient know what this is regarding? Yes         Best Call Back Number: [phone]         Additional Information: She can come in today for 11: 15 but want to make sure it's [facility] location

## 2025-01-08 RX ORDER — LIDOCAINE HYDROCHLORIDE 10 MG/ML
1 INJECTION, SOLUTION EPIDURAL; INFILTRATION; INTRACAUDAL; PERINEURAL ONCE
Status: CANCELLED | OUTPATIENT
Start: 2025-01-08 | End: 2025-01-08

## 2025-01-10 ENCOUNTER — ANESTHESIA EVENT (OUTPATIENT)
Dept: SURGERY | Facility: HOSPITAL | Age: 50
End: 2025-01-10
Payer: COMMERCIAL

## 2025-01-10 NOTE — ANESTHESIA PREPROCEDURE EVALUATION
01/10/2025  Mirian العلي is a 49 y.o., female.      Pre-op Assessment    I have reviewed the Patient Summary Reports.     I have reviewed the Nursing Notes. I have reviewed the NPO Status.   I have reviewed the Medications.     Review of Systems  Anesthesia Hx:             Denies Family Hx of Anesthesia complications.    Denies Personal Hx of Anesthesia complications.                      Physical Exam  General: Well nourished    Airway:  Mallampati: II   Mouth Opening: Normal  TM Distance: Normal    Chest/Lungs:  Normal Respiratory Rate    Heart:  Rate: Normal    Anesthesia Plan  Type of Anesthesia, risks & benefits discussed:    Anesthesia Type: Gen ETT  Intra-op Monitoring Plan: Standard ASA Monitors  Post Op Pain Control Plan: multimodal analgesia  Induction:  IV  Airway Plan: Video  Informed Consent: Informed consent signed with the Patient and all parties understand the risks and agree with anesthesia plan.  All questions answered.   ASA Score: 1  Day of Surgery Review of History & Physical: H&P Update referred to the surgeon/provider.    Ready For Surgery From Anesthesia Perspective.   .

## 2025-01-10 NOTE — PRE-PROCEDURE INSTRUCTIONS
The following was discussed with pt via phone and sent to pt portal. Pt verbalized understanding. Pt to be accompanied by her .    Dear Mirian ,    You are scheduled for a procedure with Dr. eBnavides on 1/13/2025. Your scheduled arrival time is 5:30am.  This arrival time is roughly 1.5-2 hours before your anticipated procedure time to allow sufficient time for pre-op.  Please wear comfortable clothes.  This procedure will take place at the [facility] at the corner of [facility].  It is in the [facility] next to [facility].  The address is:    [address] [place] [address].  [place] [id number]    After entering the building, you will proceed to the second floor where you can check in with registration. You should take any medications that you routinely take for blood pressure (other than those listed below), heart medications, thyroid, cholesterol, etc.     If you wear contact lenses, please wear glasses to your procedure.    Your fasting instructions are as follow:  Nothing to eat after midnight the evening before your surgery. Anesthesia encourages you to drink clear liquids up until 2 hours prior to your arrival time to ensure that you are adequately hydrated. You MUST have a responsible adult to bring you home.      The evening before and morning of your procedure, please hold the following medications:  -Aspirin and Aspirin-containing products (Goody's powder, Excedrin)  -NSAIDs (Advil, Ibuprofen, Aleve, Diclofenac)  -Vitamins/Supplements  -Herbal remedies/Teas  -Stimulants (Adderall, Vyvanse, Adipex)  -Diabetic medication (Please bring with you day of procedure)  -Prescription creams/gels/lotions  -CONTINUE HOLDING ASPIRIN    -May take Tylenol      The evening before and morning of your procedure, take a shower using antibacterial soap (ex: Hibiclens or Dial antibacterial soap). DO NOT apply deodorant, lotion, cologne, or anything else to the skin. Wear  loose, comfortable fitting clothing. Do not wear jewelry or bring any valuables with you. If you wear dentures or contacts, please bring your case with you or leave them at home. Use and bring any inhalers that you may have.    If you have any procedure-specific questions, please call your surgeon's office. Any other questions, don't hesitate to call at [phone].    Thanks,  KEATON Ruelas  Pre-Admit Testing  Anesthesia Dept [facility]

## 2025-01-13 ENCOUNTER — ANESTHESIA (OUTPATIENT)
Dept: SURGERY | Facility: HOSPITAL | Age: 50
End: 2025-01-13
Payer: COMMERCIAL

## 2025-01-13 ENCOUNTER — HOSPITAL ENCOUNTER (OUTPATIENT)
Facility: HOSPITAL | Age: 50
Discharge: HOME | End: 2025-01-13
Attending: OTOLARYNGOLOGY | Admitting: OTOLARYNGOLOGY
Payer: COMMERCIAL

## 2025-01-13 VITALS
SYSTOLIC BLOOD PRESSURE: 104 MMHG | WEIGHT: 156 LBS | HEIGHT: 71 IN | BODY MASS INDEX: 21.84 KG/M2 | HEART RATE: 86 BPM | OXYGEN SATURATION: 97 % | DIASTOLIC BLOOD PRESSURE: 67 MMHG | TEMPERATURE: 98 F | RESPIRATION RATE: 29 BRPM

## 2025-01-13 DIAGNOSIS — J32.8 OTHER CHRONIC SINUSITIS: ICD-10-CM

## 2025-01-13 PROCEDURE — 87076 CULTURE ANAEROBE IDENT EACH: CPT | Mod: 59 | Performed by: OTOLARYNGOLOGY

## 2025-01-13 PROCEDURE — 63600175 PHARM REV CODE 636 W HCPCS: Performed by: NURSE ANESTHETIST, CERTIFIED REGISTERED

## 2025-01-13 PROCEDURE — 88312 SPECIAL STAINS GROUP 1: CPT | Performed by: STUDENT IN AN ORGANIZED HEALTH CARE EDUCATION/TRAINING PROGRAM

## 2025-01-13 PROCEDURE — 31253 NSL/SINS NDSC TOTAL: CPT | Mod: 50,,, | Performed by: OTOLARYNGOLOGY

## 2025-01-13 PROCEDURE — 87102 FUNGUS ISOLATION CULTURE: CPT | Mod: 59 | Performed by: OTOLARYNGOLOGY

## 2025-01-13 PROCEDURE — 30520 REPAIR OF NASAL SEPTUM: CPT | Mod: 51,,, | Performed by: OTOLARYNGOLOGY

## 2025-01-13 PROCEDURE — 30140 RESECT INFERIOR TURBINATE: CPT | Mod: 50,51,, | Performed by: OTOLARYNGOLOGY

## 2025-01-13 PROCEDURE — 37000008 HC ANESTHESIA 1ST 15 MINUTES: Performed by: OTOLARYNGOLOGY

## 2025-01-13 PROCEDURE — 87075 CULTR BACTERIA EXCEPT BLOOD: CPT | Mod: 59 | Performed by: OTOLARYNGOLOGY

## 2025-01-13 PROCEDURE — 63600175 PHARM REV CODE 636 W HCPCS: Mod: JZ,TB | Performed by: ANESTHESIOLOGY

## 2025-01-13 PROCEDURE — 94761 N-INVAS EAR/PLS OXIMETRY MLT: CPT

## 2025-01-13 PROCEDURE — 99900035 HC TECH TIME PER 15 MIN (STAT)

## 2025-01-13 PROCEDURE — 71000033 HC RECOVERY, INTIAL HOUR: Performed by: OTOLARYNGOLOGY

## 2025-01-13 PROCEDURE — 88305 TISSUE EXAM BY PATHOLOGIST: CPT | Mod: 26,,, | Performed by: STUDENT IN AN ORGANIZED HEALTH CARE EDUCATION/TRAINING PROGRAM

## 2025-01-13 PROCEDURE — 31267 ENDOSCOPY MAXILLARY SINUS: CPT | Mod: 50,51,, | Performed by: OTOLARYNGOLOGY

## 2025-01-13 PROCEDURE — 25000003 PHARM REV CODE 250: Performed by: OTOLARYNGOLOGY

## 2025-01-13 PROCEDURE — 61782 SCAN PROC CRANIAL EXTRA: CPT | Mod: ,,, | Performed by: OTOLARYNGOLOGY

## 2025-01-13 PROCEDURE — C9046 COCAINE HCL NASAL SOLUTION: HCPCS | Performed by: OTOLARYNGOLOGY

## 2025-01-13 PROCEDURE — 63600175 PHARM REV CODE 636 W HCPCS: Performed by: OTOLARYNGOLOGY

## 2025-01-13 PROCEDURE — 27201423 OPTIME MED/SURG SUP & DEVICES STERILE SUPPLY: Performed by: OTOLARYNGOLOGY

## 2025-01-13 PROCEDURE — 88305 TISSUE EXAM BY PATHOLOGIST: CPT | Mod: 59 | Performed by: STUDENT IN AN ORGANIZED HEALTH CARE EDUCATION/TRAINING PROGRAM

## 2025-01-13 PROCEDURE — 37000009 HC ANESTHESIA EA ADD 15 MINS: Performed by: OTOLARYNGOLOGY

## 2025-01-13 PROCEDURE — 25000003 PHARM REV CODE 250: Performed by: NURSE ANESTHETIST, CERTIFIED REGISTERED

## 2025-01-13 PROCEDURE — 36000710: Performed by: OTOLARYNGOLOGY

## 2025-01-13 PROCEDURE — 88312 SPECIAL STAINS GROUP 1: CPT | Mod: 26,,, | Performed by: STUDENT IN AN ORGANIZED HEALTH CARE EDUCATION/TRAINING PROGRAM

## 2025-01-13 PROCEDURE — 71000015 HC POSTOP RECOV 1ST HR: Performed by: OTOLARYNGOLOGY

## 2025-01-13 PROCEDURE — 36000711: Performed by: OTOLARYNGOLOGY

## 2025-01-13 PROCEDURE — 87070 CULTURE OTHR SPECIMN AEROBIC: CPT | Performed by: OTOLARYNGOLOGY

## 2025-01-13 RX ORDER — PROPOFOL 10 MG/ML
VIAL (ML) INTRAVENOUS CONTINUOUS PRN
Status: DISCONTINUED | OUTPATIENT
Start: 2025-01-13 | End: 2025-01-13

## 2025-01-13 RX ORDER — KETAMINE HCL IN 0.9 % NACL 50 MG/5 ML
SYRINGE (ML) INTRAVENOUS
Status: DISCONTINUED | OUTPATIENT
Start: 2025-01-13 | End: 2025-01-13

## 2025-01-13 RX ORDER — LIDOCAINE HYDROCHLORIDE 20 MG/ML
INJECTION INTRAVENOUS
Status: DISCONTINUED | OUTPATIENT
Start: 2025-01-13 | End: 2025-01-13

## 2025-01-13 RX ORDER — ONDANSETRON HYDROCHLORIDE 2 MG/ML
INJECTION, SOLUTION INTRAVENOUS
Status: DISCONTINUED | OUTPATIENT
Start: 2025-01-13 | End: 2025-01-13

## 2025-01-13 RX ORDER — ONDANSETRON HYDROCHLORIDE 2 MG/ML
4 INJECTION, SOLUTION INTRAVENOUS ONCE AS NEEDED
Status: DISCONTINUED | OUTPATIENT
Start: 2025-01-13 | End: 2025-01-13 | Stop reason: HOSPADM

## 2025-01-13 RX ORDER — EPINEPHRINE 1 MG/ML
INJECTION, SOLUTION, CONCENTRATE INTRAVENOUS
Status: DISCONTINUED | OUTPATIENT
Start: 2025-01-13 | End: 2025-01-13 | Stop reason: HOSPADM

## 2025-01-13 RX ORDER — GLUCAGON 1 MG
1 KIT INJECTION
Status: DISCONTINUED | OUTPATIENT
Start: 2025-01-13 | End: 2025-01-13 | Stop reason: HOSPADM

## 2025-01-13 RX ORDER — DEXTROMETHORPHAN HYDROBROMIDE, GUAIFENESIN 5; 100 MG/5ML; MG/5ML
650 LIQUID ORAL EVERY 8 HOURS PRN
Qty: 30 TABLET | Refills: 0 | Status: SHIPPED | OUTPATIENT
Start: 2025-01-13

## 2025-01-13 RX ORDER — HYDROMORPHONE HYDROCHLORIDE 1 MG/ML
0.5 INJECTION, SOLUTION INTRAMUSCULAR; INTRAVENOUS; SUBCUTANEOUS EVERY 5 MIN PRN
Status: DISCONTINUED | OUTPATIENT
Start: 2025-01-13 | End: 2025-01-13 | Stop reason: HOSPADM

## 2025-01-13 RX ORDER — ACETAMINOPHEN 10 MG/ML
INJECTION, SOLUTION INTRAVENOUS
Status: DISCONTINUED | OUTPATIENT
Start: 2025-01-13 | End: 2025-01-13

## 2025-01-13 RX ORDER — PHENYLEPHRINE HYDROCHLORIDE 10 MG/ML
INJECTION INTRAVENOUS
Status: DISCONTINUED | OUTPATIENT
Start: 2025-01-13 | End: 2025-01-13

## 2025-01-13 RX ORDER — FENTANYL CITRATE 50 UG/ML
INJECTION, SOLUTION INTRAMUSCULAR; INTRAVENOUS
Status: DISCONTINUED | OUTPATIENT
Start: 2025-01-13 | End: 2025-01-13

## 2025-01-13 RX ORDER — SODIUM CHLORIDE 0.9 % (FLUSH) 0.9 %
10 SYRINGE (ML) INJECTION
Status: DISCONTINUED | OUTPATIENT
Start: 2025-01-13 | End: 2025-01-13 | Stop reason: HOSPADM

## 2025-01-13 RX ORDER — LIDOCAINE HYDROCHLORIDE AND EPINEPHRINE 10; 10 UG/ML; MG/ML
INJECTION, SOLUTION INFILTRATION; PERINEURAL
Status: DISCONTINUED | OUTPATIENT
Start: 2025-01-13 | End: 2025-01-13 | Stop reason: HOSPADM

## 2025-01-13 RX ORDER — DEXAMETHASONE SODIUM PHOSPHATE 4 MG/ML
INJECTION, SOLUTION INTRA-ARTICULAR; INTRALESIONAL; INTRAMUSCULAR; INTRAVENOUS; SOFT TISSUE
Status: DISCONTINUED | OUTPATIENT
Start: 2025-01-13 | End: 2025-01-13

## 2025-01-13 RX ORDER — HYDROCODONE BITARTRATE AND ACETAMINOPHEN 5; 325 MG/1; MG/1
1 TABLET ORAL EVERY 6 HOURS PRN
Qty: 8 TABLET | Refills: 0 | Status: SHIPPED | OUTPATIENT
Start: 2025-01-13

## 2025-01-13 RX ORDER — SODIUM CHLORIDE 9 MG/ML
INJECTION, SOLUTION INTRAVENOUS CONTINUOUS
Status: DISCONTINUED | OUTPATIENT
Start: 2025-01-13 | End: 2025-01-13 | Stop reason: HOSPADM

## 2025-01-13 RX ORDER — ROCURONIUM BROMIDE 10 MG/ML
INJECTION, SOLUTION INTRAVENOUS
Status: DISCONTINUED | OUTPATIENT
Start: 2025-01-13 | End: 2025-01-13

## 2025-01-13 RX ORDER — HYDROMORPHONE HYDROCHLORIDE 2 MG/ML
INJECTION, SOLUTION INTRAMUSCULAR; INTRAVENOUS; SUBCUTANEOUS
Status: DISCONTINUED | OUTPATIENT
Start: 2025-01-13 | End: 2025-01-13

## 2025-01-13 RX ORDER — ONDANSETRON 4 MG/1
4 TABLET, ORALLY DISINTEGRATING ORAL EVERY 8 HOURS PRN
Qty: 12 TABLET | Refills: 0 | Status: SHIPPED | OUTPATIENT
Start: 2025-01-13

## 2025-01-13 RX ORDER — CEFAZOLIN SODIUM 1 G/3ML
INJECTION, POWDER, FOR SOLUTION INTRAMUSCULAR; INTRAVENOUS
Status: DISCONTINUED | OUTPATIENT
Start: 2025-01-13 | End: 2025-01-13

## 2025-01-13 RX ORDER — COCAINE HYDROCHLORIDE 40 MG/ML
SOLUTION NASAL
Status: DISCONTINUED | OUTPATIENT
Start: 2025-01-13 | End: 2025-01-13 | Stop reason: HOSPADM

## 2025-01-13 RX ORDER — IBUPROFEN 600 MG/1
600 TABLET ORAL EVERY 6 HOURS PRN
Qty: 30 TABLET | Refills: 0 | Status: SHIPPED | OUTPATIENT
Start: 2025-01-13

## 2025-01-13 RX ORDER — MIDAZOLAM HYDROCHLORIDE 1 MG/ML
INJECTION INTRAMUSCULAR; INTRAVENOUS
Status: DISCONTINUED | OUTPATIENT
Start: 2025-01-13 | End: 2025-01-13

## 2025-01-13 RX ADMIN — ROCURONIUM BROMIDE 20 MG: 10 INJECTION INTRAVENOUS at 08:01

## 2025-01-13 RX ADMIN — Medication 20 MG: at 09:01

## 2025-01-13 RX ADMIN — REMIFENTANIL HYDROCHLORIDE 0.1 MCG/KG/MIN: 1 INJECTION, POWDER, LYOPHILIZED, FOR SOLUTION INTRAVENOUS at 07:01

## 2025-01-13 RX ADMIN — HYDROMORPHONE HYDROCHLORIDE 0.5 MG: 2 INJECTION INTRAMUSCULAR; INTRAVENOUS; SUBCUTANEOUS at 09:01

## 2025-01-13 RX ADMIN — HYDROMORPHONE HYDROCHLORIDE 0.5 MG: 1 INJECTION, SOLUTION INTRAMUSCULAR; INTRAVENOUS; SUBCUTANEOUS at 10:01

## 2025-01-13 RX ADMIN — ACETAMINOPHEN 1000 MG: 10 INJECTION INTRAVENOUS at 08:01

## 2025-01-13 RX ADMIN — FENTANYL CITRATE 100 MCG: 50 INJECTION, SOLUTION INTRAMUSCULAR; INTRAVENOUS at 07:01

## 2025-01-13 RX ADMIN — PROPOFOL 160 MG: 10 INJECTION, EMULSION INTRAVENOUS at 07:01

## 2025-01-13 RX ADMIN — SUGAMMADEX 200 MG: 100 INJECTION, SOLUTION INTRAVENOUS at 09:01

## 2025-01-13 RX ADMIN — MIDAZOLAM HYDROCHLORIDE 2 MG: 2 INJECTION, SOLUTION INTRAMUSCULAR; INTRAVENOUS at 07:01

## 2025-01-13 RX ADMIN — PHENYLEPHRINE HYDROCHLORIDE 100 MCG: 10 INJECTION INTRAVENOUS at 07:01

## 2025-01-13 RX ADMIN — DEXAMETHASONE SODIUM PHOSPHATE 8 MG: 4 INJECTION INTRA-ARTICULAR; INTRALESIONAL; INTRAMUSCULAR; INTRAVENOUS; SOFT TISSUE at 07:01

## 2025-01-13 RX ADMIN — GLYCOPYRROLATE 0.2 MG: 0.2 INJECTION, SOLUTION INTRAMUSCULAR; INTRAVENOUS at 07:01

## 2025-01-13 RX ADMIN — HYDROMORPHONE HYDROCHLORIDE 1 MG: 2 INJECTION INTRAMUSCULAR; INTRAVENOUS; SUBCUTANEOUS at 10:01

## 2025-01-13 RX ADMIN — CEFAZOLIN 2 G: 330 INJECTION, POWDER, FOR SOLUTION INTRAMUSCULAR; INTRAVENOUS at 07:01

## 2025-01-13 RX ADMIN — SODIUM CHLORIDE: 0.9 INJECTION, SOLUTION INTRAVENOUS at 07:01

## 2025-01-13 RX ADMIN — Medication 10 MG: at 09:01

## 2025-01-13 RX ADMIN — ROCURONIUM BROMIDE 50 MG: 10 INJECTION INTRAVENOUS at 07:01

## 2025-01-13 RX ADMIN — ONDANSETRON 4 MG: 2 INJECTION INTRAMUSCULAR; INTRAVENOUS at 09:01

## 2025-01-13 RX ADMIN — SODIUM CHLORIDE: 0.9 INJECTION, SOLUTION INTRAVENOUS at 09:01

## 2025-01-13 RX ADMIN — LIDOCAINE HYDROCHLORIDE 60 MG: 20 INJECTION INTRAVENOUS at 07:01

## 2025-01-13 RX ADMIN — PROPOFOL 150 MCG/KG/MIN: 10 INJECTION, EMULSION INTRAVENOUS at 07:01

## 2025-01-13 NOTE — ANESTHESIA PROCEDURE NOTES
Intubation    Date/Time: 1/13/2025 7:25 AM    Performed by: Anastasiya Lujan CRNA  Authorized by: Leonidas Hopkins MD    Intubation:     Induction:  Intravenous    Intubated:  Postinduction    Mask Ventilation:  Easy mask    Attempts:  1    Attempted By:  CRNA    Method of Intubation:  Video laryngoscopy and direct    Blade:  Honeycutt 3    Laryngeal View Grade: Grade I - full view of cords      Difficult Airway Encountered?: No      Complications:  None    Airway Device:  Oral katharine    Airway Device Size:  7.0    Style/Cuff Inflation:  Cuffed    Inflation Amount (mL):  8    Tube secured:  20    Secured at:  The lips    Placement Verified By:  Capnometry    Complicating Factors:  None    Findings Post-Intubation:  Atraumatic/condition of teeth unchanged

## 2025-01-13 NOTE — ANESTHESIA POSTPROCEDURE EVALUATION
Anesthesia Post Evaluation    Patient: Mirian العلي    Procedure(s) Performed: Procedure(s) (LRB):  FESS, WITH NASAL SEPTOPLASTY AND TURBINATE REDUCTION, WITH IMAGING GUIDANCE (Bilateral)    Final Anesthesia Type: general      Patient location during evaluation: PACU  Patient participation: Yes- Able to Participate  Level of consciousness: awake and alert  Post-procedure vital signs: reviewed and stable  Pain management: adequate  Airway patency: patent    PONV status at discharge: No PONV  Anesthetic complications: no      Cardiovascular status: blood pressure returned to baseline and hemodynamically stable  Respiratory status: unassisted  Hydration status: euvolemic  Follow-up not needed.              Vitals Value Taken Time   /64 01/13/25 1009   Temp 36.7 °C (98.1 °F) 01/13/25 1006   Pulse 87 01/13/25 1015   Resp 14 01/13/25 1015   SpO2 100 % 01/13/25 1015   Vitals shown include unfiled device data.      No case tracking events are documented in the log.      Pain/Ki Score: Ki Score: 8 (1/13/2025 10:06 AM)

## 2025-01-13 NOTE — INTERVAL H&P NOTE
Finished 10 days of doxycycline, notes worsening of prior symptoms.  I discussed the risks, benefits and alternatives to surgery with the patient, as well as the expected postoperative course.  I gave her the opportunity to ask questions and I answered all of them, and she consented to proceed.

## 2025-01-13 NOTE — BRIEF OP NOTE
[facility]  Brief Operative Note    Surgery Date: 1/13/2025     Surgeons and Role:     * Alex Benavides MD - Primary    Assisting Surgeon: None    Pre-op Diagnosis:  Perennial allergic rhinitis with seasonal variation [J30.89, J30.2]  Other chronic sinusitis [J32.8]  Deviated nasal septum [J34.2]  Nasal turbinate hypertrophy [J34.3]    Post-op Diagnosis:  Post-Op Diagnosis Codes:     * Perennial allergic rhinitis with seasonal variation [J30.89, J30.2]     * Other chronic sinusitis [J32.8]     * Deviated nasal septum [J34.2]     * Nasal turbinate hypertrophy [J34.3]    Procedure(s) (LRB):  FESS, WITH NASAL SEPTOPLASTY AND TURBINATE REDUCTION, WITH IMAGING GUIDANCE (Bilateral)    Anesthesia: General    Operative Findings: see full op note    Estimated Blood Loss: * No values recorded between 1/13/2025  7:48 AM and 1/13/2025 10:00 AM *         Specimens:   Specimen (24h ago, onward)       Start     Ordered    01/13/25 0928  Specimen to Pathology, Surgery ENT  Once        Comments: Pre-op Diagnosis: Perennial allergic rhinitis with seasonal variation [J30.89, J30.2]Other chronic sinusitis [J32.8]Deviated nasal septum [J34.2]Nasal turbinate hypertrophy [J34.3]Procedure(s):FESS, WITH NASAL SEPTOPLASTY AND TURBINATE REDUCTION, WITH IMAGING GUIDANCE Number of specimens: Name of specimens: 1. Left Maxillary Sinus 2. Left Ethmoid 3. Right Maxillary Sinus 4.Right Ethmoid     References:    Click here for ordering Quick Tip   Question Answer Comment   Procedure Type: ENT    Specimen Class: Routine/Screening    Release to patient Immediate        01/13/25 0928                      Discharge Note    OUTCOME: Patient tolerated treatment/procedure well without complication and is now ready for discharge.    DISPOSITION: Home or Self Care    FINAL DIAGNOSIS:  Other chronic sinusitis    FOLLOWUP: In clinic    DISCHARGE INSTRUCTIONS:    Discharge Procedure Orders   Diet Adult Regular     Lifting  restrictions     No dressing needed     Notify your health care provider if you experience any of the following:  temperature >100.4     Notify your health care provider if you experience any of the following:  persistent nausea and vomiting or diarrhea     Notify your health care provider if you experience any of the following:  severe uncontrolled pain     Notify your health care provider if you experience any of the following:  redness, tenderness, or signs of infection (pain, swelling, redness, odor or green/yellow discharge around incision site)     Notify your health care provider if you experience any of the following:  difficulty breathing or increased cough     Notify your health care provider if you experience any of the following:  severe persistent headache     Notify your health care provider if you experience any of the following:  worsening rash     Notify your health care provider if you experience any of the following:  persistent dizziness, light-headedness, or visual disturbances     Notify your health care provider if you experience any of the following:  increased confusion or weakness

## 2025-01-13 NOTE — OP NOTE
DATE OF OPERATION: 1/13/2025    SURGEON:  Alex Benavides MD     ASSISTANT SURGEON:  Adrian Rivero MD     OPERATION:     1. Endoscopic septoplasty.  2. Bilateral inferior turbinate reduction with submucosal resection.  3. Bilateral image-guided endoscopic total ethmoidectomy.  4. Bilateral image-guided endoscopic maxillary antrostomy.  5. Bilateral image-guided endoscopic frontal dissection with Draf IIA sinusotomy.     PREOPERATIVE DIAGNOSIS:      1. Deviated nasal septum.  2. Hypertrophic turbinates.  3. Chronic rhinosinusitis.       POSTOPERATIVE DIAGNOSIS:      1. Deviated nasal septum.  2. Hypertrophic turbinates.  3. Chronic rhinosinusitis.  4. Sinonasal polyposis.     ANESTHESIA: Total intravenous general anesthesia.     COMPLICATIONS: None.     ESTIMATED BLOOD LOSS: 100 mL     SPECIMEN: Bilateral ethmoid.  Bilateral maxillary sinus contents. Cultures for bacteria and fungus from the bilateral maxillary sinuses.     WOUND EXPECTANCY: Clean-contaminated.    DRESSING: No stent in the bilateral middle meatus. No nasal packing.    FINDINGS: Leftward septal deviation with a high deflection.  Compound inferior turbinate hypertrophy.  Grade 1 polyps from left uncinate and ethmoid bulla.  Mucopurulent exudate from bilateral middle meatus.     INDICATIONS: Chronic rhinosinusitis and anatomic nasal obstruction, not controlled with maximal medical therapy.     I discussed the risks, benefits and alternatives of surgical correction of the septal deviation, turbinate hypertrophy and chronically obstructed sinuses with the patient as well as the expected postoperative course. I gave her the opportunity to ask questions and I answered all of them. On the morning of surgery I again met with the patient and reviewed the indications for surgery and she consented to proceed.     DESCRIPTION OF PROCEDURE: The patient was brought to the operating room and placed supine on the operating table. The patient was placed under general  anesthesia and intubated. The patient was positioned with a donut under the head and the image-guidance headset for the [employer] system was applied.  Image-guided navigation was indicated to facilitate exenteration of all ethmoid cells and the extent of sinusotomy.  The CT scan disc was loaded into the image-guidance system and registered with the patient tracking system according to the 's instructions. The pointer was calibrated and registration was verified using predefined landmarks.  Cottonoid pledgets soaked with 4% cocaine were placed into the nasal cavity bilaterally for mucosal decongestion. The mucosa of the nasal septum was injected with 1% lidocaine with 1:100,000 parts epinephrine. Prophylactic antibiotics were given prior to the surgery start. A time-out was performed to confirm the proper patient, site and procedure.  The CT images were again reviewed prior to the surgical start. The bed was placed in 15-degree reverse Trendelenberg position. The patient was prepped and draped in the usual fashion.       Surgery began with performance of submucous resection of the nasal septum. This began with additional injections, assisted by a 0-degree endoscope. Then, a Delon incision was made using a #15 blade on the left side. The submucoperichondrial plane was identified and this was elevated using a [place] elevator. This plane was carried posteriorly to the bony-cartilaginous junction.  A [place] elevator was used to incise the cartilage at the junction and a contralateral mucoperiosteal flap was elevated. Then, using a 0-degree endoscope, the septal pocket was visualized and there was an additional long process of cartilage along the floor causing a deviation. This was resected using a [place] elevator and was removed.       Additional elevation of the flaps over the vomer revealed a large spur that was jutting into the middle meatus. This portion of the bone was resected top and bottom  using a Xin scissors and the spur was removed using a Carla forceps. After removal, there was no perforation of the mucoperichondrial flap. At this point, 10,000 units of topical thrombin with 1:10,000 parts epinephrine was applied to pledgets and placed between the flaps for topical hemostasis.  After these pledgets were removed, there was excellent hemostasis at the septal site.     Then, under endoscopic visualization a transseptal quilting suture of 4-0 plain gut was used to reapproximate the nasal septal flaps.  Then the hemitransfixion incision was closed using 4-0 chromic gut suture in figure-of-eight fashion times two.  Repeated nasal endoscopy at this point revealed marked improvement of the septal deviation and good visualization of the vertical suspension of both middle turbinates.     Attention was then turned to the turbinates.  Additional injections were performed at the inferior turbinates bilaterally.  Incisions were made in the anterior head of the inferior turbinate using a #6400 blade.  A [place] elevator was then tunnelled medially to the turbinate bone and used to segmentally outfracture and morselize the bone.  Then, a 2 mm blade on the powered tissue shaver was tunneled submucosally and used to resect soft tissue of the turbinate while overlying mucosa was preserved.   Finally, the turbinates were outfractured using a Castro/Boies elevator.  An identical procedure was performed bilaterally.     Attention was then turned to the sinuses.   The left middle meatus was topically decongested using thrombin with epinephrine pledgets.   The uncinate process was then visualized and a backbiter was used to incise the uncinate process. The uncinate was dissected to its superior attachment and removed using cutting forceps.  A sammy bullosa was not present.       After removal of the bone, the natural ostium of the maxillary sinus was visible. The lumen was visualized with a 30-degree scope and  entered using a curved suction to confirm the dimension. The antrostomy was enlarged with cutting instruments to include the natural sinus ostium, taking care not to move anterior to the maxillary line so as to avoid injury to the nasolacrimal duct.  Additional bone was removed using forceps. Polypoid tissue intermixed with purulent exudate was present within the maxillary sinus. This was thoroughly removed and sent for pathologic evaluation and also sampled for bacterial and fungal cultures.     The ethmoid bulla was entered with non-powered instrumentation and anterior ethmoidectomy was performed.  Abdias polyposis was appreciated, arising focally from the uncinate process and ethmoid bulla.  The roof of the bulla was removed with forceps and the suprabullar recess was exposed. The grand lamella of the middle turbinate was then traversed and posterior ethmoidectomy was performed.  An Onodi cell was not present.  The mucosa was hypertrophic throughout the sinuses, indicative of chronic inflammation.  A microdebrider was used sparingly to remove residual mucosal fragments.  Topical hemostatic agents on pledgets were then placed into the ethmoid cavity for hemostasis.  The sphenoid ostium was visible and appeared patent.    Dissection was performed at the site of the nasofrontal recess.  Using a 70-degree scope, a suprabullar cell was dissected and uncapped in a medial-to-lateral direction.  An agger nasi cell was then opened from an retrograde approach.  Additional frontal cells were not present.  Afterward, the frontal sinus ostium was visible but stenotic.  Therefore, the ostium was enlarged anteriorly using cutting instruments, taking care to avoid circumerential mucosal injury.  Powered instrumentation was not required.  The floor of the frontal sinus was removed between the lamina of the orbit and the suspension of the middle turbinate to complete a Draf IIA sinusotomy.  The anterior ethmoidal artery was  identified and avoided with assistance from the image-guidance system probe.  At the conclusion of dissection there was excellent visualization into the frontal sinus, which would not otherwise have been possible.     Attention was then turned to the right side of the sinonasal tract.  A similar procedure was performed on this side, including uncinectomy, maxillary antrostomy, total ethmoidectomy, and frontal sinus dissection.     At the conclusion of these procedures, the image-guidance probe was used to verify that all cells had been properly opened and that the skull base was visible and that the lamina papyracea had not been traversed.  All sinuses were copiously irrigated with warm normal saline solution.     At this point, all pledgets were removed. PuraGel wound healing agent and Anay hemostatic agent were applied focally to the surgical sites. No stent was placed into the bilateral middle meatus.  The nasal cavity was not packed.  Mupirocin ointment was applied to the vestibule bilaterally.  The headset was removed and the drapes were taken down. The patient was turned back toward the anesthesiologist and awakened from anesthesia, extubated and transferred to the recovery room in stable condition.

## 2025-01-13 NOTE — PLAN OF CARE
Pt in preop bay 41, VSS and IV inserted. Pt denies any open wounds on body or the use of any weight loss injections. Pt needs anesthesia and surgical consents and an updated H&P, otherwise ready to roll.

## 2025-01-13 NOTE — DISCHARGE INSTRUCTIONS
INSTRUCTIONS TO FOLLOW AFTER SINUS AND NASAL SURGERY  DR. McCOUL - [facility]    Office hours:  Weekdays 8:00 am to 5:00 pm.  Please call [phone] and ask to speak with his nurse or medical assistant.    After-hours & weekends:  Please call [phone] and ask to speak with the ENT resident doctor.    Your first office visit with Dr. Benavides after surgery should have been already scheduled.  If you dont know when it is, call Dr. Sampson nurse or medical assistant at [phone].    Please call immediately if you have:    Temperature of 101° F or greater  Any unusual, painful swelling  Any active bleeding that saturates more than a 4x4 gauze  Any thick drainage green or yellow drainage  Changes in vision or swelling around the eye  Pain not relieved by your prescribed pain medication    ACTIVITY:    Sleep on your back with the head of the bead elevated, up on 2-3 pillows, or in a recliner for the first 3 to 5 days. This will help with swelling.     After surgery you may have a lot of nasal drainage. This is normal. You may breathe through your nose if youre able but avoid inhaling forcibly. Let all drainage fall on your mustache dressing and change it as needed.    You may wake up after surgery with thick white stockings on. Wear them until you are walking around more. It is important to walk around often while at home to keep your blood circulating and prevent blood clots.    If you use CPAP or BiPAP to sleep at night, you should wait at least 48 hours before resuming use. Dr. Benavides will advise you when it is safe to do this.    You may shower 24 hours after surgery.    RESTRICTED ACTIVITIES AFTER SURGERY:    DO NOT blow your nose for 2 weeks. The only exception is that you may lightly blow your nose after using the sinus rinse. If you have to sneeze or cough, do so with your mouth open.     AVOID all heavy lifting, straining or bending for 2 weeks.     AVOID any sexual activity for 2 weeks after  surgery.    AVOID semi-contact sports or vigorous exercising for 2 weeks. Dr. Benavides will let you know when you are cleared to resume exercise.    AVOID flying or swimming for 2 weeks.      DO NOT operate a motor vehicle or any type of heavy machinery within 24 hours of taking prescription pain medication.    DO NOT smoke or be around smokers.    AVOID irritating substances that might make you sneeze, such as dust, chalk, harsh chemicals, and allergic triggers. This might also include spicy foods.    DRESSINGS:    Change the gauze mustache dressing under your nose as needed. (If unsure what this dressing is or how to do this, ask your doctor or nurse before you leave the hospital.) You may have pinkish-red drainage for 2-3 days.    Usually there is no gauze packing placed inside the nose.  If packing is necessary, you will be informed by your surgeon.  Do not touch or pull at the packing. The packing will be removed by your doctor at your first visit after surgery.     You may also have a dissolvable stent or dissolvable sponge placed into the sinuses during surgery.  These usually do not need to be removed unless you are told otherwise by Dr. Benavides.  You may notice small fragments of these items come out of your nose in the weeks following surgery.    MEDICATIONS:    After surgery, you will be sent home with prescriptions for pain medication and an anti-nausea medication. Antibiotics are usually not necessary.    Most people need pain medication for the first few days after surgery, although a narcotic is rarely necessary. The best pain control comes from a combination of ACETAMINOPHEN (Tylenol) and IBUPROFEN (Motrin). You will be given prescriptions for these at the recommended dose. These can be alternated so that you are taking something every 2 or 3 hours.    Some people have problems with bowel movements after surgery. If you have NOT had a bowel movement 3-5 days after surgery, go to your local pharmacy and  purchase an over the counter stool softener such as COLACE. You can also ask the pharmacist for his or her recommendation. If you still do not have a bowel movement after starting the softener, please call the office.    You may be given an ointment called MUPIROCIN to use after surgery. If you are given this, use a cotton swab to apply gently inside the nostrils. Do this 2 times a day for 1 week.    You will need these over-the-counter medications after surgery:    SALINE SINUS RINSE (Lavon Med brand): You will use this to rinse out your nose and sinuses after surgery. Begin doing this the day after surgery, unless instructed otherwise by Dr. Benavides.  You should do this 2 times a day, following the instructions on the box.    AFRIN (regular strength): Only use if you have nasal congestion or bleeding. Use 2 times per day for 3 days, stop for 1 day, continue 2 times per day for 3 days, then stop completely.  NOTE:  You may not need to do this at all.    DIET:    Avoid hot and spicy foods for 1 week after surgery.    Begin with bland foods the evening after surgery and advance to your regular diet as tolerated. It is not necessary to take only soft food unless you are recovering from tonsil surgery.    Drink plenty of fluids (water is best).     Avoid alcoholic and caffeinated beverages for 1 week after surgery because they can cause you to become dehydrated.

## 2025-01-13 NOTE — TRANSFER OF CARE
"Anesthesia Transfer of Care Note    Patient: Mirian العلي    Procedure(s) Performed: Procedure(s) (LRB):  FESS, WITH NASAL SEPTOPLASTY AND TURBINATE REDUCTION, WITH IMAGING GUIDANCE (Bilateral)    Patient location: PACU    Anesthesia Type: general    Transport from OR: Transported from OR on 6-10 L/min O2 by face mask with adequate spontaneous ventilation    Post pain: adequate analgesia    Post assessment: no apparent anesthetic complications and tolerated procedure well    Post vital signs: stable    Level of consciousness: awake and oriented    Nausea/Vomiting: no nausea/vomiting    Complications: none    Transfer of care protocol was followed      Last vitals: Visit Vitals  /64 (BP Location: Right arm, Patient Position: Lying)   Pulse 82   Temp 36.7 °C (98.1 °F) (Temporal)   Resp 16   Ht 5' 11" (1.803 m)   Wt 70.8 kg (156 lb)   LMP 01/01/2015 (Approximate)   SpO2 100%   Breastfeeding No   BMI 21.76 kg/m²     "

## 2025-01-15 LAB
FINAL PATHOLOGIC DIAGNOSIS: NORMAL
GROSS: NORMAL
Lab: NORMAL
MICROSCOPIC EXAM: NORMAL

## 2025-01-16 LAB
BACTERIA SPEC AEROBE CULT: NORMAL
BACTERIA SPEC AEROBE CULT: NORMAL

## 2025-01-17 ENCOUNTER — DOCUMENTATION ONLY (OUTPATIENT)
Dept: OTOLARYNGOLOGY | Facility: CLINIC | Age: 50
End: 2025-01-17
Payer: COMMERCIAL

## 2025-01-17 NOTE — PROGRESS NOTES
This note will provide additional clinical information upon guidance from the [facility] medical director.    This will amend the nasal endoscopy report dated 12/24/24 to include the presence of edema and mucopurulence in the middle meatus bilaterally, greater on the left side.  Prior documentation that omitted this information was erroneous.  To verify the presence of mucopurulence, note that culture swabs were taken from the middle meatus on the date of service (12/24/24).  The results of those swabs were Cutibacterium acnes (a purulence anaerobic bacterium) and Alternaria (a fungus).  The results of those tests can be submitted for review.    The CT scan was performed at an outside facility and an accompanying radiologists report was not available.  Therefore, my professional interpretation of the scan is as follows:    Partial opacification of bilateral frontal sinuses measuring up to 6 mm on each side.  Partial opacification of bilateral maxillary sinuses measuring up to 5 mm on each side.  Partial opacification of bilateral anterior and posterior ethmoid sinuses, greater on the right.  Complete occlusion of the left OMC, and partially occlusion of the right OMC.  Sphenoid sinuses clear bilaterally.  Nasal cavity polyposis is not apparent.  Leftward nasal septal deviation.

## 2025-01-20 LAB
BACTERIA SPEC ANAEROBE CULT: ABNORMAL
BACTERIA SPEC ANAEROBE CULT: ABNORMAL

## 2025-01-21 ENCOUNTER — OFFICE VISIT (OUTPATIENT)
Dept: OTOLARYNGOLOGY | Facility: CLINIC | Age: 50
End: 2025-01-21
Payer: COMMERCIAL

## 2025-01-21 DIAGNOSIS — J32.8 OTHER CHRONIC SINUSITIS: Primary | ICD-10-CM

## 2025-01-21 PROCEDURE — 1160F RVW MEDS BY RX/DR IN RCRD: CPT | Mod: CPTII,95,, | Performed by: OTOLARYNGOLOGY

## 2025-01-21 PROCEDURE — 99024 POSTOP FOLLOW-UP VISIT: CPT | Mod: 95,,, | Performed by: OTOLARYNGOLOGY

## 2025-01-21 PROCEDURE — 1159F MED LIST DOCD IN RCRD: CPT | Mod: CPTII,95,, | Performed by: OTOLARYNGOLOGY

## 2025-01-21 RX ORDER — BUDESONIDE 0.5 MG/2ML
0.5 INHALANT ORAL DAILY
Qty: 180 ML | Refills: 1 | Status: SHIPPED | OUTPATIENT
Start: 2025-01-21 | End: 2025-04-21

## 2025-01-21 NOTE — PROGRESS NOTES
Subjective:      Mirian العلي is a 49 y.o. female who comes for follow-up 1 week status-post endoscopic sinus surgery.  Her last visit with me was on 12/24/2024.  Doing fine, breathing fine, mild pain at anterior septum, no bleeding, using mupirocin ointment and saline rinse BID.    SNOT-22 score: : (Patient-Rptd) (P) 56  NOSE score:: (Patient-Rptd) (P) 70%  ETDQ-7 score:: (Patient-Rptd) (P) 5.4        Objective:     LMP 01/01/2015 (Approximate)      General:   not in distress   Nasal:  edematous mucosa   no septal hematoma   no bleeding   Oral Cavity:   clear   Oropharynx:   no bleeding   Neck:   nontender       Procedure    None        Data Reviewed    Pathology report indicated chronic inflammation with eosinophilia.  Cultures showed Cutibacterium and Fungus.      Assessment:     Doing well following endoscopic sinus surgery.  She also underwent septum/turbinate surgery which is unrelated to the reason for today's visit.    1. Other chronic sinusitis         Plan:     Rx budesonide in sinus rinse BID.  Stop mupirocin ointment.  Follow up in about 1 month (around 2/21/2025) for nasal endoscopy.    This encounter was completed via telemedicine virtual visit with the patient at home using synchronous real-time video and audio communication.

## 2025-01-28 LAB
FUNGUS SPEC CULT: NORMAL

## 2025-02-13 ENCOUNTER — HOSPITAL ENCOUNTER (OUTPATIENT)
Dept: RADIOLOGY | Facility: OTHER | Age: 50
Discharge: HOME | End: 2025-02-13
Attending: PHYSICIAN ASSISTANT
Payer: COMMERCIAL

## 2025-02-13 DIAGNOSIS — N64.4 BREAST PAIN, LEFT: ICD-10-CM

## 2025-02-13 PROCEDURE — 77061 BREAST TOMOSYNTHESIS UNI: CPT | Mod: TC,LT

## 2025-02-13 PROCEDURE — 77065 DX MAMMO INCL CAD UNI: CPT | Mod: 26,LT,, | Performed by: RADIOLOGY

## 2025-02-13 PROCEDURE — 76642 ULTRASOUND BREAST LIMITED: CPT | Mod: 26,LT,, | Performed by: RADIOLOGY

## 2025-02-13 PROCEDURE — 76642 ULTRASOUND BREAST LIMITED: CPT | Mod: TC,LT

## 2025-02-13 PROCEDURE — 77061 BREAST TOMOSYNTHESIS UNI: CPT | Mod: 26,LT,, | Performed by: RADIOLOGY

## 2025-02-14 DIAGNOSIS — Z91.09 HOUSE DUST MITE ALLERGY: ICD-10-CM

## 2025-02-14 RX ORDER — MOMETASONE FUROATE MONOHYDRATE 50 UG/1
SPRAY, METERED NASAL
Qty: 17 G | Refills: 2 | Status: SHIPPED | OUTPATIENT
Start: 2025-02-14

## 2025-02-16 DIAGNOSIS — N95.1 MENOPAUSAL SYNDROME: ICD-10-CM

## 2025-02-17 RX ORDER — ESTRADIOL 0.1 MG/D
FILM, EXTENDED RELEASE TRANSDERMAL
Qty: 24 PATCH | Refills: 3 | Status: SHIPPED | OUTPATIENT
Start: 2025-02-17

## 2025-02-17 NOTE — TELEPHONE ENCOUNTER
Refill Decision Note   Mirian العلي  is requesting a refill authorization.  Brief Assessment and Rationale for Refill:  Approve     Medication Therapy Plan:         Comments:     Note composed:10:20 AM 02/17/2025

## 2025-02-25 ENCOUNTER — OFFICE VISIT (OUTPATIENT)
Dept: OTOLARYNGOLOGY | Facility: CLINIC | Age: 50
End: 2025-02-25
Payer: COMMERCIAL

## 2025-02-25 VITALS
DIASTOLIC BLOOD PRESSURE: 75 MMHG | BODY MASS INDEX: 21.58 KG/M2 | SYSTOLIC BLOOD PRESSURE: 109 MMHG | HEART RATE: 84 BPM | HEIGHT: 71 IN | WEIGHT: 154.13 LBS

## 2025-02-25 DIAGNOSIS — J32.8 OTHER CHRONIC SINUSITIS: Primary | ICD-10-CM

## 2025-02-25 PROCEDURE — 99999 PR PBB SHADOW E&M-EST. PATIENT-LVL V: CPT | Mod: PBBFAC,,, | Performed by: OTOLARYNGOLOGY

## 2025-02-25 NOTE — PROGRESS NOTES
"  Subjective:      Mirian العلي is a 49 y.o. female who comes for follow-up 4-6 weeks status-post endoscopic sinus surgery.  Her last visit with me was on 1/21/2025, but virtually due to snow day.  Doing well, breathing better, some mucus and dots of crust in sinus rinse with budesonide.  Occasional azelastine for pruritic symptoms.    SNOT-22 score: : (Patient-Rptd) (P) 67  NOSE score:: (Patient-Rptd) (P) 70%  ETDQ-7 score:: (Patient-Rptd) (P) 3.4        Objective:     /75 (BP Location: Left arm, Patient Position: Sitting)   Pulse 84   Ht 5' 11" (1.803 m)   Wt 69.9 kg (154 lb 1.6 oz)   LMP 01/01/2015 (Approximate)   BMI 21.49 kg/m²      General:   not in distress   Nasal:  edematous mucosa   no septal hematoma   no bleeding   Oral Cavity:   clear   Oropharynx:   no bleeding   Neck:   nontender       Procedure    Endoscopic debridement performed.  See procedure note.        Data Reviewed    Pathology report indicated chronic inflammation with eosinophilia.  Cultures showed Alternaria and Cutibacterium.      Assessment:     Doing well following endoscopic sinus surgery.  She also underwent septum/turbinate surgery which is unrelated to the reason for today's visit.    1. Other chronic sinusitis         Plan:     Scar tissue lysed today.  Otherwise doing well.  Continue budesonide rinse daily.  Astelin prn.  Follow up in about 2 months (around 4/25/2025) for nasal endoscopy.      " 4 = No assist / stand by assistance

## 2025-02-25 NOTE — PROCEDURES
Nasal/sinus endoscopy    Date/Time: 2/25/2025 11:30 AM    Performed by: Alex Benavides MD  Authorized by: Alex Benavides MD    Anesthesia:     Local anesthetic:  Lidocaine 4% and Hector-Synephrine 1/2%    Patient tolerance:  Patient tolerated the procedure well with no immediate complications  Nose:     Procedure Performed:  Removal of Debridement  External:      No external nasal deformity  Intranasal:      Mucosa no polyps     Mucosa ulcers not present     No mucosa lesions present     Turbinates not enlarged     No septum gross deformity  Nasopharynx:      No mucosa lesions     Adenoids not present     Posterior choanae patent     Eustachian tube not patent     Sinuses patent bilaterally  Scar tissue in right middle meatus lysed with forceps

## 2025-03-18 ENCOUNTER — PATIENT MESSAGE (OUTPATIENT)
Dept: ALLERGY | Facility: CLINIC | Age: 50
End: 2025-03-18
Payer: COMMERCIAL

## 2025-03-20 ENCOUNTER — OFFICE VISIT (OUTPATIENT)
Dept: PAIN MEDICINE | Facility: CLINIC | Age: 50
End: 2025-03-20
Attending: ANESTHESIOLOGY
Payer: COMMERCIAL

## 2025-03-20 VITALS
HEART RATE: 78 BPM | OXYGEN SATURATION: 99 % | DIASTOLIC BLOOD PRESSURE: 61 MMHG | BODY MASS INDEX: 21.73 KG/M2 | WEIGHT: 155.19 LBS | SYSTOLIC BLOOD PRESSURE: 117 MMHG | HEIGHT: 71 IN

## 2025-03-20 DIAGNOSIS — G24.3 ISOLATED CERVICAL DYSTONIA: Primary | ICD-10-CM

## 2025-03-20 PROCEDURE — 99999 PR PBB SHADOW E&M-EST. PATIENT-LVL V: CPT | Mod: PBBFAC,,, | Performed by: ANESTHESIOLOGY

## 2025-03-20 RX ORDER — GLUCOSAMINE/CHONDRO SU A 500-400 MG
1 TABLET ORAL 3 TIMES DAILY
COMMUNITY

## 2025-03-20 NOTE — PROGRESS NOTES
TWO PATIENT IDENTIFIERS VERIFIED.  ALLERGIES VERIFIED.    Time out preformed prior to procedure  Present during time out  Dr. Mcclendon  2. G. VERNON DAIGLE  3. JUN HOLGUIN MD  4. R.     Patient identified using 2 identifiers   (name of patient with correct spelling first and last, date of birth)  Patient name: RENAE MONK  : 1975    Procedure being performed: BOTOX 400 UNITS INJECTION      Site of procedure: NECK/BILAT. SHOULDERS        Consent Done:   YES

## 2025-03-20 NOTE — PROGRESS NOTES
Patient Name: Mirian العلي  MRN: [id number]     Patient presents for repeat Botox injection. No new symptoms, no side effects reported from last injection.         INFORMED CONSENT: The procedure, risks, benefits and options were discussed with patient. There are no contraindications to the procedure. The patient expressed understanding and agreed to proceed. The personnel performing the procedure was discussed. I verify that I personally obtained Mirian's consent prior to the start of the procedure and the signed consent can be found on the patient's chart.  Shoulder improved with HEP     Procedure Date: 03/20/2025     Anesthesia: Topical     Sedation: None     Pre-operative diagnosis:       1. Isolated cervical dystonia  onabotulinumtoxina injection 400 Units      onabotulinumtoxina injection 400 Units              We discussed with the patient the assessment and recommendations. The following is the plan we agreed on:     1.  Procedure:  Under clean technique, EMG guidance and after discussing with the patient we used 400 units Botox.  No units wasted.     *Right Anterior Scalene 25 units   *Right Middle Scalene 25 units  *Right Splenius Capitus 25 units   *Right Longissimus 25 units   *Right Upper Trapezius 50U   *Right Levator Scapulae 50U   *Right Lower Trapezius 50U   *Right Upper Pectoralis 50U   *Left Upper Pectoralis 50U   *Left upper trapezius 50U      2.  Return as needed.  Otherwise follow-up in 3 months to repeat injection with 400 units Botox.     Mark Willis MD  [facility] Fellow       I performed a history, review of systems, and physical exam with the patient. The assessment and plan were discussed and agreed upon with Dr. Mcclendon, the attending of record, before sharing with the patient. The face to face encounter time, including answering all patient questions, was 20 minutes.         I have personally taken the history and examined this patient and agree with the fellow's note as  stated above.            Post-Care Instructions: I reviewed with the patient in detail post-care instructions. Patient is to wear sunprotection, and avoid picking at any of the treated lesions. Pt may apply Vaseline to crusted or scabbing areas. Anesthesia Volume In Cc: 0 Price (Use Numbers Only, No Special Characters Or $): 30.00 Detail Level: Detailed Consent: The patient's consent was obtained including but not limited to risks of crusting, scabbing, blistering, scarring, darker or lighter pigmentary change, recurrence, incomplete removal and infection. Render The Number Of Extractions: No

## 2025-03-21 DIAGNOSIS — G24.3 ISOLATED CERVICAL DYSTONIA: Primary | ICD-10-CM

## 2025-03-25 ENCOUNTER — OFFICE VISIT (OUTPATIENT)
Dept: OBSTETRICS AND GYNECOLOGY | Facility: CLINIC | Age: 50
End: 2025-03-25
Attending: OBSTETRICS & GYNECOLOGY
Payer: COMMERCIAL

## 2025-03-25 VITALS
WEIGHT: 153.63 LBS | DIASTOLIC BLOOD PRESSURE: 73 MMHG | SYSTOLIC BLOOD PRESSURE: 98 MMHG | HEIGHT: 71 IN | BODY MASS INDEX: 21.51 KG/M2 | HEART RATE: 94 BPM

## 2025-03-25 DIAGNOSIS — Z79.890 MENOPAUSAL SYNDROME ON HORMONE REPLACEMENT THERAPY: ICD-10-CM

## 2025-03-25 DIAGNOSIS — Z01.419 ENCOUNTER FOR GYNECOLOGICAL EXAMINATION WITHOUT ABNORMAL FINDING: Primary | ICD-10-CM

## 2025-03-25 DIAGNOSIS — N95.1 MENOPAUSAL SYNDROME: ICD-10-CM

## 2025-03-25 DIAGNOSIS — N95.2 POSTMENOPAUSAL ATROPHIC VAGINITIS: ICD-10-CM

## 2025-03-25 DIAGNOSIS — N95.1 MENOPAUSAL SYNDROME ON HORMONE REPLACEMENT THERAPY: ICD-10-CM

## 2025-03-25 PROCEDURE — 99396 PREV VISIT EST AGE 40-64: CPT | Mod: S$GLB,,, | Performed by: OBSTETRICS & GYNECOLOGY

## 2025-03-25 PROCEDURE — 1160F RVW MEDS BY RX/DR IN RCRD: CPT | Mod: CPTII,S$GLB,, | Performed by: OBSTETRICS & GYNECOLOGY

## 2025-03-25 PROCEDURE — 3074F SYST BP LT 130 MM HG: CPT | Mod: CPTII,S$GLB,, | Performed by: OBSTETRICS & GYNECOLOGY

## 2025-03-25 PROCEDURE — 3078F DIAST BP <80 MM HG: CPT | Mod: CPTII,S$GLB,, | Performed by: OBSTETRICS & GYNECOLOGY

## 2025-03-25 PROCEDURE — 1159F MED LIST DOCD IN RCRD: CPT | Mod: CPTII,S$GLB,, | Performed by: OBSTETRICS & GYNECOLOGY

## 2025-03-25 PROCEDURE — 3008F BODY MASS INDEX DOCD: CPT | Mod: CPTII,S$GLB,, | Performed by: OBSTETRICS & GYNECOLOGY

## 2025-03-25 PROCEDURE — 99999 PR PBB SHADOW E&M-EST. PATIENT-LVL IV: CPT | Mod: PBBFAC,,, | Performed by: OBSTETRICS & GYNECOLOGY

## 2025-03-25 RX ORDER — PROGESTERONE 100 MG/1
100 CAPSULE ORAL NIGHTLY
Qty: 30 CAPSULE | Refills: 6 | Status: SHIPPED | OUTPATIENT
Start: 2025-03-25

## 2025-03-25 RX ORDER — TESTOSTERONE 50 MG/5G
0.5 GEL TRANSDERMAL DAILY
Start: 2025-03-25

## 2025-03-25 RX ORDER — PRASTERONE 6.5 MG/1
6.5 INSERT VAGINAL NIGHTLY
Qty: 28 EACH | Refills: 12 | Status: SHIPPED | OUTPATIENT
Start: 2025-03-25 | End: 2025-03-28

## 2025-03-25 RX ORDER — ESTRADIOL 0.1 MG/D
1 FILM, EXTENDED RELEASE TRANSDERMAL
Start: 2025-03-27

## 2025-03-25 NOTE — PROGRESS NOTES
Subjective:       Patient ID: Mirian العلي is a 49 y.o. female.    Chief Complaint:  Well Woman and Gynecologic Exam      History of Present Illness  Gynecologic Exam  Pertinent negatives include no abdominal pain, back pain or headaches.     Mirian العلي is a 49 y.o. female  here for her annual GYN exam.     She is menopausal since age 39 at the time of a hysterectomy with BSO. Has been on HRT.   Currently reports continued Fatigue, stays tired. Also reports that she has been waking up around 3am, but is on Muscle relaxers for her Chronic neck pain and has been on Trazodone nightly as well. Wakes up fatigued. Denies hot flashes , has had vaginal dryness which has improved moderately with use of Intrarosa.  Sister has breast cancer and she has concerns about her HRT, also she has a history of Factor V Leiden mutation.(We had discussed keeping lower risk on transdermal therapy)       HRT:   Vivelle Dot 0.1 twice weekly  Testim 0.5g daily    denies vaginal itching or irritation.  Denies vaginal discharge.  She is sexually active. She has had1 partner for 25 years .   History of abnormal pap: No  Last Pap:  had hysterectomy  Last MMG: normal- 4--routine follow-up in 12 months(additional studies done of the Left: February, normal)  Last Colonoscopy:  2023:normal  denies domestic violence. She does feel safe at home.     Past Medical History:   Diagnosis Date    Angio-edema     AR (allergic rhinitis)     BRCA1 negative     BRCA2 negative     Eczema     Endometriosis, mild     Factor V Leiden mutation     Genetic testing 10/2020    Invitae Multi-Cancer Panel & Invitae Myelodysplastic Syndrome/Leukemia Panel (84 genes):  NEGATIVE for mutations.    Ovarian cyst, bilateral     Thyroglobulin antibody positive     per patient, who stated results were given by Dr. Coleman Velez in Rheumatology on 2019    Urticaria      Past Surgical History:   Procedure Laterality Date     BREAST BIOPSY Right 09/13/2023    BUNIONECTOMY Left 02/06/2018    COLONOSCOPY N/A 10/26/2023    Procedure: COLONOSCOPY;  Surgeon: Ishmael Pham MD;  Location: [facility] ([address] [place] [address]);  Service: Endoscopy;  Laterality: N/A;  Referred by: Dr. Nora Brooks  Prep: suprep  Route instructions sent: [id number]-[id number]  Other concerns: requests first case  10/19-precall complete-MS    FESS, WITH NASAL SEPTOPLASTY AND TURBINATE REDUCTION, WITH IMAGING NITESH Bilateral 01/13/2025    Procedure: FESS, WITH NASAL SEPTOPLASTY AND TURBINATE REDUCTION, WITH IMAGING GUIDANCE;  Surgeon: Alex Benavides MD;  Location: [facility] OR;  Service: ENT;  Laterality: Bilateral;  Tiva; Stealth; 150min    HYSTERECTOMY  01/2015    TLHBSO    LAPAROSCOPY W/ MINI-LAPAROTOMY  2003    OOPHORECTOMY Right 03/2012    OOPHORECTOMY Left 01/2015    TOTAL VAGINAL HYSTERECTOMY  01/2015    w/Right USO    WISDOM TOOTH EXTRACTION Bilateral      Social History[1]  Family History   Problem Relation Name Age of Onset    Thyroid disease Mother Mom     Breast cancer Mother Mom         diagnosed in her 60s; bilateral    Lung cancer Mother Mom 71        former smoker    Cancer Mother Mom         Breast cancers over age 65, lung cancer    Skin cancer Father Dad         type?    Bladder Cancer Father Dad         diagnosed in his 60s    Kidney cancer Father Dad 60    Cancer Father Dad         Skin, kidney, bladder    Breast cancer Sister  46        inflammatory breast cancer, unilateral; Real genetic test negative    Other Sister          dermatothyocitis    Cancer Sister          Inflammatory breast cancer    Other Sister Manuela         pre-cancer? cervical?    Diabetes Brother Cali     Factor V Leiden deficiency Brother Cali     Allergies Brother Cali     Asthma Brother Cali     Diabetes Brother Chelsea         Type 2    Diabetes Brother Rudi         Type 2    Diabetes Maternal Aunt      Diabetes Paternal Uncle      Cancer Maternal Grandmother mgm         " bone marrow cancer in her 60s- multiple myeloma?    Throat cancer Paternal Grandfather pgf         diagnosed in his 70s    Cancer Paternal Cousin pat aunt 2's son         unknown origin    BRCA 1/2 Paternal Cousin pat aunt 2's granddtr         +BRCA2 mutation [id number] (patient Mirian tested negative for this twice)     OB History          4    Para   2    Term   2            AB   2    Living   2         SAB   2    IAB        Ectopic        Multiple        Live Births   2           Obstetric Comments   Menarche age 14. LMP age 39 (postsurgical).  First child born age 30.  History of abnormal PAP smear: NO.  History of abnormal mammogram: NO.  History of sexually transmitted disease:  NO                     BP 98/73 (Patient Position: Sitting)   Pulse 94   Ht 5' 11" (1.803 m)   Wt 69.7 kg (153 lb 9.6 oz)   LMP 2015 (Approximate)   BMI 21.42 kg/m²         GYN & OB History    Date of Last Pap: 3/1/2014    OB History    Para Term  AB Living   4 2 2  2 2   SAB IAB Ectopic Multiple Live Births   2    2      # Outcome Date GA Lbr Elan/2nd Weight Sex Type Anes PTL Lv   4 SAB            3 SAB            2 Term      Vag-Spont   FERMÍN   1 Term      Vag-Spont   FERMÍN      Obstetric Comments   Menarche age 14. LMP age 39 (postsurgical).   First child born age 30.   History of abnormal PAP smear: NO.   History of abnormal mammogram: NO.   History of sexually transmitted disease:  NO             Review of Systems  Review of Systems   Constitutional:  Negative for activity change, appetite change, fatigue and unexpected weight change.   HENT: Negative.     Eyes:  Negative for visual disturbance.   Respiratory:  Negative for shortness of breath and wheezing.    Cardiovascular:  Negative for chest pain, palpitations and leg swelling.   Gastrointestinal:  Negative for abdominal pain, bloating and blood in stool.   Endocrine: Negative for diabetes and hair loss.   Genitourinary:  Positive for vaginal " dryness. Negative for decreased libido, dyspareunia and hot flashes.   Musculoskeletal:  Negative for back pain and joint swelling.   Integumentary:  Negative for acne, hair changes and nipple discharge.   Neurological:  Negative for headaches.   Hematological:  Does not bruise/bleed easily.   Psychiatric/Behavioral:  Negative for depression and sleep disturbance. The patient is not nervous/anxious.    Breast: Negative for mastodynia and nipple discharge          Objective:      Physical Exam:   Constitutional: She is oriented to person, place, and time. She appears well-developed and well-nourished.    HENT:   Head: Normocephalic and atraumatic.    Eyes: Pupils are equal, round, and reactive to light. EOM are normal.     Cardiovascular:  Normal rate and regular rhythm.             Pulmonary/Chest: Effort normal and breath sounds normal.   BREASTS:  no mass, no tenderness, no deformity and no retraction. Right breast exhibits no inverted nipple, no mass, no nipple discharge, no skin change, no tenderness, no bleeding and no swelling. Left breast exhibits no inverted nipple, no mass, no nipple discharge, no skin change, no tenderness, no bleeding and no swelling. Breasts are symmetrical.              Abdominal: Soft. Bowel sounds are normal.     Genitourinary:    Pelvic exam was performed with patient supine.      Genitourinary Comments: PELVIC: Normal external female genitalia without lesions. Normal hair distribution. Adequate perineal body, normal urethral meatus. Vagina Moderately rugated  without lesions or discharge. No significant cystocele or rectocele. Bimanual exam shows uterus and cervix to be surgically absent. Adnexa without masses or tenderness.  RECTAL: Deferred                 Musculoskeletal: Normal range of motion and moves all extremeties.       Neurological: She is alert and oriented to person, place, and time.    Skin: Skin is warm and dry.    Psychiatric: She has a normal mood and affect.               Assessment:        1. Encounter for gynecological examination without abnormal finding    2. Menopausal syndrome    3. Menopausal syndrome on hormone replacement therapy    4. Postmenopausal atrophic vaginitis                Plan:      Problem List Items Addressed This Visit    None  Visit Diagnoses         Encounter for gynecological examination without abnormal finding    -  Primary      Menopausal syndrome        Relevant Medications    testosterone (TESTIM) 50 mg/5 gram (1 %) Gel    estradioL (VIVELLE-DOT) 0.1 mg/24 hr PTSW (Start on 3/27/2025)    progesterone (PROMETRIUM) 100 MG capsule    Other Relevant Orders    Estradiol    Testosterone, Free    Testosterone      Menopausal syndrome on hormone replacement therapy        Relevant Orders    Estradiol    Testosterone, Free    Testosterone      Postmenopausal atrophic vaginitis        Relevant Medications    prasterone, dhea, (INTRAROSA) 6.5 mg Inst           Discussed stopping Trazodone, and use of Prometrium for insomnia.   Will check hormone levels and consider if needs switching to Evamist instead of patches.   Follow up in about 3 months (around 6/25/2025), or if symptoms worsen or fail to improve.              [1]   Social History  Socioeconomic History    Marital status:     Number of children: 2   Tobacco Use    Smoking status: Never     Passive exposure: Never    Smokeless tobacco: Never   Substance and Sexual Activity    Alcohol use: Yes     Comment: week    Drug use: Never    Sexual activity: Yes     Partners: Male     Birth control/protection: Post-menopausal, Other-see comments     Comment:  since 1999,  2002     Social Drivers of Health     Financial Resource Strain: Low Risk  (3/13/2024)    Overall Financial Resource Strain (CARDIA)     Difficulty of Paying Living Expenses: Not very hard   Food Insecurity: No Food Insecurity (3/13/2024)    Hunger Vital Sign     Worried About Running Out of Food in the Last Year: Never  true     Ran Out of Food in the Last Year: Never true   Transportation Needs: No Transportation Needs (3/13/2024)    PRAPARE - Transportation     Lack of Transportation (Medical): No     Lack of Transportation (Non-Medical): No   Physical Activity: Insufficiently Active (3/13/2024)    Exercise Vital Sign     Days of Exercise per Week: 3 days     Minutes of Exercise per Session: 20 min   Stress: Stress Concern Present (3/13/2024)    [employer] - Occupational Stress Questionnaire     Feeling of Stress : To some extent   Housing Stability: Low Risk  (3/13/2024)    Housing Stability Vital Sign     Unable to Pay for Housing in the Last Year: No     Number of Places Lived in the Last Year: 1     Unstable Housing in the Last Year: No

## 2025-03-26 ENCOUNTER — LAB VISIT (OUTPATIENT)
Dept: LAB | Facility: HOSPITAL | Age: 50
End: 2025-03-26
Payer: COMMERCIAL

## 2025-03-26 ENCOUNTER — OFFICE VISIT (OUTPATIENT)
Dept: ALLERGY | Facility: CLINIC | Age: 50
End: 2025-03-26
Payer: COMMERCIAL

## 2025-03-26 VITALS — WEIGHT: 153 LBS | BODY MASS INDEX: 21.42 KG/M2 | HEIGHT: 71 IN

## 2025-03-26 DIAGNOSIS — Z79.890 MENOPAUSAL SYNDROME ON HORMONE REPLACEMENT THERAPY: ICD-10-CM

## 2025-03-26 DIAGNOSIS — Z51.6 ALLERGY DESENSITIZATION THERAPY: ICD-10-CM

## 2025-03-26 DIAGNOSIS — Z91.09 ALLERGY TO AMERICAN HOUSE DUST MITE: ICD-10-CM

## 2025-03-26 DIAGNOSIS — N95.1 MENOPAUSAL SYNDROME: ICD-10-CM

## 2025-03-26 DIAGNOSIS — L29.9 GENERALIZED PRURITUS: ICD-10-CM

## 2025-03-26 DIAGNOSIS — J30.9 CHRONIC ALLERGIC RHINITIS: Primary | ICD-10-CM

## 2025-03-26 DIAGNOSIS — N95.1 MENOPAUSAL SYNDROME ON HORMONE REPLACEMENT THERAPY: ICD-10-CM

## 2025-03-26 LAB
ESTRADIOL SERPL HS-MCNC: 52 PG/ML
TESTOST SERPL-MCNC: 38 NG/DL (ref 5–73)

## 2025-03-26 PROCEDURE — 84402 ASSAY OF FREE TESTOSTERONE: CPT

## 2025-03-26 PROCEDURE — 82670 ASSAY OF TOTAL ESTRADIOL: CPT

## 2025-03-26 PROCEDURE — 1160F RVW MEDS BY RX/DR IN RCRD: CPT | Mod: CPTII,S$GLB,, | Performed by: STUDENT IN AN ORGANIZED HEALTH CARE EDUCATION/TRAINING PROGRAM

## 2025-03-26 PROCEDURE — 36415 COLL VENOUS BLD VENIPUNCTURE: CPT

## 2025-03-26 PROCEDURE — 1159F MED LIST DOCD IN RCRD: CPT | Mod: CPTII,S$GLB,, | Performed by: STUDENT IN AN ORGANIZED HEALTH CARE EDUCATION/TRAINING PROGRAM

## 2025-03-26 PROCEDURE — G2211 COMPLEX E/M VISIT ADD ON: HCPCS | Mod: S$GLB,,, | Performed by: STUDENT IN AN ORGANIZED HEALTH CARE EDUCATION/TRAINING PROGRAM

## 2025-03-26 PROCEDURE — 3008F BODY MASS INDEX DOCD: CPT | Mod: CPTII,S$GLB,, | Performed by: STUDENT IN AN ORGANIZED HEALTH CARE EDUCATION/TRAINING PROGRAM

## 2025-03-26 PROCEDURE — 84403 ASSAY OF TOTAL TESTOSTERONE: CPT

## 2025-03-26 PROCEDURE — 99214 OFFICE O/P EST MOD 30 MIN: CPT | Mod: S$GLB,,, | Performed by: STUDENT IN AN ORGANIZED HEALTH CARE EDUCATION/TRAINING PROGRAM

## 2025-03-26 PROCEDURE — 99999 PR PBB SHADOW E&M-EST. PATIENT-LVL IV: CPT | Mod: PBBFAC,,, | Performed by: STUDENT IN AN ORGANIZED HEALTH CARE EDUCATION/TRAINING PROGRAM

## 2025-03-26 NOTE — PROGRESS NOTES
"ALLERGY & IMMUNOLOGY CLINIC - FOLLOW UP     HISTORY OF PRESENT ILLNESS     Patient ID: Mirian العلي is a 49 y.o. female    CC: allergic rhinitis    HPI: Mirian العلي is a 49 y.o. female following up for allergic rhinitis (started odactra 9/26/22).     She had sinus surgery in January.  Symptoms improved; can breathe better through nose.   She still has post-nasal drip.   She says she is still getting used to the rinse. She says in the past when she used a sinus rinse, nothing came out, but now its like a "waterfall."  She is doing plain saline in the AM, adds budesonide in the PM.   She is using nasonex 1 SEN BID.   Using azelastine nasal spray prn.  Using zyrtec about once or twice per day.  Remains on odactra at night, tolerating well. She does think it has helped.  She reports the pruritus is under control.      MEDICAL HISTORY     Vaccines:   Immunization History   Administered Date(s) Administered    COVID-19 MRNA, LN-S PF (MODERNA HALF 0.25 ML DOSE) 12/18/2021    COVID-19, MRNA, LN-S, PF (MODERNA FULL 0.5 ML DOSE) 02/23/2021, 02/23/2021, 03/24/2021    COVID-19, mRNA, LNP-S, bivalent booster, PF (PFIZER OMICRON) 08/11/2023    Influenza 10/05/2015, 08/27/2016    Influenza (Flumist) - Quadrivalent - Intranasal *Preferred* (2-49 years old) 09/27/2014    Influenza - Quadrivalent 09/15/2019    Influenza - Quadrivalent - MDCK - PF 12/11/2021, 10/30/2022    Influenza - Quadrivalent - PF *Preferred* (6 months and older) 09/16/2017, 10/27/2018, 09/27/2020, 08/11/2023    Influenza - Trivalent - Afluria, Fluzone MDV 10/08/2011, 09/13/2013    Influenza - Trivalent - Fluarix, Flulaval, Fluzone, Afluria - PF 10/05/2015, 08/27/2016    Pneumococcal Conjugate - 20 Valent 05/07/2024    Tdap 10/07/2019     MedHx:   Patient Active Problem List   Diagnosis    Fatigue    Ovarian cyst    Breast tenderness in female    Generalized headaches    Endometriosis of pelvis    Unmedicated IUD    Factor V Leiden carrier    " Rhinitis, allergic    Pelvic pain in female    S/P hysterectomy with oophorectomy    Family history of breast cancer in mother    Chest pain on breathing    Precordial pain    Hemangioma    Corneal abrasion, left    Pectus excavatum    Shingles    Abnormal laboratory test    Carpal tunnel syndrome of right wrist    Right shoulder pain    Swelling of arm    Primary insomnia    Increased risk of breast cancer    Decreased right shoulder range of motion    Muscle weakness of right upper extremity    Isolated cervical dystonia    Inflammatory polyarthropathy    Drug-induced immunodeficiency    Current mild episode of major depressive disorder, unspecified whether recurrent    Allergy to mites    Other chronic sinusitis     SurgHx:   Past Surgical History:   Procedure Laterality Date    BREAST BIOPSY Right 09/13/2023    BUNIONECTOMY Left 02/06/2018    COLONOSCOPY N/A 10/26/2023    Procedure: COLONOSCOPY;  Surgeon: Ishmael Pham MD;  Location: [address]);  Service: Endoscopy;  Laterality: N/A;  Referred by: Dr. Nora Brooks  Prep: suprep  Route instructions sent: [id number]-[id number]  Other concerns: requests first case  10/19-precall complete-MS    FESS, WITH NASAL SEPTOPLASTY AND TURBINATE REDUCTION, WITH IMAGING NITESH Bilateral 01/13/2025    Procedure: FESS, WITH NASAL SEPTOPLASTY AND TURBINATE REDUCTION, WITH IMAGING GUIDANCE;  Surgeon: Alex Benavides MD;  Location: [facility];  Service: ENT;  Laterality: Bilateral;  Tiva; Stealth; 150min    HYSTERECTOMY  01/2015    TLHBSO    LAPAROSCOPY W/ MINI-LAPAROTOMY  2003    OOPHORECTOMY Right 03/2012    OOPHORECTOMY Left 01/2015    TOTAL VAGINAL HYSTERECTOMY  01/2015    w/Right USO    WISDOM TOOTH EXTRACTION Bilateral      Medications:   Current Outpatient Medications on File Prior to Visit   Medication Sig Dispense Refill    acetaminophen (TYLENOL) 650 MG TbSR Take 1 tablet (650 mg total) by mouth every 8 (eight) hours as needed (pain). 30 tablet 0    allerg  xt,D.farinae-D.pteronys (ODACTRA) 12 SQ-HDM Subl Place 1 tablet under the tongue every evening. 90 tablet 3    ALPRAZolam (XANAX) 1 MG tablet TAKE 1 TABLET(1 MG) BY MOUTH EVERY NIGHT AS NEEDED FOR INSOMNIA OR ANXIETY 21 tablet 2    aspirin (ECOTRIN) 81 MG EC tablet Take 81 mg by mouth once daily.      azelastine (ASTELIN) 137 mcg (0.1 %) nasal spray USE 1 TO 2 SPRAYS IN EACH NOSTRIL TWICE DAILY 30 mL 11    budesonide (PULMICORT) 0.5 mg/2 mL nebulizer solution Take 2 mLs (0.5 mg total) by nebulization once daily. For use in saline irrigation as directed. 180 mL 1    buPROPion (WELLBUTRIN XL) 150 MG TB24 tablet TAKE 1 TABLET BY MOUTH EVERY DAY TAKE WITH 300 MG TABLET 90 tablet 3    buPROPion (WELLBUTRIN XL) 300 MG 24 hr tablet TAKE 1 TABLET(300 MG) BY MOUTH DAILY 90 tablet 1    cetirizine (ZYRTEC) 10 MG tablet Take 1 tablet (10 mg total) by mouth once daily. 30 tablet 2    cyanocobalamin, vitamin B-12, 5,000 mcg Subl Place under the tongue once a week.      EPINEPHrine (EPIPEN 2-AVILA) 0.3 mg/0.3 mL AtIn Inject 0.3 mLs (0.3 mg total) into the muscle as needed (Anaphylaxis). 2 each 0    [START ON 3/27/2025] estradioL (VIVELLE-DOT) 0.1 mg/24 hr PTSW Place 1 patch onto the skin twice a week.      glucosamine-chondroitin 500-400 mg tablet Take 1 tablet by mouth 3 (three) times daily.      hydrOXYchloroQUINE (PLAQUENIL) 200 mg tablet TK 2 TS PO QD      ibuprofen (ADVIL,MOTRIN) 600 MG tablet Take 1 tablet (600 mg total) by mouth every 6 (six) hours as needed for Pain. 30 tablet 0    mometasone (NASONEX) 50 mcg/actuation nasal spray 1 SPRAY BY NASAL ROUTE 2 TIMES DAILY. SPRAY OUTWARDLY TOWARDS THE EARS IDEALLY AFTER A SINUS RINSE TWICE DAILY 17 g 2    prasterone, dhea, (INTRAROSA) 6.5 mg Inst Place 6.5 mg vaginally every evening. 28 each 12    progesterone (PROMETRIUM) 100 MG capsule Take 1 capsule (100 mg total) by mouth nightly. 30 capsule 6    sod chlor-bicarb-squeez bottle (NEILMED SINUS RINSE COMPLETE) pkdv 1 application   by sinus irrigation route 2 (two) times a day. Not right before bed      testosterone (TESTIM) 50 mg/5 gram (1 %) Gel Apply 0.5 g topically once daily.      tiZANidine (ZANAFLEX) 4 MG tablet TAKE 1 TABLET(4 MG) BY MOUTH TWICE DAILY AS NEEDED 60 tablet 12    traZODone (DESYREL) 50 MG tablet TAKE 1 TABLET(50 MG) BY MOUTH EVERY NIGHT AS NEEDED FOR INSOMNIA 90 tablet 3    UNABLE TO FIND medication name: tumeric 200 mg twice a day      vitamin D (VITAMIN D3) 1000 units Tab Take 2,000 Units by mouth 2 (two) times daily.       No current facility-administered medications on file prior to visit.     Drug Allergies:   Review of patient's allergies indicates:   Allergen Reactions    Niacin preparations      Patient states she has flushing    Sulfa (sulfonamide antibiotics)      Tongue swelling    Iodine and iodide containing products Rash     SocHx:   Social History     Tobacco Use    Smoking status: Never     Passive exposure: Never    Smokeless tobacco: Never   Substance Use Topics    Alcohol use: Yes     Comment: week    Drug use: Never     Additional History Obtained at Initial Visit:  H/o Asthma: She says she was given an inhaler as a teenager, but no longer needs an inhaler and has never been diagnosed with asthma  H/o Eczema: denies  H/o Rhinitis: endorses  Food Allergy: denies  Drug Allergies:   Iodine contrast -- she got hives. She didn't get hives when she pretreated with benadryl.  Sulfa antibiotics -- she got it after a surgery in 2015. Had tongue swelling.  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  Infection Hx: Denies frequent infections requiring antibiotics. No history of pneumonia.     PHYSICAL EXAM     VS: LMP 01/01/2015 (Approximate)   GENERAL: Alert, NAD, well-appearing  EYES: EOMI, no conjunctival injection, no discharge, no infraorbital shiners  NOSE: NT 1-2+ B/L, no stringing mucous, no polyps visualized  ORAL: MMM,  no ulcers, no thrush  LUNGS: CTAB, no w/r/c, no increased WOB  HEART: RRR, normal S1/S2, no m/g/r  DERM: no rashes  NEURO: normal speech, normal gait, no facial asymmetry     LABORATORY STUDIES     Component      Latest Ref [facility] 5/8/2024   WBC      3.90 - 12.70 K/uL 4.51    RBC      4.00 - 5.40 M/uL 4.43    Hemoglobin      12.0 - 16.0 g/dL 13.0    Hematocrit      37.0 - 48.5 % 40.7    MCV      82 - 98 fL 92    MCH      27.0 - 31.0 pg 29.3    MCHC      32.0 - 36.0 g/dL 31.9 (L)    RDW      11.5 - 14.5 % 12.6    Platelet Count      150 - 450 K/uL 167    MPV      9.2 - 12.9 fL 11.0    Immature Granulocytes      0.0 - 0.5 % 0.0    Gran # (ANC)      1.8 - 7.7 K/uL 3.3    Immature Grans (Abs)      0.00 - 0.04 K/uL 0.00    Lymph #      1.0 - 4.8 K/uL 0.8 (L)    Mono #      0.3 - 1.0 K/uL 0.5    Eos #      0.0 - 0.5 K/uL 0.0    Baso #      0.00 - 0.20 K/uL 0.03    Differential Method Automated    Sodium      136 - 145 mmol/L 136    Potassium      3.5 - 5.1 mmol/L 3.9    Chloride      95 - 110 mmol/L 105    CO2      23 - 29 mmol/L 23    Glucose      70 - 110 mg/dL 95    BUN      6 - 20 mg/dL 8    Creatinine      0.5 - 1.4 mg/dL 0.8    Calcium      8.7 - 10.5 mg/dL 9.4    PROTEIN TOTAL      6.0 - 8.4 g/dL 6.9    Albumin      3.5 - 5.2 g/dL 4.2    BILIRUBIN TOTAL      0.1 - 1.0 mg/dL 0.6    ALP      55 - 135 U/L 46 (L)    AST      10 - 40 U/L 15    ALT      10 - 44 U/L 11    eGFR      >60 mL/min/1.73 m^2 >60.0    Anion Gap      8 - 16 mmol/L 8    Hemoglobin A1C External      4.0 - 5.6 % 4.8    Estimated Avg Glucose      68 - 131 mg/dL 91    TSH      0.400 - 4.000 uIU/mL 2.443       ALLERGEN TESTING     Skin Prick:  Inhalant Skin Testing Results 9/7/22:  Interpretation: Appropriate positive and negative controls. Positives were to dust mite. All other allergens tested were negative.    Immunocaps:   Component      Latest Ref Rng 1/10/2013 7/22/2022   D. farinae      <0.10 kU/L 1.23 (H)  <0.10    D. farinae Class CLASS II  CLASS  0    D. pteronyssinus Dust Mite IgE      <0.10 kU/L 0.75 (H)  <0.10    D. pteronyssinus Class CLASS II  CLASS 0    [place] Grass IgE      <0.10 kU/L <0.35  <0.10    [place] Class CLASS 0  CLASS 0    Bereket Grass IgE      <0.10 kU/L <0.35  <0.10    [place]reket Grass Class CLASS 0  CLASS 0    [place] Tree IgE      <0.10 kU/L <0.35  <0.10    [place], Class CLASS 0  CLASS 0    Pecan [place] Tree IgE      <0.10 kU/L <0.35  <0.10    Pecan, Class CLASS 0  CLASS 0    Marshelder IgE      <0.10 kU/L <0.35  <0.10    Marshelder Class CLASS 0  CLASS 0    Ragweed, Short, IgE      <0.35 kU/L <0.35     Ragweed, Short, Class CLASS 0     A. tenius, IgE      <0.35 kU/L <0.35     A. tenius Class CLASS 0     Aspergillus Fumigatus IgE      <0.10 kU/L <0.35  <0.10    A. fumigatus Class CLASS 0  CLASS 0    [place] Grass IgE      <0.10 kU/L  <0.10    [place] Grass Class  CLASS 0    Evangeline IgE      <0.10 kU/L  <0.10    Evangeline Class  CLASS 0    English Plantain IgE      <0.10 kU/L  <0.10    English Plantain Class  CLASS 0    Ragweed, Western IgE      <0.10 kU/L  <0.10    Ragweed, Western, Class  CLASS 0    A. alternata IgE      <0.10 kU/L  <0.10    Altern. alternata Class  CLASS 0    Cat Dander IgE      <0.10 kU/L  <0.10    Cat Epithelium Class  CLASS 0    Dog Dander IgE      <0.10 kU/L  <0.10    Dog Dander Class  CLASS 0    White [place] Tree IgE      <0.10 kU/L  <0.10    White Kike Class  CLASS 0    [place] Maple Tree IgE      <0.10 kU/L  <0.10    Allergen Maple ([place]) Class  CLASS 0    [place] Tree IgE      <0.10 kU/L  <0.10    [place] Class  CLASS 0    Silver [place] IgE      <0.10 kU/L  <0.10    Silver Birch Class  CLASS 0    [place] Grass IgE      <0.10 kU/L  <0.10    [place] Grass Class  CLASS 0    Allergen Candida albicans IgE      <0.10 kU/L  <0.10    Allergen Candida albicans Class  CLASS 0    Cladosporium IgE      <0.10 kU/L  <0.10    Cladosporium Class  CLASS 0    Curvularia lunata      <0.10 kU/L  <0.10    Curvularia Lunata Class   CLASS 0    Penicillium IgE      <0.10 kU/L  <0.10    Penicillium Class  CLASS 0    Total IgE      0 - 100 IU/ml 109 (H)        PULMONARY FUNCTION TESTING     Date 1/10/13:  FVC:         101%ile   FEV1:         77%ile  FEV1/FVC: 63%  FEF 25-75: 44%ile  Interpretation: mild obstruction     CHART REVIEW     Reviewed ENT notes.     ASSESSMENT & PLAN     Mirian العلي is a 49 y.o. female with     # Chronic allergic rhinitis, allergy to dust mite: Started sublingual immunotherapy with odactra 9/26/22, and she did notice improvement (less reactive to dust mite), but some symptoms were still bothersome. Montelukast helped, but she didn't tolerate it due to sleep disturbances. She had FESS in 1/2025, and reports improvement, especially in congestion. Still with post-nasal drip. She has been on odactra for 2.5 years, and plan is to continue odactra until 4-5 years of therapy.   -continue odactra 1 sublingual tablet nightly (she has epipen).  -continue cetirizine 10 mg once to twice daily.  -continue nasonex 1 SEN BID.  -continue azelastine nasal spray 1-2 SEN BID prn.  -continue sinus rinses per ENT (she does plain saline in the morning, adds budesonide in the evening).    # Generalized pruritus: helped by cetirizine.  -continue cetirizine 10 mg once to twice daily.       Follow up: 6 months or sooner if needed     I spent a total of 35 minutes on the day of the visit.  This includes face to face time and non-face to face time preparing to see the patient, obtaining and/or reviewing separately obtained history, documenting clinical information in the electronic or other health record.  Patient has chronic condition requiring long-term follow-up with me.    Dulce Delvalle MD  Allergy/Immunology

## 2025-03-28 ENCOUNTER — PATIENT MESSAGE (OUTPATIENT)
Dept: ALLERGY | Facility: CLINIC | Age: 50
End: 2025-03-28
Payer: COMMERCIAL

## 2025-03-28 DIAGNOSIS — N95.2 POSTMENOPAUSAL ATROPHIC VAGINITIS: ICD-10-CM

## 2025-03-28 RX ORDER — PRASTERONE 6.5 MG/1
6.5 INSERT VAGINAL NIGHTLY
Qty: 30 EACH | Refills: 6 | Status: SHIPPED | OUTPATIENT
Start: 2025-03-28

## 2025-03-28 NOTE — TELEPHONE ENCOUNTER
Refill Routing Note   Medication(s) are not appropriate for processing by [facility] for the following reason(s):        Outside of protocol  DUPLICATE REQUEST    [facility] action(s):  Route               Appointments  past 12m or future 3m with PCP    Date Provider   Last Visit   3/25/2025 Tiff Mir MD   Next Visit   Visit date not found Tiff Mir MD   ED visits in past 90 days: 0        Note composed:8:16 AM 03/28/2025

## 2025-03-31 ENCOUNTER — RESULTS FOLLOW-UP (OUTPATIENT)
Dept: OBSTETRICS AND GYNECOLOGY | Facility: CLINIC | Age: 50
End: 2025-03-31

## 2025-03-31 LAB — BEAKER SEE SCANNED REPORT: NORMAL

## 2025-04-04 DIAGNOSIS — F32.0 CURRENT MILD EPISODE OF MAJOR DEPRESSIVE DISORDER, UNSPECIFIED WHETHER RECURRENT: ICD-10-CM

## 2025-04-04 RX ORDER — BUPROPION HYDROCHLORIDE 300 MG/1
TABLET ORAL
Qty: 90 TABLET | Refills: 0 | Status: SHIPPED | OUTPATIENT
Start: 2025-04-04

## 2025-04-04 NOTE — TELEPHONE ENCOUNTER
No care due was identified.  Health [facility] Embedded Care Due Messages. Reference number: [id number].   4/04/2025 5:27:58 AM CDT

## 2025-04-04 NOTE — TELEPHONE ENCOUNTER
Refill Decision Note   Mirian العلي  is requesting a refill authorization.  Brief Assessment and Rationale for Refill:  Approve     Medication Therapy Plan:         Alert overridden per protocol: Yes   Comments:     Note composed:2:02 PM 04/04/2025

## 2025-04-10 ENCOUNTER — PATIENT MESSAGE (OUTPATIENT)
Dept: OBSTETRICS AND GYNECOLOGY | Facility: CLINIC | Age: 50
End: 2025-04-10
Payer: COMMERCIAL

## 2025-04-29 ENCOUNTER — OFFICE VISIT (OUTPATIENT)
Dept: OTOLARYNGOLOGY | Facility: CLINIC | Age: 50
End: 2025-04-29
Payer: COMMERCIAL

## 2025-04-29 VITALS
DIASTOLIC BLOOD PRESSURE: 76 MMHG | HEIGHT: 71 IN | BODY MASS INDEX: 21.54 KG/M2 | SYSTOLIC BLOOD PRESSURE: 113 MMHG | WEIGHT: 153.88 LBS | HEART RATE: 78 BPM

## 2025-04-29 DIAGNOSIS — J30.2 PERENNIAL ALLERGIC RHINITIS WITH SEASONAL VARIATION: Primary | ICD-10-CM

## 2025-04-29 DIAGNOSIS — J30.89 PERENNIAL ALLERGIC RHINITIS WITH SEASONAL VARIATION: Primary | ICD-10-CM

## 2025-04-29 DIAGNOSIS — J32.8 OTHER CHRONIC SINUSITIS: ICD-10-CM

## 2025-04-29 PROCEDURE — 99213 OFFICE O/P EST LOW 20 MIN: CPT | Mod: 25,S$GLB,, | Performed by: OTOLARYNGOLOGY

## 2025-04-29 PROCEDURE — 99999 PR PBB SHADOW E&M-EST. PATIENT-LVL V: CPT | Mod: PBBFAC,,, | Performed by: OTOLARYNGOLOGY

## 2025-04-29 PROCEDURE — 3078F DIAST BP <80 MM HG: CPT | Mod: CPTII,S$GLB,, | Performed by: OTOLARYNGOLOGY

## 2025-04-29 PROCEDURE — 1159F MED LIST DOCD IN RCRD: CPT | Mod: CPTII,S$GLB,, | Performed by: OTOLARYNGOLOGY

## 2025-04-29 PROCEDURE — 3074F SYST BP LT 130 MM HG: CPT | Mod: CPTII,S$GLB,, | Performed by: OTOLARYNGOLOGY

## 2025-04-29 PROCEDURE — 1160F RVW MEDS BY RX/DR IN RCRD: CPT | Mod: CPTII,S$GLB,, | Performed by: OTOLARYNGOLOGY

## 2025-04-29 PROCEDURE — 31231 NASAL ENDOSCOPY DX: CPT | Mod: S$GLB,,, | Performed by: OTOLARYNGOLOGY

## 2025-04-29 PROCEDURE — 3008F BODY MASS INDEX DOCD: CPT | Mod: CPTII,S$GLB,, | Performed by: OTOLARYNGOLOGY

## 2025-04-29 NOTE — PROCEDURES
Nasal/sinus endoscopy    Date/Time: 4/29/2025 8:30 AM    Performed by: Alex Benavides MD  Authorized by: Alex Benavides MD    Anesthesia:     Local anesthetic:  Lidocaine 4% and Hector-Synephrine 1/2%    Patient tolerance:  Patient tolerated the procedure well with no immediate complications  Nose:     Procedure Performed:  Nasal Endoscopy  External:      No external nasal deformity  Intranasal:      Mucosa no polyps     Mucosa ulcers not present     No mucosa lesions present     Turbinates not enlarged     No septum gross deformity  Nasopharynx:      No mucosa lesions     Adenoids not present     Posterior choanae patent     Eustachian tube not patent     Sinuses all clear and patent  Scattered nonpurulent serous mucus bilaterally, including PND on right    
done

## 2025-04-29 NOTE — PROGRESS NOTES
"  Subjective:      Mirian is a 49 y.o. female who comes for follow-up of sinusitis. Her last visit with me was on 2/25/2025. Now over 3 months status-post endoscopic sinus surgery.  Doing well, breathing well, minor hyposmia at times, occasional right ear fullness, pruritic symptoms controlled with astelin, using budesonide in sinus rinse.    SNOT-22 score: : (Patient-Rptd) (P) 40  NOSE score:: (Patient-Rptd) (P) 30%  ETDQ-7 score:: (Patient-Rptd) (P) 3    The patient's medications, allergies, past medical, surgical, social and family histories were reviewed and updated as appropriate.    A detailed review of systems was obtained with pertinent positives as per the above HPI, and otherwise negative.        Objective:     /76 (BP Location: Right arm, Patient Position: Sitting)   Pulse 78   Ht 5' 10.98" (1.803 m)   Wt 69.8 kg (153 lb 14.1 oz)   LMP 01/01/2015 (Approximate)   BMI 21.47 kg/m²        Constitutional:   She appears well-developed. She is cooperative.     Head:  Normocephalic.     Nose:  No mucosal edema, rhinorrhea, septal deviation or polyps. No epistaxis. Turbinates normal, no turbinate masses and no turbinate hypertrophy.  Right sinus exhibits no maxillary sinus tenderness and no frontal sinus tenderness. Left sinus exhibits no maxillary sinus tenderness and no frontal sinus tenderness.     Mouth/Throat  Oropharynx clear and moist without lesions or asymmetry. No oropharyngeal exudate or posterior oropharyngeal erythema.     Neck:  No adenopathy. Normal range of motion present.     She has no cervical adenopathy.       Procedure    Nasal endoscopy performed.  See procedure note.        Data Reviewed    WBC (K/uL)   Date Value   05/08/2024 4.51     Eosinophil % (%)   Date Value   05/08/2024 0.0     Eos # (K/uL)   Date Value   05/08/2024 0.0     Platelets (K/uL)   Date Value   05/08/2024 167     Glucose (mg/dL)   Date Value   05/08/2024 95     Total IgE (IU/ml)   Date Value   01/10/2013 109 " (H)       Pathology report indicated chronic inflammation with eosinophilia.  Cultures showed Alternaria and Cutibacterium.      Assessment:     1. Perennial allergic rhinitis with seasonal variation    2. Other chronic sinusitis         Plan:     Continue present regimen.  In future may consider de-escalation to flonase +/- astelin +/- plain saline rinse.  Follow up in about 3 months (around 7/29/2025) for nasal endoscopy.

## 2025-05-01 ENCOUNTER — HOSPITAL ENCOUNTER (OUTPATIENT)
Dept: RADIOLOGY | Facility: OTHER | Age: 50
Discharge: HOME | End: 2025-05-01
Attending: PHYSICIAN ASSISTANT
Payer: COMMERCIAL

## 2025-05-01 DIAGNOSIS — Z12.31 SCREENING MAMMOGRAM, ENCOUNTER FOR: ICD-10-CM

## 2025-05-01 PROCEDURE — 77067 SCR MAMMO BI INCL CAD: CPT | Mod: 26,,, | Performed by: RADIOLOGY

## 2025-05-01 PROCEDURE — 77063 BREAST TOMOSYNTHESIS BI: CPT | Mod: 26,,, | Performed by: RADIOLOGY

## 2025-05-01 PROCEDURE — 77063 BREAST TOMOSYNTHESIS BI: CPT | Mod: TC

## 2025-05-05 ENCOUNTER — RESULTS FOLLOW-UP (OUTPATIENT)
Dept: OBSTETRICS AND GYNECOLOGY | Facility: CLINIC | Age: 50
End: 2025-05-05

## 2025-05-05 ENCOUNTER — PATIENT MESSAGE (OUTPATIENT)
Dept: OBSTETRICS AND GYNECOLOGY | Facility: CLINIC | Age: 50
End: 2025-05-05
Payer: COMMERCIAL

## 2025-05-08 RX ORDER — AZELASTINE 1 MG/ML
SPRAY, METERED NASAL
Qty: 30 ML | Refills: 11 | Status: SHIPPED | OUTPATIENT
Start: 2025-05-08

## 2025-05-25 DIAGNOSIS — Z91.09 HOUSE DUST MITE ALLERGY: ICD-10-CM

## 2025-05-26 RX ORDER — MOMETASONE FUROATE MONOHYDRATE 50 UG/1
SPRAY, METERED NASAL
Qty: 17 G | Refills: 2 | Status: SHIPPED | OUTPATIENT
Start: 2025-05-26

## 2025-06-11 ENCOUNTER — OFFICE VISIT (OUTPATIENT)
Dept: PRIMARY CARE CLINIC | Facility: CLINIC | Age: 50
End: 2025-06-11
Attending: FAMILY MEDICINE
Payer: COMMERCIAL

## 2025-06-11 VITALS
BODY MASS INDEX: 21.67 KG/M2 | HEART RATE: 80 BPM | WEIGHT: 155.31 LBS | OXYGEN SATURATION: 99 % | SYSTOLIC BLOOD PRESSURE: 102 MMHG | DIASTOLIC BLOOD PRESSURE: 71 MMHG

## 2025-06-11 DIAGNOSIS — F32.0 CURRENT MILD EPISODE OF MAJOR DEPRESSIVE DISORDER, UNSPECIFIED WHETHER RECURRENT: ICD-10-CM

## 2025-06-11 DIAGNOSIS — F51.01 PRIMARY INSOMNIA: ICD-10-CM

## 2025-06-11 DIAGNOSIS — Z80.3 FAMILY HISTORY OF BREAST CANCER IN MOTHER: ICD-10-CM

## 2025-06-11 DIAGNOSIS — M06.4 INFLAMMATORY POLYARTHROPATHY: ICD-10-CM

## 2025-06-11 DIAGNOSIS — D68.2 HEREDITARY DEFICIENCY OF OTHER CLOTTING FACTORS: ICD-10-CM

## 2025-06-11 DIAGNOSIS — R51.9 GENERALIZED HEADACHES: ICD-10-CM

## 2025-06-11 DIAGNOSIS — K59.01 SLOW TRANSIT CONSTIPATION: ICD-10-CM

## 2025-06-11 DIAGNOSIS — Z00.00 ANNUAL PHYSICAL EXAM: Primary | ICD-10-CM

## 2025-06-11 PROCEDURE — 99999 PR PBB SHADOW E&M-EST. PATIENT-LVL IV: CPT | Mod: PBBFAC,,, | Performed by: FAMILY MEDICINE

## 2025-06-11 RX ORDER — QUETIAPINE FUMARATE 25 MG/1
25 TABLET, FILM COATED ORAL DAILY
Qty: 30 TABLET | Refills: 1 | Status: SHIPPED | OUTPATIENT
Start: 2025-06-11 | End: 2025-08-10

## 2025-06-11 RX ORDER — BUPROPION HYDROCHLORIDE 300 MG/1
300 TABLET ORAL DAILY
Qty: 90 TABLET | Refills: 3 | Status: SHIPPED | OUTPATIENT
Start: 2025-06-11

## 2025-06-12 ENCOUNTER — PATIENT MESSAGE (OUTPATIENT)
Dept: PAIN MEDICINE | Facility: CLINIC | Age: 50
End: 2025-06-12
Payer: COMMERCIAL

## 2025-06-12 DIAGNOSIS — F51.01 PRIMARY INSOMNIA: ICD-10-CM

## 2025-06-12 DIAGNOSIS — M79.18 MYOFASCIAL PAIN: ICD-10-CM

## 2025-06-12 RX ORDER — TRAZODONE HYDROCHLORIDE 50 MG/1
50 TABLET ORAL NIGHTLY PRN
Qty: 90 TABLET | Refills: 3 | Status: SHIPPED | OUTPATIENT
Start: 2025-06-12

## 2025-06-12 RX ORDER — TIZANIDINE 4 MG/1
TABLET ORAL
Qty: 60 TABLET | Refills: 12 | Status: SHIPPED | OUTPATIENT
Start: 2025-06-12

## 2025-06-12 NOTE — TELEPHONE ENCOUNTER
Refill Decision Note   Miriansoco Perezsey  is requesting a refill authorization.  Brief Assessment and Rationale for Refill:  Approve     Medication Therapy Plan:         Comments:     Note composed:9:38 AM 06/12/2025

## 2025-06-12 NOTE — TELEPHONE ENCOUNTER
No care due was identified.  Health [facility] Embedded Care Due Messages. Reference number: [id number].   6/12/2025 5:26:03 AM CDT

## 2025-06-16 ENCOUNTER — PATIENT MESSAGE (OUTPATIENT)
Dept: GASTROENTEROLOGY | Facility: CLINIC | Age: 50
End: 2025-06-16
Payer: COMMERCIAL

## 2025-06-18 ENCOUNTER — RESULTS FOLLOW-UP (OUTPATIENT)
Dept: PRIMARY CARE CLINIC | Facility: CLINIC | Age: 50
End: 2025-06-18

## 2025-06-18 ENCOUNTER — APPOINTMENT (OUTPATIENT)
Dept: LAB | Facility: HOSPITAL | Age: 50
End: 2025-06-18
Payer: COMMERCIAL

## 2025-06-18 ENCOUNTER — PATIENT MESSAGE (OUTPATIENT)
Dept: PRIMARY CARE CLINIC | Facility: CLINIC | Age: 50
End: 2025-06-18
Payer: COMMERCIAL

## 2025-06-18 DIAGNOSIS — D50.8 OTHER IRON DEFICIENCY ANEMIA: Primary | ICD-10-CM

## 2025-06-26 ENCOUNTER — OFFICE VISIT (OUTPATIENT)
Dept: PAIN MEDICINE | Facility: CLINIC | Age: 50
End: 2025-06-26
Attending: ANESTHESIOLOGY
Payer: COMMERCIAL

## 2025-06-26 VITALS
WEIGHT: 153.44 LBS | RESPIRATION RATE: 14 BRPM | TEMPERATURE: 98 F | HEIGHT: 70 IN | OXYGEN SATURATION: 99 % | HEART RATE: 98 BPM | SYSTOLIC BLOOD PRESSURE: 104 MMHG | BODY MASS INDEX: 21.97 KG/M2 | DIASTOLIC BLOOD PRESSURE: 79 MMHG

## 2025-06-26 DIAGNOSIS — G24.3 ISOLATED CERVICAL DYSTONIA: Primary | ICD-10-CM

## 2025-06-26 PROCEDURE — 99999 PR PBB SHADOW E&M-EST. PATIENT-LVL V: CPT | Mod: PBBFAC,,, | Performed by: ANESTHESIOLOGY

## 2025-06-26 NOTE — PROGRESS NOTES
Patient Name: Mirian العلي  MRN: [id number]     Patient presents for repeat Botox injection. No new symptoms, no side effects reported from last injection. She reported 90% relief after last injection, but this did not last the full three months between appointments. She denies major health changes or red flag symptoms.  - Performed Botox injection, details below.         INFORMED CONSENT: The procedure, risks, benefits and options were discussed with patient. There are no contraindications to the procedure. The patient expressed understanding and agreed to proceed. The personnel performing the procedure was discussed. I verify that I personally obtained Mirian's consent prior to the start of the procedure and the signed consent can be found on the patient's chart.  Shoulder improved with HEP     Procedure Date: 06/26/2025     Anesthesia: Topical     Sedation: None     Pre-operative diagnosis:       1. Isolated cervical dystonia          onabotulinumtoxina injection 400 Units              We discussed with the patient the assessment and recommendations. The following is the plan we agreed on:     1.  Procedure:  Under clean technique, EMG guidance and after discussing with the patient we used 400 units Botox.  No units wasted.     *Right Anterior Scalene 25 units   *Right Middle Scalene 25 units  *Right Splenius Capitus 25 units   *Right Longissimus 25 units   *Right Upper Trapezius 50U   *Right Levator Scapulae 50U   *Right Lower Trapezius 50U   *Right Upper Pectoralis 50U   *Left Upper Pectoralis 25U   *Left upper trapezius 25U   * Left Latissimus 50U    2.  Return as needed.  Otherwise follow-up in 3 months to repeat injection with 400 units Botox.    Aric Hernandez M.D.  PGY-5  Interventional Pain Management Fellow  [facility]  Pager: [phone]      I performed a history, review of systems, and physical exam with the patient. The assessment and plan were discussed and agreed upon with  Dr. Mcclendon, the attending of record, before sharing with the patient. The face to face encounter time, including answering all patient questions, was 20 minutes.    I have personally taken the history and examined this patient and agree with the fellow's note as stated above.

## 2025-07-01 PROBLEM — R89.9 ABNORMAL LABORATORY TEST: Status: RESOLVED | Noted: 2019-06-06 | Resolved: 2025-07-01

## 2025-07-01 PROBLEM — D68.2 HEREDITARY DEFICIENCY OF OTHER CLOTTING FACTORS: Status: ACTIVE | Noted: 2025-07-01

## 2025-07-01 NOTE — PROGRESS NOTES
Patient ID: Mirian العلي is a 49 y.o. female.    Chief Complaint: Annual Exam    History of Present Illness    CHIEF COMPLAINT:  Patient presents today for follow up of sleep issues and constipation    SLEEP:  She takes trazodone for sleep management and reports significant morning fatigue as a side effect. Attempts to reduce to half dose resulted in decreased effectiveness. Ring device monitoring shows increased REM and deep sleep while taking trazodone.    GI CONCERNS:  She reports ongoing constipation. She has had a previous colonoscopy but has not consulted a gastroenterologist specifically for constipation management.    CURRENT MEDICATIONS:  She takes Wellbutrin 450mg (150mg and 300mg) and Xanax with one refill remaining. Recently started progesterone for hormone replacement therapy prescribed by gynecologist.    ALLERGIES AND ENT:  She has a dust mite allergy and has been taking allergy medication for three years without improvement, despite expected efficacy within 1-2 years of use. She underwent sinus surgery in January for blockages, which has improved symptoms including previous chronic head pain.    IMMUNIZATIONS:  Last COVID-19 booster received in summer 2022. She reports COVID-19 infection since last booster with no additional vaccinations since then.      ROS:  General: -fever, -chills, -fatigue, -weight gain, -weight loss  Eyes: -vision changes, -redness, -discharge  ENT: -ear pain, -nasal congestion, -sore throat  Cardiovascular: -chest pain, -palpitations, -lower extremity edema  Respiratory: -cough, -shortness of breath  Gastrointestinal: -abdominal pain, -nausea, -vomiting, -diarrhea, +constipation, -blood in stool  Genitourinary: -dysuria, -hematuria, -frequency  Musculoskeletal: -joint pain, -muscle pain  Skin: -rash, -lesion  Neurological: -headache, -dizziness, -numbness, -tingling  Psychiatric: -anxiety, -depression, +sleep difficulty  Allergic: +allergic reactions         Physical  Exam    Vitals: Blood pressure: 102/71.  General: No acute distress. Well-developed. Well-nourished.  Eyes: EOMI. Sclerae anicteric.  HENT: Normocephalic. Atraumatic. Nares patent. Moist oral mucosa.  Ears: Bilateral TMs clear. Bilateral EACs clear.  Cardiovascular: Regular rate. Regular rhythm. No murmurs. No rubs. No gallops. Normal S1, S2.  Respiratory: Normal respiratory effort. Clear to auscultation bilaterally. No rales. No rhonchi. No wheezing.  Abdomen: Soft. Non-tender. Non-distended. Normoactive bowel sounds.  Musculoskeletal: No  obvious deformity.  Extremities: No lower extremity edema.  Neurological: Alert & oriented x3. No slurred speech. Normal gait.  Psychiatric: Normal mood. Normal affect. Good insight. Good judgment.  Skin: Warm. Dry. No rash.         Assessment & Plan    G47.00 Insomnia, unspecified  K59.00 Constipation, unspecified  K58.0 Irritable bowel syndrome with diarrhea  F32.9 Major depressive disorder, single episode, unspecified  F41.9 Anxiety disorder, unspecified  J30.9 Allergic rhinitis, unspecified  U09.9 Post COVID-19 condition, unspecified  Z79.890 Hormone replacement therapy    ## INSOMNIA:  - Prescribed quetiapine 25 mg for sleep, with option to increase to 50 mg if needed, providing 30-day supply with 1 refill.  - Discontinued trazodone due to patient reporting feeling hungover after use, though sleep tracking ring showed more REM and deep sleep with it.  - Discussed off-label use of both trazodone and quetiapine for sleep, emphasizing different dosing compared to psychiatric use.    ## CONSTIPATION AND IRRITABLE BOWEL SYNDROME:  - Evaluated ongoing constipation despite various remedies.  - Considered IBS as potential cause, noting correlation with anxiety and depression.  - Patient can present with either diarrhea-based or constipation-based symptoms.  - Assessed hydration status, noting adequate intake with current water consumption of approximately 60 oz daily.  - Referred  patient to Dr. Jose Bowers (gastroenterologist) for evaluation of constipation and potential IBS diagnosis.    ## DEPRESSION:  - Continued Wellbutrin 300 mg for depression management.    ## ANXIETY:  - Noted the patient is currently on Xanax with 1 refill remaining.    ## ALLERGIC RHINITIS:  - Monitored ongoing allergies despite taking medication for 3 years.  - Evaluated results of sinus surgery performed in January, which improved blockages but allergies persist.    ## POST-COVID-19 CONDITION:  - Discussed annual COVID booster in fall, pending availability and recommendations, similar to influenza vaccinations.    ## HORMONE REPLACEMENT THERAPY:  - Added progesterone to the patient's hormone replacement therapy regimen.    ## GENERAL HEALTH MANAGEMENT:  - Assessed current medications.  - Patient to continue current exercise regimen including weights, gym resistance, and yoga.  - Advised on fasting requirements for upcoming labs and ordered fasting labs, including lipid panel.        Polyneuropathy followed by Rheumatology stable on medication  Visit today is associated with current or anticipated ongoing medical care related to this patient's single serious condition/complex condition of anxiety. The patient will return to see me as these issues will be followed longitudinally            This note was generated with the assistance of ambient listening technology. Verbal consent was obtained by the patient and accompanying visitor(s) for the recording of patient appointment to facilitate this note. I attest to having reviewed and edited the generated note for accuracy, though some syntax or spelling errors may persist. Please contact the author of this note for any clarification.        Answers submitted by the patient for this visit:  Review of Systems Questionnaire (Submitted on 6/4/2025)  activity change: Yes  unexpected weight change: No  neck pain: Yes  hearing loss: No  rhinorrhea: No  trouble swallowing:  No  eye discharge: No  visual disturbance: Yes  chest tightness: No  wheezing: No  chest pain: No  palpitations: No  blood in stool: No  constipation: Yes  vomiting: No  diarrhea: No  polydipsia: No  polyuria: No  difficulty urinating: No  hematuria: No  menstrual problem: No  dysuria: No  joint swelling: No  arthralgias: Yes  headaches: Yes  weakness: No  confusion: No  dysphoric mood: No

## 2025-07-02 ENCOUNTER — OFFICE VISIT (OUTPATIENT)
Dept: GASTROENTEROLOGY | Facility: CLINIC | Age: 50
End: 2025-07-02
Payer: COMMERCIAL

## 2025-07-02 VITALS
HEART RATE: 85 BPM | SYSTOLIC BLOOD PRESSURE: 112 MMHG | BODY MASS INDEX: 21.84 KG/M2 | DIASTOLIC BLOOD PRESSURE: 83 MMHG | WEIGHT: 152.56 LBS | HEIGHT: 70 IN

## 2025-07-02 DIAGNOSIS — K58.1 IRRITABLE BOWEL SYNDROME WITH CONSTIPATION: Primary | ICD-10-CM

## 2025-07-02 PROCEDURE — 3074F SYST BP LT 130 MM HG: CPT | Mod: CPTII,S$GLB,,

## 2025-07-02 PROCEDURE — 99999 PR PBB SHADOW E&M-EST. PATIENT-LVL III: CPT | Mod: PBBFAC,,,

## 2025-07-02 PROCEDURE — 3044F HG A1C LEVEL LT 7.0%: CPT | Mod: CPTII,S$GLB,,

## 2025-07-02 PROCEDURE — 3008F BODY MASS INDEX DOCD: CPT | Mod: CPTII,S$GLB,,

## 2025-07-02 PROCEDURE — 99213 OFFICE O/P EST LOW 20 MIN: CPT | Mod: S$GLB,,,

## 2025-07-02 PROCEDURE — 3079F DIAST BP 80-89 MM HG: CPT | Mod: CPTII,S$GLB,,

## 2025-07-02 NOTE — PROGRESS NOTES
Gastroenterology Clinic Consultation Note    Patient ID: Mirian العلي is a 49 y.o. female.    Chief Complaint: Constipation    History of Present Illness    CHIEF COMPLAINT:  Patient presents today for chronic constipation.    GASTROINTESTINAL:  She reports chronic constipation with bowel movements occurring less than once per week with minimal spontaneous bowel activity. She experiences associated symptoms including nausea, gas, and gas pain. She denies blood in stools or dark stools. Her water intake ranges between 32-64 ounces daily, typically aiming for 64 ounces but consistently consuming at least 32 ounces.    CURRENT MANAGEMENT:  She currently uses fiber gummy supplements for over a year, kombucha, pre/probiotic, and suppositories twice weekly for constipation management. She has previously tried enemas and Miralax.    IMAGING:  Colonoscopy in October 2023 was normal with recommendation for repeat in 10 years.      ROS:  General: -fever, -chills, -fatigue, -weight gain, -weight loss  Eyes: -vision changes, -redness, -discharge  ENT: -ear pain, -nasal congestion, -sore throat  Cardiovascular: -chest pain, -palpitations, -lower extremity edema  Respiratory: -cough, -shortness of breath  Gastrointestinal: +abdominal pain, +nausea, -vomiting, -diarrhea, +constipation, -blood in stool  Genitourinary: -dysuria, -hematuria, -frequency  Musculoskeletal: -joint pain, -muscle pain  Skin: -rash, -lesion  Neurological: -headache, -dizziness, -numbness, -tingling  Psychiatric: -anxiety, -depression, -sleep difficulty         Physical Exam  Vitals and nursing note reviewed.   Constitutional:       General: She is not in acute distress.     Appearance: Normal appearance. She is not ill-appearing.   HENT:      Head: Normocephalic and atraumatic.      Right Ear: External ear normal.      Left Ear: External ear normal.      Nose: Nose normal.   Eyes:      General: No scleral icterus.     Extraocular Movements:  Extraocular movements intact.   Cardiovascular:      Rate and Rhythm: Normal rate.   Pulmonary:      Effort: Pulmonary effort is normal. No respiratory distress.   Abdominal:      General: There is no distension.      Palpations: Abdomen is soft.      Tenderness: There is no guarding.   Musculoskeletal:         General: Normal range of motion.      Cervical back: Normal range of motion.   Skin:     General: Skin is warm.   Neurological:      Mental Status: She is alert and oriented to person, place, and time.   Psychiatric:         Mood and Affect: Mood normal.         Behavior: Behavior is cooperative.         Thought Content: Thought content normal.         Medical History:  has a past medical history of Angio-edema, AR (allergic rhinitis), BRCA1 negative, BRCA2 negative, Eczema, Endometriosis, mild (2002), Factor V Leiden mutation, Genetic testing (10/2020), Ovarian cyst, bilateral, Thyroglobulin antibody positive, and Urticaria.    Surgical History:  has a past surgical history that includes Laparoscopy w/ mini-laparotomy (2003); Total vaginal hysterectomy (01/2015); Bunionectomy (Left, 02/06/2018); Stella tooth extraction (Bilateral); Oophorectomy (Right, 03/2012); Oophorectomy (Left, 01/2015); Hysterectomy (01/2015); Colonoscopy (N/A, 10/26/2023); Breast biopsy (Right, 09/13/2023); and fess, with nasal septoplasty and turbinate reduction, with imaging deejay (Bilateral, 01/13/2025).    Family History: family history includes Allergies in her brother; Asthma in her brother; BRCA 1/2 in her paternal cousin; Bladder Cancer in her father; Breast cancer in her mother; Breast cancer (age of onset: 46) in her sister; Cancer in her father, maternal grandmother, mother, paternal cousin, and sister; Diabetes in her brother, brother, brother, maternal aunt, and paternal uncle; Factor V Leiden deficiency in her brother; Kidney cancer (age of onset: 60) in her father; Lung cancer (age of onset: 71) in her mother; Other in her  "sister and sister; Skin cancer in her father; Throat cancer in her paternal grandfather; Thyroid disease in her mother..       Review of patient's allergies indicates:   Allergen Reactions    Niacin preparations      Patient states she has flushing    Sulfa (sulfonamide antibiotics)      Tongue swelling    Iodine and iodide containing products Rash       Medications Ordered Prior to Encounter[1]    Labs:  Lab Results   Component Value Date    WBC 2.85 (L) 06/18/2025    HGB 10.9 (L) 06/18/2025    HCT 33.3 (L) 06/18/2025     06/18/2025    CRP 0.4 05/10/2016    CHOL 150 06/18/2025    TRIG 65 06/18/2025    HDL 63 06/18/2025    ALKPHOS 41 06/18/2025    ALT 12 06/18/2025    AST 16 06/18/2025     06/18/2025    K 3.8 06/18/2025     06/18/2025    CREATININE 0.8 06/18/2025    BUN 9 06/18/2025    CO2 24 06/18/2025    TSH 2.579 06/18/2025    HGBA1C 4.9 06/18/2025       Vital Signs:  /83 (BP Location: Right arm, Patient Position: Sitting)   Pulse 85   Ht 5' 10" (1.778 m)   Wt 69.2 kg (152 lb 8.9 oz)   LMP 01/01/2015 (Approximate)   BMI 21.89 kg/m²   Body mass index is 21.89 kg/m².    Imaging reviewed:   Laryngoscopy:    Areas examined:  Nasal cavities, nasopharynx, oropharynx, hypopharynx,  larynx and vocal cords  Larynx/hypopharynx:     Flexible video endoscopy performed with Storz endoscope through both  nares evaluating the inferior meatus middle meatus and sphenoethmoid  recesses bilaterally as well as the nasopharynx oropharynx hypopharynx and  larynx.  No edema or active drainage from the sinuses.  Some minimal  hyperemia of the nasopharynx.  No edema of the middle meatus or uncinate  process.  No pooling of secretions.  Mild to mild-to-moderate postcricoid  edema and hyperemia.  No pooling of secretions asymmetry of movement or  any suspicious lesion.  Minimal cobblestoning.      Last Resulted: 09/16/24 11:40 CDT         Endoscopy reviewed:   Procedure:             Colonoscopy   Indications:  "          Screening for malignant neoplasm in the colon   Providers:             Ishmael Pham MD, Shine Ryan RN, Lisa Rubin CRNA, Jocelyne Dickerson, Technician   Patient Profile:       This is a 47 year old female.   Referring MD:          Nora Brooks MD   Complications:         No immediate complications.   Medicines:             Monitored Anesthesia Care   Procedure:             Pre-Anesthesia Assessment:                          - Prior to the procedure, a History and Physical                          was performed, and patient medications and                          allergies were reviewed. The risks and benefits of                          the procedure and the sedation options and risks                          were discussed with the patient. All questions                          were answered and informed consent was obtained.                          Patient identification and proposed procedure were                          verified by the physician, the nurse and the                          anesthetist. Prophylactic Antibiotics: The patient                          does not require prophylactic antibiotics. Prior                          Anticoagulants: The patient has taken no                          anticoagulant or antiplatelet agents. After                          reviewing the risks and benefits, the patient was                          deemed in satisfactory condition to undergo the                          procedure. The anesthesia plan was to use                          monitored anesthesia care (MAC). Immediately prior                          to administration of medications, the patient was                          re-assessed for adequacy to receive sedatives. The                          heart rate, respiratory rate, oxygen saturations,                          blood pressure, adequacy of pulmonary ventilation,                          and response to  care were monitored throughout the                          procedure. The physical status of the patient was                          re-assessed after the procedure.                          After I obtained informed consent, the scope was                          passed under direct vision. Throughout the                          procedure, the patient's blood pressure, pulse,                          and oxygen saturations were monitored continuously.                          The Olympus scope CF-[id number] ([id number]) was                          introduced through the anus and advanced to the                          cecum, identified by appendiceal orifice and                          ileocecal valve. The colonoscopy was performed                          without difficulty. The patient tolerated the                          procedure well. The quality of the bowel                          preparation was good. The ileocecal valve,                          appendiceal orifice, and rectum were photographed.   Findings:       The entire examined colon appeared normal on direct and retroflexion        views.   Impression:            - The entire examined colon is normal on direct                          and retroflexion views.                          - No specimens collected.   Recommendation:        - Discharge patient to home (ambulatory).                          - Patient has a contact number available for                          emergencies. The signs and symptoms of potential                          delayed complications were discussed with the                          patient. Return to normal activities tomorrow.                          Written discharge instructions were provided to                          the patient.                          - Resume previous diet.                          - Continue present medications.                          - Return to primary care physician as previously                           scheduled.                          - Repeat colonoscopy in 10 years for screening                          purposes.   Attending Participation:        I personally performed the entire procedure.        I was present and participated during the entire procedure,        including non-meyer portions.   Ishmael Pham MD   10/26/2023 7:48:46 AM       Assessment:  1. Irritable bowel syndrome with constipation      Orders Placed This Encounter    linaCLOtide (LINZESS) 72 mcg Cap capsule       Assessment & Plan      CHRONIC CONSTIPATION:  - Chronic constipation as primary issue, ruling out structural causes due to normal colonoscopy in October 2023.  - Evaluated current management strategies, including fiber supplements, probiotics, and OTC remedies.  - Prescription medication intervention appropriate given inadequate response to OTC treatments.  - Started Linzess as first-line prescription treatment for constipation, starting with lowest dose to gauge response and tolerance.  - Considered starting at middle dose due to severity of constipation, but agreed with preference to start low.  - Take consistently for first 7-10 days to allow body to adjust.  - Can double dose if ineffective, but contact office for prescription adjustment.  - Explained Linzess mechanism of action: pulls water into stool to alleviate constipation.  - Discussed importance of adequate hydration when taking Linzess.  - Informed about potential initial side effects, including possible diarrhea in first 7-10 days.  - Patient to maintain adequate hydration: aim for 4-5 bottles of water daily.  - Patient to continue current fiber intake and supplements.  - Continued pre and probiotics.    FOLLOW-UP PLAN:  - Follow up via patient portal in about a month to report on medication effectiveness and discuss potential dosage adjustments.  - Contact office if needing prescription for higher dose of Linzess.       It is common for Linzess to cause  "diarrhea the first 7-10 days after starting it.  Linzess is always more effective the first 7-10 days of use, and it then "wears off" afterward, meaning that the diarrhea improves.  The goal is 1 BM every day, or every other day, without "accidents" or urgency.  It is ok if the BM are still loose or watery as long as it is not more than once per day, with accidents, or urgent      Plan Constipation:   Start daily fiber. Take 1 tsp of fiber powder (psyllium or other sugar-free powder).  Mix in 8 oz of water.  Take x 3-5 days.  Then, increase fiber by 1 tsp every 3-5 days until stool is easy to pass.  Stop and continue at that dose.   Do not exceed 6 tsps/day. GOAL:  More well-formed stool (one continuous well-formed piece vs. Pellets) and minimize straining with initiation. Can cause increased gas.   Start miralax daily (17g per dose). Start at once per day and can titrate up to twice daily. Decrease and/or stop Miralax if stools become softer/liquid in consistency.   Increase water intake to 8-10 glasses of water per day  Stay active. 30-45 minutes of moderate activity 3-4 times per week. (Mobility=motility)         SAW MEDINA-C  Gastroenterology Department  [facility] [address] Office [phone]     This note was generated with the assistance of ambient listening technology. Verbal consent was obtained by the patient and accompanying visitor(s) for the recording of patient appointment to facilitate this note. I attest to having reviewed and edited the generated note for accuracy, though some syntax or spelling errors may persist. Please contact the author of this note for any clarification.                      [1]   Current Outpatient Medications on File Prior to Visit   Medication Sig Dispense Refill    acetaminophen (TYLENOL) 650 MG TbSR Take 1 tablet (650 mg total) by mouth every 8 (eight) hours as needed (pain). 30 tablet 0    allerg xt,D.farinae-D.pteronys (ODACTRA) 12 SQ-HDM Subl " Place 1 tablet under the tongue every evening. 90 tablet 3    ALPRAZolam (XANAX) 1 MG tablet TAKE 1 TABLET(1 MG) BY MOUTH EVERY NIGHT AS NEEDED FOR INSOMNIA OR ANXIETY 21 tablet 2    aspirin (ECOTRIN) 81 MG EC tablet Take 81 mg by mouth once daily.      azelastine (ASTELIN) 137 mcg (0.1 %) nasal spray USE 1 TO 2 SPRAYS IN EACH NOSTRIL TWICE DAILY 30 mL 11    buPROPion (WELLBUTRIN XL) 150 MG TB24 tablet TAKE 1 TABLET BY MOUTH EVERY DAY TAKE WITH 300 MG TABLET 90 tablet 3    buPROPion (WELLBUTRIN XL) 300 MG 24 hr tablet Take 1 tablet (300 mg total) by mouth once daily. 90 tablet 3    cetirizine (ZYRTEC) 10 MG tablet Take 1 tablet (10 mg total) by mouth once daily. 30 tablet 2    cyanocobalamin, vitamin B-12, 5,000 mcg Subl Place under the tongue once a week.      EPINEPHrine (EPIPEN 2-AVILA) 0.3 mg/0.3 mL AtIn Inject 0.3 mLs (0.3 mg total) into the muscle as needed (Anaphylaxis). 2 each 0    estradioL (VIVELLE-DOT) 0.1 mg/24 hr PTSW Place 1 patch onto the skin twice a week.      glucosamine-chondroitin 500-400 mg tablet Take 1 tablet by mouth 3 (three) times daily.      hydrOXYchloroQUINE (PLAQUENIL) 200 mg tablet TK 2 TS PO QD      ibuprofen (ADVIL,MOTRIN) 600 MG tablet Take 1 tablet (600 mg total) by mouth every 6 (six) hours as needed for Pain. 30 tablet 0    mometasone (NASONEX) 50 mcg/actuation nasal spray INSTILL 1 SPRAY INTO THE NOSE TWICE DAILY. SPRAY OUTWARDLY TOWARDS THE EARS IDEALLY AFTER A SINUS RINSE TWICE DAILY 17 g 2    prasterone, dhea, (INTRAROSA) 6.5 mg Inst PLACE 6.5 MG VAGINALLY EVERY EVENING 30 each 6    progesterone (PROMETRIUM) 100 MG capsule Take 1 capsule (100 mg total) by mouth nightly. 30 capsule 6    sod chlor-bicarb-squeez bottle (NEILMED SINUS RINSE COMPLETE) pkdv 1 application  by sinus irrigation route 2 (two) times a day. Not right before bed      testosterone (TESTIM) 50 mg/5 gram (1 %) Gel Apply 0.5 g topically once daily.      tiZANidine (ZANAFLEX) 4 MG tablet TAKE 1 TABLET(4 MG) BY  MOUTH TWICE DAILY AS NEEDED 60 tablet 12    UNABLE TO FIND medication name: tumeric 200 mg twice a day      vitamin D (VITAMIN D3) 1000 units Tab Take 2,000 Units by mouth 2 (two) times daily.      budesonide (PULMICORT) 0.5 mg/2 mL nebulizer solution Take 2 mLs (0.5 mg total) by nebulization once daily. For use in saline irrigation as directed. 180 mL 1    QUEtiapine (SEROQUEL) 25 MG Tab Take 1 tablet (25 mg total) by mouth once daily. (Patient not taking: Reported on 7/2/2025) 30 tablet 1    traZODone (DESYREL) 50 MG tablet TAKE 1 TABLET(50 MG) BY MOUTH EVERY NIGHT AS NEEDED FOR INSOMNIA (Patient not taking: Reported on 7/2/2025) 90 tablet 3     No current facility-administered medications on file prior to visit.

## 2025-07-09 RX ORDER — BUPROPION HYDROCHLORIDE 150 MG/1
TABLET ORAL
Qty: 90 TABLET | Refills: 3 | Status: SHIPPED | OUTPATIENT
Start: 2025-07-09

## 2025-07-09 NOTE — TELEPHONE ENCOUNTER
Refill Decision Note   Miriansoco Perezsey  is requesting a refill authorization.  Brief Assessment and Rationale for Refill:  Approve     Medication Therapy Plan:         Comments:     Note composed:2:30 PM 07/09/2025

## 2025-07-09 NOTE — TELEPHONE ENCOUNTER
No care due was identified.  [facility] Embedded Care Due Messages. Reference number: [id number].   7/09/2025 10:23:58 AM CDT

## 2025-07-14 ENCOUNTER — PATIENT MESSAGE (OUTPATIENT)
Dept: PRIMARY CARE CLINIC | Facility: CLINIC | Age: 50
End: 2025-07-14
Payer: COMMERCIAL

## 2025-07-16 ENCOUNTER — LAB VISIT (OUTPATIENT)
Dept: LAB | Facility: HOSPITAL | Age: 50
End: 2025-07-16
Attending: FAMILY MEDICINE
Payer: COMMERCIAL

## 2025-07-16 DIAGNOSIS — D50.8 OTHER IRON DEFICIENCY ANEMIA: ICD-10-CM

## 2025-07-16 LAB
ABSOLUTE EOSINOPHIL (OHS): 0 K/UL
ABSOLUTE MONOCYTE (OHS): 0.56 K/UL (ref 0.3–1)
ABSOLUTE NEUTROPHIL COUNT (OHS): 1.57 K/UL (ref 1.8–7.7)
BASOPHILS # BLD AUTO: 0.03 K/UL
BASOPHILS NFR BLD AUTO: 0.9 %
ERYTHROCYTE [DISTWIDTH] IN BLOOD BY AUTOMATED COUNT: 12.9 % (ref 11.5–14.5)
HCT VFR BLD AUTO: 38.6 % (ref 37–48.5)
HGB BLD-MCNC: 12.7 GM/DL (ref 12–16)
IMM GRANULOCYTES # BLD AUTO: 0.01 K/UL (ref 0–0.04)
IMM GRANULOCYTES NFR BLD AUTO: 0.3 % (ref 0–0.5)
LYMPHOCYTES # BLD AUTO: 1.22 K/UL (ref 1–4.8)
MCH RBC QN AUTO: 29.3 PG (ref 27–31)
MCHC RBC AUTO-ENTMCNC: 32.9 G/DL (ref 32–36)
MCV RBC AUTO: 89 FL (ref 82–98)
NUCLEATED RBC (/100WBC) (OHS): 0 /100 WBC
PLATELET # BLD AUTO: 261 K/UL (ref 150–450)
PMV BLD AUTO: 10.4 FL (ref 9.2–12.9)
RBC # BLD AUTO: 4.34 M/UL (ref 4–5.4)
RELATIVE EOSINOPHIL (OHS): 0 %
RELATIVE LYMPHOCYTE (OHS): 36 % (ref 18–48)
RELATIVE MONOCYTE (OHS): 16.5 % (ref 4–15)
RELATIVE NEUTROPHIL (OHS): 46.3 % (ref 38–73)
WBC # BLD AUTO: 3.39 K/UL (ref 3.9–12.7)

## 2025-07-16 PROCEDURE — 36415 COLL VENOUS BLD VENIPUNCTURE: CPT | Mod: PN

## 2025-07-16 PROCEDURE — 85025 COMPLETE CBC W/AUTO DIFF WBC: CPT

## 2025-07-29 ENCOUNTER — OFFICE VISIT (OUTPATIENT)
Dept: OTOLARYNGOLOGY | Facility: CLINIC | Age: 50
End: 2025-07-29
Payer: COMMERCIAL

## 2025-07-29 VITALS
BODY MASS INDEX: 22.13 KG/M2 | WEIGHT: 154.56 LBS | HEIGHT: 70 IN | DIASTOLIC BLOOD PRESSURE: 71 MMHG | HEART RATE: 87 BPM | SYSTOLIC BLOOD PRESSURE: 100 MMHG

## 2025-07-29 DIAGNOSIS — H68.003 SALPINGITIS OF BOTH EUSTACHIAN TUBES: ICD-10-CM

## 2025-07-29 DIAGNOSIS — J30.2 PERENNIAL ALLERGIC RHINITIS WITH SEASONAL VARIATION: Primary | ICD-10-CM

## 2025-07-29 DIAGNOSIS — J30.89 PERENNIAL ALLERGIC RHINITIS WITH SEASONAL VARIATION: Primary | ICD-10-CM

## 2025-07-29 DIAGNOSIS — J32.8 OTHER CHRONIC SINUSITIS: ICD-10-CM

## 2025-07-29 PROCEDURE — 99999 PR PBB SHADOW E&M-EST. PATIENT-LVL V: CPT | Mod: PBBFAC,,, | Performed by: OTOLARYNGOLOGY

## 2025-07-29 NOTE — PROGRESS NOTES
Subjective:      Mirian is a 49 y.o. female who comes for follow-up of sinusitis. Her last visit with me was on 4/29/2025. Now over 6 months status-post endoscopic sinus surgery.    History of Present Illness    CHIEF COMPLAINT:  Patient presents with complaints of a persistently clogged right ear and recent difficulty swallowing.    HPI:  Patient reports intermittent clogged sensation in her right ear for a few weeks, particularly severe yesterday. The issue started when she was flying, although she is unsure if the flight directly caused the problem. Unlike previous experiences where ear issues typically affected both sides, this has been consistently bothering her on the right side only. She feels that this clogged sensation affects her hearing, making it difficult to concentrate, although currently, her ear feels normal.    Patient reports a recent episode of difficulty swallowing 2-3 weeks ago (around July 9th-11th) while in [place] at an elevation of almost [place],000 feet. She describes feeling a muscular obstruction preventing her from swallowing, noting a sensation of constriction. This symptom lasted for a few days before resolving spontaneously. She recalls a similar incident about 25 years ago after flying, which her ENT at the time attributed to a pulled muscle in her throat from excessive attempts to clear her ears.    Patient mentions congestion in her left sinus and allergy symptoms in her left eye this morning. She performed a sinus rinse but still felt symptomatic.    For ongoing sinus and allergy management, she uses mometasone nasal spray twice daily, azelastine nasal spray twice daily, and takes Zyrtec regularly. She has been using a sinus rinse, though less frequently than before. When symptomatic, she has taken extra Zyrtec, which she reports her doctor agreed was acceptable.    Patient denies any discharge from her nose when blowing, except when using the sinus rinse. She denies any current  "issues with swallowing or breathing through her nose.    MEDICATIONS:  Patient is on Mometasone and Azelastine nasal sprays, both administered twice daily. She is also taking Zyrtec, an oral antihistamine, on a daily basis. She is on muscle relaxers for her shoulder.    MEDICAL HISTORY:  Patient has a history of allergies.    TEST RESULTS:  Patient underwent an ear pressure test, which yielded results within the normal range.         SNOT-22 score: : (Patient-Rptd) (P) 43  NOSE score:: (Patient-Rptd) (P) 45%  ETDQ-7 score:: (Patient-Rptd) (P) 3.3    The patient's medications, allergies, past medical, surgical, social and family histories were reviewed and updated as appropriate.    A detailed review of systems was obtained with pertinent positives as per the above HPI, and otherwise negative.        Objective:     /71 (BP Location: Right arm, Patient Position: Sitting)   Pulse 87   Ht 5' 10" (1.778 m)   Wt 70.1 kg (154 lb 8.7 oz)   LMP 01/01/2015 (Approximate)   BMI 22.17 kg/m²        Constitutional:   She appears well-developed. She is cooperative. Normal speech.  No hoarse voice.      Head:  Normocephalic. Salivary glands normal.  Facial strength is normal.      Ears:    Right Ear: No drainage or tenderness. Tympanic membrane is not perforated. Tympanic membrane mobility is normal. No middle ear effusion. No decreased hearing is noted.   Left Ear: No drainage or tenderness. Tympanic membrane is not perforated. Tympanic membrane mobility is normal.  No middle ear effusion. No decreased hearing is noted.     Nose:  No mucosal edema, rhinorrhea, septal deviation or polyps. No epistaxis. Turbinates normal, no turbinate masses and no turbinate hypertrophy.  Right sinus exhibits no maxillary sinus tenderness and no frontal sinus tenderness. Left sinus exhibits no maxillary sinus tenderness and no frontal sinus tenderness.     Mouth/Throat  Oropharynx clear and moist without lesions or asymmetry and normal " uvula midline. She does not have dentures. Normal dentition. No oral lesions or mucous membrane lesions. No oropharyngeal exudate or posterior oropharyngeal erythema. Mirror exam not performed due to patient tolerance.  Mirror exam not performed due to patient tolerance.      Neck:  Neck normal without thyromegaly masses, asymmetry, normal tracheal structure, crepitus, and tenderness, thyroid normal, trachea normal and no adenopathy. Normal range of motion present.     She has no cervical adenopathy.     Cardiovascular:    Regular rhythm.              Pulmonary/Chest:   Effort normal.     Psychiatric:   She has a normal mood and affect. Her speech is normal and behavior is normal.     Neurological:   No cranial nerve deficit.     Skin:   No rash noted.       Procedure    Flexible laryngoscopy performed.  See procedure note.        Data Reviewed    WBC (K/uL)   Date Value   07/16/2025 3.39 (L)     Eosinophil % (%)   Date Value   05/08/2024 0.0     Eos # (K/uL)   Date Value   07/16/2025 0.00   05/08/2024 0.0     Eos % (%)   Date Value   07/16/2025 0.0     Platelet Count (K/uL)   Date Value   07/16/2025 261     Platelets (K/uL)   Date Value   05/08/2024 167     Glucose (mg/dL)   Date Value   06/18/2025 84   05/08/2024 95     Total IgE (IU/ml)   Date Value   01/10/2013 109 (H)       Pathology report indicated chronic inflammation with eosinophilia.  Cultures showed Alternaria, Cutibacterium.    I independently reviewed the tracings of the tympanogram performed today.  I reviewed the audiogram with the patient as well.  Pertinent findings include a normal study type A bilaterally (TPP approx -50 daPa bilaterally).      Assessment/Plan:     1. Perennial allergic rhinitis with seasonal variation    2. Other chronic sinusitis    3. Salpingitis of both eustachian tubes        Assessment & Plan    IMPRESSION:  - Mild inflammation and mucus drainage preferentially over the right side, likely causing intermittent ear clogging  sensation, not affecting hearing or requiring specific treatment.  - Sinuses in good overall shape with no significant issues.  - Swallowing episode likely due to superficial muscle issues around the throat, not involving the throat itself.  - Current allergy and sinus management regimen is effective, with sinuses in good condition.  - Considered Zyrtec as additional treatment for allergic inflammation, particularly for areas not reached by nasal sprays.  - Discussed the possibility of seasonal allergies contributing to current symptoms.    ALLERGIC RHINITIS:  - Continue mometasone nasal spray twice daily.  - Continue azelastine nasal spray twice daily.  - Continue Zyrtec as needed for allergy symptoms.    FOLLOW-UP:  - Follow up as needed.  - Contact office if needed.         Follow up if symptoms worsen or fail to improve.    This note was generated with the assistance of ambient listening technology. Verbal consent was obtained by the patient and accompanying visitor(s) for the recording of patient appointment to facilitate this note. I attest to having reviewed and edited the generated note for accuracy, though some syntax or spelling errors may persist. Please contact the author of this note for any clarification.

## 2025-07-29 NOTE — PROGRESS NOTES
Tympanogram    Right Ear Left Ear   Volume (mL) 1.54 1.69   Compliance (mL) 1.23 1.15   Pressure (daPa) -46 -55

## 2025-07-29 NOTE — PROCEDURES
Laryngoscopy    Date/Time: 7/29/2025 8:30 AM    Performed by: Alex Benavides MD  Authorized by: Alex Benavides MD    Anesthesia:     Local anesthetic:  Lidocaine 4% and Hector-Synephrine 1/2%    Patient tolerance:  Patient tolerated the procedure well with no immediate complications  Laryngoscopy:     Areas examined:  Nasopharynx, oropharynx, hypopharynx, larynx, vocal cords and nasal cavities    Laryngoscope size:  4 mm  Nose Intranasal:      Mucosa no polyps     No mucosa lesions present     No septum gross deformity     Turbinates not enlarged  Nasopharynx:      No mucosa lesions     Adenoids not present     Posterior choanae patent     Eustachian tube patent  Larynx/hypopharynx:      No epiglottis lesions     No epiglottis edema     No AE folds lesions     No vocal cord polyps     Equal and normal bilateral     No hypopharynx lesions     No piriform sinus pooling     No piriform sinus lesions     No post cricoid edema     No post cricoid erythema     Sinuses patent and clear bilaterally  Scanty nonpurulent PND on right  ETs wnl  Larynx wnl

## 2025-08-23 DIAGNOSIS — F32.0 CURRENT MILD EPISODE OF MAJOR DEPRESSIVE DISORDER, UNSPECIFIED WHETHER RECURRENT: ICD-10-CM

## 2025-08-25 ENCOUNTER — PATIENT MESSAGE (OUTPATIENT)
Dept: OTOLARYNGOLOGY | Facility: CLINIC | Age: 50
End: 2025-08-25
Payer: COMMERCIAL

## 2025-08-25 RX ORDER — BUPROPION HYDROCHLORIDE 300 MG/1
TABLET ORAL
Qty: 90 TABLET | Refills: 3 | Status: SHIPPED | OUTPATIENT
Start: 2025-08-25

## 2025-08-26 ENCOUNTER — OFFICE VISIT (OUTPATIENT)
Dept: OTOLARYNGOLOGY | Facility: CLINIC | Age: 50
End: 2025-08-26
Payer: COMMERCIAL

## 2025-08-26 VITALS
BODY MASS INDEX: 22.17 KG/M2 | HEART RATE: 101 BPM | SYSTOLIC BLOOD PRESSURE: 122 MMHG | DIASTOLIC BLOOD PRESSURE: 80 MMHG | HEIGHT: 70 IN

## 2025-08-26 DIAGNOSIS — J30.9 ALLERGIC RHINITIS, UNSPECIFIED SEASONALITY, UNSPECIFIED TRIGGER: ICD-10-CM

## 2025-08-26 DIAGNOSIS — Z91.09 ALLERGY TO AMERICAN HOUSE DUST MITE: ICD-10-CM

## 2025-08-26 DIAGNOSIS — J30.89 PERENNIAL ALLERGIC RHINITIS WITH SEASONAL VARIATION: Primary | ICD-10-CM

## 2025-08-26 DIAGNOSIS — J30.2 PERENNIAL ALLERGIC RHINITIS WITH SEASONAL VARIATION: Primary | ICD-10-CM

## 2025-08-26 DIAGNOSIS — J32.8 OTHER CHRONIC SINUSITIS: ICD-10-CM

## 2025-08-26 PROCEDURE — 31231 NASAL ENDOSCOPY DX: CPT | Mod: S$GLB,,, | Performed by: OTOLARYNGOLOGY

## 2025-08-26 PROCEDURE — 87070 CULTURE OTHR SPECIMN AEROBIC: CPT | Performed by: OTOLARYNGOLOGY

## 2025-08-26 PROCEDURE — 3044F HG A1C LEVEL LT 7.0%: CPT | Mod: CPTII,S$GLB,, | Performed by: OTOLARYNGOLOGY

## 2025-08-26 PROCEDURE — 3008F BODY MASS INDEX DOCD: CPT | Mod: CPTII,S$GLB,, | Performed by: OTOLARYNGOLOGY

## 2025-08-26 PROCEDURE — 1159F MED LIST DOCD IN RCRD: CPT | Mod: CPTII,S$GLB,, | Performed by: OTOLARYNGOLOGY

## 2025-08-26 PROCEDURE — 87102 FUNGUS ISOLATION CULTURE: CPT | Performed by: OTOLARYNGOLOGY

## 2025-08-26 PROCEDURE — 1160F RVW MEDS BY RX/DR IN RCRD: CPT | Mod: CPTII,S$GLB,, | Performed by: OTOLARYNGOLOGY

## 2025-08-26 PROCEDURE — 87075 CULTR BACTERIA EXCEPT BLOOD: CPT | Performed by: OTOLARYNGOLOGY

## 2025-08-26 PROCEDURE — 3079F DIAST BP 80-89 MM HG: CPT | Mod: CPTII,S$GLB,, | Performed by: OTOLARYNGOLOGY

## 2025-08-26 PROCEDURE — 3074F SYST BP LT 130 MM HG: CPT | Mod: CPTII,S$GLB,, | Performed by: OTOLARYNGOLOGY

## 2025-08-26 PROCEDURE — 99214 OFFICE O/P EST MOD 30 MIN: CPT | Mod: 25,S$GLB,, | Performed by: OTOLARYNGOLOGY

## 2025-08-26 PROCEDURE — 99999 PR PBB SHADOW E&M-EST. PATIENT-LVL V: CPT | Mod: PBBFAC,,, | Performed by: OTOLARYNGOLOGY

## 2025-08-27 RX ORDER — DERMATOPHAGOIDES PTERONYSSINUS AND DERMATOPHAGOIDES FARINAE 6; 6 [ARB'U]/1; [ARB'U]/1
1 TABLET SUBLINGUAL NIGHTLY
Qty: 90 TABLET | Refills: 3 | Status: ACTIVE | OUTPATIENT
Start: 2025-08-27

## 2025-08-29 LAB
BACTERIA SPEC AEROBE CULT: NORMAL
BACTERIA SPEC ANAEROBE CULT: NORMAL

## 2025-09-04 ENCOUNTER — TELEPHONE (OUTPATIENT)
Dept: PAIN MEDICINE | Facility: CLINIC | Age: 50
End: 2025-09-04
Payer: COMMERCIAL

## 2025-09-04 ENCOUNTER — PATIENT MESSAGE (OUTPATIENT)
Dept: OTOLARYNGOLOGY | Facility: CLINIC | Age: 50
End: 2025-09-04
Payer: COMMERCIAL

## 2025-09-04 DIAGNOSIS — G24.3 ISOLATED CERVICAL DYSTONIA: Primary | ICD-10-CM

## 2025-09-04 DIAGNOSIS — J32.8 OTHER CHRONIC SINUSITIS: Primary | ICD-10-CM

## 2025-09-04 RX ORDER — AZITHROMYCIN 250 MG/1
250 TABLET, FILM COATED ORAL DAILY
Qty: 6 TABLET | Refills: 0 | Status: SHIPPED | OUTPATIENT
Start: 2025-09-04 | End: 2025-09-09

## (undated) DEVICE — SPONGE PATTY SURGICAL .5X3IN

## (undated) DEVICE — DRAPE INSTR MAGNETIC 10X16IN

## (undated) DEVICE — TRACKER PATIENT NON INVASIVE

## (undated) DEVICE — COVER MAYO STND XL 30X57IN

## (undated) DEVICE — BLADE TRICUT

## (undated) DEVICE — KIT SINUS OMC

## (undated) DEVICE — CONTAINER SPECIMEN OR STER 4OZ

## (undated) DEVICE — DRAPE CORETEMP FLD WRM 56X62IN

## (undated) DEVICE — APPLICATOR ARISTA FLEX XL

## (undated) DEVICE — SYR PURAGEL 3ML

## (undated) DEVICE — TRACKER ENT INSTRUMENT

## (undated) DEVICE — PAD CURAD NONADH 3X4IN

## (undated) DEVICE — SYR 50CC LL

## (undated) DEVICE — GLOVE SENSICARE PI MICRO 7.5

## (undated) DEVICE — TOWEL OR XRAY BLUE 17X26IN

## (undated) DEVICE — POWDER ARISTA AH 3G

## (undated) DEVICE — BLADE SCALP OPHTL RND TIP